# Patient Record
Sex: MALE | Race: BLACK OR AFRICAN AMERICAN | Employment: OTHER | URBAN - METROPOLITAN AREA
[De-identification: names, ages, dates, MRNs, and addresses within clinical notes are randomized per-mention and may not be internally consistent; named-entity substitution may affect disease eponyms.]

---

## 2019-01-06 ENCOUNTER — APPOINTMENT (OUTPATIENT)
Dept: CT IMAGING | Age: 69
DRG: 435 | End: 2019-01-06
Payer: MEDICARE

## 2019-01-06 ENCOUNTER — HOSPITAL ENCOUNTER (INPATIENT)
Age: 69
LOS: 3 days | Discharge: HOME | DRG: 435 | End: 2019-01-10
Attending: EMERGENCY MEDICINE | Admitting: INTERNAL MEDICINE
Payer: MEDICARE

## 2019-01-06 ENCOUNTER — APPOINTMENT (OUTPATIENT)
Dept: ULTRASOUND IMAGING | Age: 69
DRG: 435 | End: 2019-01-06
Payer: MEDICARE

## 2019-01-06 ENCOUNTER — APPOINTMENT (OUTPATIENT)
Dept: GENERAL RADIOLOGY | Age: 69
DRG: 435 | End: 2019-01-06
Payer: MEDICARE

## 2019-01-06 DIAGNOSIS — E80.6 HYPERBILIRUBINEMIA: ICD-10-CM

## 2019-01-06 DIAGNOSIS — R17 JAUNDICE: Primary | ICD-10-CM

## 2019-01-06 LAB
ACETAMINOPHEN LEVEL: <5 MCG/ML (ref 10–30)
ALBUMIN SERPL-MCNC: 3.3 G/DL (ref 3.5–5.2)
ALP BLD-CCNC: 752 U/L (ref 40–129)
ALT SERPL-CCNC: 290 U/L (ref 0–40)
AMMONIA: 39 UMOL/L (ref 16–60)
AMPHETAMINE SCREEN, URINE: NOT DETECTED
ANION GAP SERPL CALCULATED.3IONS-SCNC: 14 MMOL/L (ref 7–16)
ANION GAP SERPL CALCULATED.3IONS-SCNC: 15 MMOL/L (ref 7–16)
ANISOCYTOSIS: ABNORMAL
APTT: 28.8 SEC (ref 24.5–35.1)
AST SERPL-CCNC: 389 U/L (ref 0–39)
BARBITURATE SCREEN URINE: NOT DETECTED
BASOPHILS ABSOLUTE: 0.11 E9/L (ref 0–0.2)
BASOPHILS RELATIVE PERCENT: 1.7 % (ref 0–2)
BENZODIAZEPINE SCREEN, URINE: NOT DETECTED
BILIRUB SERPL-MCNC: 22 MG/DL (ref 0–1.2)
BILIRUBIN DIRECT: 13.7 MG/DL (ref 0–0.3)
BILIRUBIN, INDIRECT: 8.3 MG/DL (ref 0–1)
BUN BLDV-MCNC: 10 MG/DL (ref 8–23)
BUN BLDV-MCNC: 12 MG/DL (ref 8–23)
CALCIUM SERPL-MCNC: 9 MG/DL (ref 8.6–10.2)
CALCIUM SERPL-MCNC: 9.1 MG/DL (ref 8.6–10.2)
CANNABINOID SCREEN URINE: POSITIVE
CEA: 3.6 NG/ML (ref 0–5.2)
CHLORIDE BLD-SCNC: 92 MMOL/L (ref 98–107)
CHLORIDE BLD-SCNC: 95 MMOL/L (ref 98–107)
CHLORIDE URINE RANDOM: 60 MMOL/L
CO2: 19 MMOL/L (ref 22–29)
CO2: 21 MMOL/L (ref 22–29)
COCAINE METABOLITE SCREEN URINE: NOT DETECTED
CREAT SERPL-MCNC: 0.7 MG/DL (ref 0.7–1.2)
CREAT SERPL-MCNC: 1.1 MG/DL (ref 0.7–1.2)
CREATININE URINE: 70 MG/DL (ref 40–278)
EKG ATRIAL RATE: 79 BPM
EKG P AXIS: 49 DEGREES
EKG P-R INTERVAL: 120 MS
EKG Q-T INTERVAL: 440 MS
EKG QRS DURATION: 140 MS
EKG QTC CALCULATION (BAZETT): 504 MS
EKG R AXIS: -58 DEGREES
EKG T AXIS: 7 DEGREES
EKG VENTRICULAR RATE: 79 BPM
EOSINOPHILS ABSOLUTE: 0 E9/L (ref 0.05–0.5)
EOSINOPHILS RELATIVE PERCENT: 1.1 % (ref 0–6)
ETHANOL: 25 MG/DL (ref 0–0.08)
GFR AFRICAN AMERICAN: >60
GFR AFRICAN AMERICAN: >60
GFR NON-AFRICAN AMERICAN: >60 ML/MIN/1.73
GFR NON-AFRICAN AMERICAN: >60 ML/MIN/1.73
GLUCOSE BLD-MCNC: 157 MG/DL (ref 74–99)
GLUCOSE BLD-MCNC: 276 MG/DL (ref 74–99)
HCT VFR BLD CALC: 28.7 % (ref 37–54)
HEMOGLOBIN: 9.8 G/DL (ref 12.5–16.5)
HYPOCHROMIA: ABNORMAL
INR BLD: 1
LACTIC ACID: 1.4 MMOL/L (ref 0.5–2.2)
LIPASE: 79 U/L (ref 13–60)
LYMPHOCYTES ABSOLUTE: 0.78 E9/L (ref 1.5–4)
LYMPHOCYTES RELATIVE PERCENT: 12.2 % (ref 20–42)
MCH RBC QN AUTO: 28.9 PG (ref 26–35)
MCHC RBC AUTO-ENTMCNC: 34.1 % (ref 32–34.5)
MCV RBC AUTO: 84.7 FL (ref 80–99.9)
METHADONE SCREEN, URINE: NOT DETECTED
MONOCYTES ABSOLUTE: 0.26 E9/L (ref 0.1–0.95)
MONOCYTES RELATIVE PERCENT: 3.5 % (ref 2–12)
MYELOCYTE PERCENT: 0.9 % (ref 0–0)
NEUTROPHILS ABSOLUTE: 5.4 E9/L (ref 1.8–7.3)
NEUTROPHILS RELATIVE PERCENT: 81.7 % (ref 43–80)
OPIATE SCREEN URINE: NOT DETECTED
OSMOLALITY URINE: 401 MOSM/KG (ref 300–900)
OSMOLALITY: 300 MOSM/KG (ref 285–310)
PDW BLD-RTO: 22.2 FL (ref 11.5–15)
PHENCYCLIDINE SCREEN URINE: NOT DETECTED
PLATELET # BLD: 348 E9/L (ref 130–450)
PMV BLD AUTO: 12.5 FL (ref 7–12)
POIKILOCYTES: ABNORMAL
POTASSIUM SERPL-SCNC: 4.3 MMOL/L (ref 3.5–5)
POTASSIUM SERPL-SCNC: 4.3 MMOL/L (ref 3.5–5)
POTASSIUM, UR: 30.4 MMOL/L
PROPOXYPHENE SCREEN: NOT DETECTED
PROTHROMBIN TIME: 11.8 SEC (ref 9.3–12.4)
RBC # BLD: 3.39 E12/L (ref 3.8–5.8)
SALICYLATE, SERUM: <0.3 MG/DL (ref 0–30)
SODIUM BLD-SCNC: 128 MMOL/L (ref 132–146)
SODIUM BLD-SCNC: 128 MMOL/L (ref 132–146)
SODIUM URINE: 49 MMOL/L
TARGET CELLS: ABNORMAL
TOTAL PROTEIN: 7 G/DL (ref 6.4–8.3)
TRICYCLIC ANTIDEPRESSANTS SCREEN SERUM: NEGATIVE NG/ML
TSH SERPL DL<=0.05 MIU/L-ACNC: 1.01 UIU/ML (ref 0.27–4.2)
UREA NITROGEN, UR: 363 MG/DL (ref 800–1666)
VITAMIN B-12: 1477 PG/ML (ref 211–946)
WBC # BLD: 6.5 E9/L (ref 4.5–11.5)

## 2019-01-06 PROCEDURE — 6360000002 HC RX W HCPCS: Performed by: INTERNAL MEDICINE

## 2019-01-06 PROCEDURE — G0480 DRUG TEST DEF 1-7 CLASSES: HCPCS

## 2019-01-06 PROCEDURE — 86316 IMMUNOASSAY TUMOR OTHER: CPT

## 2019-01-06 PROCEDURE — 76705 ECHO EXAM OF ABDOMEN: CPT

## 2019-01-06 PROCEDURE — 86703 HIV-1/HIV-2 1 RESULT ANTBDY: CPT

## 2019-01-06 PROCEDURE — 2580000003 HC RX 258: Performed by: EMERGENCY MEDICINE

## 2019-01-06 PROCEDURE — 83935 ASSAY OF URINE OSMOLALITY: CPT

## 2019-01-06 PROCEDURE — 82436 ASSAY OF URINE CHLORIDE: CPT

## 2019-01-06 PROCEDURE — G0378 HOSPITAL OBSERVATION PER HR: HCPCS

## 2019-01-06 PROCEDURE — 84443 ASSAY THYROID STIM HORMONE: CPT

## 2019-01-06 PROCEDURE — 82140 ASSAY OF AMMONIA: CPT

## 2019-01-06 PROCEDURE — 94761 N-INVAS EAR/PLS OXIMETRY MLT: CPT

## 2019-01-06 PROCEDURE — 82607 VITAMIN B-12: CPT

## 2019-01-06 PROCEDURE — 84540 ASSAY OF URINE/UREA-N: CPT

## 2019-01-06 PROCEDURE — 80048 BASIC METABOLIC PNL TOTAL CA: CPT

## 2019-01-06 PROCEDURE — 84133 ASSAY OF URINE POTASSIUM: CPT

## 2019-01-06 PROCEDURE — 85610 PROTHROMBIN TIME: CPT

## 2019-01-06 PROCEDURE — 99223 1ST HOSP IP/OBS HIGH 75: CPT | Performed by: SURGERY

## 2019-01-06 PROCEDURE — 83930 ASSAY OF BLOOD OSMOLALITY: CPT

## 2019-01-06 PROCEDURE — 99285 EMERGENCY DEPT VISIT HI MDM: CPT

## 2019-01-06 PROCEDURE — 80076 HEPATIC FUNCTION PANEL: CPT

## 2019-01-06 PROCEDURE — 83605 ASSAY OF LACTIC ACID: CPT

## 2019-01-06 PROCEDURE — 74175 CTA ABDOMEN W/CONTRAST: CPT

## 2019-01-06 PROCEDURE — 6360000004 HC RX CONTRAST MEDICATION: Performed by: RADIOLOGY

## 2019-01-06 PROCEDURE — 80074 ACUTE HEPATITIS PANEL: CPT

## 2019-01-06 PROCEDURE — 85730 THROMBOPLASTIN TIME PARTIAL: CPT

## 2019-01-06 PROCEDURE — 36415 COLL VENOUS BLD VENIPUNCTURE: CPT

## 2019-01-06 PROCEDURE — 85025 COMPLETE CBC W/AUTO DIFF WBC: CPT

## 2019-01-06 PROCEDURE — 84425 ASSAY OF VITAMIN B-1: CPT

## 2019-01-06 PROCEDURE — 93005 ELECTROCARDIOGRAM TRACING: CPT | Performed by: INTERNAL MEDICINE

## 2019-01-06 PROCEDURE — 86301 IMMUNOASSAY TUMOR CA 19-9: CPT

## 2019-01-06 PROCEDURE — 84300 ASSAY OF URINE SODIUM: CPT

## 2019-01-06 PROCEDURE — 2580000003 HC RX 258: Performed by: INTERNAL MEDICINE

## 2019-01-06 PROCEDURE — 2500000003 HC RX 250 WO HCPCS: Performed by: INTERNAL MEDICINE

## 2019-01-06 PROCEDURE — 71045 X-RAY EXAM CHEST 1 VIEW: CPT

## 2019-01-06 PROCEDURE — 82570 ASSAY OF URINE CREATININE: CPT

## 2019-01-06 PROCEDURE — 83690 ASSAY OF LIPASE: CPT

## 2019-01-06 PROCEDURE — 80307 DRUG TEST PRSMV CHEM ANLYZR: CPT

## 2019-01-06 PROCEDURE — 82378 CARCINOEMBRYONIC ANTIGEN: CPT

## 2019-01-06 PROCEDURE — 96372 THER/PROPH/DIAG INJ SC/IM: CPT

## 2019-01-06 RX ORDER — LORAZEPAM 2 MG/ML
2 INJECTION INTRAMUSCULAR
Status: DISCONTINUED | OUTPATIENT
Start: 2019-01-06 | End: 2019-01-11 | Stop reason: HOSPADM

## 2019-01-06 RX ORDER — SODIUM CHLORIDE 0.9 % (FLUSH) 0.9 %
10 SYRINGE (ML) INJECTION EVERY 12 HOURS SCHEDULED
Status: DISCONTINUED | OUTPATIENT
Start: 2019-01-06 | End: 2019-01-11 | Stop reason: HOSPADM

## 2019-01-06 RX ORDER — SODIUM CHLORIDE 0.9 % (FLUSH) 0.9 %
10 SYRINGE (ML) INJECTION PRN
Status: DISCONTINUED | OUTPATIENT
Start: 2019-01-06 | End: 2019-01-06 | Stop reason: SDUPTHER

## 2019-01-06 RX ORDER — LORAZEPAM 2 MG/ML
3 INJECTION INTRAMUSCULAR
Status: DISCONTINUED | OUTPATIENT
Start: 2019-01-06 | End: 2019-01-11 | Stop reason: HOSPADM

## 2019-01-06 RX ORDER — ONDANSETRON 2 MG/ML
4 INJECTION INTRAMUSCULAR; INTRAVENOUS EVERY 6 HOURS PRN
Status: DISCONTINUED | OUTPATIENT
Start: 2019-01-06 | End: 2019-01-11 | Stop reason: HOSPADM

## 2019-01-06 RX ORDER — LORAZEPAM 1 MG/1
4 TABLET ORAL
Status: DISCONTINUED | OUTPATIENT
Start: 2019-01-06 | End: 2019-01-11 | Stop reason: HOSPADM

## 2019-01-06 RX ORDER — LORAZEPAM 1 MG/1
3 TABLET ORAL
Status: DISCONTINUED | OUTPATIENT
Start: 2019-01-06 | End: 2019-01-11 | Stop reason: HOSPADM

## 2019-01-06 RX ORDER — LORAZEPAM 1 MG/1
2 TABLET ORAL
Status: DISCONTINUED | OUTPATIENT
Start: 2019-01-06 | End: 2019-01-11 | Stop reason: HOSPADM

## 2019-01-06 RX ORDER — LORAZEPAM 2 MG/ML
1 INJECTION INTRAMUSCULAR
Status: DISCONTINUED | OUTPATIENT
Start: 2019-01-06 | End: 2019-01-11 | Stop reason: HOSPADM

## 2019-01-06 RX ORDER — LORAZEPAM 2 MG/ML
4 INJECTION INTRAMUSCULAR
Status: DISCONTINUED | OUTPATIENT
Start: 2019-01-06 | End: 2019-01-11 | Stop reason: HOSPADM

## 2019-01-06 RX ORDER — SODIUM CHLORIDE 0.9 % (FLUSH) 0.9 %
10 SYRINGE (ML) INJECTION EVERY 12 HOURS SCHEDULED
Status: DISCONTINUED | OUTPATIENT
Start: 2019-01-06 | End: 2019-01-06 | Stop reason: SDUPTHER

## 2019-01-06 RX ORDER — LORAZEPAM 1 MG/1
1 TABLET ORAL
Status: DISCONTINUED | OUTPATIENT
Start: 2019-01-06 | End: 2019-01-11 | Stop reason: HOSPADM

## 2019-01-06 RX ORDER — HEPARIN SODIUM 10000 [USP'U]/ML
5000 INJECTION, SOLUTION INTRAVENOUS; SUBCUTANEOUS 3 TIMES DAILY
Status: DISCONTINUED | OUTPATIENT
Start: 2019-01-06 | End: 2019-01-11 | Stop reason: HOSPADM

## 2019-01-06 RX ORDER — SODIUM CHLORIDE 0.9 % (FLUSH) 0.9 %
10 SYRINGE (ML) INJECTION PRN
Status: DISCONTINUED | OUTPATIENT
Start: 2019-01-06 | End: 2019-01-11 | Stop reason: HOSPADM

## 2019-01-06 RX ORDER — SODIUM CHLORIDE 9 MG/ML
INJECTION, SOLUTION INTRAVENOUS CONTINUOUS
Status: DISCONTINUED | OUTPATIENT
Start: 2019-01-06 | End: 2019-01-11 | Stop reason: HOSPADM

## 2019-01-06 RX ADMIN — HEPARIN SODIUM 5000 UNITS: 10000 INJECTION INTRAVENOUS; SUBCUTANEOUS at 20:40

## 2019-01-06 RX ADMIN — FOLIC ACID: 5 INJECTION, SOLUTION INTRAMUSCULAR; INTRAVENOUS; SUBCUTANEOUS at 16:16

## 2019-01-06 RX ADMIN — IOPAMIDOL 90 ML: 755 INJECTION, SOLUTION INTRAVENOUS at 10:08

## 2019-01-06 RX ADMIN — Medication 10 ML: at 20:41

## 2019-01-06 RX ADMIN — SODIUM CHLORIDE: 9 INJECTION, SOLUTION INTRAVENOUS at 13:38

## 2019-01-06 RX ADMIN — SODIUM CHLORIDE: 9 INJECTION, SOLUTION INTRAVENOUS at 06:40

## 2019-01-06 ASSESSMENT — ENCOUNTER SYMPTOMS
EYE PAIN: 0
PHOTOPHOBIA: 0
VOMITING: 0
ABDOMINAL PAIN: 0
ANAL BLEEDING: 0
NAUSEA: 0
DIARRHEA: 0
EYE ITCHING: 1
EYE DISCHARGE: 0
SINUS PRESSURE: 0
SHORTNESS OF BREATH: 0
CHOKING: 0
SORE THROAT: 0
BLOOD IN STOOL: 0
WHEEZING: 0
COUGH: 0
RECTAL PAIN: 0
RHINORRHEA: 0
ABDOMINAL DISTENTION: 0
CONSTIPATION: 0
COLOR CHANGE: 1
SINUS PAIN: 0
FACIAL SWELLING: 0
EYE REDNESS: 0
APNEA: 0
BACK PAIN: 0
CHEST TIGHTNESS: 0

## 2019-01-06 ASSESSMENT — PAIN SCALES - GENERAL
PAINLEVEL_OUTOF10: 0
PAINLEVEL_OUTOF10: 0

## 2019-01-07 ENCOUNTER — APPOINTMENT (OUTPATIENT)
Dept: CT IMAGING | Age: 69
DRG: 435 | End: 2019-01-07
Payer: MEDICARE

## 2019-01-07 PROBLEM — K83.1 OBSTRUCTIVE JAUNDICE: Status: ACTIVE | Noted: 2019-01-07

## 2019-01-07 LAB
ALBUMIN SERPL-MCNC: 2.8 G/DL (ref 3.5–5.2)
ALP BLD-CCNC: 666 U/L (ref 40–129)
ALT SERPL-CCNC: 261 U/L (ref 0–40)
ANION GAP SERPL CALCULATED.3IONS-SCNC: 11 MMOL/L (ref 7–16)
ANION GAP SERPL CALCULATED.3IONS-SCNC: 16 MMOL/L (ref 7–16)
AST SERPL-CCNC: 298 U/L (ref 0–39)
BILIRUB SERPL-MCNC: 19.9 MG/DL (ref 0–1.2)
BUN BLDV-MCNC: 13 MG/DL (ref 8–23)
BUN BLDV-MCNC: 14 MG/DL (ref 8–23)
CALCIUM SERPL-MCNC: 8.8 MG/DL (ref 8.6–10.2)
CALCIUM SERPL-MCNC: 8.8 MG/DL (ref 8.6–10.2)
CHLORIDE BLD-SCNC: 96 MMOL/L (ref 98–107)
CHLORIDE BLD-SCNC: 97 MMOL/L (ref 98–107)
CO2: 18 MMOL/L (ref 22–29)
CO2: 21 MMOL/L (ref 22–29)
CREAT SERPL-MCNC: 0.8 MG/DL (ref 0.7–1.2)
CREAT SERPL-MCNC: 0.8 MG/DL (ref 0.7–1.2)
GFR AFRICAN AMERICAN: >60
GFR AFRICAN AMERICAN: >60
GFR NON-AFRICAN AMERICAN: >60 ML/MIN/1.73
GFR NON-AFRICAN AMERICAN: >60 ML/MIN/1.73
GLUCOSE BLD-MCNC: 147 MG/DL (ref 74–99)
GLUCOSE BLD-MCNC: 153 MG/DL (ref 74–99)
HAV IGM SER IA-ACNC: NORMAL
HCT VFR BLD CALC: 28.5 % (ref 37–54)
HEMOGLOBIN: 10 G/DL (ref 12.5–16.5)
HEPATITIS B CORE IGM ANTIBODY: NORMAL
HEPATITIS B SURFACE ANTIGEN INTERPRETATION: NORMAL
HEPATITIS C ANTIBODY INTERPRETATION: NORMAL
HIV-1 AND HIV-2 ANTIBODIES: NORMAL
MCH RBC QN AUTO: 29.9 PG (ref 26–35)
MCHC RBC AUTO-ENTMCNC: 35.1 % (ref 32–34.5)
MCV RBC AUTO: 85.3 FL (ref 80–99.9)
PDW BLD-RTO: 22.9 FL (ref 11.5–15)
PLATELET # BLD: 313 E9/L (ref 130–450)
PMV BLD AUTO: 11.3 FL (ref 7–12)
POTASSIUM SERPL-SCNC: 3.6 MMOL/L (ref 3.5–5)
POTASSIUM SERPL-SCNC: 4 MMOL/L (ref 3.5–5)
RBC # BLD: 3.34 E12/L (ref 3.8–5.8)
SODIUM BLD-SCNC: 128 MMOL/L (ref 132–146)
SODIUM BLD-SCNC: 131 MMOL/L (ref 132–146)
TOTAL PROTEIN: 6.4 G/DL (ref 6.4–8.3)
WBC # BLD: 6.4 E9/L (ref 4.5–11.5)

## 2019-01-07 PROCEDURE — 2580000003 HC RX 258: Performed by: TRANSPLANT SURGERY

## 2019-01-07 PROCEDURE — 6360000002 HC RX W HCPCS: Performed by: INTERNAL MEDICINE

## 2019-01-07 PROCEDURE — 6370000000 HC RX 637 (ALT 250 FOR IP): Performed by: INTERNAL MEDICINE

## 2019-01-07 PROCEDURE — 71260 CT THORAX DX C+: CPT

## 2019-01-07 PROCEDURE — 80053 COMPREHEN METABOLIC PANEL: CPT

## 2019-01-07 PROCEDURE — 2580000003 HC RX 258: Performed by: INTERNAL MEDICINE

## 2019-01-07 PROCEDURE — 2500000003 HC RX 250 WO HCPCS: Performed by: TRANSPLANT SURGERY

## 2019-01-07 PROCEDURE — 36415 COLL VENOUS BLD VENIPUNCTURE: CPT

## 2019-01-07 PROCEDURE — 99221 1ST HOSP IP/OBS SF/LOW 40: CPT | Performed by: EMERGENCY MEDICINE

## 2019-01-07 PROCEDURE — 80048 BASIC METABOLIC PNL TOTAL CA: CPT

## 2019-01-07 PROCEDURE — 85027 COMPLETE CBC AUTOMATED: CPT

## 2019-01-07 PROCEDURE — 93010 ELECTROCARDIOGRAM REPORT: CPT | Performed by: INTERNAL MEDICINE

## 2019-01-07 PROCEDURE — 6360000004 HC RX CONTRAST MEDICATION: Performed by: RADIOLOGY

## 2019-01-07 PROCEDURE — 99223 1ST HOSP IP/OBS HIGH 75: CPT | Performed by: INTERNAL MEDICINE

## 2019-01-07 PROCEDURE — 2500000003 HC RX 250 WO HCPCS: Performed by: INTERNAL MEDICINE

## 2019-01-07 PROCEDURE — 6360000002 HC RX W HCPCS: Performed by: TRANSPLANT SURGERY

## 2019-01-07 PROCEDURE — 1200000000 HC SEMI PRIVATE

## 2019-01-07 RX ORDER — LISINOPRIL 5 MG/1
5 TABLET ORAL DAILY
Status: DISCONTINUED | OUTPATIENT
Start: 2019-01-07 | End: 2019-01-07

## 2019-01-07 RX ORDER — PRAVASTATIN SODIUM 20 MG
80 TABLET ORAL NIGHTLY
Status: DISCONTINUED | OUTPATIENT
Start: 2019-01-07 | End: 2019-01-11 | Stop reason: HOSPADM

## 2019-01-07 RX ORDER — GABAPENTIN 100 MG/1
100 CAPSULE ORAL 2 TIMES DAILY PRN
Status: DISCONTINUED | OUTPATIENT
Start: 2019-01-07 | End: 2019-01-11 | Stop reason: HOSPADM

## 2019-01-07 RX ORDER — LISINOPRIL 5 MG/1
5 TABLET ORAL ONCE
Status: COMPLETED | OUTPATIENT
Start: 2019-01-07 | End: 2019-01-07

## 2019-01-07 RX ORDER — METOPROLOL SUCCINATE 50 MG/1
50 TABLET, EXTENDED RELEASE ORAL DAILY
Status: DISCONTINUED | OUTPATIENT
Start: 2019-01-07 | End: 2019-01-11 | Stop reason: HOSPADM

## 2019-01-07 RX ORDER — LISINOPRIL 20 MG/1
20 TABLET ORAL DAILY
Status: DISCONTINUED | OUTPATIENT
Start: 2019-01-08 | End: 2019-01-11 | Stop reason: HOSPADM

## 2019-01-07 RX ADMIN — IOPAMIDOL 90 ML: 755 INJECTION, SOLUTION INTRAVENOUS at 15:36

## 2019-01-07 RX ADMIN — HEPARIN SODIUM 5000 UNITS: 10000 INJECTION INTRAVENOUS; SUBCUTANEOUS at 21:12

## 2019-01-07 RX ADMIN — METRONIDAZOLE 500 MG: 500 INJECTION, SOLUTION INTRAVENOUS at 13:45

## 2019-01-07 RX ADMIN — LISINOPRIL 5 MG: 5 TABLET ORAL at 21:12

## 2019-01-07 RX ADMIN — METOPROLOL SUCCINATE 50 MG: 50 TABLET, FILM COATED, EXTENDED RELEASE ORAL at 10:32

## 2019-01-07 RX ADMIN — WATER 2 G: 1 INJECTION INTRAMUSCULAR; INTRAVENOUS; SUBCUTANEOUS at 17:03

## 2019-01-07 RX ADMIN — LISINOPRIL 5 MG: 5 TABLET ORAL at 10:32

## 2019-01-07 RX ADMIN — FOLIC ACID: 5 INJECTION, SOLUTION INTRAMUSCULAR; INTRAVENOUS; SUBCUTANEOUS at 08:33

## 2019-01-07 RX ADMIN — PRAVASTATIN SODIUM 80 MG: 20 TABLET ORAL at 21:13

## 2019-01-07 RX ADMIN — HEPARIN SODIUM 5000 UNITS: 10000 INJECTION INTRAVENOUS; SUBCUTANEOUS at 08:33

## 2019-01-07 RX ADMIN — HEPARIN SODIUM 5000 UNITS: 10000 INJECTION INTRAVENOUS; SUBCUTANEOUS at 13:41

## 2019-01-07 ASSESSMENT — PAIN SCALES - GENERAL
PAINLEVEL_OUTOF10: 0
PAINLEVEL_OUTOF10: 0

## 2019-01-08 PROBLEM — K83.1 OBSTRUCTIVE JAUNDICE DUE TO MALIGNANT NEOPLASM (HCC): Status: ACTIVE | Noted: 2019-01-08

## 2019-01-08 PROBLEM — E44.0 MODERATE PROTEIN-CALORIE MALNUTRITION (HCC): Chronic | Status: ACTIVE | Noted: 2019-01-08

## 2019-01-08 PROBLEM — C80.1 OBSTRUCTIVE JAUNDICE DUE TO MALIGNANT NEOPLASM (HCC): Status: ACTIVE | Noted: 2019-01-08

## 2019-01-08 LAB
ALBUMIN SERPL-MCNC: 2.5 G/DL (ref 3.5–5.2)
ALP BLD-CCNC: 584 U/L (ref 40–129)
ALT SERPL-CCNC: 211 U/L (ref 0–40)
ANION GAP SERPL CALCULATED.3IONS-SCNC: 12 MMOL/L (ref 7–16)
AST SERPL-CCNC: 226 U/L (ref 0–39)
BILIRUB SERPL-MCNC: 17.8 MG/DL (ref 0–1.2)
BUN BLDV-MCNC: 13 MG/DL (ref 8–23)
CA 19-9: 239 U/ML (ref 0–37)
CALCIUM SERPL-MCNC: 8.4 MG/DL (ref 8.6–10.2)
CHLORIDE BLD-SCNC: 97 MMOL/L (ref 98–107)
CHLORIDE URINE RANDOM: 94 MMOL/L
CO2: 20 MMOL/L (ref 22–29)
CREAT SERPL-MCNC: 0.8 MG/DL (ref 0.7–1.2)
GFR AFRICAN AMERICAN: >60
GFR NON-AFRICAN AMERICAN: >60 ML/MIN/1.73
GLUCOSE BLD-MCNC: 135 MG/DL (ref 74–99)
INR BLD: 1
OSMOLALITY URINE: 663 MOSM/KG (ref 300–900)
OSMOLALITY: 287 MOSM/KG (ref 285–310)
POTASSIUM SERPL-SCNC: 3.7 MMOL/L (ref 3.5–5)
POTASSIUM, UR: 34.3 MMOL/L
PROTHROMBIN TIME: 11.4 SEC (ref 9.3–12.4)
SODIUM BLD-SCNC: 129 MMOL/L (ref 132–146)
SODIUM URINE: 86 MMOL/L
TOTAL PROTEIN: 5.7 G/DL (ref 6.4–8.3)

## 2019-01-08 PROCEDURE — 1200000000 HC SEMI PRIVATE

## 2019-01-08 PROCEDURE — 84300 ASSAY OF URINE SODIUM: CPT

## 2019-01-08 PROCEDURE — 6370000000 HC RX 637 (ALT 250 FOR IP): Performed by: INTERNAL MEDICINE

## 2019-01-08 PROCEDURE — 36415 COLL VENOUS BLD VENIPUNCTURE: CPT

## 2019-01-08 PROCEDURE — 2580000003 HC RX 258: Performed by: TRANSPLANT SURGERY

## 2019-01-08 PROCEDURE — 80053 COMPREHEN METABOLIC PANEL: CPT

## 2019-01-08 PROCEDURE — 2500000003 HC RX 250 WO HCPCS: Performed by: TRANSPLANT SURGERY

## 2019-01-08 PROCEDURE — 99232 SBSQ HOSP IP/OBS MODERATE 35: CPT | Performed by: TRANSPLANT SURGERY

## 2019-01-08 PROCEDURE — 83935 ASSAY OF URINE OSMOLALITY: CPT

## 2019-01-08 PROCEDURE — 6370000000 HC RX 637 (ALT 250 FOR IP): Performed by: EMERGENCY MEDICINE

## 2019-01-08 PROCEDURE — 85610 PROTHROMBIN TIME: CPT

## 2019-01-08 PROCEDURE — 2500000003 HC RX 250 WO HCPCS: Performed by: INTERNAL MEDICINE

## 2019-01-08 PROCEDURE — 99231 SBSQ HOSP IP/OBS SF/LOW 25: CPT | Performed by: PHYSICIAN ASSISTANT

## 2019-01-08 PROCEDURE — 6360000002 HC RX W HCPCS: Performed by: INTERNAL MEDICINE

## 2019-01-08 PROCEDURE — 2580000003 HC RX 258: Performed by: EMERGENCY MEDICINE

## 2019-01-08 PROCEDURE — 2580000003 HC RX 258: Performed by: INTERNAL MEDICINE

## 2019-01-08 PROCEDURE — 83930 ASSAY OF BLOOD OSMOLALITY: CPT

## 2019-01-08 PROCEDURE — 6360000002 HC RX W HCPCS: Performed by: TRANSPLANT SURGERY

## 2019-01-08 PROCEDURE — 82436 ASSAY OF URINE CHLORIDE: CPT

## 2019-01-08 PROCEDURE — 84133 ASSAY OF URINE POTASSIUM: CPT

## 2019-01-08 PROCEDURE — 99232 SBSQ HOSP IP/OBS MODERATE 35: CPT | Performed by: INTERNAL MEDICINE

## 2019-01-08 RX ORDER — PANTOPRAZOLE SODIUM 40 MG/1
40 TABLET, DELAYED RELEASE ORAL
Status: DISCONTINUED | OUTPATIENT
Start: 2019-01-08 | End: 2019-01-11 | Stop reason: HOSPADM

## 2019-01-08 RX ADMIN — METRONIDAZOLE 500 MG: 500 INJECTION, SOLUTION INTRAVENOUS at 13:36

## 2019-01-08 RX ADMIN — WATER 2 G: 1 INJECTION INTRAMUSCULAR; INTRAVENOUS; SUBCUTANEOUS at 13:36

## 2019-01-08 RX ADMIN — HEPARIN SODIUM 5000 UNITS: 10000 INJECTION INTRAVENOUS; SUBCUTANEOUS at 13:39

## 2019-01-08 RX ADMIN — METRONIDAZOLE 500 MG: 500 INJECTION, SOLUTION INTRAVENOUS at 05:21

## 2019-01-08 RX ADMIN — LISINOPRIL 20 MG: 20 TABLET ORAL at 08:28

## 2019-01-08 RX ADMIN — SODIUM CHLORIDE: 9 INJECTION, SOLUTION INTRAVENOUS at 23:16

## 2019-01-08 RX ADMIN — PANTOPRAZOLE SODIUM 40 MG: 40 TABLET, DELAYED RELEASE ORAL at 09:44

## 2019-01-08 RX ADMIN — METRONIDAZOLE 500 MG: 500 INJECTION, SOLUTION INTRAVENOUS at 23:09

## 2019-01-08 RX ADMIN — METOPROLOL SUCCINATE 50 MG: 50 TABLET, FILM COATED, EXTENDED RELEASE ORAL at 08:28

## 2019-01-08 RX ADMIN — FOLIC ACID: 5 INJECTION, SOLUTION INTRAMUSCULAR; INTRAVENOUS; SUBCUTANEOUS at 08:30

## 2019-01-08 RX ADMIN — PRAVASTATIN SODIUM 80 MG: 20 TABLET ORAL at 23:08

## 2019-01-08 RX ADMIN — Medication 10 ML: at 23:08

## 2019-01-08 RX ADMIN — HEPARIN SODIUM 5000 UNITS: 10000 INJECTION INTRAVENOUS; SUBCUTANEOUS at 23:25

## 2019-01-08 RX ADMIN — GABAPENTIN 100 MG: 100 CAPSULE ORAL at 16:32

## 2019-01-08 RX ADMIN — HEPARIN SODIUM 5000 UNITS: 10000 INJECTION INTRAVENOUS; SUBCUTANEOUS at 08:32

## 2019-01-08 ASSESSMENT — PAIN SCALES - GENERAL
PAINLEVEL_OUTOF10: 0

## 2019-01-09 LAB
ALBUMIN SERPL-MCNC: 2.5 G/DL (ref 3.5–5.2)
ALP BLD-CCNC: 518 U/L (ref 40–129)
ALT SERPL-CCNC: 185 U/L (ref 0–40)
ANION GAP SERPL CALCULATED.3IONS-SCNC: 12 MMOL/L (ref 7–16)
ANISOCYTOSIS: ABNORMAL
AST SERPL-CCNC: 172 U/L (ref 0–39)
BASOPHILS ABSOLUTE: 0 E9/L (ref 0–0.2)
BASOPHILS RELATIVE PERCENT: 0.4 % (ref 0–2)
BILIRUB SERPL-MCNC: 16.6 MG/DL (ref 0–1.2)
BUN BLDV-MCNC: 11 MG/DL (ref 8–23)
CALCIUM SERPL-MCNC: 8.2 MG/DL (ref 8.6–10.2)
CHLORIDE BLD-SCNC: 97 MMOL/L (ref 98–107)
CHROMOGRANIN A: 88 NG/ML (ref 0–95)
CO2: 19 MMOL/L (ref 22–29)
CORTISOL TOTAL: 18.23 MCG/DL (ref 2.68–18.4)
CREAT SERPL-MCNC: 0.8 MG/DL (ref 0.7–1.2)
EOSINOPHILS ABSOLUTE: 0.13 E9/L (ref 0.05–0.5)
EOSINOPHILS RELATIVE PERCENT: 2.6 % (ref 0–6)
GFR AFRICAN AMERICAN: >60
GFR NON-AFRICAN AMERICAN: >60 ML/MIN/1.73
GLUCOSE BLD-MCNC: 154 MG/DL (ref 74–99)
HCT VFR BLD CALC: 24.6 % (ref 37–54)
HEMOGLOBIN: 8.6 G/DL (ref 12.5–16.5)
HYPOCHROMIA: ABNORMAL
LYMPHOCYTES ABSOLUTE: 0.54 E9/L (ref 1.5–4)
LYMPHOCYTES RELATIVE PERCENT: 10.5 % (ref 20–42)
MCH RBC QN AUTO: 29.6 PG (ref 26–35)
MCHC RBC AUTO-ENTMCNC: 35 % (ref 32–34.5)
MCV RBC AUTO: 84.5 FL (ref 80–99.9)
MONOCYTES ABSOLUTE: 0.29 E9/L (ref 0.1–0.95)
MONOCYTES RELATIVE PERCENT: 6.1 % (ref 2–12)
NEUTROPHILS ABSOLUTE: 3.97 E9/L (ref 1.8–7.3)
NEUTROPHILS RELATIVE PERCENT: 80.7 % (ref 43–80)
PDW BLD-RTO: 22.9 FL (ref 11.5–15)
PLATELET # BLD: 341 E9/L (ref 130–450)
PMV BLD AUTO: 12.1 FL (ref 7–12)
POIKILOCYTES: ABNORMAL
POTASSIUM SERPL-SCNC: 3.8 MMOL/L (ref 3.5–5)
RBC # BLD: 2.91 E12/L (ref 3.8–5.8)
SODIUM BLD-SCNC: 128 MMOL/L (ref 132–146)
TARGET CELLS: ABNORMAL
TOTAL PROTEIN: 5.6 G/DL (ref 6.4–8.3)
URIC ACID, SERUM: 2.8 MG/DL (ref 3.4–7)
WBC # BLD: 4.9 E9/L (ref 4.5–11.5)

## 2019-01-09 PROCEDURE — 82533 TOTAL CORTISOL: CPT

## 2019-01-09 PROCEDURE — 85025 COMPLETE CBC W/AUTO DIFF WBC: CPT

## 2019-01-09 PROCEDURE — 6370000000 HC RX 637 (ALT 250 FOR IP): Performed by: INTERNAL MEDICINE

## 2019-01-09 PROCEDURE — 80053 COMPREHEN METABOLIC PANEL: CPT

## 2019-01-09 PROCEDURE — 1200000000 HC SEMI PRIVATE

## 2019-01-09 PROCEDURE — 84550 ASSAY OF BLOOD/URIC ACID: CPT

## 2019-01-09 PROCEDURE — 99232 SBSQ HOSP IP/OBS MODERATE 35: CPT | Performed by: INTERNAL MEDICINE

## 2019-01-09 PROCEDURE — 2580000003 HC RX 258: Performed by: EMERGENCY MEDICINE

## 2019-01-09 PROCEDURE — 6360000002 HC RX W HCPCS: Performed by: INTERNAL MEDICINE

## 2019-01-09 PROCEDURE — 2500000003 HC RX 250 WO HCPCS: Performed by: TRANSPLANT SURGERY

## 2019-01-09 PROCEDURE — 36415 COLL VENOUS BLD VENIPUNCTURE: CPT

## 2019-01-09 RX ORDER — GABAPENTIN 100 MG/1
100 CAPSULE ORAL 2 TIMES DAILY PRN
Status: CANCELLED | OUTPATIENT
Start: 2019-01-09

## 2019-01-09 RX ORDER — PRAVASTATIN SODIUM 20 MG
80 TABLET ORAL NIGHTLY
Status: CANCELLED | OUTPATIENT
Start: 2019-01-09

## 2019-01-09 RX ORDER — LISINOPRIL 20 MG/1
20 TABLET ORAL DAILY
Status: CANCELLED | OUTPATIENT
Start: 2019-01-09

## 2019-01-09 RX ORDER — PANTOPRAZOLE SODIUM 40 MG/1
40 TABLET, DELAYED RELEASE ORAL
Status: CANCELLED | OUTPATIENT
Start: 2019-01-10

## 2019-01-09 RX ORDER — METOPROLOL SUCCINATE 50 MG/1
50 TABLET, EXTENDED RELEASE ORAL DAILY
Status: CANCELLED | OUTPATIENT
Start: 2019-01-09

## 2019-01-09 RX ADMIN — PRAVASTATIN SODIUM 80 MG: 20 TABLET ORAL at 22:06

## 2019-01-09 RX ADMIN — METRONIDAZOLE 500 MG: 500 INJECTION, SOLUTION INTRAVENOUS at 22:09

## 2019-01-09 RX ADMIN — METRONIDAZOLE 500 MG: 500 INJECTION, SOLUTION INTRAVENOUS at 06:59

## 2019-01-09 RX ADMIN — PANTOPRAZOLE SODIUM 40 MG: 40 TABLET, DELAYED RELEASE ORAL at 06:59

## 2019-01-09 RX ADMIN — HEPARIN SODIUM 5000 UNITS: 10000 INJECTION INTRAVENOUS; SUBCUTANEOUS at 22:11

## 2019-01-09 RX ADMIN — SODIUM CHLORIDE: 9 INJECTION, SOLUTION INTRAVENOUS at 22:07

## 2019-01-10 ENCOUNTER — TELEPHONE (OUTPATIENT)
Dept: HEMATOLOGY | Age: 69
End: 2019-01-10

## 2019-01-10 VITALS
DIASTOLIC BLOOD PRESSURE: 77 MMHG | HEIGHT: 70 IN | HEART RATE: 78 BPM | WEIGHT: 161.6 LBS | SYSTOLIC BLOOD PRESSURE: 157 MMHG | TEMPERATURE: 100.4 F | RESPIRATION RATE: 20 BRPM | OXYGEN SATURATION: 97 % | BODY MASS INDEX: 23.13 KG/M2

## 2019-01-10 LAB
ALBUMIN SERPL-MCNC: 2.6 G/DL (ref 3.5–5.2)
ALP BLD-CCNC: 495 U/L (ref 40–129)
ALT SERPL-CCNC: 170 U/L (ref 0–40)
ANION GAP SERPL CALCULATED.3IONS-SCNC: 11 MMOL/L (ref 7–16)
ANISOCYTOSIS: ABNORMAL
AST SERPL-CCNC: 147 U/L (ref 0–39)
BASOPHILS ABSOLUTE: 0.03 E9/L (ref 0–0.2)
BASOPHILS RELATIVE PERCENT: 0.5 % (ref 0–2)
BILIRUB SERPL-MCNC: 10.7 MG/DL (ref 0–1.2)
BUN BLDV-MCNC: 9 MG/DL (ref 8–23)
CALCIUM SERPL-MCNC: 8.4 MG/DL (ref 8.6–10.2)
CANNABINOIDS CONF, URINE: >500 NG/ML
CHLORIDE BLD-SCNC: 98 MMOL/L (ref 98–107)
CO2: 22 MMOL/L (ref 22–29)
CREAT SERPL-MCNC: 0.8 MG/DL (ref 0.7–1.2)
EOSINOPHILS ABSOLUTE: 0.07 E9/L (ref 0.05–0.5)
EOSINOPHILS RELATIVE PERCENT: 1.2 % (ref 0–6)
GFR AFRICAN AMERICAN: >60
GFR NON-AFRICAN AMERICAN: >60 ML/MIN/1.73
GLUCOSE BLD-MCNC: 148 MG/DL (ref 74–99)
HCT VFR BLD CALC: 28 % (ref 37–54)
HEMOGLOBIN: 9.2 G/DL (ref 12.5–16.5)
HYPOCHROMIA: ABNORMAL
IMMATURE GRANULOCYTES #: 0.07 E9/L
IMMATURE GRANULOCYTES %: 1.2 % (ref 0–5)
LV EF: 53 %
LVEF MODALITY: NORMAL
LYMPHOCYTES ABSOLUTE: 0.6 E9/L (ref 1.5–4)
LYMPHOCYTES RELATIVE PERCENT: 10.2 % (ref 20–42)
MCH RBC QN AUTO: 29.1 PG (ref 26–35)
MCHC RBC AUTO-ENTMCNC: 32.9 % (ref 32–34.5)
MCV RBC AUTO: 88.6 FL (ref 80–99.9)
MONOCYTES ABSOLUTE: 0.55 E9/L (ref 0.1–0.95)
MONOCYTES RELATIVE PERCENT: 9.4 % (ref 2–12)
NEUTROPHILS ABSOLUTE: 4.56 E9/L (ref 1.8–7.3)
NEUTROPHILS RELATIVE PERCENT: 77.5 % (ref 43–80)
PDW BLD-RTO: 24.4 FL (ref 11.5–15)
PLATELET # BLD: 328 E9/L (ref 130–450)
PMV BLD AUTO: 12.1 FL (ref 7–12)
POIKILOCYTES: ABNORMAL
POTASSIUM SERPL-SCNC: 3.7 MMOL/L (ref 3.5–5)
RBC # BLD: 3.16 E12/L (ref 3.8–5.8)
SODIUM BLD-SCNC: 131 MMOL/L (ref 132–146)
TARGET CELLS: ABNORMAL
TOTAL PROTEIN: 5.9 G/DL (ref 6.4–8.3)
VITAMIN B1 WHOLE BLOOD: 73 NMOL/L (ref 70–180)
WBC # BLD: 5.9 E9/L (ref 4.5–11.5)

## 2019-01-10 PROCEDURE — 6370000000 HC RX 637 (ALT 250 FOR IP): Performed by: INTERNAL MEDICINE

## 2019-01-10 PROCEDURE — 85025 COMPLETE CBC W/AUTO DIFF WBC: CPT

## 2019-01-10 PROCEDURE — 2500000003 HC RX 250 WO HCPCS: Performed by: TRANSPLANT SURGERY

## 2019-01-10 PROCEDURE — 6360000002 HC RX W HCPCS: Performed by: TRANSPLANT SURGERY

## 2019-01-10 PROCEDURE — 36415 COLL VENOUS BLD VENIPUNCTURE: CPT

## 2019-01-10 PROCEDURE — 93306 TTE W/DOPPLER COMPLETE: CPT

## 2019-01-10 PROCEDURE — APPNB15 APP NON BILLABLE TIME 0-15 MINS: Performed by: NURSE PRACTITIONER

## 2019-01-10 PROCEDURE — 6370000000 HC RX 637 (ALT 250 FOR IP): Performed by: STUDENT IN AN ORGANIZED HEALTH CARE EDUCATION/TRAINING PROGRAM

## 2019-01-10 PROCEDURE — 99223 1ST HOSP IP/OBS HIGH 75: CPT | Performed by: INTERNAL MEDICINE

## 2019-01-10 PROCEDURE — 6360000002 HC RX W HCPCS: Performed by: INTERNAL MEDICINE

## 2019-01-10 PROCEDURE — 2580000003 HC RX 258: Performed by: TRANSPLANT SURGERY

## 2019-01-10 PROCEDURE — 99232 SBSQ HOSP IP/OBS MODERATE 35: CPT | Performed by: TRANSPLANT SURGERY

## 2019-01-10 PROCEDURE — 99231 SBSQ HOSP IP/OBS SF/LOW 25: CPT | Performed by: INTERNAL MEDICINE

## 2019-01-10 PROCEDURE — APPSS60 APP SPLIT SHARED TIME 46-60 MINUTES: Performed by: NURSE PRACTITIONER

## 2019-01-10 PROCEDURE — 80053 COMPREHEN METABOLIC PANEL: CPT

## 2019-01-10 RX ORDER — LISINOPRIL 20 MG/1
20 TABLET ORAL DAILY
Qty: 30 TABLET | Refills: 3 | Status: ON HOLD | OUTPATIENT
Start: 2019-01-11 | End: 2019-05-10 | Stop reason: HOSPADM

## 2019-01-10 RX ORDER — METRONIDAZOLE 500 MG/1
500 TABLET ORAL 2 TIMES DAILY
Qty: 14 TABLET | Refills: 0 | Status: ON HOLD | OUTPATIENT
Start: 2019-01-10 | End: 2019-01-21 | Stop reason: HOSPADM

## 2019-01-10 RX ORDER — PRAVASTATIN SODIUM 80 MG/1
80 TABLET ORAL NIGHTLY
Qty: 30 TABLET | Refills: 3 | Status: SHIPPED | OUTPATIENT
Start: 2019-01-10 | End: 2019-06-17

## 2019-01-10 RX ORDER — METOPROLOL SUCCINATE 50 MG/1
50 TABLET, EXTENDED RELEASE ORAL DAILY
Qty: 30 TABLET | Refills: 3 | Status: ON HOLD | OUTPATIENT
Start: 2019-01-11 | End: 2019-05-10 | Stop reason: HOSPADM

## 2019-01-10 RX ORDER — CIPROFLOXACIN 500 MG/1
500 TABLET, FILM COATED ORAL ONCE
Status: COMPLETED | OUTPATIENT
Start: 2019-01-10 | End: 2019-01-10

## 2019-01-10 RX ORDER — TAMSULOSIN HYDROCHLORIDE 0.4 MG/1
0.4 CAPSULE ORAL DAILY
Status: DISCONTINUED | OUTPATIENT
Start: 2019-01-10 | End: 2019-01-11 | Stop reason: HOSPADM

## 2019-01-10 RX ORDER — CIPROFLOXACIN 500 MG/1
500 TABLET, FILM COATED ORAL 2 TIMES DAILY
Qty: 14 TABLET | Refills: 0 | Status: ON HOLD | OUTPATIENT
Start: 2019-01-10 | End: 2019-01-21 | Stop reason: HOSPADM

## 2019-01-10 RX ORDER — METRONIDAZOLE 500 MG/1
500 TABLET ORAL ONCE
Status: COMPLETED | OUTPATIENT
Start: 2019-01-10 | End: 2019-01-10

## 2019-01-10 RX ORDER — TAMSULOSIN HYDROCHLORIDE 0.4 MG/1
0.4 CAPSULE ORAL DAILY
Qty: 30 CAPSULE | Refills: 3 | Status: SHIPPED | OUTPATIENT
Start: 2019-01-11

## 2019-01-10 RX ADMIN — METRONIDAZOLE 500 MG: 500 TABLET, FILM COATED ORAL at 22:31

## 2019-01-10 RX ADMIN — TAMSULOSIN HYDROCHLORIDE 0.4 MG: 0.4 CAPSULE ORAL at 14:08

## 2019-01-10 RX ADMIN — PRAVASTATIN SODIUM 80 MG: 20 TABLET ORAL at 21:25

## 2019-01-10 RX ADMIN — LISINOPRIL 20 MG: 20 TABLET ORAL at 09:19

## 2019-01-10 RX ADMIN — HEPARIN SODIUM 5000 UNITS: 10000 INJECTION INTRAVENOUS; SUBCUTANEOUS at 13:25

## 2019-01-10 RX ADMIN — HEPARIN SODIUM 5000 UNITS: 10000 INJECTION INTRAVENOUS; SUBCUTANEOUS at 08:11

## 2019-01-10 RX ADMIN — METRONIDAZOLE 500 MG: 500 INJECTION, SOLUTION INTRAVENOUS at 13:21

## 2019-01-10 RX ADMIN — METRONIDAZOLE 500 MG: 500 INJECTION, SOLUTION INTRAVENOUS at 05:04

## 2019-01-10 RX ADMIN — PANTOPRAZOLE SODIUM 40 MG: 40 TABLET, DELAYED RELEASE ORAL at 06:57

## 2019-01-10 RX ADMIN — CIPROFLOXACIN 500 MG: 500 TABLET, FILM COATED ORAL at 22:31

## 2019-01-10 RX ADMIN — WATER 2 G: 1 INJECTION INTRAMUSCULAR; INTRAVENOUS; SUBCUTANEOUS at 14:08

## 2019-01-10 RX ADMIN — METOPROLOL SUCCINATE 50 MG: 50 TABLET, FILM COATED, EXTENDED RELEASE ORAL at 09:19

## 2019-01-10 RX ADMIN — HEPARIN SODIUM 5000 UNITS: 10000 INJECTION INTRAVENOUS; SUBCUTANEOUS at 21:26

## 2019-01-10 ASSESSMENT — PAIN SCALES - GENERAL
PAINLEVEL_OUTOF10: 0

## 2019-01-15 ENCOUNTER — ANESTHESIA EVENT (OUTPATIENT)
Dept: OPERATING ROOM | Age: 69
DRG: 414 | End: 2019-01-15
Payer: MEDICARE

## 2019-01-15 ENCOUNTER — HOSPITAL ENCOUNTER (OUTPATIENT)
Dept: PREADMISSION TESTING | Age: 69
Setting detail: SURGERY ADMIT
Discharge: HOME | DRG: 414 | End: 2019-01-15
Payer: MEDICARE

## 2019-01-15 VITALS
RESPIRATION RATE: 20 BRPM | HEART RATE: 82 BPM | WEIGHT: 140 LBS | HEIGHT: 70 IN | SYSTOLIC BLOOD PRESSURE: 138 MMHG | TEMPERATURE: 98.9 F | DIASTOLIC BLOOD PRESSURE: 64 MMHG | OXYGEN SATURATION: 97 % | BODY MASS INDEX: 20.04 KG/M2

## 2019-01-15 DIAGNOSIS — C24.1 AMPULLARY CARCINOMA (HCC): ICD-10-CM

## 2019-01-15 DIAGNOSIS — Z01.812 PRE-OPERATIVE LABORATORY EXAMINATION: Primary | ICD-10-CM

## 2019-01-15 LAB
ABO/RH: NORMAL
ALBUMIN SERPL-MCNC: 3.4 G/DL (ref 3.5–5.2)
ALP BLD-CCNC: 344 U/L (ref 40–129)
ALT SERPL-CCNC: 167 U/L (ref 0–40)
ANION GAP SERPL CALCULATED.3IONS-SCNC: 12 MMOL/L (ref 7–16)
ANISOCYTOSIS: ABNORMAL
ANTIBODY SCREEN: NORMAL
AST SERPL-CCNC: 222 U/L (ref 0–39)
BASOPHILIC STIPPLING: ABNORMAL
BASOPHILS ABSOLUTE: 0 E9/L (ref 0–0.2)
BASOPHILS RELATIVE PERCENT: 0.2 % (ref 0–2)
BILIRUB SERPL-MCNC: 6.6 MG/DL (ref 0–1.2)
BUN BLDV-MCNC: 9 MG/DL (ref 8–23)
CALCIUM SERPL-MCNC: 8.5 MG/DL (ref 8.6–10.2)
CHLORIDE BLD-SCNC: 96 MMOL/L (ref 98–107)
CO2: 21 MMOL/L (ref 22–29)
CREAT SERPL-MCNC: 0.8 MG/DL (ref 0.7–1.2)
EOSINOPHILS ABSOLUTE: 0.07 E9/L (ref 0.05–0.5)
EOSINOPHILS RELATIVE PERCENT: 1.8 % (ref 0–6)
GFR AFRICAN AMERICAN: >60
GFR NON-AFRICAN AMERICAN: >60 ML/MIN/1.73
GLUCOSE BLD-MCNC: 173 MG/DL (ref 74–99)
HCT VFR BLD CALC: 25.1 % (ref 37–54)
HEMOGLOBIN: 7.9 G/DL (ref 12.5–16.5)
HYPOCHROMIA: ABNORMAL
INR BLD: 0.9
LYMPHOCYTES ABSOLUTE: 1.07 E9/L (ref 1.5–4)
LYMPHOCYTES RELATIVE PERCENT: 25.7 % (ref 20–42)
MCH RBC QN AUTO: 30.6 PG (ref 26–35)
MCHC RBC AUTO-ENTMCNC: 31.5 % (ref 32–34.5)
MCV RBC AUTO: 97.3 FL (ref 80–99.9)
MONOCYTES ABSOLUTE: 0.25 E9/L (ref 0.1–0.95)
MONOCYTES RELATIVE PERCENT: 6.2 % (ref 2–12)
NEUTROPHILS ABSOLUTE: 2.71 E9/L (ref 1.8–7.3)
NEUTROPHILS RELATIVE PERCENT: 66.4 % (ref 43–80)
NUCLEATED RED BLOOD CELLS: 0.9 /100 WBC
OVALOCYTES: ABNORMAL
PDW BLD-RTO: 19.4 FL (ref 11.5–15)
PLATELET # BLD: 423 E9/L (ref 130–450)
PMV BLD AUTO: 10.3 FL (ref 7–12)
POIKILOCYTES: ABNORMAL
POLYCHROMASIA: ABNORMAL
POTASSIUM REFLEX MAGNESIUM: 4.1 MMOL/L (ref 3.5–5)
PROTHROMBIN TIME: 10.2 SEC (ref 9.3–12.4)
RBC # BLD: 2.58 E12/L (ref 3.8–5.8)
SCHISTOCYTES: ABNORMAL
SODIUM BLD-SCNC: 129 MMOL/L (ref 132–146)
TOTAL PROTEIN: 6.6 G/DL (ref 6.4–8.3)
WBC # BLD: 4.1 E9/L (ref 4.5–11.5)

## 2019-01-15 PROCEDURE — 86850 RBC ANTIBODY SCREEN: CPT

## 2019-01-15 PROCEDURE — 86923 COMPATIBILITY TEST ELECTRIC: CPT

## 2019-01-15 PROCEDURE — 80053 COMPREHEN METABOLIC PANEL: CPT

## 2019-01-15 PROCEDURE — 86901 BLOOD TYPING SEROLOGIC RH(D): CPT

## 2019-01-15 PROCEDURE — 85025 COMPLETE CBC W/AUTO DIFF WBC: CPT

## 2019-01-15 PROCEDURE — 36415 COLL VENOUS BLD VENIPUNCTURE: CPT

## 2019-01-15 PROCEDURE — 87081 CULTURE SCREEN ONLY: CPT

## 2019-01-15 PROCEDURE — 85610 PROTHROMBIN TIME: CPT

## 2019-01-15 PROCEDURE — 86900 BLOOD TYPING SEROLOGIC ABO: CPT

## 2019-01-15 ASSESSMENT — ENCOUNTER SYMPTOMS
ABDOMINAL PAIN: 0
NAUSEA: 0
VOMITING: 0
DIARRHEA: 0
EYE PAIN: 0
PHOTOPHOBIA: 0
EYE DISCHARGE: 0
SHORTNESS OF BREATH: 0
CONSTIPATION: 0
BLOOD IN STOOL: 0
BACK PAIN: 0

## 2019-01-15 ASSESSMENT — LIFESTYLE VARIABLES: SMOKING_STATUS: 0

## 2019-01-16 ENCOUNTER — APPOINTMENT (OUTPATIENT)
Dept: GENERAL RADIOLOGY | Age: 69
DRG: 414 | End: 2019-01-16
Attending: TRANSPLANT SURGERY
Payer: MEDICARE

## 2019-01-16 ENCOUNTER — HOSPITAL ENCOUNTER (INPATIENT)
Age: 69
LOS: 6 days | Discharge: NURSING, REHAB OR LONG-TERM CARE | DRG: 414 | End: 2019-01-22
Attending: TRANSPLANT SURGERY | Admitting: TRANSPLANT SURGERY
Payer: MEDICARE

## 2019-01-16 ENCOUNTER — ANESTHESIA (OUTPATIENT)
Dept: OPERATING ROOM | Age: 69
DRG: 414 | End: 2019-01-16
Payer: MEDICARE

## 2019-01-16 VITALS — DIASTOLIC BLOOD PRESSURE: 94 MMHG | SYSTOLIC BLOOD PRESSURE: 138 MMHG | OXYGEN SATURATION: 99 % | TEMPERATURE: 95 F

## 2019-01-16 DIAGNOSIS — C24.1 AMPULLARY CARCINOMA (HCC): Primary | ICD-10-CM

## 2019-01-16 PROBLEM — K86.89 PANCREATIC MASS: Status: ACTIVE | Noted: 2019-01-16

## 2019-01-16 PROBLEM — C25.9 PANCREATIC ADENOCARCINOMA (HCC): Status: ACTIVE | Noted: 2019-01-16

## 2019-01-16 LAB
B.E.: -4.5 MMOL/L (ref -3–0)
B.E.: -4.8 MMOL/L (ref -3–0)
CARDIOPULMONARY BYPASS: NO
CARDIOPULMONARY BYPASS: NO
DEVICE: ABNORMAL
DEVICE: ABNORMAL
HCO3 ARTERIAL: 19.7 MMOL/L (ref 22–26)
HCO3 ARTERIAL: 20.8 MMOL/L (ref 22–26)
HCT,ARTERIAL: 22 % (ref 37–54)
HCT,ARTERIAL: 25 % (ref 37–54)
HGB, ARTERIAL: 7.6 G/DL (ref 12.5–15.5)
HGB, ARTERIAL: 8.6 G/DL (ref 12.5–15.5)
METER GLUCOSE: 180 MG/DL (ref 74–99)
METER GLUCOSE: 180 MG/DL (ref 74–99)
METER GLUCOSE: 221 MG/DL (ref 74–99)
MRSA CULTURE ONLY: NORMAL
O2 SATURATION: 97.6 % (ref 92–98.5)
O2 SATURATION: 99 % (ref 92–98.5)
OPERATOR ID: ABNORMAL
OPERATOR ID: ABNORMAL
PCO2 ARTERIAL: 33 MMHG (ref 35–45)
PCO2 ARTERIAL: 38.4 MMHG (ref 35–45)
PH BLOOD GAS: 7.34 (ref 7.35–7.45)
PH BLOOD GAS: 7.38 (ref 7.35–7.45)
PO2 ARTERIAL: 102.7 MMHG (ref 60–80)
PO2 ARTERIAL: 132.1 MMHG (ref 60–80)
POTASSIUM SERPL-SCNC: 3.4 MMOL/L (ref 3.5–5.5)
POTASSIUM SERPL-SCNC: 3.6 MMOL/L (ref 3.5–5.5)
SOURCE, BLOOD GAS: ABNORMAL
SOURCE, BLOOD GAS: ABNORMAL

## 2019-01-16 PROCEDURE — P9016 RBC LEUKOCYTES REDUCED: HCPCS

## 2019-01-16 PROCEDURE — 2060000000 HC ICU INTERMEDIATE R&B

## 2019-01-16 PROCEDURE — 3600000004 HC SURGERY LEVEL 4 BASE: Performed by: TRANSPLANT SURGERY

## 2019-01-16 PROCEDURE — 2580000003 HC RX 258: Performed by: STUDENT IN AN ORGANIZED HEALTH CARE EDUCATION/TRAINING PROGRAM

## 2019-01-16 PROCEDURE — 7100000001 HC PACU RECOVERY - ADDTL 15 MIN: Performed by: TRANSPLANT SURGERY

## 2019-01-16 PROCEDURE — 3700000001 HC ADD 15 MINUTES (ANESTHESIA): Performed by: TRANSPLANT SURGERY

## 2019-01-16 PROCEDURE — 3700000000 HC ANESTHESIA ATTENDED CARE: Performed by: TRANSPLANT SURGERY

## 2019-01-16 PROCEDURE — 0FT40ZZ RESECTION OF GALLBLADDER, OPEN APPROACH: ICD-10-PCS | Performed by: TRANSPLANT SURGERY

## 2019-01-16 PROCEDURE — 6360000002 HC RX W HCPCS: Performed by: STUDENT IN AN ORGANIZED HEALTH CARE EDUCATION/TRAINING PROGRAM

## 2019-01-16 PROCEDURE — 2500000003 HC RX 250 WO HCPCS: Performed by: STUDENT IN AN ORGANIZED HEALTH CARE EDUCATION/TRAINING PROGRAM

## 2019-01-16 PROCEDURE — 82803 BLOOD GASES ANY COMBINATION: CPT

## 2019-01-16 PROCEDURE — 2500000003 HC RX 250 WO HCPCS

## 2019-01-16 PROCEDURE — 07BD0ZX EXCISION OF AORTIC LYMPHATIC, OPEN APPROACH, DIAGNOSTIC: ICD-10-PCS | Performed by: TRANSPLANT SURGERY

## 2019-01-16 PROCEDURE — 76998 US GUIDE INTRAOP: CPT | Performed by: TRANSPLANT SURGERY

## 2019-01-16 PROCEDURE — 2580000003 HC RX 258: Performed by: NURSE ANESTHETIST, CERTIFIED REGISTERED

## 2019-01-16 PROCEDURE — C9113 INJ PANTOPRAZOLE SODIUM, VIA: HCPCS | Performed by: STUDENT IN AN ORGANIZED HEALTH CARE EDUCATION/TRAINING PROGRAM

## 2019-01-16 PROCEDURE — 88331 PATH CONSLTJ SURG 1 BLK 1SPC: CPT

## 2019-01-16 PROCEDURE — 2580000003 HC RX 258

## 2019-01-16 PROCEDURE — 2500000003 HC RX 250 WO HCPCS: Performed by: NURSE ANESTHETIST, CERTIFIED REGISTERED

## 2019-01-16 PROCEDURE — 2709999900 HC NON-CHARGEABLE SUPPLY: Performed by: TRANSPLANT SURGERY

## 2019-01-16 PROCEDURE — 3600000014 HC SURGERY LEVEL 4 ADDTL 15MIN: Performed by: TRANSPLANT SURGERY

## 2019-01-16 PROCEDURE — 6360000002 HC RX W HCPCS: Performed by: TRANSPLANT SURGERY

## 2019-01-16 PROCEDURE — 88304 TISSUE EXAM BY PATHOLOGIST: CPT

## 2019-01-16 PROCEDURE — 88311 DECALCIFY TISSUE: CPT

## 2019-01-16 PROCEDURE — 88305 TISSUE EXAM BY PATHOLOGIST: CPT

## 2019-01-16 PROCEDURE — 6360000002 HC RX W HCPCS

## 2019-01-16 PROCEDURE — 6370000000 HC RX 637 (ALT 250 FOR IP): Performed by: STUDENT IN AN ORGANIZED HEALTH CARE EDUCATION/TRAINING PROGRAM

## 2019-01-16 PROCEDURE — P9041 ALBUMIN (HUMAN),5%, 50ML: HCPCS | Performed by: NURSE ANESTHETIST, CERTIFIED REGISTERED

## 2019-01-16 PROCEDURE — 82962 GLUCOSE BLOOD TEST: CPT

## 2019-01-16 PROCEDURE — 0D160ZA BYPASS STOMACH TO JEJUNUM, OPEN APPROACH: ICD-10-PCS | Performed by: TRANSPLANT SURGERY

## 2019-01-16 PROCEDURE — 6360000002 HC RX W HCPCS: Performed by: NURSE ANESTHETIST, CERTIFIED REGISTERED

## 2019-01-16 PROCEDURE — 43820 GASTROJEJUNOSTOMY WO VAGOTMY: CPT | Performed by: TRANSPLANT SURGERY

## 2019-01-16 PROCEDURE — 38747 REMOVE ABDOMINAL LYMPH NODES: CPT | Performed by: TRANSPLANT SURGERY

## 2019-01-16 PROCEDURE — 71045 X-RAY EXAM CHEST 1 VIEW: CPT

## 2019-01-16 PROCEDURE — 47600 CHOLECYSTECTOMY: CPT | Performed by: TRANSPLANT SURGERY

## 2019-01-16 PROCEDURE — 2580000003 HC RX 258: Performed by: TRANSPLANT SURGERY

## 2019-01-16 PROCEDURE — 7100000000 HC PACU RECOVERY - FIRST 15 MIN: Performed by: TRANSPLANT SURGERY

## 2019-01-16 RX ORDER — SODIUM CHLORIDE 0.9 % (FLUSH) 0.9 %
10 SYRINGE (ML) INJECTION EVERY 12 HOURS SCHEDULED
Status: DISCONTINUED | OUTPATIENT
Start: 2019-01-16 | End: 2019-01-16 | Stop reason: HOSPADM

## 2019-01-16 RX ORDER — LIDOCAINE HYDROCHLORIDE ANHYDROUS AND DEXTROSE MONOHYDRATE .4; 5 G/100ML; G/100ML
INJECTION, SOLUTION INTRAVENOUS CONTINUOUS PRN
Status: DISCONTINUED | OUTPATIENT
Start: 2019-01-16 | End: 2019-01-16 | Stop reason: SDUPTHER

## 2019-01-16 RX ORDER — HEPARIN SODIUM 10000 [USP'U]/ML
5000 INJECTION, SOLUTION INTRAVENOUS; SUBCUTANEOUS EVERY 8 HOURS SCHEDULED
Status: DISCONTINUED | OUTPATIENT
Start: 2019-01-16 | End: 2019-01-22 | Stop reason: HOSPADM

## 2019-01-16 RX ORDER — SODIUM CHLORIDE 9 MG/ML
INJECTION, SOLUTION INTRAVENOUS CONTINUOUS
Status: DISCONTINUED | OUTPATIENT
Start: 2019-01-16 | End: 2019-01-16

## 2019-01-16 RX ORDER — KETAMINE HYDROCHLORIDE 10 MG/ML
INJECTION, SOLUTION INTRAMUSCULAR; INTRAVENOUS PRN
Status: DISCONTINUED | OUTPATIENT
Start: 2019-01-16 | End: 2019-01-16 | Stop reason: SDUPTHER

## 2019-01-16 RX ORDER — MIDAZOLAM HYDROCHLORIDE 1 MG/ML
INJECTION INTRAMUSCULAR; INTRAVENOUS PRN
Status: DISCONTINUED | OUTPATIENT
Start: 2019-01-16 | End: 2019-01-16 | Stop reason: SDUPTHER

## 2019-01-16 RX ORDER — NICOTINE POLACRILEX 4 MG
15 LOZENGE BUCCAL PRN
Status: DISCONTINUED | OUTPATIENT
Start: 2019-01-16 | End: 2019-01-22 | Stop reason: HOSPADM

## 2019-01-16 RX ORDER — DEXTROSE MONOHYDRATE 50 MG/ML
100 INJECTION, SOLUTION INTRAVENOUS PRN
Status: DISCONTINUED | OUTPATIENT
Start: 2019-01-16 | End: 2019-01-22 | Stop reason: HOSPADM

## 2019-01-16 RX ORDER — ONDANSETRON 2 MG/ML
INJECTION INTRAMUSCULAR; INTRAVENOUS PRN
Status: DISCONTINUED | OUTPATIENT
Start: 2019-01-16 | End: 2019-01-16 | Stop reason: SDUPTHER

## 2019-01-16 RX ORDER — NEOSTIGMINE METHYLSULFATE 1 MG/ML
INJECTION, SOLUTION INTRAVENOUS PRN
Status: DISCONTINUED | OUTPATIENT
Start: 2019-01-16 | End: 2019-01-16 | Stop reason: SDUPTHER

## 2019-01-16 RX ORDER — SODIUM CHLORIDE 0.9 % (FLUSH) 0.9 %
10 SYRINGE (ML) INJECTION EVERY 12 HOURS SCHEDULED
Status: DISCONTINUED | OUTPATIENT
Start: 2019-01-16 | End: 2019-01-17 | Stop reason: SDUPTHER

## 2019-01-16 RX ORDER — SODIUM CHLORIDE, SODIUM LACTATE, POTASSIUM CHLORIDE, CALCIUM CHLORIDE 600; 310; 30; 20 MG/100ML; MG/100ML; MG/100ML; MG/100ML
INJECTION, SOLUTION INTRAVENOUS CONTINUOUS
Status: DISCONTINUED | OUTPATIENT
Start: 2019-01-16 | End: 2019-01-18

## 2019-01-16 RX ORDER — PANTOPRAZOLE SODIUM 40 MG/10ML
40 INJECTION, POWDER, LYOPHILIZED, FOR SOLUTION INTRAVENOUS DAILY
Status: DISCONTINUED | OUTPATIENT
Start: 2019-01-16 | End: 2019-01-22 | Stop reason: HOSPADM

## 2019-01-16 RX ORDER — PROPOFOL 10 MG/ML
INJECTION, EMULSION INTRAVENOUS PRN
Status: DISCONTINUED | OUTPATIENT
Start: 2019-01-16 | End: 2019-01-16 | Stop reason: SDUPTHER

## 2019-01-16 RX ORDER — DEXTROSE MONOHYDRATE 25 G/50ML
12.5 INJECTION, SOLUTION INTRAVENOUS PRN
Status: DISCONTINUED | OUTPATIENT
Start: 2019-01-16 | End: 2019-01-22 | Stop reason: HOSPADM

## 2019-01-16 RX ORDER — ONDANSETRON 2 MG/ML
4 INJECTION INTRAMUSCULAR; INTRAVENOUS EVERY 6 HOURS PRN
Status: DISCONTINUED | OUTPATIENT
Start: 2019-01-16 | End: 2019-01-22 | Stop reason: HOSPADM

## 2019-01-16 RX ORDER — SODIUM CHLORIDE 0.9 % (FLUSH) 0.9 %
10 SYRINGE (ML) INJECTION PRN
Status: DISCONTINUED | OUTPATIENT
Start: 2019-01-16 | End: 2019-01-17 | Stop reason: SDUPTHER

## 2019-01-16 RX ORDER — LIDOCAINE HYDROCHLORIDE 20 MG/ML
INJECTION, SOLUTION EPIDURAL; INFILTRATION; INTRACAUDAL; PERINEURAL PRN
Status: DISCONTINUED | OUTPATIENT
Start: 2019-01-16 | End: 2019-01-16 | Stop reason: SDUPTHER

## 2019-01-16 RX ORDER — SUCCINYLCHOLINE CHLORIDE 20 MG/ML
INJECTION INTRAMUSCULAR; INTRAVENOUS PRN
Status: DISCONTINUED | OUTPATIENT
Start: 2019-01-16 | End: 2019-01-16 | Stop reason: SDUPTHER

## 2019-01-16 RX ORDER — VECURONIUM BROMIDE 1 MG/ML
INJECTION, POWDER, LYOPHILIZED, FOR SOLUTION INTRAVENOUS PRN
Status: DISCONTINUED | OUTPATIENT
Start: 2019-01-16 | End: 2019-01-16 | Stop reason: SDUPTHER

## 2019-01-16 RX ORDER — METOPROLOL TARTRATE 5 MG/5ML
5 INJECTION INTRAVENOUS EVERY 6 HOURS
Status: DISCONTINUED | OUTPATIENT
Start: 2019-01-16 | End: 2019-01-22

## 2019-01-16 RX ORDER — 0.9 % SODIUM CHLORIDE 0.9 %
10 VIAL (ML) INJECTION DAILY
Status: DISCONTINUED | OUTPATIENT
Start: 2019-01-16 | End: 2019-01-22 | Stop reason: HOSPADM

## 2019-01-16 RX ORDER — FENTANYL CITRATE 50 UG/ML
INJECTION, SOLUTION INTRAMUSCULAR; INTRAVENOUS PRN
Status: DISCONTINUED | OUTPATIENT
Start: 2019-01-16 | End: 2019-01-16 | Stop reason: SDUPTHER

## 2019-01-16 RX ORDER — SODIUM CHLORIDE 9 MG/ML
INJECTION, SOLUTION INTRAVENOUS CONTINUOUS PRN
Status: DISCONTINUED | OUTPATIENT
Start: 2019-01-16 | End: 2019-01-16 | Stop reason: SDUPTHER

## 2019-01-16 RX ORDER — ALBUMIN, HUMAN INJ 5% 5 %
SOLUTION INTRAVENOUS PRN
Status: DISCONTINUED | OUTPATIENT
Start: 2019-01-16 | End: 2019-01-16 | Stop reason: SDUPTHER

## 2019-01-16 RX ORDER — EPHEDRINE SULFATE 50 MG/ML
INJECTION INTRAVENOUS PRN
Status: DISCONTINUED | OUTPATIENT
Start: 2019-01-16 | End: 2019-01-16 | Stop reason: SDUPTHER

## 2019-01-16 RX ORDER — DEXAMETHASONE SODIUM PHOSPHATE 10 MG/ML
INJECTION, SOLUTION INTRAMUSCULAR; INTRAVENOUS PRN
Status: DISCONTINUED | OUTPATIENT
Start: 2019-01-16 | End: 2019-01-16 | Stop reason: SDUPTHER

## 2019-01-16 RX ORDER — GLYCOPYRROLATE 1 MG/5 ML
SYRINGE (ML) INTRAVENOUS PRN
Status: DISCONTINUED | OUTPATIENT
Start: 2019-01-16 | End: 2019-01-16 | Stop reason: SDUPTHER

## 2019-01-16 RX ORDER — SODIUM CHLORIDE 0.9 % (FLUSH) 0.9 %
10 SYRINGE (ML) INJECTION PRN
Status: DISCONTINUED | OUTPATIENT
Start: 2019-01-16 | End: 2019-01-16 | Stop reason: HOSPADM

## 2019-01-16 RX ADMIN — VECURONIUM BROMIDE FOR INJECTION 3 MG: 1 INJECTION, POWDER, LYOPHILIZED, FOR SOLUTION INTRAVENOUS at 09:40

## 2019-01-16 RX ADMIN — VECURONIUM BROMIDE FOR INJECTION 7 MG: 1 INJECTION, POWDER, LYOPHILIZED, FOR SOLUTION INTRAVENOUS at 07:34

## 2019-01-16 RX ADMIN — INSULIN LISPRO 1 UNITS: 100 INJECTION, SOLUTION INTRAVENOUS; SUBCUTANEOUS at 21:50

## 2019-01-16 RX ADMIN — KETAMINE HYDROCHLORIDE 50 MG: 10 INJECTION INTRAMUSCULAR; INTRAVENOUS at 07:31

## 2019-01-16 RX ADMIN — Medication 3 MG: at 12:52

## 2019-01-16 RX ADMIN — PHENYLEPHRINE HYDROCHLORIDE 100 MCG: 10 INJECTION INTRAVENOUS at 07:36

## 2019-01-16 RX ADMIN — SODIUM CHLORIDE, POTASSIUM CHLORIDE, SODIUM LACTATE AND CALCIUM CHLORIDE: 600; 310; 30; 20 INJECTION, SOLUTION INTRAVENOUS at 16:37

## 2019-01-16 RX ADMIN — FENTANYL CITRATE 100 MCG: 50 INJECTION, SOLUTION INTRAMUSCULAR; INTRAVENOUS at 07:55

## 2019-01-16 RX ADMIN — MEROPENEM 1 G: 1 INJECTION, POWDER, FOR SOLUTION INTRAVENOUS at 07:34

## 2019-01-16 RX ADMIN — MEROPENEM 1 G: 1 INJECTION, POWDER, FOR SOLUTION INTRAVENOUS at 17:17

## 2019-01-16 RX ADMIN — EPHEDRINE SULFATE 5 MG: 50 INJECTION, SOLUTION INTRAVENOUS at 11:32

## 2019-01-16 RX ADMIN — ALBUMIN (HUMAN) 25 G: 12.5 INJECTION, SOLUTION INTRAVENOUS at 09:24

## 2019-01-16 RX ADMIN — SODIUM CHLORIDE: 9 INJECTION, SOLUTION INTRAVENOUS at 07:44

## 2019-01-16 RX ADMIN — Medication 0.2 MG: at 11:28

## 2019-01-16 RX ADMIN — VECURONIUM BROMIDE FOR INJECTION 1 MG: 1 INJECTION, POWDER, LYOPHILIZED, FOR SOLUTION INTRAVENOUS at 10:12

## 2019-01-16 RX ADMIN — Medication 0.2 MG: at 10:42

## 2019-01-16 RX ADMIN — EPHEDRINE SULFATE 10 MG: 50 INJECTION, SOLUTION INTRAVENOUS at 08:49

## 2019-01-16 RX ADMIN — METOPROLOL TARTRATE 5 MG: 5 INJECTION, SOLUTION INTRAVENOUS at 16:37

## 2019-01-16 RX ADMIN — FENTANYL CITRATE 50 MCG: 50 INJECTION, SOLUTION INTRAMUSCULAR; INTRAVENOUS at 08:57

## 2019-01-16 RX ADMIN — ONDANSETRON HYDROCHLORIDE 4 MG: 2 INJECTION, SOLUTION INTRAMUSCULAR; INTRAVENOUS at 12:56

## 2019-01-16 RX ADMIN — ALBUMIN (HUMAN) 25 G: 12.5 INJECTION, SOLUTION INTRAVENOUS at 09:53

## 2019-01-16 RX ADMIN — MIDAZOLAM HYDROCHLORIDE 2 MG: 1 INJECTION, SOLUTION INTRAMUSCULAR; INTRAVENOUS at 07:14

## 2019-01-16 RX ADMIN — LIDOCAINE HYDROCHLORIDE 100 MG: 20 INJECTION, SOLUTION EPIDURAL; INFILTRATION; INTRACAUDAL; PERINEURAL at 07:27

## 2019-01-16 RX ADMIN — SODIUM CHLORIDE: 9 INJECTION, SOLUTION INTRAVENOUS at 06:06

## 2019-01-16 RX ADMIN — PANTOPRAZOLE SODIUM 40 MG: 40 INJECTION, POWDER, FOR SOLUTION INTRAVENOUS at 16:37

## 2019-01-16 RX ADMIN — KETAMINE HYDROCHLORIDE 3 MCG/KG/MIN: 100 INJECTION, SOLUTION, CONCENTRATE INTRAMUSCULAR; INTRAVENOUS at 07:50

## 2019-01-16 RX ADMIN — VECURONIUM BROMIDE FOR INJECTION 1 MG: 1 INJECTION, POWDER, LYOPHILIZED, FOR SOLUTION INTRAVENOUS at 11:35

## 2019-01-16 RX ADMIN — EPHEDRINE SULFATE 5 MG: 50 INJECTION, SOLUTION INTRAVENOUS at 09:18

## 2019-01-16 RX ADMIN — Medication 0.2 MG: at 12:52

## 2019-01-16 RX ADMIN — LIDOCAINE HYDROCHLORIDE ANHYDROUS AND DEXTROSE MONOHYDRATE 2.1 MG/MIN: .4; 5 INJECTION, SOLUTION INTRAVENOUS at 07:32

## 2019-01-16 RX ADMIN — PROPOFOL 2500 MG: 10 INJECTION, EMULSION INTRAVENOUS at 07:27

## 2019-01-16 RX ADMIN — INSULIN LISPRO 4 UNITS: 100 INJECTION, SOLUTION INTRAVENOUS; SUBCUTANEOUS at 16:47

## 2019-01-16 RX ADMIN — FENTANYL CITRATE 100 MCG: 50 INJECTION, SOLUTION INTRAMUSCULAR; INTRAVENOUS at 07:27

## 2019-01-16 RX ADMIN — HEPARIN SODIUM 5000 UNITS: 10000 INJECTION INTRAVENOUS; SUBCUTANEOUS at 16:37

## 2019-01-16 RX ADMIN — VECURONIUM BROMIDE FOR INJECTION 3 MG: 1 INJECTION, POWDER, LYOPHILIZED, FOR SOLUTION INTRAVENOUS at 07:57

## 2019-01-16 RX ADMIN — Medication 10 ML: at 16:37

## 2019-01-16 RX ADMIN — MEROPENEM 1 G: 1 INJECTION, POWDER, FOR SOLUTION INTRAVENOUS at 23:52

## 2019-01-16 RX ADMIN — SODIUM CHLORIDE: 9 INJECTION, SOLUTION INTRAVENOUS at 07:14

## 2019-01-16 RX ADMIN — SODIUM CHLORIDE: 9 INJECTION, SOLUTION INTRAVENOUS at 07:47

## 2019-01-16 RX ADMIN — SODIUM CHLORIDE, POTASSIUM CHLORIDE, SODIUM LACTATE AND CALCIUM CHLORIDE: 600; 310; 30; 20 INJECTION, SOLUTION INTRAVENOUS at 23:52

## 2019-01-16 RX ADMIN — HEPARIN SODIUM 5000 UNITS: 10000 INJECTION INTRAVENOUS; SUBCUTANEOUS at 21:44

## 2019-01-16 RX ADMIN — VECURONIUM BROMIDE FOR INJECTION 1 MG: 1 INJECTION, POWDER, LYOPHILIZED, FOR SOLUTION INTRAVENOUS at 11:10

## 2019-01-16 RX ADMIN — DEXAMETHASONE SODIUM PHOSPHATE 10 MG: 10 INJECTION INTRAMUSCULAR; INTRAVENOUS at 08:32

## 2019-01-16 RX ADMIN — METOPROLOL TARTRATE 5 MG: 5 INJECTION, SOLUTION INTRAVENOUS at 21:41

## 2019-01-16 RX ADMIN — SUCCINYLCHOLINE CHLORIDE 120 MG: 20 INJECTION, SOLUTION INTRAMUSCULAR; INTRAVENOUS at 07:27

## 2019-01-16 ASSESSMENT — PULMONARY FUNCTION TESTS
PIF_VALUE: 24
PIF_VALUE: 16
PIF_VALUE: 11
PIF_VALUE: 22
PIF_VALUE: 18
PIF_VALUE: 18
PIF_VALUE: 17
PIF_VALUE: 22
PIF_VALUE: 22
PIF_VALUE: 2
PIF_VALUE: 18
PIF_VALUE: 20
PIF_VALUE: 16
PIF_VALUE: 21
PIF_VALUE: 22
PIF_VALUE: 20
PIF_VALUE: 19
PIF_VALUE: 22
PIF_VALUE: 3
PIF_VALUE: 21
PIF_VALUE: 20
PIF_VALUE: 16
PIF_VALUE: 26
PIF_VALUE: 19
PIF_VALUE: 22
PIF_VALUE: 22
PIF_VALUE: 19
PIF_VALUE: 16
PIF_VALUE: 22
PIF_VALUE: 16
PIF_VALUE: 21
PIF_VALUE: 21
PIF_VALUE: 20
PIF_VALUE: 22
PIF_VALUE: 22
PIF_VALUE: 23
PIF_VALUE: 19
PIF_VALUE: 23
PIF_VALUE: 21
PIF_VALUE: 19
PIF_VALUE: 20
PIF_VALUE: 19
PIF_VALUE: 19
PIF_VALUE: 20
PIF_VALUE: 16
PIF_VALUE: 2
PIF_VALUE: 24
PIF_VALUE: 20
PIF_VALUE: 16
PIF_VALUE: 18
PIF_VALUE: 16
PIF_VALUE: 21
PIF_VALUE: 21
PIF_VALUE: 16
PIF_VALUE: 19
PIF_VALUE: 20
PIF_VALUE: 17
PIF_VALUE: 20
PIF_VALUE: 22
PIF_VALUE: 20
PIF_VALUE: 20
PIF_VALUE: 0
PIF_VALUE: 19
PIF_VALUE: 16
PIF_VALUE: 21
PIF_VALUE: 20
PIF_VALUE: 25
PIF_VALUE: 21
PIF_VALUE: 22
PIF_VALUE: 20
PIF_VALUE: 18
PIF_VALUE: 18
PIF_VALUE: 23
PIF_VALUE: 22
PIF_VALUE: 23
PIF_VALUE: 17
PIF_VALUE: 21
PIF_VALUE: 20
PIF_VALUE: 19
PIF_VALUE: 20
PIF_VALUE: 16
PIF_VALUE: 24
PIF_VALUE: 21
PIF_VALUE: 22
PIF_VALUE: 0
PIF_VALUE: 19
PIF_VALUE: 22
PIF_VALUE: 19
PIF_VALUE: 40
PIF_VALUE: 20
PIF_VALUE: 21
PIF_VALUE: 23
PIF_VALUE: 22
PIF_VALUE: 22
PIF_VALUE: 17
PIF_VALUE: 0
PIF_VALUE: 23
PIF_VALUE: 20
PIF_VALUE: 19
PIF_VALUE: 32
PIF_VALUE: 21
PIF_VALUE: 17
PIF_VALUE: 19
PIF_VALUE: 16
PIF_VALUE: 20
PIF_VALUE: 20
PIF_VALUE: 21
PIF_VALUE: 20
PIF_VALUE: 19
PIF_VALUE: 19
PIF_VALUE: 21
PIF_VALUE: 21
PIF_VALUE: 22
PIF_VALUE: 18
PIF_VALUE: 17
PIF_VALUE: 24
PIF_VALUE: 20
PIF_VALUE: 18
PIF_VALUE: 19
PIF_VALUE: 19
PIF_VALUE: 40
PIF_VALUE: 20
PIF_VALUE: 19
PIF_VALUE: 20
PIF_VALUE: 20
PIF_VALUE: 19
PIF_VALUE: 20
PIF_VALUE: 22
PIF_VALUE: 16
PIF_VALUE: 16
PIF_VALUE: 19
PIF_VALUE: 21
PIF_VALUE: 22
PIF_VALUE: 15
PIF_VALUE: 19
PIF_VALUE: 20
PIF_VALUE: 21
PIF_VALUE: 19
PIF_VALUE: 16
PIF_VALUE: 20
PIF_VALUE: 18
PIF_VALUE: 18
PIF_VALUE: 19
PIF_VALUE: 22
PIF_VALUE: 19
PIF_VALUE: 19
PIF_VALUE: 18
PIF_VALUE: 17
PIF_VALUE: 17
PIF_VALUE: 22
PIF_VALUE: 22
PIF_VALUE: 18
PIF_VALUE: 17
PIF_VALUE: 21
PIF_VALUE: 22
PIF_VALUE: 18
PIF_VALUE: 22
PIF_VALUE: 19
PIF_VALUE: 19
PIF_VALUE: 17
PIF_VALUE: 16
PIF_VALUE: 22
PIF_VALUE: 20
PIF_VALUE: 21
PIF_VALUE: 19
PIF_VALUE: 21
PIF_VALUE: 2
PIF_VALUE: 17
PIF_VALUE: 23
PIF_VALUE: 18
PIF_VALUE: 23
PIF_VALUE: 20
PIF_VALUE: 20
PIF_VALUE: 19
PIF_VALUE: 22
PIF_VALUE: 22
PIF_VALUE: 0
PIF_VALUE: 16
PIF_VALUE: 22
PIF_VALUE: 20
PIF_VALUE: 19
PIF_VALUE: 16
PIF_VALUE: 19
PIF_VALUE: 16
PIF_VALUE: 23
PIF_VALUE: 19
PIF_VALUE: 19
PIF_VALUE: 20
PIF_VALUE: 19
PIF_VALUE: 20
PIF_VALUE: 22
PIF_VALUE: 19
PIF_VALUE: 22
PIF_VALUE: 1
PIF_VALUE: 20
PIF_VALUE: 21
PIF_VALUE: 18
PIF_VALUE: 19
PIF_VALUE: 22
PIF_VALUE: 19
PIF_VALUE: 22
PIF_VALUE: 19
PIF_VALUE: 20
PIF_VALUE: 19
PIF_VALUE: 18
PIF_VALUE: 23
PIF_VALUE: 20
PIF_VALUE: 19
PIF_VALUE: 24
PIF_VALUE: 20
PIF_VALUE: 21
PIF_VALUE: 22
PIF_VALUE: 21
PIF_VALUE: 22
PIF_VALUE: 20
PIF_VALUE: 18
PIF_VALUE: 16
PIF_VALUE: 20
PIF_VALUE: 18
PIF_VALUE: 20
PIF_VALUE: 21
PIF_VALUE: 3
PIF_VALUE: 19
PIF_VALUE: 22
PIF_VALUE: 16
PIF_VALUE: 23
PIF_VALUE: 22
PIF_VALUE: 21
PIF_VALUE: 22
PIF_VALUE: 22
PIF_VALUE: 17
PIF_VALUE: 19
PIF_VALUE: 4
PIF_VALUE: 19
PIF_VALUE: 20
PIF_VALUE: 16
PIF_VALUE: 19
PIF_VALUE: 20
PIF_VALUE: 20
PIF_VALUE: 21
PIF_VALUE: 21
PIF_VALUE: 1
PIF_VALUE: 18
PIF_VALUE: 18
PIF_VALUE: 21
PIF_VALUE: 20
PIF_VALUE: 21
PIF_VALUE: 22
PIF_VALUE: 2
PIF_VALUE: 21
PIF_VALUE: 20
PIF_VALUE: 22
PIF_VALUE: 22
PIF_VALUE: 21
PIF_VALUE: 19
PIF_VALUE: 17
PIF_VALUE: 20
PIF_VALUE: 19
PIF_VALUE: 19
PIF_VALUE: 20
PIF_VALUE: 20
PIF_VALUE: 22
PIF_VALUE: 23
PIF_VALUE: 20
PIF_VALUE: 19
PIF_VALUE: 17
PIF_VALUE: 20
PIF_VALUE: 21
PIF_VALUE: 19
PIF_VALUE: 19
PIF_VALUE: 20
PIF_VALUE: 19
PIF_VALUE: 18
PIF_VALUE: 24
PIF_VALUE: 19
PIF_VALUE: 20
PIF_VALUE: 20
PIF_VALUE: 18
PIF_VALUE: 16
PIF_VALUE: 22
PIF_VALUE: 22
PIF_VALUE: 23
PIF_VALUE: 21
PIF_VALUE: 20
PIF_VALUE: 18
PIF_VALUE: 16
PIF_VALUE: 23
PIF_VALUE: 19
PIF_VALUE: 19
PIF_VALUE: 23
PIF_VALUE: 20
PIF_VALUE: 21
PIF_VALUE: 21
PIF_VALUE: 20
PIF_VALUE: 22
PIF_VALUE: 22
PIF_VALUE: 18
PIF_VALUE: 21
PIF_VALUE: 22
PIF_VALUE: 22
PIF_VALUE: 19
PIF_VALUE: 18
PIF_VALUE: 20
PIF_VALUE: 23
PIF_VALUE: 19
PIF_VALUE: 24
PIF_VALUE: 22
PIF_VALUE: 17
PIF_VALUE: 19
PIF_VALUE: 22
PIF_VALUE: 19
PIF_VALUE: 17
PIF_VALUE: 19
PIF_VALUE: 16
PIF_VALUE: 19
PIF_VALUE: 20
PIF_VALUE: 23
PIF_VALUE: 19
PIF_VALUE: 19
PIF_VALUE: 17
PIF_VALUE: 19
PIF_VALUE: 20
PIF_VALUE: 19
PIF_VALUE: 21
PIF_VALUE: 22
PIF_VALUE: 19
PIF_VALUE: 20
PIF_VALUE: 19
PIF_VALUE: 22
PIF_VALUE: 21
PIF_VALUE: 16
PIF_VALUE: 25
PIF_VALUE: 20
PIF_VALUE: 1
PIF_VALUE: 20
PIF_VALUE: 21
PIF_VALUE: 19
PIF_VALUE: 24
PIF_VALUE: 17
PIF_VALUE: 20
PIF_VALUE: 0
PIF_VALUE: 3
PIF_VALUE: 19
PIF_VALUE: 20
PIF_VALUE: 22
PIF_VALUE: 20
PIF_VALUE: 19
PIF_VALUE: 20
PIF_VALUE: 1
PIF_VALUE: 21
PIF_VALUE: 19
PIF_VALUE: 19
PIF_VALUE: 20
PIF_VALUE: 19
PIF_VALUE: 0
PIF_VALUE: 22
PIF_VALUE: 18
PIF_VALUE: 22
PIF_VALUE: 22
PIF_VALUE: 19
PIF_VALUE: 20
PIF_VALUE: 17
PIF_VALUE: 19

## 2019-01-16 ASSESSMENT — PAIN SCALES - GENERAL
PAINLEVEL_OUTOF10: 0

## 2019-01-16 ASSESSMENT — PAIN - FUNCTIONAL ASSESSMENT: PAIN_FUNCTIONAL_ASSESSMENT: 0-10

## 2019-01-16 ASSESSMENT — PAIN DESCRIPTION - LOCATION: LOCATION: ABDOMEN

## 2019-01-16 ASSESSMENT — PAIN DESCRIPTION - PAIN TYPE: TYPE: SURGICAL PAIN

## 2019-01-17 LAB
ALBUMIN SERPL-MCNC: 3.2 G/DL (ref 3.5–5.2)
ALP BLD-CCNC: 219 U/L (ref 40–129)
ALT SERPL-CCNC: 167 U/L (ref 0–40)
ANION GAP SERPL CALCULATED.3IONS-SCNC: 12 MMOL/L (ref 7–16)
AST SERPL-CCNC: 239 U/L (ref 0–39)
BASOPHILS ABSOLUTE: 0 E9/L (ref 0–0.2)
BASOPHILS RELATIVE PERCENT: 0 % (ref 0–2)
BILIRUB SERPL-MCNC: 4.5 MG/DL (ref 0–1.2)
BUN BLDV-MCNC: 8 MG/DL (ref 8–23)
CALCIUM SERPL-MCNC: 8 MG/DL (ref 8.6–10.2)
CHLORIDE BLD-SCNC: 100 MMOL/L (ref 98–107)
CO2: 22 MMOL/L (ref 22–29)
CREAT SERPL-MCNC: 0.8 MG/DL (ref 0.7–1.2)
EOSINOPHILS ABSOLUTE: 0.01 E9/L (ref 0.05–0.5)
EOSINOPHILS RELATIVE PERCENT: 0.1 % (ref 0–6)
GFR AFRICAN AMERICAN: >60
GFR NON-AFRICAN AMERICAN: >60 ML/MIN/1.73
GLUCOSE BLD-MCNC: 131 MG/DL (ref 74–99)
HBA1C MFR BLD: 5.3 % (ref 4–5.6)
HCT VFR BLD CALC: 24.2 % (ref 37–54)
HEMOGLOBIN: 7.6 G/DL (ref 12.5–16.5)
IMMATURE GRANULOCYTES #: 0.04 E9/L
IMMATURE GRANULOCYTES %: 0.5 % (ref 0–5)
INR BLD: 1
LYMPHOCYTES ABSOLUTE: 1.32 E9/L (ref 1.5–4)
LYMPHOCYTES RELATIVE PERCENT: 16.3 % (ref 20–42)
MAGNESIUM: 1.9 MG/DL (ref 1.6–2.6)
MCH RBC QN AUTO: 30.4 PG (ref 26–35)
MCHC RBC AUTO-ENTMCNC: 31.4 % (ref 32–34.5)
MCV RBC AUTO: 96.8 FL (ref 80–99.9)
METER GLUCOSE: 144 MG/DL (ref 74–99)
METER GLUCOSE: 145 MG/DL (ref 74–99)
METER GLUCOSE: 154 MG/DL (ref 74–99)
MONOCYTES ABSOLUTE: 0.34 E9/L (ref 0.1–0.95)
MONOCYTES RELATIVE PERCENT: 4.2 % (ref 2–12)
NEUTROPHILS ABSOLUTE: 6.41 E9/L (ref 1.8–7.3)
NEUTROPHILS RELATIVE PERCENT: 78.9 % (ref 43–80)
PDW BLD-RTO: 19.9 FL (ref 11.5–15)
PHOSPHORUS: 2.9 MG/DL (ref 2.5–4.5)
PLATELET # BLD: 337 E9/L (ref 130–450)
PMV BLD AUTO: 10.2 FL (ref 7–12)
POTASSIUM REFLEX MAGNESIUM: 4.2 MMOL/L (ref 3.5–5)
PROTHROMBIN TIME: 11.4 SEC (ref 9.3–12.4)
RBC # BLD: 2.5 E12/L (ref 3.8–5.8)
SODIUM BLD-SCNC: 134 MMOL/L (ref 132–146)
TOTAL PROTEIN: 5.6 G/DL (ref 6.4–8.3)
WBC # BLD: 8.1 E9/L (ref 4.5–11.5)

## 2019-01-17 PROCEDURE — 6360000002 HC RX W HCPCS: Performed by: STUDENT IN AN ORGANIZED HEALTH CARE EDUCATION/TRAINING PROGRAM

## 2019-01-17 PROCEDURE — 99222 1ST HOSP IP/OBS MODERATE 55: CPT | Performed by: INTERNAL MEDICINE

## 2019-01-17 PROCEDURE — 2500000003 HC RX 250 WO HCPCS: Performed by: STUDENT IN AN ORGANIZED HEALTH CARE EDUCATION/TRAINING PROGRAM

## 2019-01-17 PROCEDURE — 2580000003 HC RX 258: Performed by: STUDENT IN AN ORGANIZED HEALTH CARE EDUCATION/TRAINING PROGRAM

## 2019-01-17 PROCEDURE — 82962 GLUCOSE BLOOD TEST: CPT

## 2019-01-17 PROCEDURE — 85025 COMPLETE CBC W/AUTO DIFF WBC: CPT

## 2019-01-17 PROCEDURE — APPSS180 APP SPLIT SHARED TIME > 60 MINUTES: Performed by: NURSE PRACTITIONER

## 2019-01-17 PROCEDURE — 2700000000 HC OXYGEN THERAPY PER DAY

## 2019-01-17 PROCEDURE — 36415 COLL VENOUS BLD VENIPUNCTURE: CPT

## 2019-01-17 PROCEDURE — 80053 COMPREHEN METABOLIC PANEL: CPT

## 2019-01-17 PROCEDURE — C9113 INJ PANTOPRAZOLE SODIUM, VIA: HCPCS | Performed by: STUDENT IN AN ORGANIZED HEALTH CARE EDUCATION/TRAINING PROGRAM

## 2019-01-17 PROCEDURE — 6370000000 HC RX 637 (ALT 250 FOR IP): Performed by: TRANSPLANT SURGERY

## 2019-01-17 PROCEDURE — 84100 ASSAY OF PHOSPHORUS: CPT

## 2019-01-17 PROCEDURE — 2060000000 HC ICU INTERMEDIATE R&B

## 2019-01-17 PROCEDURE — 83735 ASSAY OF MAGNESIUM: CPT

## 2019-01-17 PROCEDURE — 36592 COLLECT BLOOD FROM PICC: CPT

## 2019-01-17 PROCEDURE — 83036 HEMOGLOBIN GLYCOSYLATED A1C: CPT

## 2019-01-17 PROCEDURE — 85610 PROTHROMBIN TIME: CPT

## 2019-01-17 RX ORDER — THIAMINE HYDROCHLORIDE 100 MG/ML
100 INJECTION, SOLUTION INTRAMUSCULAR; INTRAVENOUS DAILY
Status: DISCONTINUED | OUTPATIENT
Start: 2019-01-17 | End: 2019-01-22 | Stop reason: HOSPADM

## 2019-01-17 RX ORDER — SODIUM CHLORIDE 0.9 % (FLUSH) 0.9 %
10 SYRINGE (ML) INJECTION EVERY 12 HOURS SCHEDULED
Status: DISCONTINUED | OUTPATIENT
Start: 2019-01-17 | End: 2019-01-22 | Stop reason: HOSPADM

## 2019-01-17 RX ORDER — FOLIC ACID 5 MG/ML
1 INJECTION, SOLUTION INTRAMUSCULAR; INTRAVENOUS; SUBCUTANEOUS DAILY
Status: DISCONTINUED | OUTPATIENT
Start: 2019-01-17 | End: 2019-01-22 | Stop reason: HOSPADM

## 2019-01-17 RX ORDER — SODIUM CHLORIDE 0.9 % (FLUSH) 0.9 %
10 SYRINGE (ML) INJECTION PRN
Status: DISCONTINUED | OUTPATIENT
Start: 2019-01-17 | End: 2019-01-22 | Stop reason: HOSPADM

## 2019-01-17 RX ORDER — IPRATROPIUM BROMIDE AND ALBUTEROL SULFATE 2.5; .5 MG/3ML; MG/3ML
1 SOLUTION RESPIRATORY (INHALATION)
Status: DISCONTINUED | OUTPATIENT
Start: 2019-01-17 | End: 2019-01-22 | Stop reason: HOSPADM

## 2019-01-17 RX ADMIN — METOPROLOL TARTRATE 5 MG: 5 INJECTION, SOLUTION INTRAVENOUS at 22:01

## 2019-01-17 RX ADMIN — MEROPENEM 1 G: 1 INJECTION, POWDER, FOR SOLUTION INTRAVENOUS at 08:19

## 2019-01-17 RX ADMIN — Medication 10 ML: at 20:35

## 2019-01-17 RX ADMIN — ONDANSETRON HYDROCHLORIDE 4 MG: 2 SOLUTION INTRAMUSCULAR; INTRAVENOUS at 20:34

## 2019-01-17 RX ADMIN — TRIMETHOBENZAMIDE HYDROCHLORIDE 200 MG: 100 INJECTION INTRAMUSCULAR at 22:56

## 2019-01-17 RX ADMIN — SODIUM CHLORIDE, POTASSIUM CHLORIDE, SODIUM LACTATE AND CALCIUM CHLORIDE: 600; 310; 30; 20 INJECTION, SOLUTION INTRAVENOUS at 16:18

## 2019-01-17 RX ADMIN — Medication 10 ML: at 04:18

## 2019-01-17 RX ADMIN — SODIUM CHLORIDE, POTASSIUM CHLORIDE, SODIUM LACTATE AND CALCIUM CHLORIDE: 600; 310; 30; 20 INJECTION, SOLUTION INTRAVENOUS at 06:03

## 2019-01-17 RX ADMIN — THIAMINE HYDROCHLORIDE 100 MG: 100 INJECTION, SOLUTION INTRAMUSCULAR; INTRAVENOUS at 08:20

## 2019-01-17 RX ADMIN — HEPARIN SODIUM 5000 UNITS: 10000 INJECTION INTRAVENOUS; SUBCUTANEOUS at 14:02

## 2019-01-17 RX ADMIN — Medication 10 ML: at 08:21

## 2019-01-17 RX ADMIN — FOLIC ACID 1 MG: 5 INJECTION, SOLUTION INTRAMUSCULAR; INTRAVENOUS; SUBCUTANEOUS at 08:20

## 2019-01-17 RX ADMIN — Medication 10 ML: at 16:21

## 2019-01-17 RX ADMIN — PANTOPRAZOLE SODIUM 40 MG: 40 INJECTION, POWDER, FOR SOLUTION INTRAVENOUS at 08:20

## 2019-01-17 RX ADMIN — ONDANSETRON HYDROCHLORIDE 4 MG: 2 SOLUTION INTRAMUSCULAR; INTRAVENOUS at 04:17

## 2019-01-17 RX ADMIN — ONDANSETRON HYDROCHLORIDE 4 MG: 2 SOLUTION INTRAMUSCULAR; INTRAVENOUS at 12:41

## 2019-01-17 RX ADMIN — METOPROLOL TARTRATE 5 MG: 5 INJECTION, SOLUTION INTRAVENOUS at 10:28

## 2019-01-17 RX ADMIN — HEPARIN SODIUM 5000 UNITS: 10000 INJECTION INTRAVENOUS; SUBCUTANEOUS at 06:03

## 2019-01-17 RX ADMIN — METOPROLOL TARTRATE 5 MG: 5 INJECTION, SOLUTION INTRAVENOUS at 04:18

## 2019-01-17 RX ADMIN — HEPARIN SODIUM 5000 UNITS: 10000 INJECTION INTRAVENOUS; SUBCUTANEOUS at 22:03

## 2019-01-17 RX ADMIN — MEROPENEM 1 G: 1 INJECTION, POWDER, FOR SOLUTION INTRAVENOUS at 16:22

## 2019-01-17 RX ADMIN — Medication 10 ML: at 10:28

## 2019-01-17 RX ADMIN — Medication 10 ML: at 08:20

## 2019-01-17 RX ADMIN — METOPROLOL TARTRATE 5 MG: 5 INJECTION, SOLUTION INTRAVENOUS at 16:21

## 2019-01-17 ASSESSMENT — PAIN DESCRIPTION - LOCATION: LOCATION: ABDOMEN

## 2019-01-17 ASSESSMENT — PAIN - FUNCTIONAL ASSESSMENT: PAIN_FUNCTIONAL_ASSESSMENT: PREVENTS OR INTERFERES SOME ACTIVE ACTIVITIES AND ADLS

## 2019-01-17 ASSESSMENT — PAIN DESCRIPTION - DESCRIPTORS: DESCRIPTORS: ACHING;SORE

## 2019-01-17 ASSESSMENT — PAIN DESCRIPTION - PROGRESSION
CLINICAL_PROGRESSION: NOT CHANGED
CLINICAL_PROGRESSION: NOT CHANGED

## 2019-01-17 ASSESSMENT — PAIN DESCRIPTION - FREQUENCY: FREQUENCY: INTERMITTENT

## 2019-01-17 ASSESSMENT — PAIN DESCRIPTION - PAIN TYPE: TYPE: SURGICAL PAIN

## 2019-01-17 ASSESSMENT — PAIN SCALES - GENERAL
PAINLEVEL_OUTOF10: 0
PAINLEVEL_OUTOF10: 8

## 2019-01-17 ASSESSMENT — PAIN DESCRIPTION - ONSET: ONSET: ON-GOING

## 2019-01-18 ENCOUNTER — APPOINTMENT (OUTPATIENT)
Dept: GENERAL RADIOLOGY | Age: 69
DRG: 414 | End: 2019-01-18
Attending: TRANSPLANT SURGERY
Payer: MEDICARE

## 2019-01-18 LAB
ALBUMIN SERPL-MCNC: 2.9 G/DL (ref 3.5–5.2)
ALP BLD-CCNC: 209 U/L (ref 40–129)
ALT SERPL-CCNC: 147 U/L (ref 0–40)
ANION GAP SERPL CALCULATED.3IONS-SCNC: 12 MMOL/L (ref 7–16)
AST SERPL-CCNC: 142 U/L (ref 0–39)
BASOPHILS ABSOLUTE: 0 E9/L (ref 0–0.2)
BASOPHILS RELATIVE PERCENT: 0 % (ref 0–2)
BILIRUB SERPL-MCNC: 4.1 MG/DL (ref 0–1.2)
BUN BLDV-MCNC: 9 MG/DL (ref 8–23)
CALCIUM SERPL-MCNC: 8.4 MG/DL (ref 8.6–10.2)
CHLORIDE BLD-SCNC: 97 MMOL/L (ref 98–107)
CO2: 25 MMOL/L (ref 22–29)
CREAT SERPL-MCNC: 0.6 MG/DL (ref 0.7–1.2)
EOSINOPHILS ABSOLUTE: 0 E9/L (ref 0.05–0.5)
EOSINOPHILS RELATIVE PERCENT: 0 % (ref 0–6)
GFR AFRICAN AMERICAN: >60
GFR NON-AFRICAN AMERICAN: >60 ML/MIN/1.73
GLUCOSE BLD-MCNC: 149 MG/DL (ref 74–99)
HCT VFR BLD CALC: 27.6 % (ref 37–54)
HEMOGLOBIN: 8.7 G/DL (ref 12.5–16.5)
IMMATURE GRANULOCYTES #: 0.04 E9/L
IMMATURE GRANULOCYTES %: 0.5 % (ref 0–5)
INR BLD: 1
LYMPHOCYTES ABSOLUTE: 0.67 E9/L (ref 1.5–4)
LYMPHOCYTES RELATIVE PERCENT: 8.3 % (ref 20–42)
MAGNESIUM: 1.9 MG/DL (ref 1.6–2.6)
MCH RBC QN AUTO: 30.9 PG (ref 26–35)
MCHC RBC AUTO-ENTMCNC: 31.5 % (ref 32–34.5)
MCV RBC AUTO: 97.9 FL (ref 80–99.9)
METER GLUCOSE: 102 MG/DL (ref 74–99)
METER GLUCOSE: 115 MG/DL (ref 74–99)
METER GLUCOSE: 132 MG/DL (ref 74–99)
METER GLUCOSE: 156 MG/DL (ref 74–99)
MONOCYTES ABSOLUTE: 0.36 E9/L (ref 0.1–0.95)
MONOCYTES RELATIVE PERCENT: 4.4 % (ref 2–12)
NEUTROPHILS ABSOLUTE: 7.03 E9/L (ref 1.8–7.3)
NEUTROPHILS RELATIVE PERCENT: 86.8 % (ref 43–80)
PDW BLD-RTO: 19.8 FL (ref 11.5–15)
PHOSPHORUS: 3 MG/DL (ref 2.5–4.5)
PLATELET # BLD: 401 E9/L (ref 130–450)
PMV BLD AUTO: 10.7 FL (ref 7–12)
POTASSIUM REFLEX MAGNESIUM: 4.1 MMOL/L (ref 3.5–5)
PROTHROMBIN TIME: 11.6 SEC (ref 9.3–12.4)
RBC # BLD: 2.82 E12/L (ref 3.8–5.8)
SODIUM BLD-SCNC: 134 MMOL/L (ref 132–146)
TOTAL PROTEIN: 5.9 G/DL (ref 6.4–8.3)
WBC # BLD: 8.1 E9/L (ref 4.5–11.5)

## 2019-01-18 PROCEDURE — C9113 INJ PANTOPRAZOLE SODIUM, VIA: HCPCS | Performed by: STUDENT IN AN ORGANIZED HEALTH CARE EDUCATION/TRAINING PROGRAM

## 2019-01-18 PROCEDURE — 85610 PROTHROMBIN TIME: CPT

## 2019-01-18 PROCEDURE — 2500000003 HC RX 250 WO HCPCS: Performed by: STUDENT IN AN ORGANIZED HEALTH CARE EDUCATION/TRAINING PROGRAM

## 2019-01-18 PROCEDURE — 6360000002 HC RX W HCPCS: Performed by: STUDENT IN AN ORGANIZED HEALTH CARE EDUCATION/TRAINING PROGRAM

## 2019-01-18 PROCEDURE — 82962 GLUCOSE BLOOD TEST: CPT

## 2019-01-18 PROCEDURE — 80053 COMPREHEN METABOLIC PANEL: CPT

## 2019-01-18 PROCEDURE — 94640 AIRWAY INHALATION TREATMENT: CPT

## 2019-01-18 PROCEDURE — 2580000003 HC RX 258: Performed by: STUDENT IN AN ORGANIZED HEALTH CARE EDUCATION/TRAINING PROGRAM

## 2019-01-18 PROCEDURE — 85025 COMPLETE CBC W/AUTO DIFF WBC: CPT

## 2019-01-18 PROCEDURE — 6370000000 HC RX 637 (ALT 250 FOR IP): Performed by: TRANSPLANT SURGERY

## 2019-01-18 PROCEDURE — 2700000000 HC OXYGEN THERAPY PER DAY

## 2019-01-18 PROCEDURE — 94664 DEMO&/EVAL PT USE INHALER: CPT

## 2019-01-18 PROCEDURE — 36592 COLLECT BLOOD FROM PICC: CPT

## 2019-01-18 PROCEDURE — 99024 POSTOP FOLLOW-UP VISIT: CPT | Performed by: TRANSPLANT SURGERY

## 2019-01-18 PROCEDURE — 71045 X-RAY EXAM CHEST 1 VIEW: CPT

## 2019-01-18 PROCEDURE — 2060000000 HC ICU INTERMEDIATE R&B

## 2019-01-18 PROCEDURE — 36415 COLL VENOUS BLD VENIPUNCTURE: CPT

## 2019-01-18 PROCEDURE — 6370000000 HC RX 637 (ALT 250 FOR IP): Performed by: STUDENT IN AN ORGANIZED HEALTH CARE EDUCATION/TRAINING PROGRAM

## 2019-01-18 PROCEDURE — 83735 ASSAY OF MAGNESIUM: CPT

## 2019-01-18 PROCEDURE — 84100 ASSAY OF PHOSPHORUS: CPT

## 2019-01-18 RX ORDER — DEXTROSE, SODIUM CHLORIDE, AND POTASSIUM CHLORIDE 5; .45; .15 G/100ML; G/100ML; G/100ML
INJECTION INTRAVENOUS CONTINUOUS
Status: DISCONTINUED | OUTPATIENT
Start: 2019-01-18 | End: 2019-01-21

## 2019-01-18 RX ORDER — SODIUM CHLORIDE, SODIUM LACTATE, POTASSIUM CHLORIDE, CALCIUM CHLORIDE 600; 310; 30; 20 MG/100ML; MG/100ML; MG/100ML; MG/100ML
500 INJECTION, SOLUTION INTRAVENOUS ONCE
Status: COMPLETED | OUTPATIENT
Start: 2019-01-18 | End: 2019-01-18

## 2019-01-18 RX ADMIN — Medication 10 ML: at 09:07

## 2019-01-18 RX ADMIN — PANTOPRAZOLE SODIUM 40 MG: 40 INJECTION, POWDER, FOR SOLUTION INTRAVENOUS at 08:15

## 2019-01-18 RX ADMIN — IPRATROPIUM BROMIDE AND ALBUTEROL SULFATE 1 AMPULE: .5; 3 SOLUTION RESPIRATORY (INHALATION) at 08:52

## 2019-01-18 RX ADMIN — IPRATROPIUM BROMIDE AND ALBUTEROL SULFATE 1 AMPULE: .5; 3 SOLUTION RESPIRATORY (INHALATION) at 12:38

## 2019-01-18 RX ADMIN — METOPROLOL TARTRATE 5 MG: 5 INJECTION, SOLUTION INTRAVENOUS at 08:15

## 2019-01-18 RX ADMIN — HEPARIN SODIUM 5000 UNITS: 10000 INJECTION INTRAVENOUS; SUBCUTANEOUS at 21:32

## 2019-01-18 RX ADMIN — FOLIC ACID 1 MG: 5 INJECTION, SOLUTION INTRAMUSCULAR; INTRAVENOUS; SUBCUTANEOUS at 09:07

## 2019-01-18 RX ADMIN — IPRATROPIUM BROMIDE AND ALBUTEROL SULFATE 1 AMPULE: .5; 3 SOLUTION RESPIRATORY (INHALATION) at 02:14

## 2019-01-18 RX ADMIN — METOPROLOL TARTRATE 5 MG: 5 INJECTION, SOLUTION INTRAVENOUS at 16:13

## 2019-01-18 RX ADMIN — Medication 10 ML: at 21:28

## 2019-01-18 RX ADMIN — THIAMINE HYDROCHLORIDE 100 MG: 100 INJECTION, SOLUTION INTRAMUSCULAR; INTRAVENOUS at 08:16

## 2019-01-18 RX ADMIN — MEROPENEM 1 G: 1 INJECTION, POWDER, FOR SOLUTION INTRAVENOUS at 16:08

## 2019-01-18 RX ADMIN — Medication 10 ML: at 08:17

## 2019-01-18 RX ADMIN — SODIUM CHLORIDE, POTASSIUM CHLORIDE, SODIUM LACTATE AND CALCIUM CHLORIDE 500 ML: 600; 310; 30; 20 INJECTION, SOLUTION INTRAVENOUS at 01:48

## 2019-01-18 RX ADMIN — METOPROLOL TARTRATE 5 MG: 5 INJECTION, SOLUTION INTRAVENOUS at 21:28

## 2019-01-18 RX ADMIN — IPRATROPIUM BROMIDE AND ALBUTEROL SULFATE 1 AMPULE: .5; 3 SOLUTION RESPIRATORY (INHALATION) at 16:28

## 2019-01-18 RX ADMIN — DEXTROSE, SODIUM CHLORIDE, AND POTASSIUM CHLORIDE: 5; .45; .15 INJECTION INTRAVENOUS at 22:57

## 2019-01-18 RX ADMIN — SODIUM CHLORIDE, POTASSIUM CHLORIDE, SODIUM LACTATE AND CALCIUM CHLORIDE: 600; 310; 30; 20 INJECTION, SOLUTION INTRAVENOUS at 03:49

## 2019-01-18 RX ADMIN — HEPARIN SODIUM 5000 UNITS: 10000 INJECTION INTRAVENOUS; SUBCUTANEOUS at 14:55

## 2019-01-18 RX ADMIN — INSULIN LISPRO 2 UNITS: 100 INJECTION, SOLUTION INTRAVENOUS; SUBCUTANEOUS at 16:16

## 2019-01-18 RX ADMIN — METOPROLOL TARTRATE 5 MG: 5 INJECTION, SOLUTION INTRAVENOUS at 03:49

## 2019-01-18 RX ADMIN — MEROPENEM 1 G: 1 INJECTION, POWDER, FOR SOLUTION INTRAVENOUS at 00:19

## 2019-01-18 RX ADMIN — DEXTROSE, SODIUM CHLORIDE, AND POTASSIUM CHLORIDE: 5; .45; .15 INJECTION INTRAVENOUS at 08:16

## 2019-01-18 RX ADMIN — Medication 10 ML: at 03:50

## 2019-01-18 RX ADMIN — HEPARIN SODIUM 5000 UNITS: 10000 INJECTION INTRAVENOUS; SUBCUTANEOUS at 05:39

## 2019-01-18 RX ADMIN — MEROPENEM 1 G: 1 INJECTION, POWDER, FOR SOLUTION INTRAVENOUS at 08:16

## 2019-01-18 ASSESSMENT — PAIN SCALES - GENERAL
PAINLEVEL_OUTOF10: 0
PAINLEVEL_OUTOF10: 0

## 2019-01-19 LAB
ALBUMIN SERPL-MCNC: 2.6 G/DL (ref 3.5–5.2)
ALP BLD-CCNC: 164 U/L (ref 40–129)
ALT SERPL-CCNC: 102 U/L (ref 0–40)
ANION GAP SERPL CALCULATED.3IONS-SCNC: 8 MMOL/L (ref 7–16)
AST SERPL-CCNC: 78 U/L (ref 0–39)
BASOPHILS ABSOLUTE: 0.01 E9/L (ref 0–0.2)
BASOPHILS RELATIVE PERCENT: 0.1 % (ref 0–2)
BILIRUB SERPL-MCNC: 3.3 MG/DL (ref 0–1.2)
BUN BLDV-MCNC: 10 MG/DL (ref 8–23)
CALCIUM SERPL-MCNC: 7.9 MG/DL (ref 8.6–10.2)
CHLORIDE BLD-SCNC: 99 MMOL/L (ref 98–107)
CO2: 27 MMOL/L (ref 22–29)
CREAT SERPL-MCNC: 0.7 MG/DL (ref 0.7–1.2)
EOSINOPHILS ABSOLUTE: 0.05 E9/L (ref 0.05–0.5)
EOSINOPHILS RELATIVE PERCENT: 0.6 % (ref 0–6)
GFR AFRICAN AMERICAN: >60
GFR NON-AFRICAN AMERICAN: >60 ML/MIN/1.73
GLUCOSE BLD-MCNC: 133 MG/DL (ref 74–99)
HCT VFR BLD CALC: 24.8 % (ref 37–54)
HEMOGLOBIN: 7.9 G/DL (ref 12.5–16.5)
IMMATURE GRANULOCYTES #: 0.04 E9/L
IMMATURE GRANULOCYTES %: 0.5 % (ref 0–5)
INR BLD: 1
LYMPHOCYTES ABSOLUTE: 1.12 E9/L (ref 1.5–4)
LYMPHOCYTES RELATIVE PERCENT: 14.5 % (ref 20–42)
MAGNESIUM: 2.3 MG/DL (ref 1.6–2.6)
MCH RBC QN AUTO: 31.3 PG (ref 26–35)
MCHC RBC AUTO-ENTMCNC: 31.9 % (ref 32–34.5)
MCV RBC AUTO: 98.4 FL (ref 80–99.9)
METER GLUCOSE: 114 MG/DL (ref 74–99)
METER GLUCOSE: 116 MG/DL (ref 74–99)
METER GLUCOSE: 120 MG/DL (ref 74–99)
METER GLUCOSE: 142 MG/DL (ref 74–99)
MONOCYTES ABSOLUTE: 0.43 E9/L (ref 0.1–0.95)
MONOCYTES RELATIVE PERCENT: 5.6 % (ref 2–12)
NEUTROPHILS ABSOLUTE: 6.06 E9/L (ref 1.8–7.3)
NEUTROPHILS RELATIVE PERCENT: 78.7 % (ref 43–80)
PDW BLD-RTO: 19.4 FL (ref 11.5–15)
PHOSPHORUS: 2.3 MG/DL (ref 2.5–4.5)
PLATELET # BLD: 365 E9/L (ref 130–450)
PMV BLD AUTO: 10.1 FL (ref 7–12)
POTASSIUM REFLEX MAGNESIUM: 4.3 MMOL/L (ref 3.5–5)
PROTHROMBIN TIME: 11.8 SEC (ref 9.3–12.4)
RBC # BLD: 2.52 E12/L (ref 3.8–5.8)
SODIUM BLD-SCNC: 134 MMOL/L (ref 132–146)
TOTAL PROTEIN: 5.6 G/DL (ref 6.4–8.3)
WBC # BLD: 7.7 E9/L (ref 4.5–11.5)

## 2019-01-19 PROCEDURE — 2500000003 HC RX 250 WO HCPCS: Performed by: STUDENT IN AN ORGANIZED HEALTH CARE EDUCATION/TRAINING PROGRAM

## 2019-01-19 PROCEDURE — 2580000003 HC RX 258: Performed by: STUDENT IN AN ORGANIZED HEALTH CARE EDUCATION/TRAINING PROGRAM

## 2019-01-19 PROCEDURE — 97530 THERAPEUTIC ACTIVITIES: CPT

## 2019-01-19 PROCEDURE — 36415 COLL VENOUS BLD VENIPUNCTURE: CPT

## 2019-01-19 PROCEDURE — 82962 GLUCOSE BLOOD TEST: CPT

## 2019-01-19 PROCEDURE — 94640 AIRWAY INHALATION TREATMENT: CPT

## 2019-01-19 PROCEDURE — C9113 INJ PANTOPRAZOLE SODIUM, VIA: HCPCS | Performed by: STUDENT IN AN ORGANIZED HEALTH CARE EDUCATION/TRAINING PROGRAM

## 2019-01-19 PROCEDURE — 6370000000 HC RX 637 (ALT 250 FOR IP): Performed by: TRANSPLANT SURGERY

## 2019-01-19 PROCEDURE — 85025 COMPLETE CBC W/AUTO DIFF WBC: CPT

## 2019-01-19 PROCEDURE — 99024 POSTOP FOLLOW-UP VISIT: CPT | Performed by: SURGERY

## 2019-01-19 PROCEDURE — 85610 PROTHROMBIN TIME: CPT

## 2019-01-19 PROCEDURE — 6360000002 HC RX W HCPCS: Performed by: STUDENT IN AN ORGANIZED HEALTH CARE EDUCATION/TRAINING PROGRAM

## 2019-01-19 PROCEDURE — 6370000000 HC RX 637 (ALT 250 FOR IP): Performed by: STUDENT IN AN ORGANIZED HEALTH CARE EDUCATION/TRAINING PROGRAM

## 2019-01-19 PROCEDURE — 2700000000 HC OXYGEN THERAPY PER DAY

## 2019-01-19 PROCEDURE — 97162 PT EVAL MOD COMPLEX 30 MIN: CPT

## 2019-01-19 PROCEDURE — 83735 ASSAY OF MAGNESIUM: CPT

## 2019-01-19 PROCEDURE — 80053 COMPREHEN METABOLIC PANEL: CPT

## 2019-01-19 PROCEDURE — 2580000003 HC RX 258: Performed by: ANESTHESIOLOGY

## 2019-01-19 PROCEDURE — 84100 ASSAY OF PHOSPHORUS: CPT

## 2019-01-19 PROCEDURE — 97166 OT EVAL MOD COMPLEX 45 MIN: CPT

## 2019-01-19 PROCEDURE — 2500000003 HC RX 250 WO HCPCS: Performed by: ANESTHESIOLOGY

## 2019-01-19 PROCEDURE — 6360000002 HC RX W HCPCS: Performed by: ANESTHESIOLOGY

## 2019-01-19 PROCEDURE — 2060000000 HC ICU INTERMEDIATE R&B

## 2019-01-19 RX ORDER — FENTANYL CITRATE 50 UG/ML
25 INJECTION, SOLUTION INTRAMUSCULAR; INTRAVENOUS
Status: DISCONTINUED | OUTPATIENT
Start: 2019-01-19 | End: 2019-01-22 | Stop reason: HOSPADM

## 2019-01-19 RX ORDER — FENTANYL CITRATE 50 UG/ML
50 INJECTION, SOLUTION INTRAMUSCULAR; INTRAVENOUS
Status: DISCONTINUED | OUTPATIENT
Start: 2019-01-19 | End: 2019-01-22 | Stop reason: HOSPADM

## 2019-01-19 RX ADMIN — Medication 10 ML: at 08:14

## 2019-01-19 RX ADMIN — METOPROLOL TARTRATE 5 MG: 5 INJECTION, SOLUTION INTRAVENOUS at 21:26

## 2019-01-19 RX ADMIN — HEPARIN SODIUM 5000 UNITS: 10000 INJECTION INTRAVENOUS; SUBCUTANEOUS at 05:34

## 2019-01-19 RX ADMIN — THIAMINE HYDROCHLORIDE 100 MG: 100 INJECTION, SOLUTION INTRAMUSCULAR; INTRAVENOUS at 08:15

## 2019-01-19 RX ADMIN — IPRATROPIUM BROMIDE AND ALBUTEROL SULFATE 1 AMPULE: .5; 3 SOLUTION RESPIRATORY (INHALATION) at 20:28

## 2019-01-19 RX ADMIN — DEXTROSE, SODIUM CHLORIDE, AND POTASSIUM CHLORIDE: 5; .45; .15 INJECTION INTRAVENOUS at 11:36

## 2019-01-19 RX ADMIN — MEROPENEM 1 G: 1 INJECTION, POWDER, FOR SOLUTION INTRAVENOUS at 00:46

## 2019-01-19 RX ADMIN — IPRATROPIUM BROMIDE AND ALBUTEROL SULFATE 1 AMPULE: .5; 3 SOLUTION RESPIRATORY (INHALATION) at 09:55

## 2019-01-19 RX ADMIN — IPRATROPIUM BROMIDE AND ALBUTEROL SULFATE 1 AMPULE: .5; 3 SOLUTION RESPIRATORY (INHALATION) at 17:12

## 2019-01-19 RX ADMIN — BUPIVACAINE HYDROCHLORIDE: 7.5 INJECTION, SOLUTION EPIDURAL; RETROBULBAR at 09:40

## 2019-01-19 RX ADMIN — PANTOPRAZOLE SODIUM 40 MG: 40 INJECTION, POWDER, FOR SOLUTION INTRAVENOUS at 08:14

## 2019-01-19 RX ADMIN — INSULIN LISPRO 2 UNITS: 100 INJECTION, SOLUTION INTRAVENOUS; SUBCUTANEOUS at 11:36

## 2019-01-19 RX ADMIN — MEROPENEM 1 G: 1 INJECTION, POWDER, FOR SOLUTION INTRAVENOUS at 09:33

## 2019-01-19 RX ADMIN — METOPROLOL TARTRATE 5 MG: 5 INJECTION, SOLUTION INTRAVENOUS at 05:34

## 2019-01-19 RX ADMIN — METOPROLOL TARTRATE 5 MG: 5 INJECTION, SOLUTION INTRAVENOUS at 16:20

## 2019-01-19 RX ADMIN — IPRATROPIUM BROMIDE AND ALBUTEROL SULFATE 1 AMPULE: .5; 3 SOLUTION RESPIRATORY (INHALATION) at 13:23

## 2019-01-19 RX ADMIN — METOPROLOL TARTRATE 5 MG: 5 INJECTION, SOLUTION INTRAVENOUS at 09:33

## 2019-01-19 RX ADMIN — MEROPENEM 1 G: 1 INJECTION, POWDER, FOR SOLUTION INTRAVENOUS at 16:20

## 2019-01-19 RX ADMIN — HEPARIN SODIUM 5000 UNITS: 10000 INJECTION INTRAVENOUS; SUBCUTANEOUS at 21:26

## 2019-01-19 RX ADMIN — Medication 10 ML: at 08:15

## 2019-01-19 RX ADMIN — Medication 10 ML: at 09:33

## 2019-01-19 RX ADMIN — FENTANYL CITRATE 25 MCG: 50 INJECTION, SOLUTION INTRAMUSCULAR; INTRAVENOUS at 17:53

## 2019-01-19 RX ADMIN — Medication 10 ML: at 17:53

## 2019-01-19 RX ADMIN — FOLIC ACID 1 MG: 5 INJECTION, SOLUTION INTRAMUSCULAR; INTRAVENOUS; SUBCUTANEOUS at 08:14

## 2019-01-19 ASSESSMENT — PAIN SCALES - GENERAL
PAINLEVEL_OUTOF10: 0
PAINLEVEL_OUTOF10: 0
PAINLEVEL_OUTOF10: 9

## 2019-01-20 LAB
ALBUMIN SERPL-MCNC: 2.6 G/DL (ref 3.5–5.2)
ALP BLD-CCNC: 169 U/L (ref 40–129)
ALT SERPL-CCNC: 93 U/L (ref 0–40)
ANION GAP SERPL CALCULATED.3IONS-SCNC: 8 MMOL/L (ref 7–16)
AST SERPL-CCNC: 64 U/L (ref 0–39)
BASOPHILS ABSOLUTE: 0.01 E9/L (ref 0–0.2)
BASOPHILS RELATIVE PERCENT: 0.1 % (ref 0–2)
BILIRUB SERPL-MCNC: 3.6 MG/DL (ref 0–1.2)
BLOOD BANK DISPENSE STATUS: NORMAL
BLOOD BANK DISPENSE STATUS: NORMAL
BLOOD BANK PRODUCT CODE: NORMAL
BLOOD BANK PRODUCT CODE: NORMAL
BPU ID: NORMAL
BPU ID: NORMAL
BUN BLDV-MCNC: 7 MG/DL (ref 8–23)
CALCIUM SERPL-MCNC: 8.5 MG/DL (ref 8.6–10.2)
CHLORIDE BLD-SCNC: 97 MMOL/L (ref 98–107)
CO2: 28 MMOL/L (ref 22–29)
CREAT SERPL-MCNC: 0.6 MG/DL (ref 0.7–1.2)
DESCRIPTION BLOOD BANK: NORMAL
DESCRIPTION BLOOD BANK: NORMAL
EOSINOPHILS ABSOLUTE: 0.02 E9/L (ref 0.05–0.5)
EOSINOPHILS RELATIVE PERCENT: 0.3 % (ref 0–6)
GFR AFRICAN AMERICAN: >60
GFR NON-AFRICAN AMERICAN: >60 ML/MIN/1.73
GLUCOSE BLD-MCNC: 149 MG/DL (ref 74–99)
HCT VFR BLD CALC: 29.5 % (ref 37–54)
HEMOGLOBIN: 9.2 G/DL (ref 12.5–16.5)
IMMATURE GRANULOCYTES #: 0.05 E9/L
IMMATURE GRANULOCYTES %: 0.7 % (ref 0–5)
INR BLD: 1.1
LYMPHOCYTES ABSOLUTE: 0.91 E9/L (ref 1.5–4)
LYMPHOCYTES RELATIVE PERCENT: 12.8 % (ref 20–42)
MAGNESIUM: 2.1 MG/DL (ref 1.6–2.6)
MCH RBC QN AUTO: 30.5 PG (ref 26–35)
MCHC RBC AUTO-ENTMCNC: 31.2 % (ref 32–34.5)
MCV RBC AUTO: 97.7 FL (ref 80–99.9)
METER GLUCOSE: 133 MG/DL (ref 74–99)
METER GLUCOSE: 136 MG/DL (ref 74–99)
METER GLUCOSE: 142 MG/DL (ref 74–99)
METER GLUCOSE: 191 MG/DL (ref 74–99)
MONOCYTES ABSOLUTE: 0.43 E9/L (ref 0.1–0.95)
MONOCYTES RELATIVE PERCENT: 6 % (ref 2–12)
NEUTROPHILS ABSOLUTE: 5.71 E9/L (ref 1.8–7.3)
NEUTROPHILS RELATIVE PERCENT: 80.1 % (ref 43–80)
PDW BLD-RTO: 18.6 FL (ref 11.5–15)
PHOSPHORUS: 2 MG/DL (ref 2.5–4.5)
PLATELET # BLD: 436 E9/L (ref 130–450)
PMV BLD AUTO: 10.4 FL (ref 7–12)
POTASSIUM REFLEX MAGNESIUM: 4.1 MMOL/L (ref 3.5–5)
PROTHROMBIN TIME: 12.2 SEC (ref 9.3–12.4)
RBC # BLD: 3.02 E12/L (ref 3.8–5.8)
SODIUM BLD-SCNC: 133 MMOL/L (ref 132–146)
TOTAL PROTEIN: 6.3 G/DL (ref 6.4–8.3)
WBC # BLD: 7.1 E9/L (ref 4.5–11.5)

## 2019-01-20 PROCEDURE — 6370000000 HC RX 637 (ALT 250 FOR IP): Performed by: TRANSPLANT SURGERY

## 2019-01-20 PROCEDURE — 6360000002 HC RX W HCPCS: Performed by: STUDENT IN AN ORGANIZED HEALTH CARE EDUCATION/TRAINING PROGRAM

## 2019-01-20 PROCEDURE — 85610 PROTHROMBIN TIME: CPT

## 2019-01-20 PROCEDURE — 99024 POSTOP FOLLOW-UP VISIT: CPT | Performed by: SURGERY

## 2019-01-20 PROCEDURE — 82962 GLUCOSE BLOOD TEST: CPT

## 2019-01-20 PROCEDURE — 2700000000 HC OXYGEN THERAPY PER DAY

## 2019-01-20 PROCEDURE — 6370000000 HC RX 637 (ALT 250 FOR IP): Performed by: STUDENT IN AN ORGANIZED HEALTH CARE EDUCATION/TRAINING PROGRAM

## 2019-01-20 PROCEDURE — 85025 COMPLETE CBC W/AUTO DIFF WBC: CPT

## 2019-01-20 PROCEDURE — 94640 AIRWAY INHALATION TREATMENT: CPT

## 2019-01-20 PROCEDURE — 2500000003 HC RX 250 WO HCPCS: Performed by: STUDENT IN AN ORGANIZED HEALTH CARE EDUCATION/TRAINING PROGRAM

## 2019-01-20 PROCEDURE — 2060000000 HC ICU INTERMEDIATE R&B

## 2019-01-20 PROCEDURE — 2580000003 HC RX 258: Performed by: STUDENT IN AN ORGANIZED HEALTH CARE EDUCATION/TRAINING PROGRAM

## 2019-01-20 PROCEDURE — 83735 ASSAY OF MAGNESIUM: CPT

## 2019-01-20 PROCEDURE — 80053 COMPREHEN METABOLIC PANEL: CPT

## 2019-01-20 PROCEDURE — 36415 COLL VENOUS BLD VENIPUNCTURE: CPT

## 2019-01-20 PROCEDURE — C9113 INJ PANTOPRAZOLE SODIUM, VIA: HCPCS | Performed by: STUDENT IN AN ORGANIZED HEALTH CARE EDUCATION/TRAINING PROGRAM

## 2019-01-20 PROCEDURE — 84100 ASSAY OF PHOSPHORUS: CPT

## 2019-01-20 RX ADMIN — DEXTROSE, SODIUM CHLORIDE, AND POTASSIUM CHLORIDE: 5; .45; .15 INJECTION INTRAVENOUS at 06:48

## 2019-01-20 RX ADMIN — IPRATROPIUM BROMIDE AND ALBUTEROL SULFATE 1 AMPULE: .5; 3 SOLUTION RESPIRATORY (INHALATION) at 08:40

## 2019-01-20 RX ADMIN — INSULIN LISPRO 2 UNITS: 100 INJECTION, SOLUTION INTRAVENOUS; SUBCUTANEOUS at 12:14

## 2019-01-20 RX ADMIN — FENTANYL CITRATE 25 MCG: 50 INJECTION, SOLUTION INTRAMUSCULAR; INTRAVENOUS at 09:30

## 2019-01-20 RX ADMIN — PANTOPRAZOLE SODIUM 40 MG: 40 INJECTION, POWDER, FOR SOLUTION INTRAVENOUS at 09:30

## 2019-01-20 RX ADMIN — METOPROLOL TARTRATE 5 MG: 5 INJECTION, SOLUTION INTRAVENOUS at 04:44

## 2019-01-20 RX ADMIN — Medication 10 ML: at 21:18

## 2019-01-20 RX ADMIN — METOPROLOL TARTRATE 5 MG: 5 INJECTION, SOLUTION INTRAVENOUS at 16:18

## 2019-01-20 RX ADMIN — IPRATROPIUM BROMIDE AND ALBUTEROL SULFATE 1 AMPULE: .5; 3 SOLUTION RESPIRATORY (INHALATION) at 20:23

## 2019-01-20 RX ADMIN — HEPARIN SODIUM 5000 UNITS: 10000 INJECTION INTRAVENOUS; SUBCUTANEOUS at 15:03

## 2019-01-20 RX ADMIN — Medication 10 ML: at 09:42

## 2019-01-20 RX ADMIN — FOLIC ACID 1 MG: 5 INJECTION, SOLUTION INTRAMUSCULAR; INTRAVENOUS; SUBCUTANEOUS at 09:29

## 2019-01-20 RX ADMIN — Medication 10 ML: at 09:43

## 2019-01-20 RX ADMIN — THIAMINE HYDROCHLORIDE 100 MG: 100 INJECTION, SOLUTION INTRAMUSCULAR; INTRAVENOUS at 09:31

## 2019-01-20 RX ADMIN — HEPARIN SODIUM 5000 UNITS: 10000 INJECTION INTRAVENOUS; SUBCUTANEOUS at 21:18

## 2019-01-20 RX ADMIN — IPRATROPIUM BROMIDE AND ALBUTEROL SULFATE 1 AMPULE: .5; 3 SOLUTION RESPIRATORY (INHALATION) at 16:04

## 2019-01-20 RX ADMIN — INSULIN LISPRO 1 UNITS: 100 INJECTION, SOLUTION INTRAVENOUS; SUBCUTANEOUS at 21:18

## 2019-01-20 RX ADMIN — Medication 10 ML: at 00:00

## 2019-01-20 RX ADMIN — METOPROLOL TARTRATE 5 MG: 5 INJECTION, SOLUTION INTRAVENOUS at 21:18

## 2019-01-20 RX ADMIN — METOPROLOL TARTRATE 5 MG: 5 INJECTION, SOLUTION INTRAVENOUS at 09:30

## 2019-01-20 RX ADMIN — MEROPENEM 1 G: 1 INJECTION, POWDER, FOR SOLUTION INTRAVENOUS at 00:00

## 2019-01-20 RX ADMIN — HEPARIN SODIUM 5000 UNITS: 10000 INJECTION INTRAVENOUS; SUBCUTANEOUS at 06:00

## 2019-01-20 ASSESSMENT — PAIN SCALES - GENERAL
PAINLEVEL_OUTOF10: 0
PAINLEVEL_OUTOF10: 8

## 2019-01-21 LAB
ALBUMIN SERPL-MCNC: 2.7 G/DL (ref 3.5–5.2)
ALP BLD-CCNC: 155 U/L (ref 40–129)
ALT SERPL-CCNC: 86 U/L (ref 0–40)
ANION GAP SERPL CALCULATED.3IONS-SCNC: 7 MMOL/L (ref 7–16)
AST SERPL-CCNC: 64 U/L (ref 0–39)
BASOPHILS ABSOLUTE: 0.01 E9/L (ref 0–0.2)
BASOPHILS RELATIVE PERCENT: 0.2 % (ref 0–2)
BILIRUB SERPL-MCNC: 3.4 MG/DL (ref 0–1.2)
BUN BLDV-MCNC: 7 MG/DL (ref 8–23)
CALCIUM SERPL-MCNC: 8.3 MG/DL (ref 8.6–10.2)
CHLORIDE BLD-SCNC: 96 MMOL/L (ref 98–107)
CO2: 28 MMOL/L (ref 22–29)
CREAT SERPL-MCNC: 0.6 MG/DL (ref 0.7–1.2)
EOSINOPHILS ABSOLUTE: 0.03 E9/L (ref 0.05–0.5)
EOSINOPHILS RELATIVE PERCENT: 0.5 % (ref 0–6)
GFR AFRICAN AMERICAN: >60
GFR NON-AFRICAN AMERICAN: >60 ML/MIN/1.73
GLUCOSE BLD-MCNC: 149 MG/DL (ref 74–99)
HCT VFR BLD CALC: 25.9 % (ref 37–54)
HEMOGLOBIN: 8.4 G/DL (ref 12.5–16.5)
IMMATURE GRANULOCYTES #: 0.04 E9/L
IMMATURE GRANULOCYTES %: 0.7 % (ref 0–5)
INR BLD: 1.1
LYMPHOCYTES ABSOLUTE: 1.07 E9/L (ref 1.5–4)
LYMPHOCYTES RELATIVE PERCENT: 18.4 % (ref 20–42)
MAGNESIUM: 2 MG/DL (ref 1.6–2.6)
MCH RBC QN AUTO: 31 PG (ref 26–35)
MCHC RBC AUTO-ENTMCNC: 32.4 % (ref 32–34.5)
MCV RBC AUTO: 95.6 FL (ref 80–99.9)
METER GLUCOSE: 101 MG/DL (ref 74–99)
METER GLUCOSE: 140 MG/DL (ref 74–99)
METER GLUCOSE: 146 MG/DL (ref 74–99)
MONOCYTES ABSOLUTE: 0.44 E9/L (ref 0.1–0.95)
MONOCYTES RELATIVE PERCENT: 7.5 % (ref 2–12)
NEUTROPHILS ABSOLUTE: 4.24 E9/L (ref 1.8–7.3)
NEUTROPHILS RELATIVE PERCENT: 72.7 % (ref 43–80)
PDW BLD-RTO: 17.8 FL (ref 11.5–15)
PHOSPHORUS: 2.2 MG/DL (ref 2.5–4.5)
PLATELET # BLD: 373 E9/L (ref 130–450)
PMV BLD AUTO: 9.9 FL (ref 7–12)
POTASSIUM REFLEX MAGNESIUM: 4 MMOL/L (ref 3.5–5)
PROTHROMBIN TIME: 12.6 SEC (ref 9.3–12.4)
RBC # BLD: 2.71 E12/L (ref 3.8–5.8)
SODIUM BLD-SCNC: 131 MMOL/L (ref 132–146)
TOTAL PROTEIN: 6.2 G/DL (ref 6.4–8.3)
WBC # BLD: 5.8 E9/L (ref 4.5–11.5)

## 2019-01-21 PROCEDURE — 97530 THERAPEUTIC ACTIVITIES: CPT

## 2019-01-21 PROCEDURE — 85025 COMPLETE CBC W/AUTO DIFF WBC: CPT

## 2019-01-21 PROCEDURE — 83735 ASSAY OF MAGNESIUM: CPT

## 2019-01-21 PROCEDURE — 2700000000 HC OXYGEN THERAPY PER DAY

## 2019-01-21 PROCEDURE — 94640 AIRWAY INHALATION TREATMENT: CPT

## 2019-01-21 PROCEDURE — 85610 PROTHROMBIN TIME: CPT

## 2019-01-21 PROCEDURE — 2500000003 HC RX 250 WO HCPCS: Performed by: STUDENT IN AN ORGANIZED HEALTH CARE EDUCATION/TRAINING PROGRAM

## 2019-01-21 PROCEDURE — 99233 SBSQ HOSP IP/OBS HIGH 50: CPT | Performed by: INTERNAL MEDICINE

## 2019-01-21 PROCEDURE — 6360000002 HC RX W HCPCS: Performed by: STUDENT IN AN ORGANIZED HEALTH CARE EDUCATION/TRAINING PROGRAM

## 2019-01-21 PROCEDURE — 6370000000 HC RX 637 (ALT 250 FOR IP): Performed by: TRANSPLANT SURGERY

## 2019-01-21 PROCEDURE — 6370000000 HC RX 637 (ALT 250 FOR IP): Performed by: STUDENT IN AN ORGANIZED HEALTH CARE EDUCATION/TRAINING PROGRAM

## 2019-01-21 PROCEDURE — 2580000003 HC RX 258: Performed by: STUDENT IN AN ORGANIZED HEALTH CARE EDUCATION/TRAINING PROGRAM

## 2019-01-21 PROCEDURE — 80053 COMPREHEN METABOLIC PANEL: CPT

## 2019-01-21 PROCEDURE — C9113 INJ PANTOPRAZOLE SODIUM, VIA: HCPCS | Performed by: STUDENT IN AN ORGANIZED HEALTH CARE EDUCATION/TRAINING PROGRAM

## 2019-01-21 PROCEDURE — 82962 GLUCOSE BLOOD TEST: CPT

## 2019-01-21 PROCEDURE — 2060000000 HC ICU INTERMEDIATE R&B

## 2019-01-21 PROCEDURE — 36415 COLL VENOUS BLD VENIPUNCTURE: CPT

## 2019-01-21 PROCEDURE — 84100 ASSAY OF PHOSPHORUS: CPT

## 2019-01-21 RX ADMIN — METOPROLOL TARTRATE 5 MG: 5 INJECTION, SOLUTION INTRAVENOUS at 04:00

## 2019-01-21 RX ADMIN — METOPROLOL TARTRATE 5 MG: 5 INJECTION, SOLUTION INTRAVENOUS at 21:11

## 2019-01-21 RX ADMIN — POTASSIUM PHOSPHATE, MONOBASIC AND POTASSIUM PHOSPHATE, DIBASIC 20 MMOL: 224; 236 INJECTION, SOLUTION, CONCENTRATE INTRAVENOUS at 08:21

## 2019-01-21 RX ADMIN — Medication 10 ML: at 15:27

## 2019-01-21 RX ADMIN — INSULIN LISPRO 1 UNITS: 100 INJECTION, SOLUTION INTRAVENOUS; SUBCUTANEOUS at 21:12

## 2019-01-21 RX ADMIN — IPRATROPIUM BROMIDE AND ALBUTEROL SULFATE 1 AMPULE: .5; 3 SOLUTION RESPIRATORY (INHALATION) at 16:15

## 2019-01-21 RX ADMIN — DEXTROSE, SODIUM CHLORIDE, AND POTASSIUM CHLORIDE: 5; .45; .15 INJECTION INTRAVENOUS at 05:08

## 2019-01-21 RX ADMIN — Medication 10 ML: at 09:09

## 2019-01-21 RX ADMIN — METOPROLOL TARTRATE 5 MG: 5 INJECTION, SOLUTION INTRAVENOUS at 15:26

## 2019-01-21 RX ADMIN — PANTOPRAZOLE SODIUM 40 MG: 40 INJECTION, POWDER, FOR SOLUTION INTRAVENOUS at 09:08

## 2019-01-21 RX ADMIN — HEPARIN SODIUM 5000 UNITS: 10000 INJECTION INTRAVENOUS; SUBCUTANEOUS at 06:29

## 2019-01-21 RX ADMIN — METOPROLOL TARTRATE 5 MG: 5 INJECTION, SOLUTION INTRAVENOUS at 09:58

## 2019-01-21 RX ADMIN — HEPARIN SODIUM 5000 UNITS: 10000 INJECTION INTRAVENOUS; SUBCUTANEOUS at 15:26

## 2019-01-21 RX ADMIN — FOLIC ACID 1 MG: 5 INJECTION, SOLUTION INTRAMUSCULAR; INTRAVENOUS; SUBCUTANEOUS at 09:08

## 2019-01-21 RX ADMIN — THIAMINE HYDROCHLORIDE 100 MG: 100 INJECTION, SOLUTION INTRAMUSCULAR; INTRAVENOUS at 09:08

## 2019-01-21 RX ADMIN — IPRATROPIUM BROMIDE AND ALBUTEROL SULFATE 1 AMPULE: .5; 3 SOLUTION RESPIRATORY (INHALATION) at 07:40

## 2019-01-21 RX ADMIN — IPRATROPIUM BROMIDE AND ALBUTEROL SULFATE 1 AMPULE: .5; 3 SOLUTION RESPIRATORY (INHALATION) at 12:00

## 2019-01-21 RX ADMIN — HEPARIN SODIUM 5000 UNITS: 10000 INJECTION INTRAVENOUS; SUBCUTANEOUS at 21:59

## 2019-01-21 RX ADMIN — INSULIN LISPRO 2 UNITS: 100 INJECTION, SOLUTION INTRAVENOUS; SUBCUTANEOUS at 09:09

## 2019-01-21 RX ADMIN — Medication 10 ML: at 21:12

## 2019-01-21 ASSESSMENT — PAIN SCALES - GENERAL
PAINLEVEL_OUTOF10: 0

## 2019-01-22 LAB
ALBUMIN SERPL-MCNC: 3.1 G/DL (ref 3.5–5.2)
ALP BLD-CCNC: 143 U/L (ref 40–129)
ALT SERPL-CCNC: 73 U/L (ref 0–40)
ANION GAP SERPL CALCULATED.3IONS-SCNC: 11 MMOL/L (ref 7–16)
AST SERPL-CCNC: 47 U/L (ref 0–39)
BASOPHILS ABSOLUTE: 0.01 E9/L (ref 0–0.2)
BASOPHILS RELATIVE PERCENT: 0.2 % (ref 0–2)
BILIRUB SERPL-MCNC: 3.3 MG/DL (ref 0–1.2)
BUN BLDV-MCNC: 8 MG/DL (ref 8–23)
CALCIUM SERPL-MCNC: 8.4 MG/DL (ref 8.6–10.2)
CHLORIDE BLD-SCNC: 95 MMOL/L (ref 98–107)
CO2: 26 MMOL/L (ref 22–29)
CREAT SERPL-MCNC: 0.7 MG/DL (ref 0.7–1.2)
EOSINOPHILS ABSOLUTE: 0.04 E9/L (ref 0.05–0.5)
EOSINOPHILS RELATIVE PERCENT: 0.7 % (ref 0–6)
GFR AFRICAN AMERICAN: >60
GFR NON-AFRICAN AMERICAN: >60 ML/MIN/1.73
GLUCOSE BLD-MCNC: 135 MG/DL (ref 74–99)
HCT VFR BLD CALC: 26.9 % (ref 37–54)
HEMOGLOBIN: 8.7 G/DL (ref 12.5–16.5)
IMMATURE GRANULOCYTES #: 0.04 E9/L
IMMATURE GRANULOCYTES %: 0.7 % (ref 0–5)
INR BLD: 1.1
LYMPHOCYTES ABSOLUTE: 1.15 E9/L (ref 1.5–4)
LYMPHOCYTES RELATIVE PERCENT: 19 % (ref 20–42)
MAGNESIUM: 2.2 MG/DL (ref 1.6–2.6)
MCH RBC QN AUTO: 31.1 PG (ref 26–35)
MCHC RBC AUTO-ENTMCNC: 32.3 % (ref 32–34.5)
MCV RBC AUTO: 96.1 FL (ref 80–99.9)
METER GLUCOSE: 135 MG/DL (ref 74–99)
METER GLUCOSE: 244 MG/DL (ref 74–99)
MONOCYTES ABSOLUTE: 0.52 E9/L (ref 0.1–0.95)
MONOCYTES RELATIVE PERCENT: 8.6 % (ref 2–12)
NEUTROPHILS ABSOLUTE: 4.29 E9/L (ref 1.8–7.3)
NEUTROPHILS RELATIVE PERCENT: 70.8 % (ref 43–80)
PDW BLD-RTO: 17.5 FL (ref 11.5–15)
PHOSPHORUS: 3.1 MG/DL (ref 2.5–4.5)
PLATELET # BLD: 395 E9/L (ref 130–450)
PMV BLD AUTO: 10.8 FL (ref 7–12)
POTASSIUM REFLEX MAGNESIUM: 3.9 MMOL/L (ref 3.5–5)
PROTHROMBIN TIME: 12.3 SEC (ref 9.3–12.4)
RBC # BLD: 2.8 E12/L (ref 3.8–5.8)
SODIUM BLD-SCNC: 132 MMOL/L (ref 132–146)
TOTAL PROTEIN: 6 G/DL (ref 6.4–8.3)
WBC # BLD: 6.1 E9/L (ref 4.5–11.5)

## 2019-01-22 PROCEDURE — 36415 COLL VENOUS BLD VENIPUNCTURE: CPT

## 2019-01-22 PROCEDURE — 6370000000 HC RX 637 (ALT 250 FOR IP): Performed by: STUDENT IN AN ORGANIZED HEALTH CARE EDUCATION/TRAINING PROGRAM

## 2019-01-22 PROCEDURE — 6360000002 HC RX W HCPCS: Performed by: STUDENT IN AN ORGANIZED HEALTH CARE EDUCATION/TRAINING PROGRAM

## 2019-01-22 PROCEDURE — 82962 GLUCOSE BLOOD TEST: CPT

## 2019-01-22 PROCEDURE — 83735 ASSAY OF MAGNESIUM: CPT

## 2019-01-22 PROCEDURE — 80053 COMPREHEN METABOLIC PANEL: CPT

## 2019-01-22 PROCEDURE — 85025 COMPLETE CBC W/AUTO DIFF WBC: CPT

## 2019-01-22 PROCEDURE — 2580000003 HC RX 258: Performed by: STUDENT IN AN ORGANIZED HEALTH CARE EDUCATION/TRAINING PROGRAM

## 2019-01-22 PROCEDURE — 84100 ASSAY OF PHOSPHORUS: CPT

## 2019-01-22 PROCEDURE — C9113 INJ PANTOPRAZOLE SODIUM, VIA: HCPCS | Performed by: STUDENT IN AN ORGANIZED HEALTH CARE EDUCATION/TRAINING PROGRAM

## 2019-01-22 PROCEDURE — 97530 THERAPEUTIC ACTIVITIES: CPT

## 2019-01-22 PROCEDURE — 85610 PROTHROMBIN TIME: CPT

## 2019-01-22 PROCEDURE — 2700000000 HC OXYGEN THERAPY PER DAY

## 2019-01-22 PROCEDURE — 94640 AIRWAY INHALATION TREATMENT: CPT

## 2019-01-22 PROCEDURE — 97535 SELF CARE MNGMENT TRAINING: CPT

## 2019-01-22 PROCEDURE — 6370000000 HC RX 637 (ALT 250 FOR IP): Performed by: TRANSPLANT SURGERY

## 2019-01-22 PROCEDURE — 2500000003 HC RX 250 WO HCPCS: Performed by: STUDENT IN AN ORGANIZED HEALTH CARE EDUCATION/TRAINING PROGRAM

## 2019-01-22 RX ORDER — METOPROLOL SUCCINATE 50 MG/1
50 TABLET, EXTENDED RELEASE ORAL DAILY
Status: DISCONTINUED | OUTPATIENT
Start: 2019-01-22 | End: 2019-01-22 | Stop reason: HOSPADM

## 2019-01-22 RX ADMIN — IPRATROPIUM BROMIDE AND ALBUTEROL SULFATE 1 AMPULE: .5; 3 SOLUTION RESPIRATORY (INHALATION) at 09:46

## 2019-01-22 RX ADMIN — HEPARIN SODIUM 5000 UNITS: 10000 INJECTION INTRAVENOUS; SUBCUTANEOUS at 05:30

## 2019-01-22 RX ADMIN — HEPARIN SODIUM 5000 UNITS: 10000 INJECTION INTRAVENOUS; SUBCUTANEOUS at 13:42

## 2019-01-22 RX ADMIN — INSULIN LISPRO 4 UNITS: 100 INJECTION, SOLUTION INTRAVENOUS; SUBCUTANEOUS at 11:27

## 2019-01-22 RX ADMIN — THIAMINE HYDROCHLORIDE 100 MG: 100 INJECTION, SOLUTION INTRAMUSCULAR; INTRAVENOUS at 08:10

## 2019-01-22 RX ADMIN — PANTOPRAZOLE SODIUM 40 MG: 40 INJECTION, POWDER, FOR SOLUTION INTRAVENOUS at 08:11

## 2019-01-22 RX ADMIN — Medication 10 ML: at 08:11

## 2019-01-22 RX ADMIN — METOPROLOL SUCCINATE 50 MG: 50 TABLET, FILM COATED, EXTENDED RELEASE ORAL at 18:01

## 2019-01-22 RX ADMIN — FOLIC ACID 1 MG: 5 INJECTION, SOLUTION INTRAMUSCULAR; INTRAVENOUS; SUBCUTANEOUS at 08:11

## 2019-01-22 RX ADMIN — METOPROLOL TARTRATE 5 MG: 5 INJECTION, SOLUTION INTRAVENOUS at 04:25

## 2019-01-22 RX ADMIN — METOPROLOL TARTRATE 5 MG: 5 INJECTION, SOLUTION INTRAVENOUS at 10:18

## 2019-01-22 RX ADMIN — Medication 10 ML: at 10:18

## 2019-01-22 ASSESSMENT — PAIN SCALES - GENERAL
PAINLEVEL_OUTOF10: 0

## 2019-01-23 VITALS
WEIGHT: 147 LBS | HEIGHT: 70 IN | BODY MASS INDEX: 21.05 KG/M2 | SYSTOLIC BLOOD PRESSURE: 132 MMHG | RESPIRATION RATE: 11 BRPM | TEMPERATURE: 99.1 F | OXYGEN SATURATION: 97 % | DIASTOLIC BLOOD PRESSURE: 60 MMHG | HEART RATE: 85 BPM

## 2019-01-28 ENCOUNTER — TELEPHONE (OUTPATIENT)
Dept: HEMATOLOGY | Age: 69
End: 2019-01-28

## 2019-02-07 ENCOUNTER — HOSPITAL ENCOUNTER (OUTPATIENT)
Dept: INFUSION THERAPY | Age: 69
Discharge: HOME | End: 2019-02-07
Payer: MEDICARE

## 2019-02-07 ENCOUNTER — OFFICE VISIT (OUTPATIENT)
Dept: ONCOLOGY | Age: 69
End: 2019-02-07
Payer: MEDICARE

## 2019-02-07 ENCOUNTER — OFFICE VISIT (OUTPATIENT)
Dept: HEMATOLOGY | Age: 69
End: 2019-02-07
Payer: MEDICARE

## 2019-02-07 ENCOUNTER — TELEPHONE (OUTPATIENT)
Dept: CASE MANAGEMENT | Age: 69
End: 2019-02-07

## 2019-02-07 VITALS
DIASTOLIC BLOOD PRESSURE: 80 MMHG | SYSTOLIC BLOOD PRESSURE: 136 MMHG | WEIGHT: 130 LBS | OXYGEN SATURATION: 100 % | BODY MASS INDEX: 18.61 KG/M2 | RESPIRATION RATE: 18 BRPM | HEART RATE: 90 BPM | HEIGHT: 70 IN

## 2019-02-07 VITALS
SYSTOLIC BLOOD PRESSURE: 119 MMHG | WEIGHT: 131.4 LBS | HEART RATE: 90 BPM | DIASTOLIC BLOOD PRESSURE: 70 MMHG | BODY MASS INDEX: 18.81 KG/M2 | HEIGHT: 70 IN | TEMPERATURE: 98.8 F | RESPIRATION RATE: 20 BRPM

## 2019-02-07 DIAGNOSIS — C25.9 PANCREATIC ADENOCARCINOMA (HCC): ICD-10-CM

## 2019-02-07 DIAGNOSIS — K86.89 PANCREATIC MASS: ICD-10-CM

## 2019-02-07 DIAGNOSIS — C25.9 PANCREATIC ADENOCARCINOMA (HCC): Primary | ICD-10-CM

## 2019-02-07 DIAGNOSIS — C25.0 MALIGNANT NEOPLASM OF HEAD OF PANCREAS (HCC): Primary | ICD-10-CM

## 2019-02-07 LAB
ALBUMIN SERPL-MCNC: 3.6 G/DL (ref 3.5–5.2)
ALP BLD-CCNC: 206 U/L (ref 40–129)
ALT SERPL-CCNC: 42 U/L (ref 0–40)
ANION GAP SERPL CALCULATED.3IONS-SCNC: 7 MMOL/L (ref 7–16)
AST SERPL-CCNC: 30 U/L (ref 0–39)
BASOPHILS ABSOLUTE: 0.02 E9/L (ref 0–0.2)
BASOPHILS RELATIVE PERCENT: 0.3 % (ref 0–2)
BILIRUB SERPL-MCNC: 2.5 MG/DL (ref 0–1.2)
BUN BLDV-MCNC: 11 MG/DL (ref 8–23)
CALCIUM SERPL-MCNC: 9.2 MG/DL (ref 8.6–10.2)
CHLORIDE BLD-SCNC: 100 MMOL/L (ref 98–107)
CO2: 25 MMOL/L (ref 22–29)
CREAT SERPL-MCNC: 1 MG/DL (ref 0.7–1.2)
EOSINOPHILS ABSOLUTE: 0.02 E9/L (ref 0.05–0.5)
EOSINOPHILS RELATIVE PERCENT: 0.3 % (ref 0–6)
GFR AFRICAN AMERICAN: >60
GFR NON-AFRICAN AMERICAN: >60 ML/MIN/1.73
GLUCOSE BLD-MCNC: 110 MG/DL (ref 74–99)
HCT VFR BLD CALC: 28.7 % (ref 37–54)
HEMOGLOBIN: 9.2 G/DL (ref 12.5–16.5)
IMMATURE GRANULOCYTES #: 0.01 E9/L
IMMATURE GRANULOCYTES %: 0.1 % (ref 0–5)
LYMPHOCYTES ABSOLUTE: 0.88 E9/L (ref 1.5–4)
LYMPHOCYTES RELATIVE PERCENT: 12.9 % (ref 20–42)
MCH RBC QN AUTO: 31.1 PG (ref 26–35)
MCHC RBC AUTO-ENTMCNC: 32.1 % (ref 32–34.5)
MCV RBC AUTO: 97 FL (ref 80–99.9)
MONOCYTES ABSOLUTE: 0.51 E9/L (ref 0.1–0.95)
MONOCYTES RELATIVE PERCENT: 7.5 % (ref 2–12)
NEUTROPHILS ABSOLUTE: 5.38 E9/L (ref 1.8–7.3)
NEUTROPHILS RELATIVE PERCENT: 78.9 % (ref 43–80)
PDW BLD-RTO: 15 FL (ref 11.5–15)
PLATELET # BLD: 404 E9/L (ref 130–450)
PMV BLD AUTO: 9.5 FL (ref 7–12)
POTASSIUM SERPL-SCNC: 4.2 MMOL/L (ref 3.5–5)
RBC # BLD: 2.96 E12/L (ref 3.8–5.8)
SODIUM BLD-SCNC: 132 MMOL/L (ref 132–146)
TOTAL PROTEIN: 7.6 G/DL (ref 6.4–8.3)
WBC # BLD: 6.8 E9/L (ref 4.5–11.5)

## 2019-02-07 PROCEDURE — 85025 COMPLETE CBC W/AUTO DIFF WBC: CPT

## 2019-02-07 PROCEDURE — 36415 COLL VENOUS BLD VENIPUNCTURE: CPT

## 2019-02-07 PROCEDURE — 86301 IMMUNOASSAY TUMOR CA 19-9: CPT

## 2019-02-07 PROCEDURE — 80053 COMPREHEN METABOLIC PANEL: CPT

## 2019-02-07 PROCEDURE — 99024 POSTOP FOLLOW-UP VISIT: CPT | Performed by: TRANSPLANT SURGERY

## 2019-02-07 PROCEDURE — 99024 POSTOP FOLLOW-UP VISIT: CPT | Performed by: INTERNAL MEDICINE

## 2019-02-07 PROCEDURE — 99214 OFFICE O/P EST MOD 30 MIN: CPT | Performed by: INTERNAL MEDICINE

## 2019-02-07 NOTE — PROGRESS NOTES
[facility]  [facility]   Hematology/Oncology  Consult      Patient Name: Denver Lamp  YOB: 1950  PCP: Fer Orellana DO   Referring Provider: [de-identified] [id number] [address]     Reason for Consultation: No chief complaint on file. History of Present Illness:      Diagnostic Data:     Past Medical History:   Diagnosis Date    Cancer ([address]     Cerebral artery occlusion with cerebral infarction ([address]     Hypertension     Syncope        Patient Active Problem List    Diagnosis Date Noted    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[address]     Pancreatic mass 01/16/2019    Pancreatic adenocarcinoma ([address] 01/16/2019    Obstructive jaundice due to malignant neoplasm ([address] 01/08/2019    Moderate protein-calorie malnutrition ([address] 01/08/2019    Obstructive jaundice 01/07/2019    Hyperbilirubinemia     Itching     Palliative care encounter     Goals of care, counseling/discussion     Jaundice 01/06/2019    Gastric ulcer 09/14/2015    Acute blood loss anemia 09/12/2015    Elevation of cardiac enzymes 09/12/2015    HTN (hypertension), benign 09/12/2015    Other specified anemias 09/12/2015    Non-ST elevated myocardial infarction ([address]     Syncope         Past Surgical History:   Procedure Laterality Date    EYE SURGERY      WHIPPLE PROCEDURE N/A 1/16/2019    EXPLORATORY LAPAROTOMY; ABORTED WHIPPLE PROCEDURE; GASTROJEJUNOSTOMY; OPEN CHOLECYSTECTOMY performed by Balwinder Iverson MD at [facility]       Family History  Family History   Problem Relation Age of Onset    Cancer Mother 50        breast       Social History    TOBACCO:   reports that he has never smoked. He has never used smokeless tobacco.  ETOH:   reports that he drinks about 4.8 oz of alcohol per week . Home Medications  Prior to Admission medications    Medication Sig Start Date End Date Taking?  Authorizing Provider   pravastatin (PRAVACHOL) 80 MG tablet Take 1 tablet by mouth nightly 1/10/19   Jeannette Iraheta MD   lisinopril (PRINIVIL;ZESTRIL) 20 MG tablet Take 1 tablet by mouth daily 1/11/19   Jeannette Iraheta MD   metoprolol succinate (TOPROL XL) 50 MG extended release tablet Take 1 tablet by mouth daily 1/11/19   Jeannette Iraheta MD   tamsulosin (FLOMAX) 0.4 MG capsule Take 1 capsule by mouth daily 1/11/19   Jeannette Iraheta MD       Allergies  No Known Allergies    Review of Systems:    Constitutional:  No fever chills or rigors. Eyes: No changes in vision, discharge, or pain  ENT: No Headaches, hearing loss or vertigo. No mouth sores or sore throat. No change in taste or smell. Cardiovascular: No chest discomfort, dyspnea on exertion, palpitations or loss of consciousness. or phlebitis. Respiratory: Has no cough or wheezing, Has no sputum production. Has no hemoptysis, Has no pleuritic pain, . Gastrointestinal: No abdominal pain, appetite loss, blood in stools. No change in bowel habits. No hematemesis   Genitourinary: Patient acknowledges no dysuria, trouble voiding, or hematuria. No nocturia or increased frequency. Musculoskeletal: No gait disturbance, weakness or joint complaints. Integumentary: No rash or pruritis. Neurological: No headache, diplopia, change in muscle strength, numbness or tingling. No change in gait, balance, coordination, mood, affect, memory, mentation, behavior. Psychiatric: No anxiety, or depression. Endocrine: No temperature intolerance. No excessive thirst, fluid intake, or urination. No tremor. Hematologic/Lymphatic: No abnormal bruising or bleeding, blood clots or swollen lymph nodes. Allergic/Immunologic: No nasal congestion or hives. Objective  There were no vitals taken for this visit. Physical Performance Status    Physical Exam:  General: AAO to person, place, time, and purpose, appears stated age, cooperative, no acute distress, pleasant   Head and neck : PERRLA, EOMI . Sclera non icteric.   Oropharynx : Clear  Neck: no JVD,  no adenopathy, no carotid bruit  BREASTS : No lumps or palpable masses  LYMPHATICS :  Lungs: Clear to auscultation   Heart: Regular rate and regular rhythm, no murmur, normal S1, S2  Abdomen: Soft, non-tender;no masses, no organomegaly  Extremities: No edema,no cyanosis, no clubbing. Skin:  No rash. Neurologic:Cranial nerves grossly intact. No focal motor or sensory deficits . Recent Laboratory Data-   Lab Results   Component Value Date    WBC 6.1 2019    HGB 8.7 (L) 2019    HCT 26.9 (L) 2019    MCV 96.1 2019     2019    LYMPHOPCT 19.0 (L) 2019    RBC 2.80 (L) 2019    MCH 31.1 2019    MCHC 32.3 2019    RDW 17.5 (H) 2019    NEUTOPHILPCT 70.8 2019    MONOPCT 8.6 2019    BASOPCT 0.2 2019    NEUTROABS 4.29 2019    LYMPHSABS 1.15 (L) 2019    MONOSABS 0.52 2019    EOSABS 0.04 (L) 2019    BASOSABS 0.01 2019       Lab Results   Component Value Date     2019    K 3.9 2019    CL 95 (L) 2019    CO2 26 2019    BUN 8 2019    CREATININE 0.7 2019    GLUCOSE 135 (H) 2019    CALCIUM 8.4 (L) 2019    PROT 6.0 (L) 2019    LABALBU 3.1 (L) 2019    BILITOT 3.3 (H) 2019    ALKPHOS 143 (H) 2019    AST 47 (H) 2019    ALT 73 (H) 2019    LABGLOM >60 2019    GFRAA >60 2019       Lab Results   Component Value Date    IRON 16 (L) 2015    TIBC 509 (H) 2015    FERRITIN 7 2015           Radiology-    Xr Chest Portable    Result Date: 2019  Patient MRN: [id number] : 1950 Age:  76 years Gender: Male Order Date: 2019 1:30 AM Exam: XR CHEST PORTABLE Number of Images: 1 view Indication:   increased O2 req increased O2 req Comparison: 2019 FINDINGS: Right central venous catheter terminates in low SVC. Nasogastric tube in the gastric cardia. There is atelectasis at the right base. Heart enlarged.  Pulmonary

## 2019-02-10 LAB — CA 19-9: 171 U/ML (ref 0–37)

## 2019-02-13 ENCOUNTER — TELEPHONE (OUTPATIENT)
Dept: RADIATION ONCOLOGY | Age: 69
End: 2019-02-13

## 2019-02-27 ENCOUNTER — TELEPHONE (OUTPATIENT)
Dept: HEMATOLOGY | Age: 69
End: 2019-02-27

## 2019-03-07 ENCOUNTER — TELEPHONE (OUTPATIENT)
Dept: HEMATOLOGY | Age: 69
End: 2019-03-07

## 2019-03-08 ENCOUNTER — ANESTHESIA (OUTPATIENT)
Dept: ENDOSCOPY | Age: 69
End: 2019-03-08
Payer: MEDICARE

## 2019-03-08 ENCOUNTER — ANESTHESIA EVENT (OUTPATIENT)
Dept: ENDOSCOPY | Age: 69
End: 2019-03-08
Payer: MEDICARE

## 2019-03-08 ENCOUNTER — HOSPITAL ENCOUNTER (OUTPATIENT)
Age: 69
Setting detail: OUTPATIENT SURGERY
Discharge: HOME | End: 2019-03-08
Attending: INTERNAL MEDICINE | Admitting: INTERNAL MEDICINE
Payer: MEDICARE

## 2019-03-08 VITALS
TEMPERATURE: 98 F | BODY MASS INDEX: 18.61 KG/M2 | HEIGHT: 70 IN | RESPIRATION RATE: 17 BRPM | HEART RATE: 77 BPM | SYSTOLIC BLOOD PRESSURE: 142 MMHG | OXYGEN SATURATION: 99 % | WEIGHT: 130 LBS | DIASTOLIC BLOOD PRESSURE: 80 MMHG

## 2019-03-08 VITALS — DIASTOLIC BLOOD PRESSURE: 80 MMHG | SYSTOLIC BLOOD PRESSURE: 144 MMHG | OXYGEN SATURATION: 93 %

## 2019-03-08 PROCEDURE — 2580000003 HC RX 258: Performed by: NURSE ANESTHETIST, CERTIFIED REGISTERED

## 2019-03-08 PROCEDURE — 82787 IGG 1 2 3 OR 4 EACH: CPT

## 2019-03-08 PROCEDURE — 3700000001 HC ADD 15 MINUTES (ANESTHESIA): Performed by: INTERNAL MEDICINE

## 2019-03-08 PROCEDURE — 7100000010 HC PHASE II RECOVERY - FIRST 15 MIN: Performed by: INTERNAL MEDICINE

## 2019-03-08 PROCEDURE — 3609020800 HC EGD W/EUS FNA: Performed by: INTERNAL MEDICINE

## 2019-03-08 PROCEDURE — 6360000002 HC RX W HCPCS: Performed by: NURSE ANESTHETIST, CERTIFIED REGISTERED

## 2019-03-08 PROCEDURE — 88305 TISSUE EXAM BY PATHOLOGIST: CPT

## 2019-03-08 PROCEDURE — 43238 EGD US FINE NEEDLE BX/ASPIR: CPT | Performed by: INTERNAL MEDICINE

## 2019-03-08 PROCEDURE — 88173 CYTOPATH EVAL FNA REPORT: CPT

## 2019-03-08 PROCEDURE — 3609018500 HC EGD US SCOPE W/ADJACENT STRUCTURES: Performed by: INTERNAL MEDICINE

## 2019-03-08 PROCEDURE — 3700000000 HC ANESTHESIA ATTENDED CARE: Performed by: INTERNAL MEDICINE

## 2019-03-08 PROCEDURE — 7100000011 HC PHASE II RECOVERY - ADDTL 15 MIN: Performed by: INTERNAL MEDICINE

## 2019-03-08 PROCEDURE — 3609012800 HC EGD DIAGNOSTIC ONLY: Performed by: INTERNAL MEDICINE

## 2019-03-08 PROCEDURE — 2709999900 HC NON-CHARGEABLE SUPPLY: Performed by: INTERNAL MEDICINE

## 2019-03-08 RX ORDER — PROPOFOL 10 MG/ML
INJECTION, EMULSION INTRAVENOUS CONTINUOUS PRN
Status: DISCONTINUED | OUTPATIENT
Start: 2019-03-08 | End: 2019-03-08 | Stop reason: SDUPTHER

## 2019-03-08 RX ORDER — 0.9 % SODIUM CHLORIDE 0.9 %
10 VIAL (ML) INJECTION EVERY 12 HOURS SCHEDULED
Status: CANCELLED | OUTPATIENT
Start: 2019-03-08

## 2019-03-08 RX ORDER — SODIUM CHLORIDE 9 MG/ML
INJECTION, SOLUTION INTRAVENOUS CONTINUOUS PRN
Status: DISCONTINUED | OUTPATIENT
Start: 2019-03-08 | End: 2019-03-08 | Stop reason: SDUPTHER

## 2019-03-08 RX ORDER — 0.9 % SODIUM CHLORIDE 0.9 %
10 VIAL (ML) INJECTION PRN
Status: CANCELLED | OUTPATIENT
Start: 2019-03-08

## 2019-03-08 RX ORDER — PROPOFOL 10 MG/ML
INJECTION, EMULSION INTRAVENOUS PRN
Status: DISCONTINUED | OUTPATIENT
Start: 2019-03-08 | End: 2019-03-08 | Stop reason: SDUPTHER

## 2019-03-08 RX ADMIN — PROPOFOL 40 MG: 10 INJECTION, EMULSION INTRAVENOUS at 11:34

## 2019-03-08 RX ADMIN — SODIUM CHLORIDE: 9 INJECTION, SOLUTION INTRAVENOUS at 11:32

## 2019-03-08 RX ADMIN — PROPOFOL 20 MG: 10 INJECTION, EMULSION INTRAVENOUS at 12:30

## 2019-03-08 RX ADMIN — PROPOFOL 20 MG: 10 INJECTION, EMULSION INTRAVENOUS at 12:27

## 2019-03-08 RX ADMIN — PROPOFOL 140 MCG/KG/MIN: 10 INJECTION, EMULSION INTRAVENOUS at 11:35

## 2019-03-08 ASSESSMENT — PULMONARY FUNCTION TESTS
PIF_VALUE: 0
PIF_VALUE: 1
PIF_VALUE: 0

## 2019-03-08 ASSESSMENT — PAIN - FUNCTIONAL ASSESSMENT: PAIN_FUNCTIONAL_ASSESSMENT: 0-10

## 2019-03-08 ASSESSMENT — PAIN SCALES - GENERAL
PAINLEVEL_OUTOF10: 0

## 2019-03-11 LAB
IGG 1: 986 MG/DL (ref 240–1118)
IGG 2: 685 MG/DL (ref 124–549)
IGG 3: 73 MG/DL (ref 21–134)
IGG 4: 369 MG/DL (ref 1–123)

## 2019-03-14 DIAGNOSIS — K86.89 PANCREATIC MASS: Primary | ICD-10-CM

## 2019-03-18 ENCOUNTER — TELEPHONE (OUTPATIENT)
Dept: INFUSION THERAPY | Age: 69
End: 2019-03-18

## 2019-03-18 ENCOUNTER — HOSPITAL ENCOUNTER (OUTPATIENT)
Dept: INFUSION THERAPY | Age: 69
Discharge: HOME | End: 2019-03-18
Payer: MEDICARE

## 2019-03-18 ENCOUNTER — HOSPITAL ENCOUNTER (OUTPATIENT)
Age: 69
Discharge: HOME | End: 2019-03-18
Payer: MEDICARE

## 2019-03-18 ENCOUNTER — HOSPITAL ENCOUNTER (OUTPATIENT)
Dept: INFUSION THERAPY | Age: 69
End: 2019-03-18

## 2019-03-18 ENCOUNTER — TELEPHONE (OUTPATIENT)
Dept: ONCOLOGY | Age: 69
End: 2019-03-18

## 2019-03-18 ENCOUNTER — OFFICE VISIT (OUTPATIENT)
Dept: ONCOLOGY | Age: 69
End: 2019-03-18
Payer: MEDICARE

## 2019-03-18 VITALS
SYSTOLIC BLOOD PRESSURE: 127 MMHG | WEIGHT: 125.9 LBS | HEIGHT: 70 IN | BODY MASS INDEX: 18.02 KG/M2 | DIASTOLIC BLOOD PRESSURE: 75 MMHG | TEMPERATURE: 99.3 F | HEART RATE: 107 BPM

## 2019-03-18 DIAGNOSIS — K86.89 PANCREATIC MASS: Primary | ICD-10-CM

## 2019-03-18 DIAGNOSIS — R97.8 OTHER ABNORMAL TUMOR MARKERS: ICD-10-CM

## 2019-03-18 DIAGNOSIS — K86.89 PANCREATIC MASS: ICD-10-CM

## 2019-03-18 DIAGNOSIS — R17 JAUNDICE: Primary | ICD-10-CM

## 2019-03-18 LAB
ALBUMIN SERPL-MCNC: 3.2 G/DL (ref 3.5–5.2)
ALP BLD-CCNC: 487 U/L (ref 40–129)
ALT SERPL-CCNC: 194 U/L (ref 0–40)
ANION GAP SERPL CALCULATED.3IONS-SCNC: 13 MMOL/L (ref 7–16)
ANISOCYTOSIS: ABNORMAL
AST SERPL-CCNC: 205 U/L (ref 0–39)
BASOPHILS ABSOLUTE: 0 E9/L (ref 0–0.2)
BASOPHILS RELATIVE PERCENT: 0.1 % (ref 0–2)
BILIRUB SERPL-MCNC: 2 MG/DL (ref 0–1.2)
BUN BLDV-MCNC: 15 MG/DL (ref 8–23)
CALCIUM SERPL-MCNC: 8.6 MG/DL (ref 8.6–10.2)
CHLORIDE BLD-SCNC: 99 MMOL/L (ref 98–107)
CO2: 23 MMOL/L (ref 22–29)
CREAT SERPL-MCNC: 1 MG/DL (ref 0.7–1.2)
EOSINOPHILS ABSOLUTE: 0 E9/L (ref 0.05–0.5)
EOSINOPHILS RELATIVE PERCENT: 0.1 % (ref 0–6)
GFR AFRICAN AMERICAN: >60
GFR NON-AFRICAN AMERICAN: >60 ML/MIN/1.73
GLUCOSE BLD-MCNC: 149 MG/DL (ref 74–99)
HCT VFR BLD CALC: 36.5 % (ref 37–54)
HEMOGLOBIN: 11.6 G/DL (ref 12.5–16.5)
LYMPHOCYTES ABSOLUTE: 0.2 E9/L (ref 1.5–4)
LYMPHOCYTES RELATIVE PERCENT: 1.7 % (ref 20–42)
MCH RBC QN AUTO: 29.3 PG (ref 26–35)
MCHC RBC AUTO-ENTMCNC: 31.8 % (ref 32–34.5)
MCV RBC AUTO: 92.2 FL (ref 80–99.9)
MONOCYTES ABSOLUTE: 0.3 E9/L (ref 0.1–0.95)
MONOCYTES RELATIVE PERCENT: 2.6 % (ref 2–12)
NEUTROPHILS ABSOLUTE: 9.5 E9/L (ref 1.8–7.3)
NEUTROPHILS RELATIVE PERCENT: 95.7 % (ref 43–80)
PDW BLD-RTO: 14.1 FL (ref 11.5–15)
PLATELET # BLD: 389 E9/L (ref 130–450)
PMV BLD AUTO: 9.6 FL (ref 7–12)
POTASSIUM SERPL-SCNC: 4 MMOL/L (ref 3.5–5)
RBC # BLD: 3.96 E12/L (ref 3.8–5.8)
SODIUM BLD-SCNC: 135 MMOL/L (ref 132–146)
TOTAL PROTEIN: 7.1 G/DL (ref 6.4–8.3)
WBC # BLD: 9.9 E9/L (ref 4.5–11.5)

## 2019-03-18 PROCEDURE — 36415 COLL VENOUS BLD VENIPUNCTURE: CPT

## 2019-03-18 PROCEDURE — 80053 COMPREHEN METABOLIC PANEL: CPT

## 2019-03-18 PROCEDURE — 85025 COMPLETE CBC W/AUTO DIFF WBC: CPT

## 2019-03-18 PROCEDURE — 99214 OFFICE O/P EST MOD 30 MIN: CPT | Performed by: INTERNAL MEDICINE

## 2019-03-18 PROCEDURE — 99212 OFFICE O/P EST SF 10 MIN: CPT | Performed by: INTERNAL MEDICINE

## 2019-03-18 PROCEDURE — 86301 IMMUNOASSAY TUMOR CA 19-9: CPT

## 2019-03-18 RX ORDER — PREDNISONE 1 MG/1
20 TABLET ORAL DAILY
Qty: 120 TABLET | Refills: 1 | Status: ON HOLD | OUTPATIENT
Start: 2019-03-18 | End: 2019-03-29 | Stop reason: SDUPTHER

## 2019-03-19 ENCOUNTER — TELEPHONE (OUTPATIENT)
Dept: SURGERY | Age: 69
End: 2019-03-19

## 2019-03-21 ENCOUNTER — APPOINTMENT (OUTPATIENT)
Dept: GENERAL RADIOLOGY | Age: 69
DRG: 871 | End: 2019-03-21
Payer: MEDICARE

## 2019-03-21 ENCOUNTER — HOSPITAL ENCOUNTER (EMERGENCY)
Age: 69
Discharge: HOME | DRG: 871 | End: 2019-03-21
Attending: EMERGENCY MEDICINE
Payer: MEDICARE

## 2019-03-21 VITALS
BODY MASS INDEX: 18.04 KG/M2 | RESPIRATION RATE: 16 BRPM | DIASTOLIC BLOOD PRESSURE: 61 MMHG | WEIGHT: 126 LBS | SYSTOLIC BLOOD PRESSURE: 103 MMHG | OXYGEN SATURATION: 98 % | HEIGHT: 70 IN | TEMPERATURE: 98.1 F | HEART RATE: 98 BPM

## 2019-03-21 DIAGNOSIS — R53.83 OTHER FATIGUE: Primary | ICD-10-CM

## 2019-03-21 DIAGNOSIS — R74.8 ELEVATED LIVER ENZYMES: ICD-10-CM

## 2019-03-21 LAB
ALBUMIN SERPL-MCNC: 2.8 G/DL (ref 3.5–5.2)
ALP BLD-CCNC: 714 U/L (ref 40–129)
ALT SERPL-CCNC: 151 U/L (ref 0–40)
ANION GAP SERPL CALCULATED.3IONS-SCNC: 15 MMOL/L (ref 7–16)
ANISOCYTOSIS: ABNORMAL
AST SERPL-CCNC: 203 U/L (ref 0–39)
BASOPHILS ABSOLUTE: 0 E9/L (ref 0–0.2)
BASOPHILS RELATIVE PERCENT: 0.2 % (ref 0–2)
BILIRUB SERPL-MCNC: 5.5 MG/DL (ref 0–1.2)
BUN BLDV-MCNC: 24 MG/DL (ref 8–23)
CA 19-9: 239 U/ML (ref 0–37)
CALCIUM SERPL-MCNC: 9 MG/DL (ref 8.6–10.2)
CHLORIDE BLD-SCNC: 93 MMOL/L (ref 98–107)
CO2: 21 MMOL/L (ref 22–29)
CREAT SERPL-MCNC: 1.3 MG/DL (ref 0.7–1.2)
EOSINOPHILS ABSOLUTE: 0 E9/L (ref 0.05–0.5)
EOSINOPHILS RELATIVE PERCENT: 0 % (ref 0–6)
GFR AFRICAN AMERICAN: >60
GFR NON-AFRICAN AMERICAN: >60 ML/MIN/1.73
GLUCOSE BLD-MCNC: 96 MG/DL (ref 74–99)
HCT VFR BLD CALC: 33.9 % (ref 37–54)
HEMOGLOBIN: 11.2 G/DL (ref 12.5–16.5)
LACTIC ACID: 2.5 MMOL/L (ref 0.5–2.2)
LYMPHOCYTES ABSOLUTE: 0.32 E9/L (ref 1.5–4)
LYMPHOCYTES RELATIVE PERCENT: 2.6 % (ref 20–42)
MCH RBC QN AUTO: 29.3 PG (ref 26–35)
MCHC RBC AUTO-ENTMCNC: 33 % (ref 32–34.5)
MCV RBC AUTO: 88.7 FL (ref 80–99.9)
METER GLUCOSE: 93 MG/DL (ref 74–99)
MONOCYTES ABSOLUTE: 0.64 E9/L (ref 0.1–0.95)
MONOCYTES RELATIVE PERCENT: 6.1 % (ref 2–12)
NEUTROPHILS ABSOLUTE: 9.74 E9/L (ref 1.8–7.3)
NEUTROPHILS RELATIVE PERCENT: 91.3 % (ref 43–80)
OVALOCYTES: ABNORMAL
PDW BLD-RTO: 14.5 FL (ref 11.5–15)
PLATELET # BLD: 264 E9/L (ref 130–450)
PMV BLD AUTO: 10.7 FL (ref 7–12)
POIKILOCYTES: ABNORMAL
POTASSIUM SERPL-SCNC: 4 MMOL/L (ref 3.5–5)
PRO-BNP: 7813 PG/ML (ref 0–125)
RBC # BLD: 3.82 E12/L (ref 3.8–5.8)
SODIUM BLD-SCNC: 129 MMOL/L (ref 132–146)
TOTAL PROTEIN: 7.2 G/DL (ref 6.4–8.3)
TROPONIN: 0.03 NG/ML (ref 0–0.03)
WBC # BLD: 10.7 E9/L (ref 4.5–11.5)

## 2019-03-21 PROCEDURE — 99283 EMERGENCY DEPT VISIT LOW MDM: CPT

## 2019-03-21 PROCEDURE — 36415 COLL VENOUS BLD VENIPUNCTURE: CPT

## 2019-03-21 PROCEDURE — 83880 ASSAY OF NATRIURETIC PEPTIDE: CPT

## 2019-03-21 PROCEDURE — 84484 ASSAY OF TROPONIN QUANT: CPT

## 2019-03-21 PROCEDURE — 85025 COMPLETE CBC W/AUTO DIFF WBC: CPT

## 2019-03-21 PROCEDURE — 87040 BLOOD CULTURE FOR BACTERIA: CPT

## 2019-03-21 PROCEDURE — 93005 ELECTROCARDIOGRAM TRACING: CPT

## 2019-03-21 PROCEDURE — 83605 ASSAY OF LACTIC ACID: CPT

## 2019-03-21 PROCEDURE — 71045 X-RAY EXAM CHEST 1 VIEW: CPT

## 2019-03-21 PROCEDURE — 87077 CULTURE AEROBIC IDENTIFY: CPT

## 2019-03-21 PROCEDURE — 82962 GLUCOSE BLOOD TEST: CPT

## 2019-03-21 PROCEDURE — 87186 SC STD MICRODIL/AGAR DIL: CPT

## 2019-03-21 PROCEDURE — 2580000003 HC RX 258: Performed by: EMERGENCY MEDICINE

## 2019-03-21 PROCEDURE — 80053 COMPREHEN METABOLIC PANEL: CPT

## 2019-03-21 RX ORDER — 0.9 % SODIUM CHLORIDE 0.9 %
1000 INTRAVENOUS SOLUTION INTRAVENOUS ONCE
Status: COMPLETED | OUTPATIENT
Start: 2019-03-21 | End: 2019-03-21

## 2019-03-21 RX ADMIN — SODIUM CHLORIDE 1000 ML: 9 INJECTION, SOLUTION INTRAVENOUS at 19:17

## 2019-03-21 NOTE — ED PROVIDER NOTES
Department of Emergency Medicine   ED  Provider Note  Admit Date/RoomTime: 3/21/2019  6:00 PM  ED Room: 01/01    HPI:   Evie Wright is a 76 y.o. male presenting to the ED for fatigue, beginning 30 minutes ago. The complaint has been constant, moderate in severity, and worsened by nothing. Pt has a Hx of pancreatic CA and follows with [facility]. He states today he was out in the cold and he became increasingly fatigued, stating he could not make it the rest of the way home. He is not complaining of pain at this time and he states his appetite has been normal and he has been drinking fluids normally. The pt denies HA, SOB, unilateral weakness, dysarthria, lightheadedness, chest pain, fever, chills, abd pain, N/V/D, neck pain, back pain, dysuria, and hematuria. ROS:   Pertinent positives and negatives are stated within HPI, all other systems reviewed and are negative.    --------------------------------------------- PAST HISTORY ---------------------------------------------  Past Medical History:  has a past medical history of Cancer [facility]), Cerebral artery occlusion with cerebral infarction ([facility] Hypertension, and Syncope. Past Surgical History:  has a past surgical history that includes whipple procedure (N/A, 1/16/2019); eye surgery; Upper gastrointestinal endoscopy (N/A, 3/8/2019); Upper gastrointestinal endoscopy (N/A, 3/8/2019); ERCP (N/A, 3/25/2019); and picc line insertion nurse (3/27/2019). Social History:  reports that he has never smoked. He has never used smokeless tobacco. He reports that he drinks about 4.8 oz of alcohol per week. He reports that he does not use drugs. Family History: family history includes Cancer (age of onset: 50) in his mother. The patients home medications have been reviewed. Allergies: Patient has no known allergies.     -------------------------------------------------- RESULTS -------------------------------------------------  All laboratory and radiology results have been personally reviewed by myself   LABS:  Results for orders placed or performed during the hospital encounter of 03/21/19   Culture Blood #1   Result Value Ref Range    Blood Culture, Routine      Organism Escherichia coli (A)     Organism Escherichia coli (A)     Blood Culture, Routine Second morphology     Organism Klebsiella pneumoniae ssp pneumoniae (A)     Organism Clostridium perfringens (A)     Blood Culture, Routine       Beta Lactamase negative  A negative Beta Lactamase test does not necessarily assure  susceptibility to this drug.          Susceptibility    Escherichia coli - BACTERIAL SUSCEPTIBILITY PANEL BY SANTIAGO     ampicillin <=^2 Sensitive mcg/mL     ceFAZolin <=^4 Sensitive mcg/mL     cefepime <=^0.12 Sensitive mcg/mL     cefTRIAXone <=^0.25 Sensitive mcg/mL     Confirmatory Extended Spectrum Beta-Lactamase Neg  mcg/mL     gentamicin <=^1 Sensitive mcg/mL     imipenem <=^0.25 Sensitive mcg/mL     levofloxacin <=^0.12 Sensitive mcg/mL     piperacillin-tazobactam <=^4 Sensitive mcg/mL     trimethoprim-sulfamethoxazole <=^20 Sensitive mcg/mL    Escherichia coli - BACTERIAL SUSCEPTIBILITY PANEL BY SANTIAGO     ampicillin >=^32 Resistant mcg/mL     ceFAZolin <=^4 Sensitive mcg/mL     cefepime <=^0.12 Sensitive mcg/mL     cefTRIAXone <=^0.25 Sensitive mcg/mL     Confirmatory Extended Spectrum Beta-Lactamase Neg  mcg/mL     gentamicin <=^1 Sensitive mcg/mL     imipenem <=^0.25 Sensitive mcg/mL     levofloxacin =^0.5 Sensitive mcg/mL     piperacillin-tazobactam <=^4 Sensitive mcg/mL     trimethoprim-sulfamethoxazole >=^320 Resistant mcg/mL    Klebsiella pneumoniae ssp pneumoniae - BACTERIAL SUSCEPTIBILITY PANEL BY SANTIAGO     ampicillin >=^32 Resistant mcg/mL     ceFAZolin <=^4 Sensitive mcg/mL     cefepime <=^0.12 Sensitive mcg/mL     cefTRIAXone <=^0.25 Sensitive mcg/mL     Confirmatory Extended Spectrum Beta-Lactamase Neg  mcg/mL     gentamicin <=^1 Sensitive mcg/mL     imipenem <=^0.25 Sensitive mcg/mL     levofloxacin <=^0.12 Sensitive mcg/mL     piperacillin-tazobactam <=^4 Sensitive mcg/mL     trimethoprim-sulfamethoxazole <=^20 Sensitive mcg/mL   Troponin   Result Value Ref Range    Troponin 0.03 0.00 - 0.03 ng/mL   CBC Auto Differential   Result Value Ref Range    WBC 10.7 4.5 - 11.5 E9/L    RBC 3.82 3.80 - 5.80 E12/L    Hemoglobin 11.2 (L) 12.5 - 16.5 g/dL    Hematocrit 33.9 (L) 37.0 - 54.0 %    MCV 88.7 80.0 - 99.9 fL    MCH 29.3 26.0 - 35.0 pg    MCHC 33.0 32.0 - 34.5 %    RDW 14.5 11.5 - 15.0 fL    Platelets 032 550 - 728 E9/L    MPV 10.7 7.0 - 12.0 fL    Neutrophils % 91.3 (H) 43.0 - 80.0 %    Lymphocytes % 2.6 (L) 20.0 - 42.0 %    Monocytes % 6.1 2.0 - 12.0 %    Eosinophils % 0.0 0.0 - 6.0 %    Basophils % 0.2 0.0 - 2.0 %    Neutrophils # 9.74 (H) 1.80 - 7.30 E9/L    Lymphocytes # 0.32 (L) 1.50 - 4.00 E9/L    Monocytes # 0.64 0.10 - 0.95 E9/L    Eosinophils # 0.00 (L) 0.05 - 0.50 E9/L    Basophils # 0.00 0.00 - 0.20 E9/L    Anisocytosis 2+     Poikilocytes 1+     Ovalocytes 1+    Comprehensive Metabolic Panel   Result Value Ref Range    Sodium 129 (L) 132 - 146 mmol/L    Potassium 4.0 3.5 - 5.0 mmol/L    Chloride 93 (L) 98 - 107 mmol/L    CO2 21 (L) 22 - 29 mmol/L    Anion Gap 15 7 - 16 mmol/L    Glucose 96 74 - 99 mg/dL    BUN 24 (H) 8 - 23 mg/dL    CREATININE 1.3 (H) 0.7 - 1.2 mg/dL    GFR Non-African American >60 >=60 mL/min/1.73    GFR African American >60     Calcium 9.0 8.6 - 10.2 mg/dL    Total Protein 7.2 6.4 - 8.3 g/dL    Alb 2.8 (L) 3.5 - 5.2 g/dL    Total Bilirubin 5.5 (H) 0.0 - 1.2 mg/dL    Alkaline Phosphatase 714 (H) 40 - 129 U/L     (H) 0 - 40 U/L     (H) 0 - 39 U/L   Brain Natriuretic Peptide   Result Value Ref Range    Pro-BNP 7,813 (H) 0 - 125 pg/mL   Lactic Acid, Plasma   Result Value Ref Range    Lactic Acid 2.5 (H) 0.5 - 2.2 mmol/L   POCT Glucose   Result Value Ref Range    Meter Glucose 93 74 - 99 mg/dL   EKG 12 Lead   Result Value Ref Range Neurologic: No focal deficits, systemic strength 5/5 to all extremities symmetrically. Psych: Normal affect.     ------------------------------ ED COURSE/MEDICAL DECISION MAKING----------------------  Medications   0.9 % sodium chloride bolus (0 mLs Intravenous Stopped 3/21/19 2121)       Medical Decision Making: This is a 76year old male presenting to the ED for fatigue beginning 30 minutes PTA while pt was outside in the cold. Present Hx of pancreatic CA. Hemodynamically stable. He appears in NAD at this time and is complaining of no pain, normal appetite and drinking normally. He will be given IVF. Labs and CXR ordered. Re-evaluations:  2005  Pt feeling better following fluids. Workup discussed. He does not want to wait for a UA and lactate. States he would just like to go home. Pt will be discharged to home to followup as outpatient. Discussed importance of outpatient followup and signs and symptoms for which to return. Counseling: The emergency provider has spoken with the patient and discussed todays results, in addition to providing specific details for the plan of care and counseling regarding the diagnosis and prognosis. Questions are answered at this time and they are agreeable with the plan.      --------------------------------- IMPRESSION AND DISPOSITION ---------------------------------    IMPRESSION  1. Other fatigue    2. Elevated liver enzymes        DISPOSITION  Disposition: Discharge to home  Patient condition is fair    3/21/19, 6:08 PM.    This note is prepared by John Jeong, acting as Scribe for Radha Bermudez MD.    Radha Bermudez MD:  The scribe's documentation has been prepared under my direction and personally reviewed by me in its entirety. I confirm that the note above accurately reflects all work, treatment, procedures, and medical decision making performed by me.              Radha Bermudez MD  04/10/19 7039

## 2019-03-21 NOTE — ED NOTES
Bed: 01  Expected date:   Expected time:   Means of arrival:   Comments:  ems     Paul Islas, RN  03/21/19 1800

## 2019-03-22 ENCOUNTER — HOSPITAL ENCOUNTER (INPATIENT)
Age: 69
LOS: 3 days | Discharge: ANOTHER HOSPITAL | DRG: 871 | End: 2019-03-25
Attending: EMERGENCY MEDICINE | Admitting: INTERNAL MEDICINE
Payer: MEDICARE

## 2019-03-22 DIAGNOSIS — R78.81 BACTEREMIA: Primary | ICD-10-CM

## 2019-03-22 PROBLEM — K83.1 OBSTRUCTIVE JAUNDICE DUE TO MALIGNANT NEOPLASM (HCC): Chronic | Status: ACTIVE | Noted: 2019-01-08

## 2019-03-22 PROBLEM — C80.1 OBSTRUCTIVE JAUNDICE DUE TO MALIGNANT NEOPLASM (HCC): Chronic | Status: ACTIVE | Noted: 2019-01-08

## 2019-03-22 PROBLEM — R17 JAUNDICE: Status: RESOLVED | Noted: 2019-01-06 | Resolved: 2019-03-22

## 2019-03-22 PROBLEM — C25.9 PANCREATIC ADENOCARCINOMA (HCC): Chronic | Status: ACTIVE | Noted: 2019-01-16

## 2019-03-22 LAB
ALBUMIN SERPL-MCNC: 2.5 G/DL (ref 3.5–5.2)
ALP BLD-CCNC: 490 U/L (ref 40–129)
ALT SERPL-CCNC: 202 U/L (ref 0–40)
ANION GAP SERPL CALCULATED.3IONS-SCNC: 15 MMOL/L (ref 7–16)
AST SERPL-CCNC: 420 U/L (ref 0–39)
BACTERIA: ABNORMAL /HPF
BILIRUB SERPL-MCNC: 5.6 MG/DL (ref 0–1.2)
BILIRUBIN URINE: ABNORMAL
BLOOD, URINE: ABNORMAL
BUN BLDV-MCNC: 31 MG/DL (ref 8–23)
CALCIUM SERPL-MCNC: 8.6 MG/DL (ref 8.6–10.2)
CHLORIDE BLD-SCNC: 98 MMOL/L (ref 98–107)
CLARITY: ABNORMAL
CO2: 19 MMOL/L (ref 22–29)
COLOR: YELLOW
CREAT SERPL-MCNC: 1.4 MG/DL (ref 0.7–1.2)
EPITHELIAL CELLS, UA: ABNORMAL /HPF
GFR AFRICAN AMERICAN: >60
GFR NON-AFRICAN AMERICAN: >60 ML/MIN/1.73
GLUCOSE BLD-MCNC: 104 MG/DL (ref 74–99)
GLUCOSE URINE: NEGATIVE MG/DL
HCT VFR BLD CALC: 27.9 % (ref 37–54)
HEMOGLOBIN: 9.3 G/DL (ref 12.5–16.5)
KETONES, URINE: NEGATIVE MG/DL
LACTIC ACID, SEPSIS: 1.9 MMOL/L (ref 0.5–1.9)
LEUKOCYTE ESTERASE, URINE: ABNORMAL
LIPASE: 5 U/L (ref 13–60)
MCH RBC QN AUTO: 29.2 PG (ref 26–35)
MCHC RBC AUTO-ENTMCNC: 33.3 % (ref 32–34.5)
MCV RBC AUTO: 87.5 FL (ref 80–99.9)
NITRITE, URINE: POSITIVE
PDW BLD-RTO: 14.6 FL (ref 11.5–15)
PH UA: 5.5 (ref 5–9)
PLATELET # BLD: 121 E9/L (ref 130–450)
PMV BLD AUTO: 10.4 FL (ref 7–12)
POTASSIUM SERPL-SCNC: 3.9 MMOL/L (ref 3.5–5)
PROTEIN UA: 30 MG/DL
RBC # BLD: 3.19 E12/L (ref 3.8–5.8)
RBC UA: ABNORMAL /HPF (ref 0–2)
SODIUM BLD-SCNC: 132 MMOL/L (ref 132–146)
SPECIFIC GRAVITY UA: 1.01 (ref 1–1.03)
TOTAL PROTEIN: 6.4 G/DL (ref 6.4–8.3)
TROPONIN: 0.02 NG/ML (ref 0–0.03)
UROBILINOGEN, URINE: 0.2 E.U./DL
WBC # BLD: 29.2 E9/L (ref 4.5–11.5)
WBC UA: >20 /HPF (ref 0–5)

## 2019-03-22 PROCEDURE — 51702 INSERT TEMP BLADDER CATH: CPT

## 2019-03-22 PROCEDURE — 2580000003 HC RX 258: Performed by: INTERNAL MEDICINE

## 2019-03-22 PROCEDURE — 81001 URINALYSIS AUTO W/SCOPE: CPT

## 2019-03-22 PROCEDURE — 2580000003 HC RX 258: Performed by: EMERGENCY MEDICINE

## 2019-03-22 PROCEDURE — 83605 ASSAY OF LACTIC ACID: CPT

## 2019-03-22 PROCEDURE — 99284 EMERGENCY DEPT VISIT MOD MDM: CPT

## 2019-03-22 PROCEDURE — 83690 ASSAY OF LIPASE: CPT

## 2019-03-22 PROCEDURE — 6370000000 HC RX 637 (ALT 250 FOR IP): Performed by: INTERNAL MEDICINE

## 2019-03-22 PROCEDURE — 87040 BLOOD CULTURE FOR BACTERIA: CPT

## 2019-03-22 PROCEDURE — 84484 ASSAY OF TROPONIN QUANT: CPT

## 2019-03-22 PROCEDURE — 2500000003 HC RX 250 WO HCPCS: Performed by: EMERGENCY MEDICINE

## 2019-03-22 PROCEDURE — 80053 COMPREHEN METABOLIC PANEL: CPT

## 2019-03-22 PROCEDURE — 6360000002 HC RX W HCPCS: Performed by: INTERNAL MEDICINE

## 2019-03-22 PROCEDURE — 87076 CULTURE ANAEROBE IDENT EACH: CPT

## 2019-03-22 PROCEDURE — 87077 CULTURE AEROBIC IDENTIFY: CPT

## 2019-03-22 PROCEDURE — 2060000000 HC ICU INTERMEDIATE R&B

## 2019-03-22 PROCEDURE — 36415 COLL VENOUS BLD VENIPUNCTURE: CPT

## 2019-03-22 PROCEDURE — 96365 THER/PROPH/DIAG IV INF INIT: CPT

## 2019-03-22 PROCEDURE — 87186 SC STD MICRODIL/AGAR DIL: CPT

## 2019-03-22 PROCEDURE — 6360000002 HC RX W HCPCS: Performed by: EMERGENCY MEDICINE

## 2019-03-22 PROCEDURE — 99222 1ST HOSP IP/OBS MODERATE 55: CPT | Performed by: SURGERY

## 2019-03-22 PROCEDURE — 87088 URINE BACTERIA CULTURE: CPT

## 2019-03-22 PROCEDURE — 85027 COMPLETE CBC AUTOMATED: CPT

## 2019-03-22 RX ORDER — ONDANSETRON 2 MG/ML
4 INJECTION INTRAMUSCULAR; INTRAVENOUS EVERY 6 HOURS PRN
Status: DISCONTINUED | OUTPATIENT
Start: 2019-03-22 | End: 2019-03-25 | Stop reason: HOSPADM

## 2019-03-22 RX ORDER — PANTOPRAZOLE SODIUM 40 MG/1
40 TABLET, DELAYED RELEASE ORAL
Status: DISCONTINUED | OUTPATIENT
Start: 2019-03-23 | End: 2019-03-25 | Stop reason: HOSPADM

## 2019-03-22 RX ORDER — SODIUM CHLORIDE 0.9 % (FLUSH) 0.9 %
10 SYRINGE (ML) INJECTION EVERY 12 HOURS SCHEDULED
Status: DISCONTINUED | OUTPATIENT
Start: 2019-03-22 | End: 2019-03-25 | Stop reason: HOSPADM

## 2019-03-22 RX ORDER — ACETAMINOPHEN 325 MG/1
650 TABLET ORAL EVERY 4 HOURS PRN
Status: DISCONTINUED | OUTPATIENT
Start: 2019-03-22 | End: 2019-03-25 | Stop reason: HOSPADM

## 2019-03-22 RX ORDER — SODIUM CHLORIDE 9 MG/ML
INJECTION, SOLUTION INTRAVENOUS CONTINUOUS
Status: DISCONTINUED | OUTPATIENT
Start: 2019-03-22 | End: 2019-03-23

## 2019-03-22 RX ORDER — PRAVASTATIN SODIUM 20 MG
80 TABLET ORAL NIGHTLY
Status: DISCONTINUED | OUTPATIENT
Start: 2019-03-22 | End: 2019-03-22 | Stop reason: SDUPTHER

## 2019-03-22 RX ORDER — METOPROLOL SUCCINATE 50 MG/1
50 TABLET, EXTENDED RELEASE ORAL DAILY
Status: DISCONTINUED | OUTPATIENT
Start: 2019-03-22 | End: 2019-03-25 | Stop reason: HOSPADM

## 2019-03-22 RX ORDER — METOPROLOL SUCCINATE 50 MG/1
50 TABLET, EXTENDED RELEASE ORAL DAILY
Status: DISCONTINUED | OUTPATIENT
Start: 2019-03-22 | End: 2019-03-22 | Stop reason: SDUPTHER

## 2019-03-22 RX ORDER — LISINOPRIL 20 MG/1
20 TABLET ORAL DAILY
Status: DISCONTINUED | OUTPATIENT
Start: 2019-03-22 | End: 2019-03-22 | Stop reason: SDUPTHER

## 2019-03-22 RX ORDER — SODIUM CHLORIDE 0.9 % (FLUSH) 0.9 %
10 SYRINGE (ML) INJECTION PRN
Status: DISCONTINUED | OUTPATIENT
Start: 2019-03-22 | End: 2019-03-25 | Stop reason: HOSPADM

## 2019-03-22 RX ORDER — LISINOPRIL 20 MG/1
20 TABLET ORAL DAILY
Status: DISCONTINUED | OUTPATIENT
Start: 2019-03-22 | End: 2019-03-25 | Stop reason: HOSPADM

## 2019-03-22 RX ORDER — SODIUM CHLORIDE 9 MG/ML
INJECTION, SOLUTION INTRAVENOUS CONTINUOUS
Status: DISCONTINUED | OUTPATIENT
Start: 2019-03-22 | End: 2019-03-22

## 2019-03-22 RX ORDER — GABAPENTIN 100 MG/1
100 CAPSULE ORAL 2 TIMES DAILY PRN
Status: DISCONTINUED | OUTPATIENT
Start: 2019-03-22 | End: 2019-03-25 | Stop reason: HOSPADM

## 2019-03-22 RX ORDER — PRAVASTATIN SODIUM 20 MG
80 TABLET ORAL NIGHTLY
Status: DISCONTINUED | OUTPATIENT
Start: 2019-03-22 | End: 2019-03-25 | Stop reason: HOSPADM

## 2019-03-22 RX ORDER — 0.9 % SODIUM CHLORIDE 0.9 %
1000 INTRAVENOUS SOLUTION INTRAVENOUS ONCE
Status: COMPLETED | OUTPATIENT
Start: 2019-03-22 | End: 2019-03-22

## 2019-03-22 RX ORDER — TAMSULOSIN HYDROCHLORIDE 0.4 MG/1
0.4 CAPSULE ORAL DAILY
Status: DISCONTINUED | OUTPATIENT
Start: 2019-03-22 | End: 2019-03-25 | Stop reason: HOSPADM

## 2019-03-22 RX ADMIN — SODIUM CHLORIDE 1000 ML: 9 INJECTION, SOLUTION INTRAVENOUS at 14:00

## 2019-03-22 RX ADMIN — PIPERACILLIN AND TAZOBACTAM 3.38 G: 3; .375 INJECTION, POWDER, FOR SOLUTION INTRAVENOUS at 23:35

## 2019-03-22 RX ADMIN — SODIUM CHLORIDE: 9 INJECTION, SOLUTION INTRAVENOUS at 14:05

## 2019-03-22 RX ADMIN — PRAVASTATIN SODIUM 80 MG: 20 TABLET ORAL at 20:30

## 2019-03-22 RX ADMIN — LISINOPRIL 20 MG: 20 TABLET ORAL at 20:30

## 2019-03-22 RX ADMIN — Medication 10 ML: at 20:31

## 2019-03-22 RX ADMIN — SODIUM CHLORIDE: 9 INJECTION, SOLUTION INTRAVENOUS at 20:29

## 2019-03-22 RX ADMIN — METOPROLOL SUCCINATE 50 MG: 50 TABLET, EXTENDED RELEASE ORAL at 20:30

## 2019-03-22 RX ADMIN — TAMSULOSIN HYDROCHLORIDE 0.4 MG: 0.4 CAPSULE ORAL at 20:30

## 2019-03-22 RX ADMIN — CEFEPIME HYDROCHLORIDE 2 G: 2 INJECTION, POWDER, FOR SOLUTION INTRAVENOUS at 13:20

## 2019-03-22 RX ADMIN — METRONIDAZOLE 500 MG: 500 INJECTION, SOLUTION INTRAVENOUS at 17:50

## 2019-03-22 ASSESSMENT — PAIN DESCRIPTION - PROGRESSION: CLINICAL_PROGRESSION: NOT CHANGED

## 2019-03-22 ASSESSMENT — PAIN DESCRIPTION - DESCRIPTORS: DESCRIPTORS: CONSTANT;DULL

## 2019-03-22 ASSESSMENT — PAIN DESCRIPTION - PAIN TYPE
TYPE: CHRONIC PAIN
TYPE: ACUTE PAIN

## 2019-03-22 ASSESSMENT — PAIN SCALES - GENERAL
PAINLEVEL_OUTOF10: 8
PAINLEVEL_OUTOF10: 4

## 2019-03-22 ASSESSMENT — PAIN DESCRIPTION - LOCATION
LOCATION: ABDOMEN
LOCATION: ABDOMEN

## 2019-03-22 ASSESSMENT — PAIN DESCRIPTION - ONSET: ONSET: ON-GOING

## 2019-03-22 ASSESSMENT — PAIN DESCRIPTION - FREQUENCY: FREQUENCY: CONTINUOUS

## 2019-03-22 ASSESSMENT — PAIN DESCRIPTION - ORIENTATION
ORIENTATION: RIGHT
ORIENTATION: LEFT

## 2019-03-22 NOTE — ED NOTES
Patient stated that he has no ride home. Call daughter, Royal Phoenix, who lives in [place].  Daughter is unable to find ride  Charge nurse notified        Kenya Billingsley RN  03/21/19 2048

## 2019-03-22 NOTE — ED NOTES
Pt on telepack. Monitor tech K[facility]p[employer]er aware. Pt ready to admit. No s/s of distress. IV infusing without difficulty. Pt chimed.      Jose Holden RN  03/22/19 4333

## 2019-03-22 NOTE — CARE COORDINATION
Social Work /Discharge Planning:    Pt was seen in ED yesterday and was called to return to ED today for abnormal labs and placement in nursing facility for rehab. Pt also has open case with [facility]. GEGE spoke to Clarke Mckinley with [facility] who reports pt's ex wife made referral to APS because pt is unable to care for himself at home, has no electric, and lives in deplorable conditions. Per report pt's landlord was going to evict pt because he was unable to properly care for himself and his home. GEGE met with pt who was alert and oriented x3. Pt reports he was living alone and able to care for himself, no recent falls, but now agreeable to go to [facility]/SNF. Pt states his daughters are currently reviewing [facility] options and SW can call to see if they have found a place for pt to go. GEGE left voice messages for pt's daughters, Nirav Bardales ([phone]) and [facility] ([phone]).

## 2019-03-22 NOTE — CONSULTS
assessment and plan with the following additions, corrections, and changes. This is a 76 y.o. male admitted 3/22/2019 with bacteremia, likely obstructive jaundice, possible from occluded stent. Plan:  1. Medical service requested transfer to [place] in case of need for ERCP  2. Fractionate Bili  3. Once at [address] [facility], will need MRI w/wo contrast MRCP and started on abx. 4. We will have GI eval as well. Jd Kwan   3/22/2019   3:21 PM    NOTE: This report, in part or full, may have been transcribed using voice recognition software. Every effort was made to ensure accuracy; however, inadvertent computerized transcription errors may be present. Please excuse any transcriptional grammatical or spelling errors that may have escaped my editorial review.

## 2019-03-22 NOTE — ED PROVIDER NOTES
37.0 - 54.0 %    MCV 87.5 80.0 - 99.9 fL    MCH 29.2 26.0 - 35.0 pg    MCHC 33.3 32.0 - 34.5 %    RDW 14.6 11.5 - 15.0 fL    Platelets 145 (L) 459 - 450 E9/L    MPV 10.4 7.0 - 12.0 fL   Comprehensive Metabolic Panel   Result Value Ref Range    Sodium 132 132 - 146 mmol/L    Potassium 3.9 3.5 - 5.0 mmol/L    Chloride 98 98 - 107 mmol/L    CO2 19 (L) 22 - 29 mmol/L    Anion Gap 15 7 - 16 mmol/L    Glucose 104 (H) 74 - 99 mg/dL    BUN 31 (H) 8 - 23 mg/dL    CREATININE 1.4 (H) 0.7 - 1.2 mg/dL    GFR Non-African American >60 >=60 mL/min/1.73    GFR African American >60     Calcium 8.6 8.6 - 10.2 mg/dL    Total Protein 6.4 6.4 - 8.3 g/dL    Alb 2.5 (L) 3.5 - 5.2 g/dL    Total Bilirubin 5.6 (H) 0.0 - 1.2 mg/dL    Alkaline Phosphatase 490 (H) 40 - 129 U/L     (H) 0 - 40 U/L     (H) 0 - 39 U/L   Lactate, Sepsis   Result Value Ref Range    Lactic Acid, Sepsis 1.9 0.5 - 1.9 mmol/L   Lipase   Result Value Ref Range    Lipase 5 (L) 13 - 60 U/L   Troponin   Result Value Ref Range    Troponin 0.02 0.00 - 0.03 ng/mL   EKG 12 Lead   Result Value Ref Range    Ventricular Rate 102 BPM    Atrial Rate 102 BPM    P-R Interval 136 ms    QRS Duration 150 ms    Q-T Interval 368 ms    QTc Calculation (Bazett) 479 ms    P Axis 67 degrees    R Axis -39 degrees    T Axis 43 degrees       RADIOLOGY:  Interpreted by Radiologist.  No orders to display       ------------------------- NURSING NOTES AND VITALS REVIEWED ---------------------------   The nursing notes within the ED encounter and vital signs as below have been reviewed.    /73   Pulse 90   Temp 97.7 °F (36.5 °C) (Oral)   Resp 14   Ht 5' 9\" (1.753 m)   Wt 125 lb (56.7 kg)   SpO2 98%   BMI 18.46 kg/m²   Oxygen Saturation Interpretation: Normal      ---------------------------------------------------PHYSICAL EXAM--------------------------------------      Constitutional/General: Alert and oriented x3, well appearing, non toxic in NAD  Head: Normocephalic and atraumatic  Eyes: PERRL, EOMI  Mouth: Oropharynx clear, handling secretions, no trismus  Neck: Supple, full ROM,   Pulmonary: Lungs clear to auscultation bilaterally, no wheezes, rales, or rhonchi. Not in respiratory distress  Cardiovascular:  Regular rate and rhythm, no murmurs, gallops, or rubs. 2+ distal pulses  Abdomen: Soft, non tender, non distended,   Extremities: Moves all extremities x 4. Warm and well perfused  Skin: warm and dry without rash  Neurologic: GCS 15,  Psych: Normal Affect      ------------------------------ ED COURSE/MEDICAL DECISION MAKING----------------------  Medications   cefepime (MAXIPIME) 2 g IVPB extended (mini-bag) (2 g Intravenous New Bag 3/22/19 1320)         ED COURSE:       Medical Decision Making:    Labs and imaging reviewed. Patient started antibiotics, patient was admitted. Counseling: The emergency provider has spoken with the patient and discussed todays results, in addition to providing specific details for the plan of care and counseling regarding the diagnosis and prognosis. Questions are answered at this time and they are agreeable with the plan.      --------------------------------- IMPRESSION AND DISPOSITION ---------------------------------    IMPRESSION  1. Bacteremia        DISPOSITION  Disposition: Admit to med/surg floor  Patient condition is stable      NOTE: This report was transcribed using voice recognition software.  Every effort was made to ensure accuracy; however, inadvertent computerized transcription errors may be present        Saira Viera MD  03/25/19 9408

## 2019-03-23 ENCOUNTER — APPOINTMENT (OUTPATIENT)
Dept: MRI IMAGING | Age: 69
DRG: 871 | End: 2019-03-23
Payer: MEDICARE

## 2019-03-23 LAB
ALBUMIN SERPL-MCNC: 2 G/DL (ref 3.5–5.2)
ALP BLD-CCNC: 417 U/L (ref 40–129)
ALT SERPL-CCNC: 169 U/L (ref 0–40)
ANION GAP SERPL CALCULATED.3IONS-SCNC: 13 MMOL/L (ref 7–16)
AST SERPL-CCNC: 346 U/L (ref 0–39)
BILIRUB SERPL-MCNC: 5.3 MG/DL (ref 0–1.2)
BUN BLDV-MCNC: 28 MG/DL (ref 8–23)
CALCIUM SERPL-MCNC: 7.7 MG/DL (ref 8.6–10.2)
CHLORIDE BLD-SCNC: 101 MMOL/L (ref 98–107)
CO2: 18 MMOL/L (ref 22–29)
CREAT SERPL-MCNC: 1.1 MG/DL (ref 0.7–1.2)
GFR AFRICAN AMERICAN: >60
GFR NON-AFRICAN AMERICAN: >60 ML/MIN/1.73
GLUCOSE BLD-MCNC: 60 MG/DL (ref 74–99)
HCT VFR BLD CALC: 26.3 % (ref 37–54)
HEMOGLOBIN: 9 G/DL (ref 12.5–16.5)
LACTIC ACID, SEPSIS: 1.1 MMOL/L (ref 0.5–1.9)
MAGNESIUM: 2 MG/DL (ref 1.6–2.6)
MCH RBC QN AUTO: 29.4 PG (ref 26–35)
MCHC RBC AUTO-ENTMCNC: 34.2 % (ref 32–34.5)
MCV RBC AUTO: 85.9 FL (ref 80–99.9)
PDW BLD-RTO: 14.6 FL (ref 11.5–15)
PLATELET # BLD: 52 E9/L (ref 130–450)
PLATELET CONFIRMATION: NORMAL
PMV BLD AUTO: 12.2 FL (ref 7–12)
POTASSIUM SERPL-SCNC: 2.8 MMOL/L (ref 3.5–5)
RBC # BLD: 3.06 E12/L (ref 3.8–5.8)
SODIUM BLD-SCNC: 132 MMOL/L (ref 132–146)
TOTAL PROTEIN: 5.4 G/DL (ref 6.4–8.3)
WBC # BLD: 18.5 E9/L (ref 4.5–11.5)

## 2019-03-23 PROCEDURE — 83735 ASSAY OF MAGNESIUM: CPT

## 2019-03-23 PROCEDURE — 6370000000 HC RX 637 (ALT 250 FOR IP): Performed by: INTERNAL MEDICINE

## 2019-03-23 PROCEDURE — 83605 ASSAY OF LACTIC ACID: CPT

## 2019-03-23 PROCEDURE — 6360000002 HC RX W HCPCS: Performed by: INTERNAL MEDICINE

## 2019-03-23 PROCEDURE — 80053 COMPREHEN METABOLIC PANEL: CPT

## 2019-03-23 PROCEDURE — 74183 MRI ABD W/O CNTR FLWD CNTR: CPT

## 2019-03-23 PROCEDURE — 2060000000 HC ICU INTERMEDIATE R&B

## 2019-03-23 PROCEDURE — 6360000004 HC RX CONTRAST MEDICATION: Performed by: RADIOLOGY

## 2019-03-23 PROCEDURE — 2580000003 HC RX 258: Performed by: INTERNAL MEDICINE

## 2019-03-23 PROCEDURE — 36415 COLL VENOUS BLD VENIPUNCTURE: CPT

## 2019-03-23 PROCEDURE — 87077 CULTURE AEROBIC IDENTIFY: CPT

## 2019-03-23 PROCEDURE — 85027 COMPLETE CBC AUTOMATED: CPT

## 2019-03-23 PROCEDURE — A9579 GAD-BASE MR CONTRAST NOS,1ML: HCPCS | Performed by: RADIOLOGY

## 2019-03-23 PROCEDURE — 87040 BLOOD CULTURE FOR BACTERIA: CPT

## 2019-03-23 PROCEDURE — 87186 SC STD MICRODIL/AGAR DIL: CPT

## 2019-03-23 PROCEDURE — 99231 SBSQ HOSP IP/OBS SF/LOW 25: CPT | Performed by: SURGERY

## 2019-03-23 RX ORDER — SODIUM CHLORIDE AND POTASSIUM CHLORIDE .9; .15 G/100ML; G/100ML
SOLUTION INTRAVENOUS CONTINUOUS
Status: DISCONTINUED | OUTPATIENT
Start: 2019-03-23 | End: 2019-03-25 | Stop reason: HOSPADM

## 2019-03-23 RX ORDER — POTASSIUM CHLORIDE 20 MEQ/1
40 TABLET, EXTENDED RELEASE ORAL 2 TIMES DAILY
Status: COMPLETED | OUTPATIENT
Start: 2019-03-23 | End: 2019-03-23

## 2019-03-23 RX ADMIN — ONDANSETRON HYDROCHLORIDE 4 MG: 2 SOLUTION INTRAMUSCULAR; INTRAVENOUS at 15:55

## 2019-03-23 RX ADMIN — PIPERACILLIN AND TAZOBACTAM 3.38 G: 3; .375 INJECTION, POWDER, FOR SOLUTION INTRAVENOUS at 08:12

## 2019-03-23 RX ADMIN — SODIUM CHLORIDE: 9 INJECTION, SOLUTION INTRAVENOUS at 03:53

## 2019-03-23 RX ADMIN — SODIUM CHLORIDE AND POTASSIUM CHLORIDE: .9; .15 SOLUTION INTRAVENOUS at 11:00

## 2019-03-23 RX ADMIN — PRAVASTATIN SODIUM 80 MG: 20 TABLET ORAL at 19:34

## 2019-03-23 RX ADMIN — TAMSULOSIN HYDROCHLORIDE 0.4 MG: 0.4 CAPSULE ORAL at 08:13

## 2019-03-23 RX ADMIN — PIPERACILLIN AND TAZOBACTAM 3.38 G: 3; .375 INJECTION, POWDER, FOR SOLUTION INTRAVENOUS at 15:52

## 2019-03-23 RX ADMIN — METOPROLOL SUCCINATE 50 MG: 50 TABLET, EXTENDED RELEASE ORAL at 08:13

## 2019-03-23 RX ADMIN — Medication 10 ML: at 19:34

## 2019-03-23 RX ADMIN — LISINOPRIL 20 MG: 20 TABLET ORAL at 08:12

## 2019-03-23 RX ADMIN — POTASSIUM CHLORIDE 40 MEQ: 20 TABLET, EXTENDED RELEASE ORAL at 19:33

## 2019-03-23 RX ADMIN — GADOTERIDOL 11 ML: 279.3 INJECTION, SOLUTION INTRAVENOUS at 10:17

## 2019-03-23 RX ADMIN — PANTOPRAZOLE SODIUM 40 MG: 40 TABLET, DELAYED RELEASE ORAL at 05:46

## 2019-03-23 RX ADMIN — Medication 10 ML: at 08:13

## 2019-03-23 RX ADMIN — SODIUM CHLORIDE AND POTASSIUM CHLORIDE: .9; .15 SOLUTION INTRAVENOUS at 19:38

## 2019-03-23 RX ADMIN — ENOXAPARIN SODIUM 40 MG: 40 INJECTION, SOLUTION INTRAVENOUS; SUBCUTANEOUS at 08:13

## 2019-03-23 RX ADMIN — POTASSIUM CHLORIDE 40 MEQ: 20 TABLET, EXTENDED RELEASE ORAL at 11:05

## 2019-03-23 ASSESSMENT — PAIN SCALES - GENERAL
PAINLEVEL_OUTOF10: 0

## 2019-03-23 NOTE — CONSULTS
Full ID Consult dictated    Pt seen and examined    Sever sepsis  Complicated UTI  GN Bactremia nisreen from above  Obstructive jaundice- bactremia can be from above but right now typical features of UTI    PLAN  Zosyn for now  Repeat one set bld cultures tomorrow  May need decompression of biliary system  Follow the final bld culture results

## 2019-03-23 NOTE — H&P
[address] Consult[facility]  History and Physical      CHIEF COMPLAINT:  + blood cx      HISTORY OF PRESENT ILLNESS:      The patient is a 76 y.o. male patient of Dr Margarette Perla who presents with +BCx. he had been seen in emergency department fatigue and had blood cultures drawn. Blood cultures came back positive. . Patient admitted to hospital for further evaluation treatment. Patient has history of pancreatic cancer obstructive jaundice and biliary stent. He had sub fever 2 days ago. No vom or diarrhea. No exac or relief. Past Medical History:    Past Medical History:   Diagnosis Date    Cancer [facility])     pancreatic    Cerebral artery occlusion with cerebral infarction ([facility]     Hypertension     Syncope        Past Surgical History:    Past Surgical History:   Procedure Laterality Date    EYE SURGERY      UPPER GASTROINTESTINAL ENDOSCOPY N/A 3/8/2019    REDO ENDOSCOPIC EGD  ULTRASOUND performed by Gisele Dewey DO at [address] [facility] 3/8/2019    EGD DIAGNOSTIC ONLY performed by Gisele Dewey DO at [address] [place] [address] N/A 1/16/2019    EXPLORATORY LAPAROTOMY; ABORTED WHIPPLE PROCEDURE; GASTROJEJUNOSTOMY; OPEN CHOLECYSTECTOMY performed by Norman Daniels MD at [address]       Medications Prior to Admission:    Medications Prior to Admission: predniSONE (DELTASONE) 5 MG tablet, Take 4 tablets by mouth daily  pravastatin (PRAVACHOL) 80 MG tablet, Take 1 tablet by mouth nightly  lisinopril (PRINIVIL;ZESTRIL) 20 MG tablet, Take 1 tablet by mouth daily  metoprolol succinate (TOPROL XL) 50 MG extended release tablet, Take 1 tablet by mouth daily  tamsulosin (FLOMAX) 0.4 MG capsule, Take 1 capsule by mouth daily    Allergies:    Patient has no known allergies. Social History:    reports that he has never smoked. He has never used smokeless tobacco. He reports that he drinks about 4.8 oz of alcohol per week.  He reports that he does not use 417 03/23/2019     03/23/2019     03/23/2019     Magnesium:    Lab Results   Component Value Date    MG 2.2 01/22/2019     Phosphorus:    Lab Results   Component Value Date    PHOS 3.1 01/22/2019     Troponin:    Lab Results   Component Value Date    TROPONINI 0.02 03/22/2019     U/A:    Lab Results   Component Value Date    COLORU Yellow 03/22/2019    PROTEINU 30 03/22/2019    PHUR 5.5 03/22/2019    LABCAST FEW  01/11/2013    WBCUA >20 03/22/2019    RBCUA 1-3 03/22/2019    RBCUA 0-1 01/25/2013    BACTERIA MANY 03/22/2019    CLARITYU TURBID 03/22/2019    SPECGRAV 1.010 03/22/2019    LEUKOCYTESUR LARGE 03/22/2019    UROBILINOGEN 0.2 03/22/2019    BILIRUBINUR LARGE 03/22/2019    BLOODU MODERATE 03/22/2019    GLUCOSEU Negative 03/22/2019     FLP:  No results found for: TRIG, HDL, LDLCALC, LDLDIRECT, LABVLDL  TSH:    Lab Results   Component Value Date    TSH 1.010 01/06/2019       MRI ABDOMEN W WO CONTRAST MRCP    (Results Pending)       ASSESSMENT:      Principal Problem:    Bacteremia  Active Problems:    HTN (hypertension), benign    Hyperbilirubinemia    Obstructive jaundice due to malignant neoplasm (HCC)    Moderate protein-calorie malnutrition (HCC)    Pancreatic adenocarcinoma ([address]  Resolved Problems:    * No resolved hospital problems.  *      PLAN:    IV abx-- Zosyn  BCx GNB  MRCP  Gsurg Consult appreciated  ID cs  PT/OT  DVT PPx  DC planning         Electronically signed by Radha Reeves MD on 3/23/2019 at 8:12 AM

## 2019-03-23 NOTE — PROCEDURES
Spoke to [facility] [place] and he will complete the MRI screening form and keep pt npo after midnight. 3/22/19 at 11 pm

## 2019-03-23 NOTE — PROGRESS NOTES
GENERAL SURGERY  DAILY PROGRESS NOTE  3/23/2019    Chief Complaint   Patient presents with    Other     was here lastnight.  Per family patient lives alone was sent by  to be admitted       Subjective:  Denies abdominal pain, nausea and vomiting    Objective:  /61   Pulse 86   Temp 98.1 °F (36.7 °C) (Temporal)   Resp 20   Ht 5' 9\" (1.753 m)   Wt 124 lb 3.2 oz (56.3 kg)   SpO2 95%   BMI 18.34 kg/m²     GENERAL:  Laying in bed, awake, alert, cooperative, no apparent distress  HEAD: Normocephalic, atraumatic  EYES: No sclera icterus, pupils equal  LUNGS:  No increased work of breathing  CARDIOVASCULAR:  RR  ABDOMEN:  Soft, non-tender, non-distended  EXTREMITIES: No edema or swelling  SKIN: Warm and dry    Assessment/Plan:  76 y.o. male male admitted 3/22/2019 with bacteremia, likely obstructive jaundice, possible from occluded stent    MRCP pending  Continue abx per primary  Okay for clears once MRCP is complete  Depending on MRCP results possible transfer to [facility] signed by Consuelo Burdick MD on 3/23/2019 at 7:46 AM

## 2019-03-23 NOTE — PROGRESS NOTES
General Surgery Progress Note:    Chief Complaint   Patient presents with    Other     was here lastnight. Per family patient lives alone was sent by  to be admitted       S: ok no pain, no n/v       Objective:    Vitals:    03/23/19 0730   BP: 109/61   Pulse: 86   Resp: 20   Temp: 98.1 °F (36.7 °C)   SpO2: 95%       Physical - /61   Pulse 86   Temp 98.1 °F (36.7 °C) (Temporal)   Resp 20   Ht 5' 9\" (1.753 m)   Wt 124 lb 3.2 oz (56.3 kg)   SpO2 95%   BMI 18.34 kg/m²       Gen: NAD  Resp: Breathing Comfortably, no resp distress  CV: Reg Rate  Abd: soft non tender   EXT NVI no edema       Assessment/Plan: 77 yo with hx aborted whipple due to desmoplastic rxn, admitted with bacteremia, recent EUS showed no Ca but path from previous was questionable,  Obstructive jaundice possible occluded stent. ..     - MRCP today ok for liquids after may need GI and transfer for ERCP. ..   - Bactermia/obstrucvei jaundice ? Stent blockage,...  On ABX Zosyn,    VTE Prophylaxis: lovenox     Isaiasteo Lamb MD  [unfilled]  9:45 AM

## 2019-03-23 NOTE — PROGRESS NOTES
Freed catheter inserted per facility protocol for severe urinary retention. Final return of 2200cc of tea colored urine. Pt mynor procedure well.

## 2019-03-23 NOTE — PLAN OF CARE
Problem: Pain:  Goal: Control of acute pain  Description  Control of acute pain  Outcome: Met This Shift     Problem: Pain:  Goal: Control of chronic pain  Description  Control of chronic pain  Outcome: Met This Shift     Problem: Risk for Impaired Skin Integrity  Goal: Tissue integrity - skin and mucous membranes  Description  Structural intactness and normal physiological function of skin and  mucous membranes.   Outcome: Met This Shift     Problem: Falls - Risk of:  Goal: Absence of physical injury  Description  Absence of physical injury  Outcome: Met This Shift

## 2019-03-23 NOTE — PLAN OF CARE
Problem: Pain:  Goal: Control of acute pain  Description  Control of acute pain  3/23/2019 0618 by Dutch Mejía RN  Outcome: Met This Shift  3/22/2019 2206 by Dutch Mejía RN  Outcome: Met This Shift     Problem: Falls - Risk of:  Goal: Absence of physical injury  Description  Absence of physical injury  3/23/2019 0618 by Dutch Mejía RN  Outcome: Met This Shift  3/22/2019 2206 by Dutch Mejía RN  Outcome: Met This Shift

## 2019-03-23 NOTE — PLAN OF CARE
Problem: Pain:  Description  Pain management should include both nonpharmacologic and pharmacologic interventions. Goal: Pain level will decrease  Description  Pain level will decrease  Outcome: Met This Shift     Problem: Risk for Impaired Skin Integrity  Goal: Tissue integrity - skin and mucous membranes  Description  Structural intactness and normal physiological function of skin and  mucous membranes.   Outcome: Met This Shift     Problem: Falls - Risk of:  Goal: Will remain free from falls  Description  Will remain free from falls  Outcome: Met This Shift

## 2019-03-24 PROBLEM — N39.0 UTI (URINARY TRACT INFECTION): Status: ACTIVE | Noted: 2019-03-24

## 2019-03-24 LAB
ANION GAP SERPL CALCULATED.3IONS-SCNC: 6 MMOL/L (ref 7–16)
BUN BLDV-MCNC: 22 MG/DL (ref 8–23)
CALCIUM SERPL-MCNC: 7.6 MG/DL (ref 8.6–10.2)
CHLORIDE BLD-SCNC: 104 MMOL/L (ref 98–107)
CO2: 20 MMOL/L (ref 22–29)
CREAT SERPL-MCNC: 1 MG/DL (ref 0.7–1.2)
GFR AFRICAN AMERICAN: >60
GFR NON-AFRICAN AMERICAN: >60 ML/MIN/1.73
GLUCOSE BLD-MCNC: 154 MG/DL (ref 74–99)
HCT VFR BLD CALC: 28.3 % (ref 37–54)
HEMOGLOBIN: 9.5 G/DL (ref 12.5–16.5)
MCH RBC QN AUTO: 29.1 PG (ref 26–35)
MCHC RBC AUTO-ENTMCNC: 33.6 % (ref 32–34.5)
MCV RBC AUTO: 86.8 FL (ref 80–99.9)
ORGANISM: ABNORMAL
PDW BLD-RTO: 14.8 FL (ref 11.5–15)
PLATELET # BLD: 34 E9/L (ref 130–450)
PLATELET CONFIRMATION: NORMAL
PMV BLD AUTO: 12 FL (ref 7–12)
POTASSIUM SERPL-SCNC: 4.5 MMOL/L (ref 3.5–5)
RBC # BLD: 3.26 E12/L (ref 3.8–5.8)
SODIUM BLD-SCNC: 130 MMOL/L (ref 132–146)
URINE CULTURE, ROUTINE: ABNORMAL
URINE CULTURE, ROUTINE: ABNORMAL
WBC # BLD: 15 E9/L (ref 4.5–11.5)

## 2019-03-24 PROCEDURE — 99231 SBSQ HOSP IP/OBS SF/LOW 25: CPT | Performed by: SURGERY

## 2019-03-24 PROCEDURE — 87040 BLOOD CULTURE FOR BACTERIA: CPT

## 2019-03-24 PROCEDURE — 2580000003 HC RX 258: Performed by: INTERNAL MEDICINE

## 2019-03-24 PROCEDURE — 80048 BASIC METABOLIC PNL TOTAL CA: CPT

## 2019-03-24 PROCEDURE — 6370000000 HC RX 637 (ALT 250 FOR IP): Performed by: INTERNAL MEDICINE

## 2019-03-24 PROCEDURE — 85027 COMPLETE CBC AUTOMATED: CPT

## 2019-03-24 PROCEDURE — 6360000002 HC RX W HCPCS: Performed by: INTERNAL MEDICINE

## 2019-03-24 PROCEDURE — 36415 COLL VENOUS BLD VENIPUNCTURE: CPT

## 2019-03-24 PROCEDURE — 2060000000 HC ICU INTERMEDIATE R&B

## 2019-03-24 RX ADMIN — ENOXAPARIN SODIUM 40 MG: 40 INJECTION, SOLUTION INTRAVENOUS; SUBCUTANEOUS at 08:30

## 2019-03-24 RX ADMIN — LISINOPRIL 20 MG: 20 TABLET ORAL at 08:30

## 2019-03-24 RX ADMIN — PRAVASTATIN SODIUM 80 MG: 20 TABLET ORAL at 21:20

## 2019-03-24 RX ADMIN — Medication 10 ML: at 23:42

## 2019-03-24 RX ADMIN — PIPERACILLIN AND TAZOBACTAM 3.38 G: 3; .375 INJECTION, POWDER, FOR SOLUTION INTRAVENOUS at 00:30

## 2019-03-24 RX ADMIN — PIPERACILLIN AND TAZOBACTAM 3.38 G: 3; .375 INJECTION, POWDER, FOR SOLUTION INTRAVENOUS at 08:29

## 2019-03-24 RX ADMIN — PIPERACILLIN AND TAZOBACTAM 3.38 G: 3; .375 INJECTION, POWDER, FOR SOLUTION INTRAVENOUS at 23:42

## 2019-03-24 RX ADMIN — PANTOPRAZOLE SODIUM 40 MG: 40 TABLET, DELAYED RELEASE ORAL at 06:17

## 2019-03-24 RX ADMIN — SODIUM CHLORIDE AND POTASSIUM CHLORIDE: .9; .15 SOLUTION INTRAVENOUS at 17:40

## 2019-03-24 RX ADMIN — Medication 10 ML: at 15:28

## 2019-03-24 RX ADMIN — ACETAMINOPHEN 650 MG: 325 TABLET, FILM COATED ORAL at 21:20

## 2019-03-24 RX ADMIN — Medication 10 ML: at 08:30

## 2019-03-24 RX ADMIN — SODIUM CHLORIDE AND POTASSIUM CHLORIDE: .9; .15 SOLUTION INTRAVENOUS at 06:17

## 2019-03-24 RX ADMIN — METOPROLOL SUCCINATE 50 MG: 50 TABLET, EXTENDED RELEASE ORAL at 08:30

## 2019-03-24 RX ADMIN — PIPERACILLIN AND TAZOBACTAM 3.38 G: 3; .375 INJECTION, POWDER, FOR SOLUTION INTRAVENOUS at 15:27

## 2019-03-24 RX ADMIN — TAMSULOSIN HYDROCHLORIDE 0.4 MG: 0.4 CAPSULE ORAL at 08:30

## 2019-03-24 ASSESSMENT — PAIN SCALES - GENERAL
PAINLEVEL_OUTOF10: 0

## 2019-03-24 NOTE — CONSULTS
[facility]                  [address] Jaqui Sosa                                  CONSULTATION    PATIENT NAME: Mariann Carrington                   :        1950  MED REC NO:   [id number]                            ROOM:       7402  ACCOUNT NO:   [de-identified]                           ADMIT DATE: 2019  PROVIDER:     Milo Malone MD    CONSULT DATE:  2019    REASON FOR CONSULTATION:  Bile duct obstruction, bacteremia. HISTORY:  He is a 69-year-old male who presented originally with  jaundice back in January. He was found to have obstructive jaundice. Pancreatic duct was markedly dilated. His bilirubin at that time was in  excess of 20. His evaluation included an endoscopic ultrasound and ERCP  with stent placement in [place]. Now, apparently the original  pathology suggested high IPMN with high-grade dysplasia. Malignancy was  not confirmed. He underwent an open procedure for an attempted Whipple  by Dr. Cira Hassan here in [facility]. Because of desmoplastic reaction  and poorly defined planes, the lesion was judged to be unresectable. A  retrocolic gastrojejunostomy was performed. Pathology from that procedure also failed to confirm the presence of  malignancy. His gallbladder was removed. His liver function studies  improved, but never normalized reaching a william with a bilirubin of 2.0  on 2019, but his alkaline phosphatase had risen from 206 in  February to 47. On the , his alkaline phosphatase of 714 and his  bilirubin 5.5. In the interim, he underwent an endoscopic ultrasound  here in [facility] by Dr. Greer December. The aspiration obtained an inadequate  specimen and no further diagnostic information was obtained. 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 Just a  few days ago, prednisone at a dose of 20 mg per day was initiated. His  IgG4 was elevated at 369.   This is nonoperative. Now, he presents with  Gram-negative septicemia. His first set of blood cultures with  Klebsiella matching his urine. His second set of blood cultures report  Gram-negative rods, Gram-positive diplococci and Gram-positive rods. There is a lesion in the liver which is clearly new, but appears cystic  and does not have features of hepatic abscess, but I guess that would  have to be considered. He will require exchange of his stent. However, he appears not to have  complete biliary obstruction as one would expect, as his bilirubin has  remained the same essentially since admission. His alkaline phosphatase  is fallen presumably his treatment for his infection and cyst.  The  question is whether Dr. Ramy Elias would prefer to have it done locally  or have him referred back to the [facility] where the original  stent was placed. He is stable and there is no urgency today. He is stable.         Foreign Schaeffer MD    D: 03/24/2019 11:44:47       T: 03/24/2019 11:48:35     [email or web address]  Job#: [id number]     Doc#: [id number]    CC:

## 2019-03-24 NOTE — PROGRESS NOTES
GENERAL SURGERY  DAILY PROGRESS NOTE  3/24/2019    Chief Complaint   Patient presents with    Other     was here lastnight. Per family patient lives alone was sent by  to be admitted     Subjective:  No events overnight. Only has leg pain. Tolerated food yesterday. Objective:  BP (!) 103/58   Pulse 71   Temp 98.5 °F (36.9 °C) (Temporal)   Resp 16   Ht 5' 9\" (1.753 m)   Wt 124 lb 3.2 oz (56.3 kg)   SpO2 97%   BMI 18.34 kg/m²     General appearance: alert, cooperative and in no acute distress. Eyes: grossly normal  Lungs: nonlabored breathing  Heart: regular rate  Abdomen: soft, non-tender, non distended  : talavera watson    Assessment/Plan:  76 y.o. male with history of aborted Whipple, admitted with obstructive jaundice bacteremia.  MRCP showed occluded duct stent    No abdominal pain  Continue zosyn  GI consultation    Electronically signed by Colt Rojas MD on 3/24/2019 at 7:35 AM

## 2019-03-24 NOTE — PROGRESS NOTES
General Surgery Progress Note:    Chief Complaint   Patient presents with    Other     was here lastnight. Per family patient lives alone was sent by  to be admitted       S: ok no pain, no n/v       Objective:    Vitals:    03/24/19 1505   BP: 131/69   Pulse: 79   Resp: 18   Temp: 98.4 °F (36.9 °C)   SpO2: 96%       Physical - /69   Pulse 79   Temp 98.4 °F (36.9 °C) (Temporal)   Resp 18   Ht 5' 9\" (1.753 m)   Wt 124 lb 3.2 oz (56.3 kg)   SpO2 96%   BMI 18.34 kg/m²       Gen: NAD  Resp: Breathing Comfortably, no resp distress  CV: Reg Rate  Abd: soft non tender   EXT NVI no edema       Assessment/Plan: 75 yo with hx aborted whipple due to desmoplastic rxn, admitted with bacteremia, recent EUS showed no Ca but path from previous was questionable,  Obstructive jaundice possible occluded stent. ..     - GI recs appreciated, likely will need ERCP   - Bactermia/obstrucvei jaundice ? Stent blockage,...  On ABX Zosyn,    VTE Prophylaxis: lovenox     Tapan Almazan MD  [unfilled]  3:37 PM

## 2019-03-24 NOTE — PROGRESS NOTES
Call placed to Dr Ezekiel Brooks inquiring about if patient is to be transferred or treated here. Dr Ezekiel Brooks stated, \"we will decide tomorrow. \"

## 2019-03-24 NOTE — PLAN OF CARE
Problem: Pain:  Goal: Pain level will decrease  Description  Pain level will decrease  3/24/2019 0558 by Mayo Fernandez RN  Outcome: Met This Shift  3/23/2019 1834 by Rupail Eubanks RN  Outcome: Met This Shift  Goal: Control of acute pain  Description  Control of acute pain  3/24/2019 0558 by Mayo Fernandez RN  Outcome: Met This Shift  3/23/2019 2107 by Ivan Wray RN  Outcome: Met This Shift  3/23/2019 1920 by Ivan Wray RN  Outcome: Met This Shift  Goal: Control of chronic pain  Description  Control of chronic pain  Outcome: Met This Shift     Problem: Risk for Impaired Skin Integrity  Goal: Tissue integrity - skin and mucous membranes  Description  Structural intactness and normal physiological function of skin and  mucous membranes.   3/24/2019 0558 by Mayo Fernandez RN  Outcome: Met This Shift  3/23/2019 2107 by Ivan Wray RN  Outcome: Met This Shift  3/23/2019 1834 by Rupali Eubanks RN  Outcome: Met This Shift     Problem: Falls - Risk of:  Goal: Will remain free from falls  Description  Will remain free from falls  3/24/2019 0558 by Mayo Fernandez RN  Outcome: Met This Shift  3/23/2019 1834 by Rupali Eubanks RN  Outcome: Met This Shift  Goal: Absence of physical injury  Description  Absence of physical injury  Outcome: Met This Shift

## 2019-03-24 NOTE — PROGRESS NOTES
Subjective:  *Feeling better No CP, SOB, F, V, D, P     Objective:    BP (!) 116/57   Pulse 60   Temp 98.3 °F (36.8 °C) (Temporal)   Resp 18   Ht 5' 9\" (1.753 m)   Wt 124 lb 3.2 oz (56.3 kg)   SpO2 96%   BMI 18.34 kg/m²     24HR INTAKE/OUTPUT:      Intake/Output Summary (Last 24 hours) at 3/24/2019 0838  Last data filed at 3/24/2019 0649  Gross per 24 hour   Intake 2461.67 ml   Output 1000 ml   Net 1461.67 ml     nad  Heart:  RRR, no murmurs, gallops, or rubs. Lungs:  CTA + few rhonchi  Abd: bowel sounds present, nontender, nondistended, no masses  Extrem:  No clubbing, cyanosis, or edema    Most Recent Labs  Lab Results   Component Value Date    WBC 15.0 (H) 03/24/2019    HGB 9.5 (L) 03/24/2019    HCT 28.3 (L) 03/24/2019    PLT 34 (L) 03/24/2019     (L) 03/24/2019    K 4.5 03/24/2019     03/24/2019    CREATININE 1.0 03/24/2019    BUN 22 03/24/2019    CO2 20 (L) 03/24/2019    GLUCOSE 154 (H) 03/24/2019     (H) 03/23/2019     (H) 03/23/2019    INR 1.1 01/22/2019    TSH 1.010 01/06/2019    LABA1C 5.3 01/17/2019       MRI ABDOMEN W WO CONTRAST MRCP   Final Result   Biliary dilatation with abnormal signal at the level the   distal duct which could be compatible with a calculus a stent or an   occluded stent    Pancreatic duct dilatation   New cystic lesion in the right lobe of the liver measuring 4 cm in   size possibly a cyst.                    Assessment    Principal Problem:    Bacteremia  Active Problems:    HTN (hypertension), benign    Hyperbilirubinemia    Obstructive jaundice due to malignant neoplasm (HCC)    Moderate protein-calorie malnutrition (HCC)    Pancreatic adenocarcinoma (HCC)    UTI (urinary tract infection)  Resolved Problems:    * No resolved hospital problems.  *      Plan:  IV abx-- Zosyn-->rocephin  BCx K pneumonia  UCx  MRCP  Gsurg Consult appreciated  ID cs  PT/OT  DVT PPx  DC planning    Electronically signed by Sajan Hawley MD on 3/24/2019 at 8:38 AM

## 2019-03-24 NOTE — PLAN OF CARE
Problem: Pain:  Goal: Pain level will decrease  Description  Pain level will decrease  3/24/2019 1829 by Kamar Cordova RN  Outcome: Met This Shift  3/24/2019 0558 by Margaret Gregg RN  Outcome: Met This Shift  Goal: Control of acute pain  Description  Control of acute pain  3/24/2019 1829 by Kamar Cordova RN  Outcome: Met This Shift  3/24/2019 0558 by Margaret Gregg RN  Outcome: Met This Shift  Goal: Control of chronic pain  Description  Control of chronic pain  3/24/2019 1829 by Kamar Cordova RN  Outcome: Met This Shift  3/24/2019 0558 by Margaret Gregg RN  Outcome: Met This Shift     Problem: Risk for Impaired Skin Integrity  Goal: Tissue integrity - skin and mucous membranes  Description  Structural intactness and normal physiological function of skin and  mucous membranes.   3/24/2019 1829 by Kamar Cordova RN  Outcome: Met This Shift  3/24/2019 0558 by Margaret Gregg RN  Outcome: Met This Shift     Problem: Falls - Risk of:  Goal: Will remain free from falls  Description  Will remain free from falls  3/24/2019 1829 by Kamar Cordova RN  Outcome: Met This Shift  3/24/2019 0558 by Margaret Gregg RN  Outcome: Met This Shift  Goal: Absence of physical injury  Description  Absence of physical injury  3/24/2019 1829 by Kamar Cordova RN  Outcome: Met This Shift  3/24/2019 0558 by Margaret Gregg RN  Outcome: Met This Shift

## 2019-03-24 NOTE — PLAN OF CARE
Problem: Pain:  Goal: Control of acute pain  Description  Control of acute pain  3/23/2019 2107 by Vanessa Collado RN  Outcome: Met This Shift  3/23/2019 1920 by Vanessa Collado RN  Outcome: Met This Shift     Problem: Risk for Impaired Skin Integrity  Goal: Tissue integrity - skin and mucous membranes  Description  Structural intactness and normal physiological function of skin and  mucous membranes.   3/23/2019 2107 by Vanessa Collado RN  Outcome: Met This Shift  3/23/2019 1834 by Mart Johnson RN  Outcome: Met This Shift

## 2019-03-24 NOTE — PROGRESS NOTES
ID Progress Note                [address] [facility] [address], [place], [address]            Phone [phone]     Fax [phone]      Chief complaint   Weaknes/fever unchanged    Subjective:  Pt looks very  tired, shivering, abdo pain  The patient is awake and alert. Tolerating medications. Reports no side effects. 10 ROS otherwise negative unless otherwise specified above. Objective:    Vitals:    03/24/19 1505   BP: 131/69   Pulse: 79   Resp: 18   Temp: 98.4 °F (36.9 °C)   SpO2: 96%     GENERAL:  The patient is alert and oriented x3, not in acute distress. HEENT:  Atraumatic, normocephalic. PERRLA. EOMI. RESPIRATORY:  Air entry bilaterally equal.  No wheezes or crackles. CARDIOVASCULAR:  S1 and S2 normal.  ABDOMEN:  Soft,tender today, nondistended. Mild tenderness over the left  lower quadrant. No guarding or rigidity. EXTREMITIES:  Mild pedal edema, grade 1.         Labs:  Recent Labs     03/22/19  1220 03/23/19  0625 03/24/19  0614   WBC 29.2* 18.5* 15.0*   RBC 3.19* 3.06* 3.26*   HGB 9.3* 9.0* 9.5*   HCT 27.9* 26.3* 28.3*   MCV 87.5 85.9 86.8   MCH 29.2 29.4 29.1   MCHC 33.3 34.2 33.6   RDW 14.6 14.6 14.8   * 52* 34*   MPV 10.4 12.2* 12.0     CMP:    Lab Results   Component Value Date     03/24/2019    K 4.5 03/24/2019    K 3.9 01/22/2019     03/24/2019    CO2 20 03/24/2019    BUN 22 03/24/2019    CREATININE 1.0 03/24/2019    GFRAA >60 03/24/2019    LABGLOM >60 03/24/2019    GLUCOSE 154 03/24/2019    PROT 5.4 03/23/2019    LABALBU 2.0 03/23/2019    CALCIUM 7.6 03/24/2019    BILITOT 5.3 03/23/2019    ALKPHOS 417 03/23/2019     03/23/2019     03/23/2019          Microbiology :  Recent Labs     03/23/19  0625   BC Gram stain performed from blood culture bottle media  Gram positive diplococci  Growth in both bottles  Previously positive blood culture called  *     Recent Labs     03/22/19  1225   BLOODCULT2 Gram stain performed from blood culture

## 2019-03-25 ENCOUNTER — ANESTHESIA (OUTPATIENT)
Dept: ENDOSCOPY | Age: 69
DRG: 444 | End: 2019-03-25
Payer: MEDICARE

## 2019-03-25 ENCOUNTER — HOSPITAL ENCOUNTER (INPATIENT)
Age: 69
LOS: 4 days | Discharge: NURSING, REHAB OR LONG-TERM CARE | DRG: 444 | End: 2019-03-29
Attending: INTERNAL MEDICINE | Admitting: TRANSPLANT SURGERY
Payer: MEDICARE

## 2019-03-25 ENCOUNTER — APPOINTMENT (OUTPATIENT)
Dept: GENERAL RADIOLOGY | Age: 69
DRG: 444 | End: 2019-03-25
Attending: INTERNAL MEDICINE
Payer: MEDICARE

## 2019-03-25 ENCOUNTER — ANESTHESIA EVENT (OUTPATIENT)
Dept: ENDOSCOPY | Age: 69
DRG: 444 | End: 2019-03-25
Payer: MEDICARE

## 2019-03-25 ENCOUNTER — HOSPITAL ENCOUNTER (OUTPATIENT)
Age: 69
Discharge: HOME | End: 2019-03-25
Payer: MEDICARE

## 2019-03-25 VITALS
HEIGHT: 69 IN | WEIGHT: 124.2 LBS | DIASTOLIC BLOOD PRESSURE: 61 MMHG | RESPIRATION RATE: 18 BRPM | SYSTOLIC BLOOD PRESSURE: 119 MMHG | TEMPERATURE: 98.9 F | HEART RATE: 72 BPM | BODY MASS INDEX: 18.4 KG/M2 | OXYGEN SATURATION: 95 %

## 2019-03-25 VITALS — DIASTOLIC BLOOD PRESSURE: 55 MMHG | SYSTOLIC BLOOD PRESSURE: 92 MMHG | OXYGEN SATURATION: 97 %

## 2019-03-25 DIAGNOSIS — K83.1 BILIARY OBSTRUCTION: Primary | ICD-10-CM

## 2019-03-25 LAB
ABO/RH: NORMAL
ALBUMIN SERPL-MCNC: 1.7 G/DL (ref 3.5–5.2)
ALP BLD-CCNC: 388 U/L (ref 40–129)
ALT SERPL-CCNC: 107 U/L (ref 0–40)
AMYLASE: 29 U/L (ref 20–100)
ANION GAP SERPL CALCULATED.3IONS-SCNC: 5 MMOL/L (ref 7–16)
ANTIBODY SCREEN: NORMAL
APTT: 30.6 SEC (ref 24.5–35.1)
AST SERPL-CCNC: 127 U/L (ref 0–39)
BILIRUB SERPL-MCNC: 7.2 MG/DL (ref 0–1.2)
BLOOD BANK DISPENSE STATUS: NORMAL
BLOOD BANK PRODUCT CODE: NORMAL
BPU ID: NORMAL
BUN BLDV-MCNC: 16 MG/DL (ref 8–23)
CALCIUM SERPL-MCNC: 7.4 MG/DL (ref 8.6–10.2)
CHLORIDE BLD-SCNC: 103 MMOL/L (ref 98–107)
CO2: 19 MMOL/L (ref 22–29)
CREAT SERPL-MCNC: 0.9 MG/DL (ref 0.7–1.2)
DESCRIPTION BLOOD BANK: NORMAL
GFR AFRICAN AMERICAN: >60
GFR NON-AFRICAN AMERICAN: >60 ML/MIN/1.73
GLUCOSE BLD-MCNC: 129 MG/DL (ref 74–99)
HCT VFR BLD CALC: 29.6 % (ref 37–54)
HEMOGLOBIN: 10.3 G/DL (ref 12.5–16.5)
INR BLD: 1.3
LIPASE: 9 U/L (ref 13–60)
MCH RBC QN AUTO: 29.3 PG (ref 26–35)
MCHC RBC AUTO-ENTMCNC: 34.8 % (ref 32–34.5)
MCV RBC AUTO: 84.1 FL (ref 80–99.9)
PDW BLD-RTO: 15 FL (ref 11.5–15)
PLATELET # BLD: 34 E9/L (ref 130–450)
PLATELET CONFIRMATION: NORMAL
PMV BLD AUTO: 13.5 FL (ref 7–12)
POTASSIUM SERPL-SCNC: 4.3 MMOL/L (ref 3.5–5)
PROTHROMBIN TIME: 14.1 SEC (ref 9.3–12.4)
RBC # BLD: 3.52 E12/L (ref 3.8–5.8)
SODIUM BLD-SCNC: 127 MMOL/L (ref 132–146)
TOTAL PROTEIN: 5.4 G/DL (ref 6.4–8.3)
WBC # BLD: 11 E9/L (ref 4.5–11.5)

## 2019-03-25 PROCEDURE — 0F798DZ DILATION OF COMMON BILE DUCT WITH INTRALUMINAL DEVICE, VIA NATURAL OR ARTIFICIAL OPENING ENDOSCOPIC: ICD-10-PCS | Performed by: INTERNAL MEDICINE

## 2019-03-25 PROCEDURE — 86022 PLATELET ANTIBODIES: CPT

## 2019-03-25 PROCEDURE — 6370000000 HC RX 637 (ALT 250 FOR IP): Performed by: NURSE PRACTITIONER

## 2019-03-25 PROCEDURE — 74330 X-RAY BILE/PANC ENDOSCOPY: CPT

## 2019-03-25 PROCEDURE — 85027 COMPLETE CBC AUTOMATED: CPT

## 2019-03-25 PROCEDURE — 6360000002 HC RX W HCPCS: Performed by: TRANSPLANT SURGERY

## 2019-03-25 PROCEDURE — 6370000000 HC RX 637 (ALT 250 FOR IP): Performed by: INTERNAL MEDICINE

## 2019-03-25 PROCEDURE — 3700000001 HC ADD 15 MINUTES (ANESTHESIA): Performed by: INTERNAL MEDICINE

## 2019-03-25 PROCEDURE — 86901 BLOOD TYPING SEROLOGIC RH(D): CPT

## 2019-03-25 PROCEDURE — C1769 GUIDE WIRE: HCPCS | Performed by: INTERNAL MEDICINE

## 2019-03-25 PROCEDURE — A0428 BLS: HCPCS

## 2019-03-25 PROCEDURE — 82150 ASSAY OF AMYLASE: CPT

## 2019-03-25 PROCEDURE — 6370000000 HC RX 637 (ALT 250 FOR IP): Performed by: TRANSPLANT SURGERY

## 2019-03-25 PROCEDURE — 99222 1ST HOSP IP/OBS MODERATE 55: CPT | Performed by: TRANSPLANT SURGERY

## 2019-03-25 PROCEDURE — 2580000003 HC RX 258: Performed by: INTERNAL MEDICINE

## 2019-03-25 PROCEDURE — 2580000003 HC RX 258: Performed by: NURSE PRACTITIONER

## 2019-03-25 PROCEDURE — BF101ZZ FLUOROSCOPY OF BILE DUCTS USING LOW OSMOLAR CONTRAST: ICD-10-PCS | Performed by: INTERNAL MEDICINE

## 2019-03-25 PROCEDURE — 36415 COLL VENOUS BLD VENIPUNCTURE: CPT

## 2019-03-25 PROCEDURE — 80053 COMPREHEN METABOLIC PANEL: CPT

## 2019-03-25 PROCEDURE — 2580000003 HC RX 258: Performed by: SPECIALIST

## 2019-03-25 PROCEDURE — 6360000002 HC RX W HCPCS: Performed by: SPECIALIST

## 2019-03-25 PROCEDURE — 2500000003 HC RX 250 WO HCPCS: Performed by: NURSE ANESTHETIST, CERTIFIED REGISTERED

## 2019-03-25 PROCEDURE — A0425 GROUND MILEAGE: HCPCS

## 2019-03-25 PROCEDURE — 6360000002 HC RX W HCPCS: Performed by: INTERNAL MEDICINE

## 2019-03-25 PROCEDURE — 7100000011 HC PHASE II RECOVERY - ADDTL 15 MIN: Performed by: INTERNAL MEDICINE

## 2019-03-25 PROCEDURE — 86900 BLOOD TYPING SEROLOGIC ABO: CPT

## 2019-03-25 PROCEDURE — 2709999900 HC NON-CHARGEABLE SUPPLY: Performed by: INTERNAL MEDICINE

## 2019-03-25 PROCEDURE — 36430 TRANSFUSION BLD/BLD COMPNT: CPT

## 2019-03-25 PROCEDURE — 97165 OT EVAL LOW COMPLEX 30 MIN: CPT

## 2019-03-25 PROCEDURE — 3700000000 HC ANESTHESIA ATTENDED CARE: Performed by: INTERNAL MEDICINE

## 2019-03-25 PROCEDURE — 1200000000 HC SEMI PRIVATE

## 2019-03-25 PROCEDURE — 7100000010 HC PHASE II RECOVERY - FIRST 15 MIN: Performed by: INTERNAL MEDICINE

## 2019-03-25 PROCEDURE — 87186 SC STD MICRODIL/AGAR DIL: CPT

## 2019-03-25 PROCEDURE — 87077 CULTURE AEROBIC IDENTIFY: CPT

## 2019-03-25 PROCEDURE — 87040 BLOOD CULTURE FOR BACTERIA: CPT

## 2019-03-25 PROCEDURE — 2720000010 HC SURG SUPPLY STERILE: Performed by: INTERNAL MEDICINE

## 2019-03-25 PROCEDURE — 97530 THERAPEUTIC ACTIVITIES: CPT

## 2019-03-25 PROCEDURE — 85610 PROTHROMBIN TIME: CPT

## 2019-03-25 PROCEDURE — P9035 PLATELET PHERES LEUKOREDUCED: HCPCS

## 2019-03-25 PROCEDURE — 2580000003 HC RX 258: Performed by: NURSE ANESTHETIST, CERTIFIED REGISTERED

## 2019-03-25 PROCEDURE — 97161 PT EVAL LOW COMPLEX 20 MIN: CPT

## 2019-03-25 PROCEDURE — 85730 THROMBOPLASTIN TIME PARTIAL: CPT

## 2019-03-25 PROCEDURE — 99223 1ST HOSP IP/OBS HIGH 75: CPT | Performed by: INTERNAL MEDICINE

## 2019-03-25 PROCEDURE — 83690 ASSAY OF LIPASE: CPT

## 2019-03-25 PROCEDURE — C1874 STENT, COATED/COV W/DEL SYS: HCPCS | Performed by: INTERNAL MEDICINE

## 2019-03-25 PROCEDURE — 3609015100 HC ERCP STENT PLACEMENT BILIARY/PANCREATIC DUCT: Performed by: INTERNAL MEDICINE

## 2019-03-25 PROCEDURE — 6360000002 HC RX W HCPCS: Performed by: NURSE ANESTHETIST, CERTIFIED REGISTERED

## 2019-03-25 PROCEDURE — 86850 RBC ANTIBODY SCREEN: CPT

## 2019-03-25 PROCEDURE — 6360000002 HC RX W HCPCS

## 2019-03-25 DEVICE — STENT SYSTEM RMV
Type: IMPLANTABLE DEVICE | Site: BILE DUCT | Status: FUNCTIONAL
Brand: WALLFLEX BILIARY

## 2019-03-25 RX ORDER — PANTOPRAZOLE SODIUM 40 MG/1
40 TABLET, DELAYED RELEASE ORAL
Status: CANCELLED | OUTPATIENT
Start: 2019-03-26

## 2019-03-25 RX ORDER — PRAVASTATIN SODIUM 20 MG
80 TABLET ORAL NIGHTLY
Status: DISCONTINUED | OUTPATIENT
Start: 2019-03-25 | End: 2019-03-29 | Stop reason: HOSPADM

## 2019-03-25 RX ORDER — SODIUM CHLORIDE, SODIUM LACTATE, POTASSIUM CHLORIDE, CALCIUM CHLORIDE 600; 310; 30; 20 MG/100ML; MG/100ML; MG/100ML; MG/100ML
INJECTION, SOLUTION INTRAVENOUS CONTINUOUS PRN
Status: DISCONTINUED | OUTPATIENT
Start: 2019-03-25 | End: 2019-03-25 | Stop reason: SDUPTHER

## 2019-03-25 RX ORDER — METOPROLOL SUCCINATE 50 MG/1
50 TABLET, EXTENDED RELEASE ORAL DAILY
Status: CANCELLED | OUTPATIENT
Start: 2019-03-26

## 2019-03-25 RX ORDER — ACETAMINOPHEN 325 MG/1
650 TABLET ORAL EVERY 4 HOURS PRN
Status: DISCONTINUED | OUTPATIENT
Start: 2019-03-25 | End: 2019-03-29 | Stop reason: HOSPADM

## 2019-03-25 RX ORDER — PANTOPRAZOLE SODIUM 40 MG/1
40 TABLET, DELAYED RELEASE ORAL
Status: DISCONTINUED | OUTPATIENT
Start: 2019-03-26 | End: 2019-03-29 | Stop reason: HOSPADM

## 2019-03-25 RX ORDER — LISINOPRIL 20 MG/1
20 TABLET ORAL DAILY
Status: CANCELLED | OUTPATIENT
Start: 2019-03-26

## 2019-03-25 RX ORDER — PROPOFOL 10 MG/ML
INJECTION, EMULSION INTRAVENOUS PRN
Status: DISCONTINUED | OUTPATIENT
Start: 2019-03-25 | End: 2019-03-25 | Stop reason: SDUPTHER

## 2019-03-25 RX ORDER — ACETAMINOPHEN 325 MG/1
650 TABLET ORAL EVERY 4 HOURS PRN
Status: CANCELLED | OUTPATIENT
Start: 2019-03-25

## 2019-03-25 RX ORDER — CIPROFLOXACIN 2 MG/ML
400 INJECTION, SOLUTION INTRAVENOUS
Status: DISCONTINUED | OUTPATIENT
Start: 2019-03-25 | End: 2019-03-25

## 2019-03-25 RX ORDER — METOPROLOL SUCCINATE 50 MG/1
50 TABLET, EXTENDED RELEASE ORAL DAILY
Status: DISCONTINUED | OUTPATIENT
Start: 2019-03-26 | End: 2019-03-29 | Stop reason: HOSPADM

## 2019-03-25 RX ORDER — SODIUM CHLORIDE 0.9 % (FLUSH) 0.9 %
10 SYRINGE (ML) INJECTION EVERY 12 HOURS SCHEDULED
Status: CANCELLED | OUTPATIENT
Start: 2019-03-25

## 2019-03-25 RX ORDER — PROPOFOL 10 MG/ML
INJECTION, EMULSION INTRAVENOUS CONTINUOUS PRN
Status: DISCONTINUED | OUTPATIENT
Start: 2019-03-25 | End: 2019-03-25 | Stop reason: SDUPTHER

## 2019-03-25 RX ORDER — SODIUM CHLORIDE 0.9 % (FLUSH) 0.9 %
10 SYRINGE (ML) INJECTION PRN
Status: DISCONTINUED | OUTPATIENT
Start: 2019-03-25 | End: 2019-03-29 | Stop reason: HOSPADM

## 2019-03-25 RX ORDER — PREDNISONE 20 MG/1
40 TABLET ORAL DAILY
Status: DISCONTINUED | OUTPATIENT
Start: 2019-03-25 | End: 2019-03-29 | Stop reason: HOSPADM

## 2019-03-25 RX ORDER — PRAVASTATIN SODIUM 20 MG
80 TABLET ORAL NIGHTLY
Status: CANCELLED | OUTPATIENT
Start: 2019-03-25

## 2019-03-25 RX ORDER — SODIUM CHLORIDE, SODIUM LACTATE, POTASSIUM CHLORIDE, AND CALCIUM CHLORIDE .6; .31; .03; .02 G/100ML; G/100ML; G/100ML; G/100ML
1000 INJECTION, SOLUTION INTRAVENOUS ONCE
Status: COMPLETED | OUTPATIENT
Start: 2019-03-25 | End: 2019-03-25

## 2019-03-25 RX ORDER — SODIUM CHLORIDE 0.9 % (FLUSH) 0.9 %
10 SYRINGE (ML) INJECTION EVERY 12 HOURS SCHEDULED
Status: DISCONTINUED | OUTPATIENT
Start: 2019-03-25 | End: 2019-03-29 | Stop reason: HOSPADM

## 2019-03-25 RX ORDER — LISINOPRIL 20 MG/1
20 TABLET ORAL DAILY
Status: DISCONTINUED | OUTPATIENT
Start: 2019-03-26 | End: 2019-03-29 | Stop reason: HOSPADM

## 2019-03-25 RX ORDER — TAMSULOSIN HYDROCHLORIDE 0.4 MG/1
0.4 CAPSULE ORAL DAILY
Status: DISCONTINUED | OUTPATIENT
Start: 2019-03-26 | End: 2019-03-29 | Stop reason: HOSPADM

## 2019-03-25 RX ORDER — 0.9 % SODIUM CHLORIDE 0.9 %
250 INTRAVENOUS SOLUTION INTRAVENOUS ONCE
Status: DISCONTINUED | OUTPATIENT
Start: 2019-03-25 | End: 2019-03-29 | Stop reason: HOSPADM

## 2019-03-25 RX ORDER — SODIUM CHLORIDE 0.9 % (FLUSH) 0.9 %
10 SYRINGE (ML) INJECTION PRN
Status: CANCELLED | OUTPATIENT
Start: 2019-03-25

## 2019-03-25 RX ORDER — SODIUM CHLORIDE AND POTASSIUM CHLORIDE .9; .15 G/100ML; G/100ML
SOLUTION INTRAVENOUS CONTINUOUS
Status: CANCELLED | OUTPATIENT
Start: 2019-03-25

## 2019-03-25 RX ORDER — TAMSULOSIN HYDROCHLORIDE 0.4 MG/1
0.4 CAPSULE ORAL DAILY
Status: CANCELLED | OUTPATIENT
Start: 2019-03-26

## 2019-03-25 RX ORDER — SODIUM CHLORIDE AND POTASSIUM CHLORIDE .9; .15 G/100ML; G/100ML
SOLUTION INTRAVENOUS CONTINUOUS
Status: DISCONTINUED | OUTPATIENT
Start: 2019-03-25 | End: 2019-03-27

## 2019-03-25 RX ORDER — ONDANSETRON 2 MG/ML
4 INJECTION INTRAMUSCULAR; INTRAVENOUS EVERY 6 HOURS PRN
Status: DISCONTINUED | OUTPATIENT
Start: 2019-03-25 | End: 2019-03-29 | Stop reason: HOSPADM

## 2019-03-25 RX ORDER — SODIUM CHLORIDE, SODIUM LACTATE, POTASSIUM CHLORIDE, AND CALCIUM CHLORIDE .6; .31; .03; .02 G/100ML; G/100ML; G/100ML; G/100ML
1000 INJECTION, SOLUTION INTRAVENOUS ONCE
Status: DISCONTINUED | OUTPATIENT
Start: 2019-03-26 | End: 2019-03-25

## 2019-03-25 RX ORDER — ONDANSETRON 2 MG/ML
4 INJECTION INTRAMUSCULAR; INTRAVENOUS EVERY 6 HOURS PRN
Status: CANCELLED | OUTPATIENT
Start: 2019-03-25

## 2019-03-25 RX ADMIN — PHENYLEPHRINE HYDROCHLORIDE 100 MCG: 10 INJECTION INTRAVENOUS at 16:04

## 2019-03-25 RX ADMIN — GLUCAGON HYDROCHLORIDE 0.25 MG: KIT at 15:50

## 2019-03-25 RX ADMIN — METOPROLOL SUCCINATE 50 MG: 50 TABLET, EXTENDED RELEASE ORAL at 09:12

## 2019-03-25 RX ADMIN — SODIUM CHLORIDE AND POTASSIUM CHLORIDE: .9; .15 SOLUTION INTRAVENOUS at 06:14

## 2019-03-25 RX ADMIN — Medication 10 ML: at 09:12

## 2019-03-25 RX ADMIN — PROPOFOL 125 MCG/KG/MIN: 10 INJECTION, EMULSION INTRAVENOUS at 15:42

## 2019-03-25 RX ADMIN — PANTOPRAZOLE SODIUM 40 MG: 40 TABLET, DELAYED RELEASE ORAL at 06:14

## 2019-03-25 RX ADMIN — PRAVASTATIN SODIUM 80 MG: 20 TABLET ORAL at 21:46

## 2019-03-25 RX ADMIN — PIPERACILLIN SODIUM AND TAZOBACTAM SODIUM 3.38 G: 3; .375 INJECTION, POWDER, LYOPHILIZED, FOR SOLUTION INTRAVENOUS at 18:42

## 2019-03-25 RX ADMIN — INDOMETHACIN 100 MG: 50 SUPPOSITORY RECTAL at 14:54

## 2019-03-25 RX ADMIN — PREDNISONE 40 MG: 20 TABLET ORAL at 18:13

## 2019-03-25 RX ADMIN — PROPOFOL 100 MG: 10 INJECTION, EMULSION INTRAVENOUS at 15:42

## 2019-03-25 RX ADMIN — PHENYLEPHRINE HYDROCHLORIDE 100 MCG: 10 INJECTION INTRAVENOUS at 16:00

## 2019-03-25 RX ADMIN — CIPROFLOXACIN 400 MG: 2 INJECTION, SOLUTION INTRAVENOUS at 14:54

## 2019-03-25 RX ADMIN — POTASSIUM CHLORIDE AND SODIUM CHLORIDE: 900; 150 INJECTION, SOLUTION INTRAVENOUS at 18:13

## 2019-03-25 RX ADMIN — TAMSULOSIN HYDROCHLORIDE 0.4 MG: 0.4 CAPSULE ORAL at 09:12

## 2019-03-25 RX ADMIN — SODIUM CHLORIDE, POTASSIUM CHLORIDE, SODIUM LACTATE AND CALCIUM CHLORIDE 1000 ML: 600; 310; 30; 20 INJECTION, SOLUTION INTRAVENOUS at 12:30

## 2019-03-25 RX ADMIN — PIPERACILLIN AND TAZOBACTAM 3.38 G: 3; .375 INJECTION, POWDER, FOR SOLUTION INTRAVENOUS at 09:11

## 2019-03-25 RX ADMIN — LISINOPRIL 20 MG: 20 TABLET ORAL at 09:12

## 2019-03-25 RX ADMIN — SODIUM CHLORIDE, POTASSIUM CHLORIDE, SODIUM LACTATE AND CALCIUM CHLORIDE: 600; 310; 30; 20 INJECTION, SOLUTION INTRAVENOUS at 15:35

## 2019-03-25 ASSESSMENT — ENCOUNTER SYMPTOMS
EYE PAIN: 0
EYE DISCHARGE: 0
PHOTOPHOBIA: 0
DIARRHEA: 0
CONSTIPATION: 0
SHORTNESS OF BREATH: 0
VOMITING: 0
NAUSEA: 0
ABDOMINAL PAIN: 0
BACK PAIN: 0
BLOOD IN STOOL: 0

## 2019-03-25 ASSESSMENT — PAIN SCALES - GENERAL
PAINLEVEL_OUTOF10: 6
PAINLEVEL_OUTOF10: 0
PAINLEVEL_OUTOF10: 0
PAINLEVEL_OUTOF10: 8

## 2019-03-25 NOTE — OP NOTE
Brief Postoperative Note    Sandeep Orr  YOB: 1950  [id number]    Procedure:  ERCP    Anesthesia: [facility]      Surgeon:  Magalis Bergeron MD      Findings: CBD: SURPRISINGLY PT. HAS AN UNCOVERED WALL STENT WITH OBSTRUCTING TISSUE INGROWTH !!  MASSIVE DILATATION OF CBD PROXIMALLY WITH DISTAL OBSTRUCTION. FULLY COVERED WALL STENT 8X60 WAS PLACED INSIDE THE UNCOVERED ONE AND A REMARKABLE AMOUNT OF PURULENT BILIARY DRAINAGE WAS OBTAINED.  PT. TOLERATED WELL                      Complications: None      Salo Edouard MD

## 2019-03-25 NOTE — PROGRESS NOTES
Gave welcome folder. Explained contents. Discussed Rights & Responsibilities and Condition H.  Made patient aware we care and will be responsive to their needs.    3/25/2019 Renée Marks  7:23 PM

## 2019-03-25 NOTE — PROGRESS NOTES
OCCUPATIONAL THERAPY INITIAL EVALUATION      Date:3/25/2019  Patient Name: Pretty Maldonado  MRN: [id number]  : 1950  Room: [address] [place] [address]    Evaluating OT: Liana Mills. JANETTE PinaR/L   License #  [id number]    AM-PAC Daily Activity Raw Score: 15/24  Recommended Adaptive Equipment:  TBD     Diagnosis: Bacteremia   Surgery:   Past Surgical History:   Procedure Laterality Date    EYE SURGERY      UPPER GASTROINTESTINAL ENDOSCOPY N/A 3/8/2019    REDO ENDOSCOPIC EGD  ULTRASOUND performed by Rosenda Haddad DO at [facility] N/A 3/8/2019    EGD DIAGNOSTIC ONLY performed by Rosenda Haddad DO at [address] [place] [address] N/A 2019    EXPLORATORY LAPAROTOMY; ABORTED WHIPPLE PROCEDURE; GASTROJEJUNOSTOMY; OPEN CHOLECYSTECTOMY performed by Carlo Pimentel MD at [address] [facility]      Pertinent Medical History:       Diagnosis Date    Cancer [facility])     pancreatic    Cerebral artery occlusion with cerebral infarction ([facility] [address]     Hypertension     Syncope       Precautions:  Falls, skin integrity, bed alarm     Home Living: Pt lives alone, but states he is moving from current residence. Pt. states possible KEAGAN at d/c Bathroom setup: accessible   Equipment owned: st. cane  Prior Level of Function: pt. States was Ind.?? with ADLs , Ind. ?? with IADLs; using st. cane for ambulation. Driving: not active  Occupation: fabricating  Medication Management: self    Pain Level: minimal pain R lateral knee  Cognition: A&O: 2-3/4; Follows 1-2 step directions with increased time for processing   Memory:  fair -   Sequencing:  poor   Problem solving:  fair -   Judgement/safety:  poor+     Functional Assessment:   Initial Eval Status  Date: 3-25-19 Treatment Status  Date: Short Term Goals  Treatment frequency: PRN 3-4 x/week   Feeding S/u with breakfast tray this a.m. Mod I   Grooming Min A   Sup   UB Dressing Min A   Sup   LB Dressing Max A   Sup    Bathing Mod A with sim. ax.   Sup Toileting NT  Sup   Bed Mobility  Log roll: Min A  Supine to sit: Mod A   Sit to supine: NT   Log roll Mod I  Supine to sit: Mod I   Sit to supine: Mod I   Functional Transfers Sit to stand: Mod A   Stand to sit:Mod A  Stand pivot: Mod A with cues and increased time. Commode: NT  Sup   Functional Mobility Mod A with few steps from EOB to bedside chair  Sup   Balance Sitting:     Static:  SBA    Dynamic:CGA  Standing: Mod A     Activity Tolerance Poor+ with lt. ax. Visual/  Perceptual Glasses: no                Hand dominance: R  UE ROM: RUE: grossly WFL  LUE: shld. to 100, otherwise grossly WFL  Strength: RUE: grossly 4/5 LUE: grossly shld. 4-/5, distally 4/5   Strength: B Fair/WFL  Fine Motor Coordination: B fair-    Hearing: WFL  Sensation: B UE No c/o numbness or tingling  Tone: B UE WFL  Edema: None noted B UE                            Comments/Treatment: Upon arrival, patient supine in bed. Pt. assisted with bed mobility, functional transfer/mobility training, ADL ax. with focus on safety, technique, precautions. At end of session, patient seated in bedside chair with breakfast tray available/ s/u provided, all needs met, RN notified, with call light and phone within reach, all lines and tubes intact. Pt. Instructed RE: safe transfers/mobility, ADLs, role of OT, treatment plan, recs. , prec. Pt would benefit from continued in pt. OT to increase functional independence and quality of life. Eval Complexity: Low  · History: Expanded chart review of medical records and additional review of physical, cognitive, or psychosocial history related to current functional performance  · Exam: 3+ performance deficits  · Assistance/Modification: Min/mod assistance or modifications required to perform tasks. May have comorbidities that affect occupational performance.     Assessment of current deficits   Functional mobility [x]  ADLs [x] Strength [x]  Cognition []  Functional transfers  [x] IADLs [x] Safety Awareness [x]  Endurance [x]  Fine Motor Coordination [] Balance [x] Vision/perception [] Sensation []   Gross Motor Coordination [] ROM [] Delirium []                  Motor Control []    Plan of Care:   ADL retraining [x]   Equipment needs [x]   Neuromuscular re-education [x] Energy Conservation Techniques [x]  Functional Transfer training [x] Patient and/or Family Education [x]  Functional Mobility training [x]  Environmental Modifications [x]  Cognitive re-training []   Compensatory techniques for ADLs [x]  Splinting Needs []   Positioning to improve overall function [x]   Therapeutic Activity [x]  Therapeutic Exercise  [x]  Visual/Perceptual: []    Delirium prevention/treatment  []   Other:  []    Rehab Potential: Good for established goals, pt. assisted in establishment of goals. Patient / Family Goal: to return home     Patient and/or family were instructed diagnosis, prognosis/goals and plan of care. Demonstrated fair+ understanding. [] Malnutrition indicators have been identified and nursing has been notified to ensure a dietitian consult is ordered. Evaluation time includes thorough review of current medical information, gathering information on past medical & social history & PLOF, completion of standardized testing, informal observation of tasks, consultation with other medical professions/disciplines, assessment of data & development of POC/goals. Tx. Time in: 7:43  Tx. Time out: 8:10  low Evaluation + 27 timed treatment minutes    Lyndsey Pina OTR/L   License #  [id number]

## 2019-03-25 NOTE — CONSULTS
Source: BLOOD       Site: Blood&Blood          Microbiology results called to and read back   by Christoph Longoria RN, 03/22/2019  11:10, by [facility]   Susceptibility     Escherichia coli (1)     Antibiotic Interpretation SANTIAGO Status    ampicillin Sensitive <=^2 mcg/mL     ceFAZolin Sensitive <=^4 mcg/mL     cefepime Sensitive <=^0.12 mcg/mL     cefTRIAXone Sensitive <=^0.25 mcg/mL     Confirmatory Extended Spectrum Beta-Lactamase  Neg mcg/mL     gentamicin Sensitive <=^1 mcg/mL     imipenem Sensitive <=^0.25 mcg/mL     levofloxacin Sensitive <=^0.12 mcg/mL     piperacillin-tazobactam Sensitive <=^4 mcg/mL     trimethoprim-sulfamethoxazole Sensitive <=^20 mcg/mL     Escherichia coli (2)     Antibiotic Interpretation SANTIAGO Status    ampicillin Resistant >=^32 mcg/mL     ceFAZolin Sensitive <=^4 mcg/mL     cefepime Sensitive <=^0.12 mcg/mL     cefTRIAXone Sensitive <=^0.25 mcg/mL     Confirmatory Extended Spectrum Beta-Lactamase  Neg mcg/mL     gentamicin Sensitive <=^1 mcg/mL     imipenem Sensitive <=^0.25 mcg/mL     levofloxacin Sensitive =^0.5 mcg/mL     piperacillin-tazobactam Sensitive <=^4 mcg/mL     trimethoprim-sulfamethoxazole Resistant >=^320 mcg/mL     Klebsiella pneumoniae ssp pneumoniae (3)     Antibiotic Interpretation SANTIAGO Status    ampicillin Resistant >=^32 mcg/mL     ceFAZolin Sensitive <=^4 mcg/mL     cefepime Sensitive <=^0.12 mcg/mL     cefTRIAXone Sensitive <=^0.25 mcg/mL     Confirmatory Extended Spectrum Beta-Lactamase  Neg mcg/mL     gentamicin Sensitive <=^1 mcg/mL     imipenem Sensitive <=^0.25 mcg/mL     levofloxacin Sensitive <=^0.12 mcg/mL     piperacillin-tazobactam Sensitive <=^4 mcg/mL     trimethoprim-sulfamethoxazole Sensitive         ANAEROBIC G+ SUBHA ALSO GREW.3-21-19  STREP SPECIES ALSO GREW FROM 3-22-19 BLOOD PLUS THE OTHERS (E COLI AND KLEB AND ANAEROBES)    3-24-19 BLOODS ARE + FOR THE STREP 2/2                   THIS IS PRIOR TO ERCP                Past Medical History:        Diagnosis Date    Cancer [facility])     pancreatic    Cerebral artery occlusion with cerebral infarction ([facility] [address]     Hypertension     Syncope      Past Surgical History:        Procedure Laterality Date    EYE SURGERY      UPPER GASTROINTESTINAL ENDOSCOPY N/A 3/8/2019    REDO ENDOSCOPIC EGD  ULTRASOUND performed by Lyndsey Conteh DO at [place] [address] N/A 3/8/2019    EGD DIAGNOSTIC ONLY performed by Lyndsey Conteh DO at [address] [place] [address] 1/16/2019    EXPLORATORY LAPAROTOMY; ABORTED WHIPPLE PROCEDURE; GASTROJEJUNOSTOMY; OPEN CHOLECYSTECTOMY performed by Marine Leon MD at [facility] OR     Current Medications:   Scheduled Meds:   indomethacin  100 mg Rectal 60 Min Pre-Op    ciprofloxacin  400 mg Intravenous 60 Min Pre-Op    sodium chloride  250 mL Intravenous Once    sodium chloride flush  10 mL Intravenous 2 times per day    [START ON 3/26/2019] lisinopril  20 mg Oral Daily    [START ON 3/26/2019] metoprolol succinate  50 mg Oral Daily    [START ON 3/26/2019] pantoprazole  40 mg Oral QAM AC    pravastatin  80 mg Oral Nightly    [START ON 3/26/2019] tamsulosin  0.4 mg Oral Daily    predniSONE  40 mg Oral Daily     Continuous Infusions:   0.9% NaCl with KCl 20 mEq       PRN Meds:acetaminophen, magnesium hydroxide, ondansetron, sodium chloride flush, HYDROmorphone    Allergies:  Patient has no known allergies.     Social History:   Social History     Socioeconomic History    Marital status:      Spouse name: Not on file    Number of children: Not on file    Years of education: Not on file    Highest education level: Not on file   Occupational History    Not on file   Social Needs    Financial resource strain: Not on file    Food insecurity:     Worry: Not on file     Inability: Not on file    Transportation needs:     Medical: Not on file     Non-medical: Not on file   Tobacco Use    Smoking status: Never Smoker    Smokeless tobacco: Never Used   Substance and Sexual Activity    Alcohol use: Yes     Alcohol/week: 4.8 oz     Types: 8 Cans of beer per week     Comment: states drinks one 24 ounce beer daily    Drug use: No     Types: Marijuana    Sexual activity: Not Currently   Lifestyle    Physical activity:     Days per week: Not on file     Minutes per session: Not on file    Stress: Not on file   Relationships    Social connections:     Talks on phone: Not on file     Gets together: Not on file     Attends [employer] service: Not on file     Active member of club or organization: Not on file     Attends meetings of clubs or organizations: Not on file     Relationship status: Not on file    Intimate partner violence:     Fear of current or ex partner: Not on file     Emotionally abused: Not on file     Physically abused: Not on file     Forced sexual activity: Not on file   Other Topics Concern    Not on file   Social History Narrative    Not on file         Family History:       Problem Relation Age of Onset    Cancer Mother 50        breast   . Otherwise non-pertinent to the chief complaint. REVIEW OF SYSTEMS:    CONSTITUTIONAL: + chills, fevers +night sweats. + loss of weight. EYES:  No double vision or drainage from eyes, ears or throat. HEENT:  No neck stiffness. No dysphagia. No drainage from eyes, ears or throat  RESPIRATORY:  No cough, productive sputum or hemoptysis. CARDIOVASCULAR:  No chest pain, palpitations, orthopnea or dyspnea on exertion. GASTROINTESTINAL:  +nausea, vomiting, diarrhea or constipation or hematochezia   GENITOURINARY:  No frequency+ burning dysuria   INTEGUMENT/BREAST:  No rash or breast masses. HEMATOLOGIC/LYMPHATIC:  No lymphadenopathy or blood dyscrasics. ALLERGIC/IMMUNOLOGIC:  No anaphylaxis. ENDOCRINE:  No polyuria or polydipsia or temperature intolerance. MUSCULOSKELETAL:  No myalgia or arthralgia. Full ROM. NEUROLOGICAL:  No focal motor sensory deficit.   BEHAVIOR/PSYCH:  No psychosis. PHYSICAL EXAM:    Vitals:    /68   Pulse 66   Temp 98.8 °F (37.1 °C) (Oral)   Resp 16   SpO2 97%   Constitutional: The patient is awake, alert, and oriented. Skin: Warm and dry. No rashes were noted. HEENT: Eyes show round, and reactive pupils. No jaundice. Moist mucous membranes, no ulcerations, no thrush. Neck: Supple to movements. No lymphadenopathy. Chest: No use of accessory muscles to breathe. Symmetrical expansion. Auscultation reveals no wheezing, crackles, or rhonchi. Cardiovascular: S1 and S2 are rhythmic and regular. No murmurs appreciated. Abdomen: Positive bowel sounds to auscultation. Benign to palpation. No masses felt. No hepatosplenomegaly. Extremities: No clubbing, no cyanosis, no edema.   Musculoskeletal: FROM  Neurological: NO FOCAL DEFICITS   Lines: peripheral      CBC+dif:  Recent Labs     03/23/19  0625 03/24/19  0614 03/25/19  0759   WBC 18.5* 15.0* 11.0   HGB 9.0* 9.5* 10.3*   HCT 26.3* 28.3* 29.6*   MCV 85.9 86.8 84.1   PLT 52* 34* 34*     No results found for: CRP  No results found for: CRPHS  No results found for: SEDRATE  Lab Results   Component Value Date     (H) 03/25/2019     (H) 03/25/2019    ALKPHOS 388 (H) 03/25/2019    BILITOT 7.2 (H) 03/25/2019     Lab Results   Component Value Date     03/25/2019    K 4.3 03/25/2019    K 3.9 01/22/2019     03/25/2019    CO2 19 03/25/2019    BUN 16 03/25/2019    CREATININE 0.9 03/25/2019    GFRAA >60 03/25/2019    LABGLOM >60 03/25/2019    GLUCOSE 129 03/25/2019    PROT 5.4 03/25/2019    LABALBU 1.7 03/25/2019    CALCIUM 7.4 03/25/2019    BILITOT 7.2 03/25/2019    ALKPHOS 388 03/25/2019     03/25/2019     03/25/2019       Lab Results   Component Value Date    PROTIME 14.1 03/25/2019    INR 1.3 03/25/2019       Lab Results   Component Value Date    TSH 1.010 01/06/2019       Lab Results   Component Value Date    COLORU Yellow 03/22/2019    PHUR 5.5 03/22/2019    LABCAST FEW  01/11/2013    WBCUA >20 03/22/2019    RBCUA 1-3 03/22/2019    RBCUA 0-1 01/25/2013    BACTERIA MANY 03/22/2019    CLARITYU TURBID 03/22/2019    SPECGRAV 1.010 03/22/2019    LEUKOCYTESUR LARGE 03/22/2019    UROBILINOGEN 0.2 03/22/2019    BILIRUBINUR LARGE 03/22/2019    BLOODU MODERATE 03/22/2019    GLUCOSEU Negative 03/22/2019       Lab Results   Component Value Date    OUW8URC 19.7 01/16/2019    ZPK8CKU 33.0 01/16/2019    PO2ART 132.1 01/16/2019     Radiology:  FL ERCP BILIARY AND PANCREATIC S&I   Final Result   Fluoroscopic guidance during ERCP, distal stent does not   demonstrate opacifications, clinical correlation with findings during   dynamic exam recommended. Microbiology:  Pending  Recent Labs     03/23/19  0625 03/24/19  0614   BC Gram stain performed from blood culture bottle media  Gram positive diplococci  Growth in both bottles  Previously positive blood culture called  *  Identification and sensitivity to follow Gram stain performed from blood culture bottle media  Gram positive cocci in pairs  Previously positive blood culture called  *     Recent Labs     03/23/19  0625   ORG Streptococcus species*     No results for input(s): Sherita Pont in the last 72 hours. No results for input(s): STREPNEUMAGU in the last 72 hours. No results for input(s): LP1UAG in the last 72 hours. No results for input(s): ASO in the last 72 hours. No results for input(s): CULTRESP in the last 72 hours. Assessment:  · POLYMICROBIAL SEPSIS WITH  CHOLANGITIS  · HIGH GRADE STREP SEPSIS C BROCK MV DISEASE                    R/O SBE    Plan:    · Cont ZOSYN   · F/U BLOODS  · ECHO  · Check cultures  · Baseline ESR, CRP  · Monitor labs  · Will follow with you    Thank you for having us see this patient in consultation. I will be discussing this case with the treating physicians.       Electronically signed by Raphael Matamoros MD on 3/25/2019 at 5:22 PM

## 2019-03-25 NOTE — CONSULTS
Consults   Gastroenterology Consult Note   Viji SIDHU NP-C with Vidal Monaco M.D. Consult Note        Date of Service: 3/25/2019  Reason for Consult: ERCP  Requesting Physician: Timi Amador    CHIEF COMPLAINT:  Fatigue, and abdominal pain    History Obtained From:  patient, electronic medical record    HISTORY OF PRESENT ILLNESS:       Torrie Barriga is a 76 y.o. male with significant past medical history of HTN, stroke, and pancreatic cancer  transferred to [facility] from [facility] for a ERCP. Patient was admitted d/t  Fatigue, abdominal pain, as well as positive BC. Pt reports to ongoing pain in his left leg while at home. Also with complaints of lower abdominal pain \"pinching and rated it a 9/10\". Denies any nausea or vomiting. Reports to soft brown BMs daily. Denies any melena or hematochezia. He reports a poor appetite, with known weight loss but unable to quantify a amount. He was having fever and chills while at home, uncertain of what his temperature was at home. Reports to having a EGD as well as colonoscopy in the past but again uncertain of any details. No family present. Admission labs , chloride 93, CO2 21, BUN 24, creat 1.3, BNP 7813, albumin 2.8, , , , bili 5.5, H&H 11.2 & 33.9, neutro 91.3%, lymph 2.6%, abso neutro 9.74, abso lymph 0.32, abso eosi 0.00. MRCP:  Biliary dilatation with abnormal signal at the level the distal duct which could be compatible with a calculus a stent or an occluded stent Pancreatic duct dilatation New cystic lesion in the right lobe of the liver measuring 4 cm in size possibly a cyst.  Consultation for ERCP. Currently, pt reports to lower abdominal pain. Patient seen with extreme fatigue, drifts back to sleep quickly after awakened. Labs today , CO2 19, anion gap 5, , calcium 7.4, protein 5.4, albumin 1.7, , , , bili 7.2, RBC 3.52, H&H 10.3 & 29.6, MCHC 34.8, MPV 13.5, plat 34.      Past Medical History: denies  Illicit Drugs: Pt denies    Family History:   HEMAL- patient extremely fatigued, with noted confusion. No family present. REVIEW OF SYSTEMS:    Aside from what was mentioned in the PMH and HPI, essentially unremarkable, all others negative. PHYSICAL EXAM:      Vitals: There were no vitals taken for this visit. CONSTITUTIONAL:  Asleep arouses to name- drifts quickly back to sleep, cooperative, no apparent distress, and appears stated age  EYES:  pupils equal, round and reactive to light, sclera icteric and conjunctiva normal  ENT:  normocephalic, oral pharynx with dry mucous membranes  NECK:  supple   LUNGS: clear/ diminshed to auscultation bilaterally.   CARDIOVASCULAR:   regular rate and rhythm, no murmur noted; 2+ pulses; no edema  ABDOMEN:   normal bowel sounds, softly distended, non-tender, no masses palpated, no hepatosplenomegally  MUSCULOSKELETAL:  full range of motion noted  motor strength is 5 out of 5 all extremities bilaterally  NEUROLOGIC:  Mental Status Exam:  Level of Alertness:   awake  Orientation:   person  Motor Exam:  Motor exam is symmetrical 5 out of 5 all extremities bilaterally  SKIN:  AA  skin color, dry texture, turgor fair    DATA:    CBC with Differential:    Lab Results   Component Value Date    WBC 11.0 03/25/2019    RBC 3.52 03/25/2019    HGB 10.3 03/25/2019    HCT 29.6 03/25/2019    PLT 34 03/25/2019    MCV 84.1 03/25/2019    MCH 29.3 03/25/2019    MCHC 34.8 03/25/2019    RDW 15.0 03/25/2019    NRBC 0.9 01/15/2019    SEGSPCT 68 01/11/2013    LYMPHOPCT 2.6 03/21/2019    MONOPCT 6.1 03/21/2019    MYELOPCT 0.9 01/06/2019    BASOPCT 0.2 03/21/2019    MONOSABS 0.64 03/21/2019    LYMPHSABS 0.32 03/21/2019    EOSABS 0.00 03/21/2019    BASOSABS 0.00 03/21/2019     CMP:    Lab Results   Component Value Date     03/25/2019    K 4.3 03/25/2019    K 3.9 01/22/2019     03/25/2019    CO2 19 03/25/2019    BUN 16 03/25/2019    CREATININE 0.9 03/25/2019    GFRAA >60 2019    LABGLOM >60 2019    GLUCOSE 129 2019    PROT 5.4 2019    LABALBU 1.7 2019    CALCIUM 7.4 2019    BILITOT 7.2 2019    ALKPHOS 388 2019     2019     2019     Hepatic Function Panel:    Lab Results   Component Value Date    ALKPHOS 388 2019     2019     2019    PROT 5.4 2019    BILITOT 7.2 2019    BILIDIR 13.7 2019    IBILI 8.3 2019    LABALBU 1.7 2019     PT/INR:    Lab Results   Component Value Date    PROTIME 12.3 2019    INR 1.1 2019     PTT:    Lab Results   Component Value Date    APTT 28.8 2019   [APTT}  Last 3 Troponin:    Lab Results   Component Value Date    TROPONINI 0.02 2019    TROPONINI 0.03 2019    TROPONINI 0.36 2015     TSH:    Lab Results   Component Value Date    TSH 1.010 2019     VITAMIN B12:   Lab Results   Component Value Date    EXYEMURU26 9637 2019     FOLATE:  No results found for: FOLATE  IRON:    Lab Results   Component Value Date    IRON 16 2015     Iron Saturation:    Lab Results   Component Value Date    LABIRON 3 2015     TIBC:    Lab Results   Component Value Date    TIBC 509 2015     FERRITIN:    Lab Results   Component Value Date    FERRITIN 7 2015         Mri Abdomen W Wo Contrast Mrcp    Result Date: 3/23/2019  Patient MRN:  [id number] : 1950 Age: 76 years Gender: Male Order Date:  3/22/2019 5:15 PM EXAM: MRI ABDOMEN W WO CONTRAST MRCP NUMBER OF IMAGES \ views:  040 INDICATION:  evaluate for blockage of CBD stent COMPARISON: CT scan dated 2019 20  Sequences are submitted Multiple sequence of the abdomen with sagittal and coronal MPR reconstructions were obtained from the top of the diaphragm to the bottom of the kidneys.  The visualized portions of the abdomen reveal: The liver is abnormal. There is a region of increased signal in the right lobe of the liver new from previous. There is massive biliary dilatation. There is a filling defect in the distal common duct this is suspicious for a large calculus or mass this could also represent artifact from a stent with obstruction. Please correlate clinically. .  . The spleen is unremarkable. . The kidneys are unremarkable  . The adrenals is  unremarkable. The pancreas is abnormal. There is dilatation of the pancreatic duct there is a suggestion of mass effect at the level the head of the pancreas. This is poorly evaluated due to significant motion artifact There is no significant ascites MRCP demonstrates massive biliary dilatation and dilatation of the pancreatic duct. Biliary dilatation with abnormal signal at the level the distal duct which could be compatible with a calculus a stent or an occluded stent Pancreatic duct dilatation New cystic lesion in the right lobe of the liver measuring 4 cm in size possibly a cyst.       IMPRESSION:    · Obstructive jaundice, occluded stent  · Prior ERCP with stent placement at [facility]  · HX aborted whipple d/t desmoplastic reaction with Dr. Maddie Alex  · Pancreatic CA  · +BC- gram +cocci; ID following  · Anemia, normocytic, normochromic  · Thrombocytopenia   · Weight loss  · Elevated LFTs  · Electrolyte abnormalities defer to PCP  · Recent EUS with Dr. Kalia Adamson- negative for CA  · CA 19-9 239 (3/18/19)  · +UTI- Klebsiella    RECOMMENDATIONS:      · Amylase, lipase now  · PTT, PT/INR now  · Type & screen now  · Prepare & transfuse 6 units of platelets now; prior to ERCP  · NPO  · ERCP today with Dr. Princess Hess. Procedure details for ERCP drawn in detail. Complications including but not limited to pancreatitis, perforation, bleeding or infection are discussed in great detail. Risks, benefits, and alternatives have been explained. Pt has understood the information and has agreed to proceed.   · LR bolus as ordered  · Continue to medicate for pain, nausea as ordered  · Monitor CBC, CMP, amylase,

## 2019-03-25 NOTE — H&P
Encounter Date:06/12/2018    Location: [place]    Debbie ZUNIGA Zavala, LIZETTE    Patient ID: Chrissy William is a 44 y.o. female resident of [place], [place].    1973  Subjective:      Chief Complaint/Reason for visit:  Pre-operative cardiac clearance    Problem List:  1.Hypertension  2. Hyperlipidemia  A. Lipid Profile 4/26/18- TC- 229, TG- 276, HDL-58, .  B. Statin therapy discontinued secondary to fatty liver.  3. DM 2  A. HgbA1C- 6.8 4/26/18  4. Obesity  5. History of Tobacco abuse with cessation 1997  6. CREST Syndrome, followed by Rheumatology  7.Chronic Back Pain  8. IBS  9. GERD  10. Migraine Headaches  11. Chronic Fatigue  12. Depression/Anxiety  13. Fatty Liver    HPI: Mrs. William is a 44 yr old white female who presents in consultation referred by Val Llanos PA-C for pre-operative cardiac clearance prior to sleeve gastrectomy. She has a past history of hypertension and hyperlipidemia.  Currently her blood pressure has been well controlled on her Losartan therapy. She previously was on Lovastatin, this was discontinued when she was diagnosed with Fatty Liver.  She exercises 3 times per week by walking 30-40 minutes.  She denies any symptoms of chest pain or dyspnea on exertion while exercising. She has had no prior cardiac testing.  She denies symptoms of palpitations, edema, orthopnea, PND or syncope. She has had several previous surgeries without complications.    Social History     Social History   • Marital status:      Spouse name: Ricky William   • Number of children: 2   • Years of education: Trade School      Occupational History   •        Social History Main Topics   • Smoking status: Former Smoker     Packs/day: 1.00     Years: 5.00     Types: Cigarettes     Quit date: 1997   • Smokeless tobacco: Never Used   • Alcohol use Yes      Comment: occas   • Drug use: No   • Sexual activity: Not on file     Other Topics Concern   • Not on file     Social History  Hepatobiliary and Pancreatic Surgery Attending History and Physical    Patient's Name/Date of Birth: Lucy Hernández /1950 (64 y.o.)    Date: March 25, 2019     CC: occluded biliary stent    HPI:  Patient is a very pleasant 76year old male well known to me whom I met in early January with obstructive jaundice. He underwent and ERCP and EUS at [facility]. He was found to have a pancreatic head mass and the biopsies showed an IPMN with high grade dysplasia and features suspicious for invasive adenocarcinoma with chronic pancreatitis. I took him to the operating room and there was an intense desmoplastic reaction and I could not develop a plane for resection. I sent off multiple lymph nodes which were negative and my intraoperative core biopsies showed only pancreatitis. I did perform a billroth 2. He underwent a repeat EUS with biopsy which again showed only pancreatitis. His IGG4 was noted to be elevated. Being that he is now 3 months since his first diagnosis he has maintained his weight and does not appear to have cancer. 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 He presented this past weekend with bacteremia and obstructive jaundice.       Past Medical History:   Diagnosis Date    Cancer [facility])     pancreatic    Cerebral artery occlusion with cerebral infarction ([facility] [address]     Hypertension     Syncope        Past Surgical History:   Procedure Laterality Date    EYE SURGERY      UPPER GASTROINTESTINAL ENDOSCOPY N/A 3/8/2019    REDO ENDOSCOPIC EGD  ULTRASOUND performed by Lazara Fisher DO at [address] [facility] 3/8/2019    EGD DIAGNOSTIC ONLY performed by Lazara Fisher DO at [address] [facility] N/A 1/16/2019    EXPLORATORY LAPAROTOMY; ABORTED WHIPPLE PROCEDURE; GASTROJEJUNOSTOMY; OPEN CHOLECYSTECTOMY performed by Lesa De Leon MD at [address] Narrative    Lives in [place], [place] w/ spouse, children and her parents.    @ dental office.       family history includes Colon cancer (age of onset: 78) in her paternal uncle; Diabetes in her father, maternal grandmother, mother, paternal grandfather, and paternal grandmother; Heart disease in her father; Hypertension in her father, maternal grandmother, mother, and paternal grandmother; Lymphoma in her sister; No Known Problems in her brother and sister; Prostate cancer in her maternal grandfather; Sleep apnea in her maternal grandmother; Stroke in her paternal grandfather and paternal grandmother.     has a past medical history of Anxiety (2015); Chicken pox; Chronic back pain; CREST syndrome; Diabetes type 2, controlled (11/2016); Dyspepsia; Dyspnea on exertion; Fatigue; Fatty liver (2017); Former smoker; GERD (gastroesophageal reflux disease); H. pylori infection; History of IBS; Hyperlipidemia; Hypertension; Iron deficiency anemia; Migraine; Obesity; TMJ syndrome; Tubular adenoma (2008); and Tubular adenoma of ascending colon (12/2016).    Allergies   Allergen Reactions   • Azithromycin Rash   • Erythromycin Rash   • Mobic [Meloxicam] Rash   • Sulfa Antibiotics Rash         Current Outpatient Prescriptions:   •  B-D UF III MINI PEN NEEDLES 31G X 5 MM misc, USE AS DIRECTED WITH BRANDON, Disp: , Rfl: 0  •  Biotin 93009 MCG tablet, Take 10,000 mcg by mouth Daily., Disp: , Rfl:   •  cyclobenzaprine (FLEXERIL) 10 MG tablet, Take 10 mg by mouth As Needed., Disp: , Rfl: 2  •  escitalopram (LEXAPRO) 10 MG tablet, Take 10 mg by mouth Daily., Disp: , Rfl:   •  ferrous sulfate 325 (65 FE) MG tablet, Take 325 mg by mouth Daily With Breakfast., Disp: , Rfl:   •  glipiZIDE (GLUCOTROL) 5 MG ER tablet, TAKE 1 TABLET BY MOUTH EVERY EVENING WITH MEAL, Disp: , Rfl: 3  •  losartan (COZAAR) 25 MG tablet, TAKE 1 TABLET EVERY DAY ( D/C LISINOPRIL), Disp: , Rfl: 0  •  metFORMIN ER (GLUCOPHAGE-XR) 500 MG 24 hr tablet, TAKE 2  "TABLETS BY MOUTH TWICE A DAY, Disp: , Rfl: 1  •  multivitamin (DAILY SPARKLE) tablet tablet, Take 1 tablet by mouth Daily., Disp: , Rfl:   •  omeprazole (priLOSEC) 40 MG capsule, TAKE 1 CAPSULE (40 MG) BY MOUTH DAILY AT LEAST 30 MINUTES BEFORE MORNING MEAL., Disp: , Rfl: 11  •  ONE TOUCH ULTRA TEST test strip, TEST TWICE A DAY AS NEEDED, Disp: , Rfl: 5    Review of Systems   Constitution: Negative.   HENT: Negative.    Eyes: Negative.    Cardiovascular: Negative for chest pain, dyspnea on exertion, irregular heartbeat, leg swelling, near-syncope, orthopnea, palpitations, paroxysmal nocturnal dyspnea and syncope.   Respiratory: Positive for snoring.    Endocrine: Negative.    Hematologic/Lymphatic: Negative.    Skin: Negative.    Musculoskeletal: Negative.    Gastrointestinal:        History of H-pylori   Genitourinary: Negative.    Neurological: Negative.    Psychiatric/Behavioral: Positive for depression. The patient is nervous/anxious.    Allergic/Immunologic: Negative.      Vitals:    06/12/18 1300   BP: 130/74   BP Location: Left arm   Patient Position: Sitting   Pulse: 81   Weight: 91.3 kg (201 lb 3.2 oz)   Height: 152.4 cm (60\")   BMI- 39.3    Objective:       Physical Exam   Constitutional: She is oriented to person, place, and time. She appears well-developed and well-nourished.   HENT:   Head: Normocephalic and atraumatic.   Neck: Normal range of motion. No thyromegaly present.   Cardiovascular: Normal rate, regular rhythm, normal heart sounds and intact distal pulses.  Exam reveals no gallop and no friction rub.    No murmur heard.  Pulmonary/Chest: Effort normal and breath sounds normal.   Abdominal: Soft. Bowel sounds are normal.   Musculoskeletal: Normal range of motion. She exhibits no edema.   Neurological: She is alert and oriented to person, place, and time.   Skin: Skin is warm and dry.   Psychiatric: She has a normal mood and affect. Her behavior is normal. Judgment and thought content normal. " status:      Spouse name: Not on file    Number of children: Not on file    Years of education: Not on file    Highest education level: Not on file   Occupational History    Not on file   Social Needs    Financial resource strain: Not on file    Food insecurity:     Worry: Not on file     Inability: Not on file    Transportation needs:     Medical: Not on file     Non-medical: Not on file   Tobacco Use    Smoking status: Never Smoker    Smokeless tobacco: Never Used   Substance and Sexual Activity    Alcohol use: Yes     Alcohol/week: 4.8 oz     Types: 8 Cans of beer per week     Comment: states drinks one 24 ounce beer daily    Drug use: No     Types: Marijuana    Sexual activity: Not Currently   Lifestyle    Physical activity:     Days per week: Not on file     Minutes per session: Not on file    Stress: Not on file   Relationships    Social connections:     Talks on phone: Not on file     Gets together: Not on file     Attends Tenriism service: Not on file     Active member of club or organization: Not on file     Attends meetings of clubs or organizations: Not on file     Relationship status: Not on file    Intimate partner violence:     Fear of current or ex partner: Not on file     Emotionally abused: Not on file     Physically abused: Not on file     Forced sexual activity: Not on file   Other Topics Concern    Not on file   Social History Narrative    Not on file       ROS:   Review of Systems   Constitutional: Positive for fatigue and fever. Negative for chills and diaphoresis. HENT: Negative for congestion, ear discharge, ear pain, hearing loss, nosebleeds and tinnitus. Eyes: Negative for photophobia, pain and discharge. Respiratory: Negative for shortness of breath. Cardiovascular: Negative for palpitations and leg swelling. Gastrointestinal: Negative for abdominal pain, blood in stool, constipation, diarrhea, nausea and vomiting. Endocrine: Negative for polydipsia.   Vitals reviewed.      Data Review:     ECG 12 Lead  Date/Time: 6/12/2018 1:07 PM  Performed by: OJ LEON  Authorized by: OJ LEON   Comparison: not compared with previous ECG   Previous ECG: no previous ECG available  Rhythm: sinus rhythm  Rate: normal  BPM: 81  Conduction: conduction normal  ST Segments: ST segments normal  T Waves: T waves normal  QRS axis: normal  Clinical impression: normal ECG             Assessment:      Diagnosis Plan   1. Preop cardiovascular exam  ECG 12 Lead   2. Essential hypertension     3. Mixed hyperlipidemia     4. Type 2 diabetes mellitus without complication, without long-term current use of insulin       Plan:   1. Echocardiogram to evaluate for Pulmonary Hypertension in the setting of CREST Syndrome.  2. If echocardiogram normal, she would be an acceptable cardiac risk to proceed with sleeve gastrectomy.  3. Follow-up on a prn basis.    Scribed for Oj Leon MD by Reyna Lopez. 6/12/2018  1:16 PM  IOj MD, personally performed the services described in this documentation as scribed by the above named individual in my presence, and it is both accurate and complete.  6/12/2018  2:43 PM

## 2019-03-25 NOTE — PROGRESS NOTES
[facility] Medicine    Subjective:  Pt alert conversive sitting up in chair      Current Facility-Administered Medications:     0.9% NaCl with KCl 20 mEq infusion, , Intravenous, Continuous, Sudarshan Najera MD, Last Rate: 100 mL/hr at 03/25/19 2369    lisinopril (PRINIVIL;ZESTRIL) tablet 20 mg, 20 mg, Oral, Daily, Alicia Josefina Malmer, DO, 20 mg at 03/24/19 0830    metoprolol succinate (TOPROL XL) extended release tablet 50 mg, 50 mg, Oral, Daily, Alicia Josefina Malmer, DO, 50 mg at 03/24/19 0830    pravastatin (PRAVACHOL) tablet 80 mg, 80 mg, Oral, Nightly, Alicia Josefina Malmer, DO, 80 mg at 03/24/19 2120    tamsulosin (FLOMAX) capsule 0.4 mg, 0.4 mg, Oral, Daily, Alicia Josefina Malmer, DO, 0.4 mg at 03/24/19 0830    sodium chloride flush 0.9 % injection 10 mL, 10 mL, Intravenous, 2 times per day, Ceja Hashimoto, DO, 10 mL at 03/24/19 0830    sodium chloride flush 0.9 % injection 10 mL, 10 mL, Intravenous, PRN, Alicia Josefina Malmer, DO, 10 mL at 03/24/19 2342    magnesium hydroxide (MILK OF MAGNESIA) 400 MG/5ML suspension 30 mL, 30 mL, Oral, Daily PRN, Alicia Josefina Malmer, DO    ondansetron [facility]) injection 4 mg, 4 mg, Intravenous, Q6H PRN, Alicia Josefina Malmer, DO, 4 mg at 03/23/19 1555    enoxaparin (LOVENOX) injection 40 mg, 40 mg, Subcutaneous, Daily, Alicia Josefina Malmer, DO, 40 mg at 03/24/19 0830    acetaminophen (TYLENOL) tablet 650 mg, 650 mg, Oral, Q4H PRN, Alicia Josefina Malmer, DO, 650 mg at 03/24/19 2120    gabapentin (NEURONTIN) capsule 100 mg, 100 mg, Oral, BID PRN, Hilary Mcgill MD    pantoprazole (PROTONIX) tablet 40 mg, 40 mg, Oral, REECE EPPERSON, Hilary Mcgill MD, 40 mg at 03/25/19 0614    piperacillin-tazobactam (ZOSYN) 3.375 g in dextrose 5 % 100 mL IVPB extended infusion (mini-bag), 3.375 g, Intravenous, Q8H, Hilda Mayen MD, Stopped at 03/25/19 0344    Objective:    BP (!) 95/54   Pulse 74   Temp 99.2 °F (37.3 °C) (Temporal)   Resp 20   Ht 5' 9\" (1.753 m)   Wt 124 lb 3.2 oz (56.3 kg)   SpO2 95% BMI 18.34 kg/m²     Heart:  Reg  Lungs:  CTA bilaterally, no wheeze, rales or rhonchi  Abd: bowel sounds present, nondistended  Extrem:  No clubbing, cyanosis, or edema    CBC with Differential:    Lab Results   Component Value Date    WBC 15.0 03/24/2019    RBC 3.26 03/24/2019    HGB 9.5 03/24/2019    HCT 28.3 03/24/2019    PLT 34 03/24/2019    MCV 86.8 03/24/2019    MCH 29.1 03/24/2019    MCHC 33.6 03/24/2019    RDW 14.8 03/24/2019    NRBC 0.9 01/15/2019    SEGSPCT 68 01/11/2013    LYMPHOPCT 2.6 03/21/2019    MONOPCT 6.1 03/21/2019    MYELOPCT 0.9 01/06/2019    BASOPCT 0.2 03/21/2019    MONOSABS 0.64 03/21/2019    LYMPHSABS 0.32 03/21/2019    EOSABS 0.00 03/21/2019    BASOSABS 0.00 03/21/2019     CMP:    Lab Results   Component Value Date     03/24/2019    K 4.5 03/24/2019    K 3.9 01/22/2019     03/24/2019    CO2 20 03/24/2019    BUN 22 03/24/2019    CREATININE 1.0 03/24/2019    GFRAA >60 03/24/2019    LABGLOM >60 03/24/2019    GLUCOSE 154 03/24/2019    PROT 5.4 03/23/2019    LABALBU 2.0 03/23/2019    CALCIUM 7.6 03/24/2019    BILITOT 5.3 03/23/2019    ALKPHOS 417 03/23/2019     03/23/2019     03/23/2019     Warfarin PT/INR:    Lab Results   Component Value Date    INR 1.1 01/22/2019    INR 1.1 01/21/2019    INR 1.1 01/20/2019    PROTIME 12.3 01/22/2019    PROTIME 12.6 (H) 01/21/2019    PROTIME 12.2 01/20/2019       Assessment:    Principal Problem:    Bacteremia  Active Problems:    HTN (hypertension), benign    Hyperbilirubinemia    Obstructive jaundice due to malignant neoplasm (HCC)    Moderate protein-calorie malnutrition (HCC)    Pancreatic adenocarcinoma (HCC)    UTI (urinary tract infection)  Resolved Problems:    * No resolved hospital problems.  *      Plan:  Cont atb per id / will need ercp stent exchange        Dominguez Perry  8:23 AM  3/25/2019

## 2019-03-25 NOTE — CARE COORDINATION
3/25/2019  Social Work Discharge Planning:  SW was informed by Irma Phillips that she has been following Pt for [email or web address]. SW called Pts daughter Cassy Lopez to confirm this. Cassy Lopez stated that thye are considering [employer] however, are also interested in [address] if possible, because they are thinking about Pt possibly gpoing into [facility] In the near future. SW made a referral to Rocío Hubbard at [facility] as well. Waiting reply. Daughter N-17 generated and transport form is in chart. Electronically signed by ABBE Grant on 3/25/2019 at 1:50 PM

## 2019-03-25 NOTE — H&P
[facility] History and Physical      CHIEF COMPLAINT:  Fatigue     History of Present Illness:   Mr. Domingo Ferrari is a 76year-old male patient of Dr. Anthony Horton with a PMHx of HTN, CVA, and pancreatic cancer who was admitted to [facility] from [address] [place] [address] for an ERCP. Patient is unable to describe his symptoms that made him seek medical care initially, so the history is taken from the EMR. Patient presented to [address] [place] [address] ED on 3/22/19 for positive blood cultures with gram negative rods. He was seen at [address] [place] [address] ED on 3/21/19 for fatigue and was discharged. Having positive blood cultures and a complicated UTI, patient was admitted. ID was consulted for gram-negative bacteremia, and he was started on Zosyn. General Surgery was consulted for bacteremia likely secondary to obstructive jaundice from occluded biliary stent. An MRCP was performed on 3/23/19, which showed \"biliary dilatation with abnormal signal at the level of the distal duct, which could be compatible with a calculus or an occluded stent. \" \"Pancreatic duct dilatation\" as well as a \"new cystic lesion in the right lobe of the liver measuring 4 cm in size\" were also noted on the ERCP. Patient was subsequently transferred to [facility] for an ERCP. Informant(s) for H&P: EMR     REVIEW OF SYSTEMS:  General: Denies fatigue, fevers, and chills. Head: Denies headache, lightheadedness, and dizziness. Eyes: Denies double vision and blurry vision. Ears: Denies hearing loss, tinnitus, and pain. Nose: When asked about rhinorrhea or congestion, patient replies \"In my left leg. \" When asked again, patient replies, \"In my left leg. \"  Throat: When asked about sore throat, patient replies \"In my left leg. \" When asked again, patient replies, \"In my left leg. \"  Resp: When asked about SOB, patient replies \"In my left leg. \" When asked again, patient replies, \"In my left leg. \" Denies wheezing and coughing. CV: Denies chest pain and palpitations.    GI: Admits month.   Skin: Warm and dry. Head: Normocephalic and atraumatic. Eyes: PERRL. Sclera icteric. Lungs: CTAB. No wheezing, rhonci, or rales. No respiratory distress. Cardiovascular: RRR. Normal S1 and S2. No murmur. Abdomen: Bowel sounds normoactive. Soft. Nontender. No masses. Extremities: No cyanosis, no clubbing and no edema. Neurologic: Upper extremity strength +4/5. When asked to lift bilateral legs against resistance, patient cannot elevate legs bilaterally. LABS:  Recent Labs     19  0759    130* 127*   K 2.8* 4.5 4.3    104 103   CO2 18* 20* 19*   BUN 28* 22 16   CREATININE 1.1 1.0 0.9   GLUCOSE 60* 154* 129*   CALCIUM 7.7* 7.6* 7.4*       Recent Labs     19  0759   WBC 18.5* 15.0* 11.0   RBC 3.06* 3.26* 3.52*   HGB 9.0* 9.5* 10.3*   HCT 26.3* 28.3* 29.6*   MCV 85.9 86.8 84.1   MCH 29.4 29.1 29.3   MCHC 34.2 33.6 34.8*   RDW 14.6 14.8 15.0   PLT 52* 34* 34*   MPV 12.2* 12.0 13.5*       No results for input(s): POCGLU in the last 72 hours. Radiology:   Narrative   Patient MRN:  [id number]   : 1950   Age: 76 years   Gender: Male   Order Date:  3/22/2019 5:15 PM   EXAM: MRI ABDOMEN W WO CONTRAST MRCP   NUMBER OF IMAGES \ views:  953   INDICATION:  evaluate for blockage of CBD stent    COMPARISON: CT scan dated 2019           20  Sequences are submitted       Multiple sequence of the abdomen with sagittal and coronal MPR   reconstructions were obtained from the top of the diaphragm to the   bottom of the kidneys. The visualized portions of the abdomen reveal:       The liver is abnormal. There is a region of increased signal in the   right lobe of the liver new from previous. There is massive biliary   dilatation. There is a filling defect in the distal common duct this   is suspicious for a large calculus or mass this could also represent   artifact from a stent with obstruction.  Please correlate clinically. .  .   The spleen is unremarkable. .   The kidneys are unremarkable  .   The adrenals is  unremarkable. The pancreas is abnormal. There is dilatation of the pancreatic duct   there is a suggestion of mass effect at the level the head of the   pancreas. This is poorly evaluated due to significant motion artifact   There is no significant ascites   MRCP demonstrates massive biliary dilatation and dilatation of the   pancreatic duct.           Impression   Biliary dilatation with abnormal signal at the level the   distal duct which could be compatible with a calculus a stent or an   occluded stent    Pancreatic duct dilatation   New cystic lesion in the right lobe of the liver measuring 4 cm in   size possibly a cyst.            EKG:   Sinus tachycardia  Left axis deviation  Right bundle branch block  Abnormal ECG  When compared with ECG of 21-MAR-2019 19:02,  Significant changes have occurred    ASSESSMENT:      Active Problems:    Biliary obstruction  Resolved Problems:    * No resolved hospital problems. *      PLAN:    1. Biliary obstruction  - GI following (Dr. Yenny Pereira). ERCP scheduled today. Liver enzymes elevated as such currently: alk phos 388, , . Bilirubin 7.2.   - Amylase 29 and lipase 9.   - On PPI 40 mg daily.   - ID following. On Zosyn 3.375 g q 8 hrs. - On 0.9% NaCl with KCl 20 mEq at 100 cc/hr   - Labs: CBC and CMP    2. Bacteremia  - UA from 3/22/19 showed moderate blood, positive nitrite, and large leukocyte esterase. Urine culture from 3/22/19 showed >100,000 cfu of Klebsiella pneumoniae. - Blood culture from 3/21/10 showed E coli and Klebsiella pneumoniae. Blood cultures from 3/22/19 showed E coli, Klebsiella pneumoniae, and Streptococcus. Blood culture from 3/25/19 pending.   - ID following. On Zosyn 3.375 g q 8 hrs. - Lactic acid from 3/23/19 WNL. WBC count today WNL. 3. HTN  - Monitor BP.   - On home lisinopril 20 mg daily.   - On home Toprol XL 50 mg daily. 4. HLD  - On home pravastatin 80 mg nightly. 5. BPH   - On home Flomax 0.4 mg daily. 6. Hyponatremia  - Na+ currently 127.  - NPO currently. Will do PO fluid restriction to 1500 mL. 7. Anemia  - Hgb 10.3 currently. Will monitor.   - Transfuse if Hgb <7.0.    8. Thrombocytopenia  - Platelets 34 currently. - Receiving 6 unit platelets per GI before ERCP. DVT prophylaxis: Lovenox 40 mg daily  Code: Full   Diet: NPO     NOTE: This report was transcribed using voice recognition software. Every effort was made to ensure accuracy; however, inadvertent computerized transcription errors may be present.     Electronically signed by Marlen Monahan DO on 3/25/2019 at 1:15 PM

## 2019-03-25 NOTE — PROGRESS NOTES
Called Dr. Dee araujo RN [facility].   Reason Update.   Yanelis Settler  7:18 PM  3/25/2019 Home

## 2019-03-25 NOTE — DISCHARGE INSTR - COC
jaundice due to malignant neoplasm (HCC) K83.1, C80.1    Moderate protein-calorie malnutrition (HCC) E44.0    Pancreatic mass K86.9    Pancreatic adenocarcinoma (HCC) C25.9    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    Bacteremia R78.81    UTI (urinary tract infection) N39.0    Biliary obstruction K83.1       Isolation/Infection:   Isolation          No Isolation            Nurse Assessment:  Last Vital Signs: There were no vitals taken for this visit. Last documented pain score (0-10 scale): Pain Level: 8  Last Weight:   Wt Readings from Last 1 Encounters:   03/22/19 124 lb 3.2 oz (56.3 kg)     Mental Status:  oriented and alert    IV Access:  - PICC - site  R Upper Arm, insertion date: 3/27/19    Nursing Mobility/ADLs:  Walking   Assisted  Transfer  Assisted  Bathing  Assisted  Dressing  Assisted  Toileting  Assisted  Feeding  Independent  Med Admin  Independent  Med Delivery   none    Wound Care Documentation and Therapy:        Elimination:  Continence:   · Bowel: No  · Bladder: HEMAL  Urinary Catheter: Removal Date 3/29/19   Colostomy/Ileostomy/Ileal Conduit: No       Date of Last BM: 3/29/19  No intake or output data in the 24 hours ending 03/25/19 1402  No intake/output data recorded. Safety Concerns: At Risk for Falls    Impairments/Disabilities:          Nutrition Therapy:  Current Nutrition Therapy:   General Carb Control 5 Low Fat    Routes of Feeding: Oral  Liquids: Thin Liquids  Daily Fluid Restriction: yes - amount 1000 mL  Last Modified Barium Swallow with Video (Video Swallowing Test): not done    Treatments at the Time of Hospital Discharge:   Respiratory Treatments:   Oxygen Therapy:  is not on home oxygen therapy.   Ventilator:    - No ventilator support    Rehab Therapies: Physical Therapy and Occupational Therapy  Weight Bearing Status/Restrictions: No weight bearing restirctions  Other Medical Equipment (for information only, NOT a DME order):  walker  Other Treatments:     Patient's personal belongings (please select all that are sent with patient):      RN SIGNATURE:  Electronically signed by Jose Polanco RN on 3/29/19 at 10:41 AM    CASE MANAGEMENT/SOCIAL WORK SECTION    Inpatient Status Date: ***    Readmission Risk Assessment Score:  Readmission Risk              Risk of Unplanned Readmission:        17           Discharging to Facility/ Agency   · Name:   · Address:  · Phone:  · Fax:    Dialysis Facility (if applicable)   · Name:  · Address:  · Dialysis Schedule:  · Phone:  · Fax:    / signature: {Esignature:[id number]}    PHYSICIAN SECTION    Prognosis: Fair    Condition at Discharge: Stable    Rehab Potential (if transferring to Rehab): Good    Recommended Labs or Other Treatments After Discharge: continue antibiotics    Physician Certification: I certify the above information and transfer of Duke Wise  is necessary for the continuing treatment of the diagnosis listed and that he requires [facility] for greater 30 days.      Update Admission H&P: Changes in H&P as follows - covered stent placed within metal stent, unable to remove previous stent    PHYSICIAN SIGNATURE:  Electronically signed by Beau Levine MD on 3/29/19 at 11:20 AM

## 2019-03-25 NOTE — PLAN OF CARE
Problem: Risk for Impaired Skin Integrity  Goal: Tissue integrity - skin and mucous membranes  Description  Structural intactness and normal physiological function of skin and  mucous membranes.   3/25/2019 0353 by Jose Baer RN  Outcome: Met This Shift     Problem: Falls - Risk of:  Goal: Will remain free from falls  Description  Will remain free from falls  3/25/2019 0353 by Jose Baer RN  Outcome: Met This Shift    Problem: Falls - Risk of:  Goal: Absence of physical injury  Description  Absence of physical injury  3/25/2019 0353 by Jose Baer RN  Outcome: Met This Shift

## 2019-03-25 NOTE — PROGRESS NOTES
Nursing Transfer Note    Data:  Summary of patients progress: general anesthesia recovery    Reason for transfer: PACU discharge criteria met, transferred to next level of care. Action:  Explained reason for transfer to Patient/Family  Report given to: manda garcia, using RN Handoff Navigator.   Mode of transportation: Cart    Response:  RN Recommendations:continuation of care

## 2019-03-26 ENCOUNTER — APPOINTMENT (OUTPATIENT)
Dept: ULTRASOUND IMAGING | Age: 69
DRG: 444 | End: 2019-03-26
Attending: INTERNAL MEDICINE
Payer: MEDICARE

## 2019-03-26 ENCOUNTER — APPOINTMENT (OUTPATIENT)
Dept: GENERAL RADIOLOGY | Age: 69
DRG: 444 | End: 2019-03-26
Attending: INTERNAL MEDICINE
Payer: MEDICARE

## 2019-03-26 LAB
ALBUMIN SERPL-MCNC: 1.5 G/DL (ref 3.5–5.2)
ALP BLD-CCNC: 311 U/L (ref 40–129)
ALT SERPL-CCNC: 70 U/L (ref 0–40)
AMYLASE: 27 U/L (ref 20–100)
AMYLASE: 63 U/L (ref 20–100)
ANION GAP SERPL CALCULATED.3IONS-SCNC: 7 MMOL/L (ref 7–16)
ANISOCYTOSIS: ABNORMAL
AST SERPL-CCNC: 45 U/L (ref 0–39)
BASOPHILS ABSOLUTE: 0 E9/L (ref 0–0.2)
BASOPHILS RELATIVE PERCENT: 0 % (ref 0–2)
BILIRUB SERPL-MCNC: 2.4 MG/DL (ref 0–1.2)
BLOOD CULTURE, ROUTINE: ABNORMAL
BUN BLDV-MCNC: 18 MG/DL (ref 8–23)
CALCIUM SERPL-MCNC: 7.3 MG/DL (ref 8.6–10.2)
CHLORIDE BLD-SCNC: 103 MMOL/L (ref 98–107)
CO2: 18 MMOL/L (ref 22–29)
CREAT SERPL-MCNC: 1 MG/DL (ref 0.7–1.2)
CULTURE, BLOOD 2: ABNORMAL
EKG ATRIAL RATE: 102 BPM
EKG ATRIAL RATE: 95 BPM
EKG P AXIS: 48 DEGREES
EKG P AXIS: 67 DEGREES
EKG P-R INTERVAL: 130 MS
EKG P-R INTERVAL: 136 MS
EKG Q-T INTERVAL: 368 MS
EKG Q-T INTERVAL: 394 MS
EKG QRS DURATION: 150 MS
EKG QRS DURATION: 152 MS
EKG QTC CALCULATION (BAZETT): 479 MS
EKG QTC CALCULATION (BAZETT): 495 MS
EKG R AXIS: -39 DEGREES
EKG R AXIS: -53 DEGREES
EKG T AXIS: 43 DEGREES
EKG T AXIS: 54 DEGREES
EKG VENTRICULAR RATE: 102 BPM
EKG VENTRICULAR RATE: 95 BPM
EOSINOPHILS ABSOLUTE: 0 E9/L (ref 0.05–0.5)
EOSINOPHILS RELATIVE PERCENT: 0 % (ref 0–6)
GFR AFRICAN AMERICAN: >60
GFR NON-AFRICAN AMERICAN: >60 ML/MIN/1.73
GLUCOSE BLD-MCNC: 177 MG/DL (ref 74–99)
HCT VFR BLD CALC: 29.6 % (ref 37–54)
HEMOGLOBIN: 10 G/DL (ref 12.5–16.5)
HYPOCHROMIA: ABNORMAL
LIPASE: 10 U/L (ref 13–60)
LIPASE: 13 U/L (ref 13–60)
LV EF: 49 %
LVEF MODALITY: NORMAL
LYMPHOCYTES ABSOLUTE: 0.54 E9/L (ref 1.5–4)
LYMPHOCYTES RELATIVE PERCENT: 6.1 % (ref 20–42)
MCH RBC QN AUTO: 28.9 PG (ref 26–35)
MCHC RBC AUTO-ENTMCNC: 33.8 % (ref 32–34.5)
MCV RBC AUTO: 85.5 FL (ref 80–99.9)
MONOCYTES ABSOLUTE: 0.63 E9/L (ref 0.1–0.95)
MONOCYTES RELATIVE PERCENT: 7 % (ref 2–12)
NEUTROPHILS ABSOLUTE: 7.83 E9/L (ref 1.8–7.3)
NEUTROPHILS RELATIVE PERCENT: 86.9 % (ref 43–80)
NUCLEATED RED BLOOD CELLS: 0 /100 WBC
ORGANISM: ABNORMAL
OSMOLALITY URINE: 472 MOSM/KG (ref 300–900)
OSMOLALITY: 285 MOSM/KG (ref 285–310)
OVALOCYTES: ABNORMAL
PDW BLD-RTO: 15.4 FL (ref 11.5–15)
PLATELET # BLD: 67 E9/L (ref 130–450)
PLATELET CONFIRMATION: NORMAL
PMV BLD AUTO: 12.9 FL (ref 7–12)
POIKILOCYTES: ABNORMAL
POLYCHROMASIA: ABNORMAL
POTASSIUM SERPL-SCNC: 4.7 MMOL/L (ref 3.5–5)
RBC # BLD: 3.46 E12/L (ref 3.8–5.8)
SCHISTOCYTES: ABNORMAL
SODIUM BLD-SCNC: 128 MMOL/L (ref 132–146)
TARGET CELLS: ABNORMAL
TOTAL PROTEIN: 5.4 G/DL (ref 6.4–8.3)
WBC # BLD: 9 E9/L (ref 4.5–11.5)

## 2019-03-26 PROCEDURE — 51798 US URINE CAPACITY MEASURE: CPT

## 2019-03-26 PROCEDURE — 99233 SBSQ HOSP IP/OBS HIGH 50: CPT | Performed by: INTERNAL MEDICINE

## 2019-03-26 PROCEDURE — 99232 SBSQ HOSP IP/OBS MODERATE 35: CPT | Performed by: TRANSPLANT SURGERY

## 2019-03-26 PROCEDURE — 82150 ASSAY OF AMYLASE: CPT

## 2019-03-26 PROCEDURE — 97530 THERAPEUTIC ACTIVITIES: CPT

## 2019-03-26 PROCEDURE — 51701 INSERT BLADDER CATHETER: CPT

## 2019-03-26 PROCEDURE — 76775 US EXAM ABDO BACK WALL LIM: CPT

## 2019-03-26 PROCEDURE — 2580000003 HC RX 258: Performed by: SPECIALIST

## 2019-03-26 PROCEDURE — 1200000000 HC SEMI PRIVATE

## 2019-03-26 PROCEDURE — 6370000000 HC RX 637 (ALT 250 FOR IP): Performed by: TRANSPLANT SURGERY

## 2019-03-26 PROCEDURE — 83930 ASSAY OF BLOOD OSMOLALITY: CPT

## 2019-03-26 PROCEDURE — 6360000002 HC RX W HCPCS: Performed by: INTERNAL MEDICINE

## 2019-03-26 PROCEDURE — 36415 COLL VENOUS BLD VENIPUNCTURE: CPT

## 2019-03-26 PROCEDURE — 6370000000 HC RX 637 (ALT 250 FOR IP): Performed by: INTERNAL MEDICINE

## 2019-03-26 PROCEDURE — 80053 COMPREHEN METABOLIC PANEL: CPT

## 2019-03-26 PROCEDURE — 85025 COMPLETE CBC W/AUTO DIFF WBC: CPT

## 2019-03-26 PROCEDURE — 51702 INSERT TEMP BLADDER CATH: CPT

## 2019-03-26 PROCEDURE — 83935 ASSAY OF URINE OSMOLALITY: CPT

## 2019-03-26 PROCEDURE — 83690 ASSAY OF LIPASE: CPT

## 2019-03-26 PROCEDURE — 2580000003 HC RX 258: Performed by: INTERNAL MEDICINE

## 2019-03-26 PROCEDURE — 74018 RADEX ABDOMEN 1 VIEW: CPT

## 2019-03-26 PROCEDURE — 97165 OT EVAL LOW COMPLEX 30 MIN: CPT

## 2019-03-26 PROCEDURE — 6360000002 HC RX W HCPCS: Performed by: SPECIALIST

## 2019-03-26 PROCEDURE — 93306 TTE W/DOPPLER COMPLETE: CPT

## 2019-03-26 PROCEDURE — 87040 BLOOD CULTURE FOR BACTERIA: CPT

## 2019-03-26 RX ORDER — LIDOCAINE HYDROCHLORIDE 10 MG/ML
5 INJECTION, SOLUTION EPIDURAL; INFILTRATION; INTRACAUDAL; PERINEURAL ONCE
Status: COMPLETED | OUTPATIENT
Start: 2019-03-26 | End: 2019-03-27

## 2019-03-26 RX ORDER — SODIUM CHLORIDE 0.9 % (FLUSH) 0.9 %
10 SYRINGE (ML) INJECTION EVERY 12 HOURS SCHEDULED
Status: DISCONTINUED | OUTPATIENT
Start: 2019-03-26 | End: 2019-03-29 | Stop reason: HOSPADM

## 2019-03-26 RX ORDER — SODIUM CHLORIDE 0.9 % (FLUSH) 0.9 %
10 SYRINGE (ML) INJECTION PRN
Status: DISCONTINUED | OUTPATIENT
Start: 2019-03-26 | End: 2019-03-29 | Stop reason: HOSPADM

## 2019-03-26 RX ADMIN — TAMSULOSIN HYDROCHLORIDE 0.4 MG: 0.4 CAPSULE ORAL at 10:43

## 2019-03-26 RX ADMIN — PANTOPRAZOLE SODIUM 40 MG: 40 TABLET, DELAYED RELEASE ORAL at 05:47

## 2019-03-26 RX ADMIN — POTASSIUM CHLORIDE AND SODIUM CHLORIDE: 900; 150 INJECTION, SOLUTION INTRAVENOUS at 07:24

## 2019-03-26 RX ADMIN — PRAVASTATIN SODIUM 80 MG: 20 TABLET ORAL at 20:08

## 2019-03-26 RX ADMIN — CEFTRIAXONE 2 G: 2 INJECTION, POWDER, FOR SOLUTION INTRAMUSCULAR; INTRAVENOUS at 18:43

## 2019-03-26 RX ADMIN — PIPERACILLIN SODIUM AND TAZOBACTAM SODIUM 3.38 G: 3; .375 INJECTION, POWDER, LYOPHILIZED, FOR SOLUTION INTRAVENOUS at 01:50

## 2019-03-26 RX ADMIN — Medication 10 ML: at 20:09

## 2019-03-26 RX ADMIN — PREDNISONE 40 MG: 20 TABLET ORAL at 12:05

## 2019-03-26 RX ADMIN — AMPICILLIN SODIUM 2 G: 2 INJECTION, POWDER, FOR SOLUTION INTRAMUSCULAR; INTRAVENOUS at 18:13

## 2019-03-26 RX ADMIN — POTASSIUM CHLORIDE AND SODIUM CHLORIDE: 900; 150 INJECTION, SOLUTION INTRAVENOUS at 18:05

## 2019-03-26 RX ADMIN — Medication 10 ML: at 10:41

## 2019-03-26 RX ADMIN — PIPERACILLIN SODIUM AND TAZOBACTAM SODIUM 3.38 G: 3; .375 INJECTION, POWDER, LYOPHILIZED, FOR SOLUTION INTRAVENOUS at 10:43

## 2019-03-26 RX ADMIN — PIPERACILLIN SODIUM AND TAZOBACTAM SODIUM 3.38 G: 3; .375 INJECTION, POWDER, LYOPHILIZED, FOR SOLUTION INTRAVENOUS at 18:05

## 2019-03-26 RX ADMIN — AMPICILLIN SODIUM 2 G: 2 INJECTION, POWDER, FOR SOLUTION INTRAMUSCULAR; INTRAVENOUS at 21:58

## 2019-03-26 ASSESSMENT — PAIN SCALES - GENERAL
PAINLEVEL_OUTOF10: 0

## 2019-03-26 NOTE — PROGRESS NOTES
tamsulosin (FLOMAX) capsule 0.4 mg Daily   predniSONE (DELTASONE) tablet 40 mg Daily   HYDROmorphone (DILAUDID) injection 0.5 mg Q4H PRN   piperacillin-tazobactam (ZOSYN) 3.375 g in dextrose 5 % 50 mL IVPB extended infusion (mini-bag) Q8H        Data Review  CBC: Lab Results   Component Value Date    WBC 11.0 03/25/2019    RBC 3.52 03/25/2019    HGB 10.3 03/25/2019    HCT 29.6 03/25/2019    MCV 84.1 03/25/2019    MCH 29.3 03/25/2019    MCHC 34.8 03/25/2019    RDW 15.0 03/25/2019    PLT 34 03/25/2019    MPV 13.5 03/25/2019     CMP:  Lab Results   Component Value Date     03/25/2019    K 4.3 03/25/2019    K 3.9 01/22/2019     03/25/2019    CO2 19 03/25/2019    BUN 16 03/25/2019    CREATININE 0.9 03/25/2019    GFRAA >60 03/25/2019    LABGLOM >60 03/25/2019    GLUCOSE 129 03/25/2019    PROT 5.4 03/25/2019    LABALBU 1.7 03/25/2019    CALCIUM 7.4 03/25/2019    BILITOT 7.2 03/25/2019    ALKPHOS 388 03/25/2019     03/25/2019     03/25/2019     Hepatic Function Panel:  Lab Results   Component Value Date    ALKPHOS 388 03/25/2019     03/25/2019     03/25/2019    PROT 5.4 03/25/2019    BILITOT 7.2 03/25/2019    BILIDIR 13.7 01/06/2019    IBILI 8.3 01/06/2019    LABALBU 1.7 03/25/2019       PT/INR:    Lab Results   Component Value Date    PROTIME 14.1 03/25/2019    INR 1.3 03/25/2019     IRON:    Lab Results   Component Value Date    IRON 16 09/12/2015     Iron Saturation:  No components found for: PERCENTFE  FERRITIN:    Lab Results   Component Value Date    FERRITIN 7 09/12/2015         Assessment:     Active Problems:  · Obstructive jaundice, occluded stent  · Prior ERCP with stent [facility] at [facility]  · HX aborted whipple d/t desmoplastic reaction with Dr. Carter Player  · Pancreatic CA  · AIP  · +BC- gram +cocci; ID following  · Anemia, normocytic, normochromic  · Thrombocytopenia   · Weight loss  · Elevated LFTs  · Electrolyte abnormalities defer to PCP  · Recent EUS with Dr. Scott Bone- negative for CA  · CA 19-9 239 (3/18/19)  · +UTI- Klebsiella  · ERCP 3/25/19: SURPRISINGLY PT. HAS AN UNCOVERED WALL STENT WITH OBSTRUCTING TISSUE INGROWTH !! MASSIVE DILATATION OF CBD PROXIMALLY WITH DISTAL OBSTRUCTION. FULLY COVERED WALL STENT 8X60 WAS PLACED INSIDE THE UNCOVERED ONE AND A REMARKABLE AMOUNT OF PURULENT BILIARY DRAINAGE WAS OBTAINED.  PT. TOLERATED WELL      Plan:     · Continue IVF as ordered  · IV antibiotics per ID  · Protonix 40 MG PO daily  · OK for clears, AAT  · Continue Prednisone per Surgery orders  · Continue to medicate for pain, nausea as ordered  · Monitor CBC, CMP, amylase, lipase daily  · Will follow      Discussed with Dr. Paige Hoffman per Dr. Clarice Medina, NP-C 3/26/2019 8:23 AM For Dr. Neil May

## 2019-03-26 NOTE — PROGRESS NOTES
Infectious Disease  Progress Note  [facility]    Chief Complaint: weakness    Subjective:  Feels better today and Is more animated     Scheduled Meds:   ampicillin IV  2 g Intravenous 6 times per day    cefTRIAXone (ROCEPHIN) IV  2 g Intravenous Q24H    indomethacin  100 mg Rectal 60 Min Pre-Op    sodium chloride  250 mL Intravenous Once    sodium chloride flush  10 mL Intravenous 2 times per day    lisinopril  20 mg Oral Daily    metoprolol succinate  50 mg Oral Daily    pantoprazole  40 mg Oral QAM AC    pravastatin  80 mg Oral Nightly    tamsulosin  0.4 mg Oral Daily    predniSONE  40 mg Oral Daily    piperacillin-tazobactam  3.375 g Intravenous Q8H     Continuous Infusions:   0.9% NaCl with KCl 20 mEq 100 mL/hr at 03/26/19 0724     PRN Meds:acetaminophen, magnesium hydroxide, ondansetron, sodium chloride flush, HYDROmorphone    ROS:  Constitutional:No Fever, chills or rigors. No unexplained weight loss. HEENT: No headache, dizziness or lightheadedness. No recent change in vision or hearing. No sore throat or runny nose. Respiratory: no cough, chest pain, shortness of breath or wheeze. Cardiovascular: no chest pain or palpitations  Gastrointestinal: no nausea, vomiting, diarrhea, constipation. No blood in stool. Genitourinary: No h/o dysuria, hematuria, urgency, frequency or incontinence. Musculoskeletal: No h/o joint pain anywhere in body, or bodyache. Neurologic: No h/o passing out, focal weakness or seizure. Skin: No h/o rashes or easy bruising. Hematologic: No gum bleeding or easy bruising. No hemoptysis.     Patient Vitals for the past 24 hrs:   BP Temp Temp src Pulse Resp SpO2   03/26/19 0945 110/64 97.4 °F (36.3 °C) Oral 61 16 95 %   03/25/19 1945 (!) 116/58 98.4 °F (36.9 °C) Oral 68 16 95 %       CBC with Differential:    Lab Results   Component Value Date    WBC 9.0 03/26/2019    WBC 11.0 03/25/2019    WBC 15.0 03/24/2019    WBC 18.5 03/23/2019    WBC 29.2 03/22/2019    RBC 3.46 03/26/2019    RBC 3.52 03/25/2019    RBC 3.26 03/24/2019    RBC 3.06 03/23/2019    RBC 3.19 03/22/2019    HGB 10.0 03/26/2019    HGB 10.3 03/25/2019    HGB 9.5 03/24/2019    HGB 9.0 03/23/2019    HGB 9.3 03/22/2019    HCT 29.6 03/26/2019    HCT 29.6 03/25/2019    HCT 28.3 03/24/2019    HCT 26.3 03/23/2019    HCT 27.9 03/22/2019    PLT 67 03/26/2019    PLT 34 03/25/2019    PLT 34 03/24/2019    PLT 52 03/23/2019     03/22/2019    MCV 85.5 03/26/2019    MCV 84.1 03/25/2019    MCV 86.8 03/24/2019    MCV 85.9 03/23/2019    MCV 87.5 03/22/2019    MCH 28.9 03/26/2019    MCH 29.3 03/25/2019    MCH 29.1 03/24/2019    MCH 29.4 03/23/2019    MCH 29.2 03/22/2019    MCHC 33.8 03/26/2019    MCHC 34.8 03/25/2019    MCHC 33.6 03/24/2019    MCHC 34.2 03/23/2019    MCHC 33.3 03/22/2019    RDW 15.4 03/26/2019    RDW 15.0 03/25/2019    RDW 14.8 03/24/2019    RDW 14.6 03/23/2019    RDW 14.6 03/22/2019    NRBC 0.0 03/26/2019    NRBC 0.9 01/15/2019    SEGSPCT 68 01/11/2013    SEGSPCT 65 01/08/2013    LYMPHOPCT 6.1 03/26/2019    LYMPHOPCT 2.6 03/21/2019    LYMPHOPCT 1.7 03/18/2019    LYMPHOPCT 12.9 02/07/2019    LYMPHOPCT 19.0 01/22/2019    MONOPCT 7.0 03/26/2019    MONOPCT 6.1 03/21/2019    MONOPCT 2.6 03/18/2019    MONOPCT 7.5 02/07/2019    MONOPCT 8.6 01/22/2019    MYELOPCT 0.9 01/06/2019    BASOPCT 0.0 03/26/2019    BASOPCT 0.2 03/21/2019    BASOPCT 0.1 03/18/2019    BASOPCT 0.3 02/07/2019    BASOPCT 0.2 01/22/2019    MONOSABS 0.63 03/26/2019    MONOSABS 0.64 03/21/2019    MONOSABS 0.30 03/18/2019    MONOSABS 0.51 02/07/2019    MONOSABS 0.52 01/22/2019    LYMPHSABS 0.54 03/26/2019    LYMPHSABS 0.32 03/21/2019    LYMPHSABS 0.20 03/18/2019    LYMPHSABS 0.88 02/07/2019    LYMPHSABS 1.15 01/22/2019    EOSABS 0.00 03/26/2019    EOSABS 0.00 03/21/2019    EOSABS 0.00 03/18/2019    EOSABS 0.02 02/07/2019    EOSABS 0.04 01/22/2019    BASOSABS 0.00 03/26/2019    BASOSABS 0.00 03/21/2019    BASOSABS 0.00 03/18/2019    BASOSABS 0.02

## 2019-03-26 NOTE — CARE COORDINATION
3/26/2019  Social Work Discharge Planning:  SW was informed by liaison Russell Willis that she spoke to spouse and daughter and [facility]. [facility] is their choice. Russell Willis will start precert. Hens done, transport form is in chart and N-17 generated.  Electronically signed by ABBE Caldwell on 3/26/2019 at 1:00 PM

## 2019-03-26 NOTE — PROGRESS NOTES
HEPATOBILIARY AND PANCREATIC SURGERY  DAILY PROGRESS NOTE  3/26/2019    CC: biliary stent occlusion    Subjective:  No complaints this morning. No n/v. Afebrile    Objective:  BP (!) 116/58   Pulse 68   Temp 98.4 °F (36.9 °C) (Oral)   Resp 16   SpO2 95%     GENERAL:  Laying in bed, drowsy, no distress  HEAD: Normocephalic, atraumatic  LUNGS:  No increased work of breathing on room air  CARDIOVASCULAR:  RR  ABDOMEN:  Soft, non-tender, mildly distended  SKIN: Warm and dry    Assessment/Plan:  76 y.o. 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 Continue prednisone  Thrombocytopenia--HIT labs pending. No heparin.   Bacteremia--zosyn restarted last night by ID, had been held due to thrombocytopenia  Am labs pending    Electronically signed by Colt Rojas MD on 3/26/2019 at 6:57 AM     Thrombocytopenia still present but improving off lovenox  HIT panel pending  Continue prednisone 40 for total 2 weeks, then will begin to taper  Greatly appreciate ID's input  Bilirubin markedly improved  Okay to discharge tomorrow from my standpoint    Electronically signed by Ashley Burleson MD on 3/26/2019 at 3:16 PM

## 2019-03-26 NOTE — CONSULTS
Inpatient consult to Urology  Consult performed by: NAHUM Euceda - CNP  Consult ordered by: Aris Aguilar MD        3/26/2019 11:45 AM  Service: Urology  Group: [facility] (Aidan/Michael/Mak)    Jag Franco  [id number]     Chief Complaint:    Urinary retention    History of Present Illness: The patient is a 76 y.o. male patient who presents with fatigue. He was found to have positive blood cultures and obstructive jaundice. He has a biliary stent. He has been unable to void for about 24 hours according to the nursing staff. Pt and his estranged wife are not good historians. Pt states he does take a pill to help him uraninite. He is not sure if he has seen a urologist. He denies any  surgery however he is not really sure if his bladder is full. He can not really say when he last voided or when he last had a catheter. The nursing staff reports he had a catheter about 24 hours ago but it was removed. There was a catheter attempted today but resistance was met and pt screamed in severe pain so the catheter attempt was abandoned. Upon discussion with the wife, who does not live with the pt, she reports pt lives in deplorable circumstances. He is not able to care for himself. He has a hx of schizophrenia and alcoholism. She reports that she is the POA. She says she has known him not to urinate well and always appears to be grimacing with urination. He has never smoked. He has had stroke x 2 in 2013.        Past Medical History:   Diagnosis Date    Cancer [facility])     pancreatic    Cerebral artery occlusion with cerebral infarction ([facility] [address]     Hypertension     Syncope          Past Surgical History:   Procedure Laterality Date    ERCP N/A 3/25/2019    ERCP STENT INSERTION performed by Apollo Herrmann MD at [address] [place] [address] N/A 3/8/2019    REDO ENDOSCOPIC EGD  ULTRASOUND performed by Leopold Gouty, DO at [facility] [place] ENDOSCOPY N/A 3/8/2019    EGD DIAGNOSTIC ONLY performed by Rosenda Haddad DO at [address] 1/16/2019    EXPLORATORY LAPAROTOMY; ABORTED WHIPPLE PROCEDURE; GASTROJEJUNOSTOMY; OPEN CHOLECYSTECTOMY performed by Carlo Pimentel MD at [address]       Medications Prior to Admission:    Medications Prior to Admission: predniSONE (DELTASONE) 5 MG tablet, Take 4 tablets by mouth daily  pravastatin (PRAVACHOL) 80 MG tablet, Take 1 tablet by mouth nightly  lisinopril (PRINIVIL;ZESTRIL) 20 MG tablet, Take 1 tablet by mouth daily  metoprolol succinate (TOPROL XL) 50 MG extended release tablet, Take 1 tablet by mouth daily  tamsulosin (FLOMAX) 0.4 MG capsule, Take 1 capsule by mouth daily    Allergies:    Patient has no known allergies. Social History:    reports that he has never smoked. He has never used smokeless tobacco. He reports that he drinks about 4.8 oz of alcohol per week. He reports that he does not use drugs. Family History:   Non-contributory to this Urological problem  family history includes Cancer (age of onset: 50) in his mother. Review of Systems:  Constitutional: no chills or sweats reported, he reportedly has had a fever. Respiratory: negative for cough and hemoptysis  Cardiovascular: negative for chest pain and dyspnea  Gastrointestinal: negative for abdominal pain, diarrhea, nausea and vomiting   Derm: negative for rash and skin lesion(s)  Neurological: negative for seizures and tremors, hx syncope  Musculoskeletal: negative  Endocrine: negative for diabetic symptoms including polydipsia and polyuria  Psychiatric: hx psychiatric disease per wife, alcohol abuse  : As above in the HPI, otherwise negative  All other reviews are negative    Physical Exam:     Vitals:  /64   Pulse 61   Temp 97.4 °F (36.3 °C) (Oral)   Resp 16   SpO2 95%     General:  Awake, alert, conversing  Well developed, well nourished, well groomed.   No apparent distress. HEENT:  Normocephalic, atraumatic. Pupils equal, round. No scleral icterus. No conjunctival injection. Normal lips, teeth, and gums. No nasal discharge. Neck:  Supple, no masses. Heart:  RRR  Lungs:  No audible wheezing. Respirations symmetric and non-labored. Abdomen: distended, no tenderness, no CV tenderness  Extremities:  No clubbing, cyanosis, or edema  Skin:  Warm and dry  Neuro: No focal motor deficits   Rectal: deferred  Genitalia: uncircumcised male, no lesions of penis or scrotum.     Labs:     Recent Labs     03/24/19  0614 03/25/19 0759 03/26/19  0835   WBC 15.0* 11.0 9.0   RBC 3.26* 3.52* 3.46*   HGB 9.5* 10.3* 10.0*   HCT 28.3* 29.6* 29.6*   MCV 86.8 84.1 85.5   MCH 29.1 29.3 28.9   MCHC 33.6 34.8* 33.8   RDW 14.8 15.0 15.4*   PLT 34* 34* 67*   MPV 12.0 13.5* 12.9*         Recent Labs     03/24/19  0614 03/25/19 0759 03/26/19  0835   CREATININE 1.0 0.9 1.0     3/26/2019 12:18 PM - Stan, Mhy Incoming Results From [facility]     Specimen Information: Blood        Component Collected Lab   Culture, Blood 2 03/22/2019 12:25 PM [address] Lab   Organism Abnormal  03/22/2019 12:25 PM [address] Lab   Escherichia coli    Culture, Blood 2 03/22/2019 12:25 PM [address] Lab   Refer to previous blood culture collected   3/21/19 @ 1905 for susceptibility results    Organism Abnormal  03/22/2019 12:25 PM [address] Lab   Escherichia coli    Culture, Blood 2 03/22/2019 12:25 PM [address] Lab   Second morphology   Refer to previous blood culture collected   3/21/19 @ 1905 for susceptibility results    Organism Abnormal  03/22/2019 12:25 PM [facility] [address] [facility]   Klebsiella pneumoniae ssp pneumoniae    Culture, Blood 2 03/22/2019 12:25 PM [facility] [address] [facility]   Refer to previous blood culture collected   3/21/19 @ 1905 for susceptibility results            3/24/2019  7:43 AM - Stan, Aidey Incoming Results From [facility]     Specimen Information: Urine, clean catch        Component Collected Lab   Urine Culture, Routine 03/22/2019  2:30 PM [facility]   Organism Abnormal  03/22/2019  2:30 PM [facility] [facility]   Klebsiella pneumoniae ssp pneumoniae    Urine Culture, Routine 03/22/2019  2:30 PM [place] [address] [facility] Lab   >100,000 CFU/ml    Testing Performed By     Lab - [address] Name Director Address Valid Date Range   [facility] - [address] Sean Corea  [facility]. [facility] [address] 08/30/17 0821-Present   Narrative   Performed by: [facility] Lab   Source: URINE       Site:              Susceptibility     Klebsiella pneumoniae ssp pneumoniae (1)     Antibiotic Interpretation [facility] Status   ampicillin Resistant >=^32 mcg/mL    ceFAZolin Sensitive <=^4 mcg/mL    cefepime Sensitive <=^0.12 mcg/mL    cefTRIAXone Sensitive <=^0.25 mcg/mL    Confirmatory Extended Spectrum Beta-Lactamase  Neg mcg/mL    gentamicin Sensitive <=^1 mcg/mL    imipenem Sensitive <=^0.25 mcg/mL    levofloxacin Sensitive <=^0.12 mcg/mL    nitrofurantoin Intermediate =^64 mcg/mL    piperacillin-tazobactam Sensitive <=^4 mcg/mL    trimethoprim-sulfamethoxazole Sensitive <=^20 mcg/mL      Procedure note  After cleansing and prepping pt, # 16 fr coude catheter placed via sterile technique with return of 600 cc orange urine. Pt tolerated well. No resistance met. Catheter secured to leg with provided device. Assessment/plan:  Urinary retention  Polymicrobial Sepsis  ID following  Continue talavera catheter  Continue Flomax, caution orthostatic hypotension. Will get retroperitoneal US and KUB to eval upper tracts  Voiding trial when ambulatory   Will need outpt follow up for YOLI and PSA    COBY Vallejo  [facility]  March 26, 2019            Electronically signed by NAHUM Presley CNP on 3/26/2019 at 11:45 AM           The patient was seen and examined. I have reviewed the medical record in detail.   I agree with the plan as outlined by NAHUM Ramsay-LUIS MIGUEL.     Jessica Le MD  7:50 AM  3/27/2019

## 2019-03-26 NOTE — OP NOTE
[address] [address]                                OPERATIVE REPORT    PATIENT NAME: Norm Ku                   :        1950  MED REC NO:   [id number]                            ROOM:       0708  ACCOUNT NO:   [de-identified]                           ADMIT DATE: 2019  PROVIDER:     Michi Casarez MD    DATE OF PROCEDURE:  2019    PROCEDURE PERFORMED:  ERCP with stent placement. PREOPERATIVE DIAGNOSES:  This is a 69-year-old gentleman who has a long  past history including obstructive jaundice with a possibility of  pancreatic head mass. He underwent an EUS with biopsy, showed initially  IPMN with high-grade dysplasia. The patient underwent an attempted  Whipple procedure; however, secondary to lack of adequate planes for  dissection, procedure could not be done. The patient had repeat EUS  with stent placement at [facility], and during the time the  patient did not experience significant weight loss or metastatic  disease, etc.  He presented to the hospital now with jaundice and early  sepsis clinical picture. ERCP is done for evaluation. POSTOPERATIVE DIAGNOSES:  Surprisingly, an uncovered Wallstent was found  placed in the common bile duct! with near-total obstruction secondary to  the tissue ingrowth. The papillotome was introduced as well as a  balloon, and injection showed massively dilated proximal common bile  duct and a filling defect at the distal common bile duct. Full  cholangiography was not done for fear of injecting more dye in view of  patient's obstruction. Over a guidewire, a fully covered Wallstent 8 x  60 was placed inside uncovered stent with a gush of purulent as well as  massive biliary drainage obtained. Pictures were taken, and procedure  was terminated. ANESTHESIA:  LMAC.     DESCRIPTION OF PROCEDURE:  With the patient in his left lateral  decubitus position, the Olympus side-viewing video duodenoscope was  introduced into the esophagus, advanced through the GE junction into the  gastric body, advanced through the pylorus and duodenal bulb, second  portion of duodenum where what appeared to be an uncovered Wallstent was  placed in the common bile duct. There was massive tissue ingrowth and  lack of any drainage. Carefully, a papillotome was introduced. Inside  it was the guidewire carefully manipulated to be introduced inside the  uncovered stent into the intrahepatic ductal system. Over the  guidewire, a balloon was introduced, and balloon cholangiography showed  a filling defect at the distal common bile duct and marked dilatation of  the proximal common bile duct area. I did not want to inject more dye  in view of the obstruction, so over the guidewire, the balloon was  exchanged with 8 x 60 fully covered Wallstent, and upon introducing it,  there was marked drainage, copious amount of pus and biliary drainage  obtained; and the scope was then retrieved, the area decompressed, and  procedure was terminated. The patient tolerated the procedure well.         Nayana Hanson MD    D: 03/25/2019 16:25:36       T: 03/25/2019 16:27:36     [id number]  Job#: [id number]     Doc#: [id number]    CC:  MD Cely Hsu MD [place], DO

## 2019-03-26 NOTE — PROGRESS NOTES
Straight cath attempted, unsuccessful because pt is tense and yelling out. Comfort measures given. Dr. Zoya Marsh notified. Will attempt again see new orders.

## 2019-03-26 NOTE — PATIENT CARE CONFERENCE
P Quality Flow/Interdisciplinary Rounds Progress Note        Quality Flow Rounds held on March 26, 2019    Disciplines Attending:  Bedside Nurse, ,  and Nursing Unit Leadership    Sandeep Orr was admitted on 3/25/2019 11:13 AM    Anticipated Discharge Date:  Expected Discharge Date: 03/26/19    Disposition:    Dylan Score:  Dylan Scale Score: 19    Readmission Risk              Risk of Unplanned Readmission:        16           Discussed patient goal for the day, patient clinical progression, and barriers to discharge. The following Goal(s) of the Day/Commitment(s) have been identified: Patient due to echo today. Adequate pain control/safety.        Leatha Ruiz  March 26, 2019

## 2019-03-26 NOTE — PROGRESS NOTES
Occupational Therapy  OCCUPATIONAL THERAPY INITIAL EVALUATION      Date:3/26/2019  Patient Name: Vickki Koyanagi  MRN: [id number]  : 1950  Room: [address] [place] [address]A        Evaluating OT: Marciano Casiano OTR/L [id number]    AM-PAC Daily Activity Raw Score: 15/24  Recommended Adaptive Equipment:  TBD      Diagnosis: Bacteremia. S/p PROCEDURE PERFORMED:  ERCP with stent placement 3/25/19   PMHX:  CA, Syncope,   Precautions:  Falls, , bed alarm     Home Living: Info obtained from family member:   Pt lives alone, but  is moving from current residence[facility]  Prior Level of Function: per family member, - patient was not able to care for himself, falls, has a walker     Pain Level: no pain ;   Cognition:  Alert, grossly oriented     Judgement/safety:  Fair-     Functional Assessment:   Initial Eval Status  Date: 3/26/19 Tx Session   Date: Short Term Goals  Treatment frequency: PRN   Feeding Set-up      Grooming SBA/set-up ,seated   Mod I    UB Dressing Min A   Mod I    LB Dressing Mod A   Mod I    Bathing      Toileting      Bed Mobility  SBA   supine to sit  Min A  Sit-supine        Functional Transfers Mod A  Sit-stand from bed  Noted decrease balance, safety     Patient c/o dizziness upon standing        Mod I    Functional Mobility Mod Aw/walker   Attempting side steps   Mod I with good tolerance    Balance Sitting:     Static:  SBA    Standing: Mod A     Activity Tolerance Fair-  Patient c/o dizziness with activity   Good with ADL activity    Visual/  Perceptual Glasses: no                    Strength ROM Additional Info:    RUE  3+/5  WFL good  and wfl FMC/dexterity noted during ADL tasks     LUE 3+/5  WFL good  and wfl FMC/dexterity noted during ADL tasks         Hearing: WFL   Sensation:  No c/o numbness or tingling                             Comments/Treatment: Sitting EOB with SBA - patient alert and conversing, sit-stand from bed x2 attempts Mod A.   Patient demonstrated decrease endurance, safety and strength, c/o dizziness. Attemting side steps next to bed - Mod A with w/walker - decrease balance, strength and safety. Scooting to head of bed - Mod A. Returned to bed - Min A. Nurse present- checking vitals. While seated in bed - patient brushed teeth SBA/set-up. Educate on safety awareness    Upon arrival, patient lying in bed . At end of session, patient returned to bed  with call light and phone within reach, all lines and tubes intact. **Pt would benefit from continued skilled OT to increase safety and independence with completion of ADL/IADL tasks for functional independence and quality of life. ALARM ON    Eval Complexity: Low    Assessment of current deficits   Functional mobility [x]  ADLs [x] Strength [x]  Cognition []  Functional transfers  [x] IADLs [x] Safety Awareness [x]  Endurance [x]  Fine Motor Coordination [] Balance [x] Vision/perception [] Sensation []   Gross Motor Coordination [] ROM [] Delirium []                  Motor Control []    Plan of Care:   ADL retraining [x]   Equipment needs [x]   Neuromuscular re-education [x] Energy Conservation Techniques [x]  Functional Transfer training [x] Patient and/or Family Education [x]  Functional Mobility training [x]  Environmental Modifications [x]  Cognitive re-training []   Compensatory techniques for ADLs [x]  Splinting Needs []   Positioning to improve overall function [x]   Therapeutic Activity [x]  Therapeutic Exercise  [x]  Visual/Perceptual: []    Delirium prevention/treatment  []   Other:  []    Rehab Potential: Good for established goals     Family Goal:   Rehab     Patient and/or family were instructed on functional diagnosis, prognosis/goals and OT plan of care. Demonstrated fair  understanding.     Low Evaluation + 15  timed tx minutes  Tx Time in: 0798  Tx Time out: 1010    Evaluation time includes thorough review of current medical information, gathering information on past medical history/social history and prior level of function, completion of standardized testing/informal observation of tasks, assessment of data, and development of POC/Goals      Christy Hyde OTR/L [id number]

## 2019-03-26 NOTE — DISCHARGE SUMMARY
[address]                  [address] [facility],  [facility]                               DISCHARGE SUMMARY    PATIENT NAME: Gillis Duane                   :        1950  MED REC NO:   [id number]                            ROOM:       7402  ACCOUNT NO:   [de-identified]                           ADMIT DATE: 2019  PROVIDER:     Sarah Mccallum DO                  DISCHARGE DATE:  2019    DISCHARGE DIAGNOSES:  1. Bacteremia. 2.  UTI. 3.  Biliary obstruction. 4.  Hyperbilirubinemia. 5.  Hypertension. 6.  Pancreatic CA. HOSPITAL COURSE:  The patient is a 51-year-old  male who is  admitted to the hospital for positive blood cultures. His bilirubin was  elevated. He had a history of history of pancreatic CA, status post  biliary stent. The patient was seen by Surgery, Infectious Disease, GI  who recommended biliary stent exchange. The patient was transferred to  [address] [place] [address] for further evaluation, ERCP and  biliary stent exchange on 2019 in stable condition.         Ruddy Sarmiento DO    D: 2019 13:28:24       T: 2019 4:58:11     [email or web address]  Job#: [id number]     Doc#: [id number]    CC:  Tete Long DO

## 2019-03-26 NOTE — PLAN OF CARE
Problem: Falls - Risk of:  Goal: Will remain free from falls  Description  Will remain free from falls  Outcome: Met This Shift     Problem: Risk for Impaired Skin Integrity  Goal: Tissue integrity - skin and mucous membranes  Description  Structural intactness and normal physiological function of skin and  mucous membranes.   Outcome: Met This Shift

## 2019-03-26 NOTE — PROGRESS NOTES
[facility] Progress Note    Admitting Date and Time: 3/25/2019 11:13 AM  Admit Dx: Biliary obstruction [K83.1]  Biliary obstruction [K83.1]    Subjective:  Patient is being followed for Biliary obstruction [K83.1]  Biliary obstruction [K83.1]   Pt feels ok, had no complaints. ROS: denies fever, chills, cp, sob, n/v, HA unless stated above.       indomethacin  100 mg Rectal 60 Min Pre-Op    sodium chloride  250 mL Intravenous Once    sodium chloride flush  10 mL Intravenous 2 times per day    lisinopril  20 mg Oral Daily    metoprolol succinate  50 mg Oral Daily    pantoprazole  40 mg Oral QAM AC    pravastatin  80 mg Oral Nightly    tamsulosin  0.4 mg Oral Daily    predniSONE  40 mg Oral Daily    piperacillin-tazobactam  3.375 g Intravenous Q8H       acetaminophen 650 mg Q4H PRN   magnesium hydroxide 30 mL Daily PRN   ondansetron 4 mg Q6H PRN   sodium chloride flush 10 mL PRN   HYDROmorphone 0.5 mg Q4H PRN        Objective:    /64   Pulse 61   Temp 97.4 °F (36.3 °C) (Oral)   Resp 16   SpO2 95%     General Appearance: alert and orientedx3  Skin: warm and dry  Head: normocephalic and atraumatic  Eyes: pupils equal, round, and reactive to light, extraocular eye movements intact, conjunctivae normal  Neck: neck supple and non tender without mass   Pulmonary/Chest: clear to auscultation bilaterally- no wheezes, rales or rhonchi, normal air movement, no respiratory distress  Cardiovascular: normal rate, normal S1 and S2 and no carotid bruits  Abdomen: soft, non-tender, non-distended, normal bowel sounds  Extremities: no cyanosis, no clubbing and no edema  Neurologic: no cranial nerve deficit and speech normal        Recent Labs     03/24/19  0614 03/25/19  0759 03/26/19  0835   * 127* 128*   K 4.5 4.3 4.7    103 103   CO2 20* 19* 18*   BUN 22 16 18   CREATININE 1.0 0.9 1.0   GLUCOSE 154* 129* 177*   CALCIUM 7.6* 7.4* 7.3*       Recent Labs     03/24/19  0546

## 2019-03-26 NOTE — PROGRESS NOTES
Called surgical resident per RN TM  Reason are we continuing Zosyn?   Sol Navarro  3/26/2019  10:26 AM

## 2019-03-27 LAB
ALBUMIN SERPL-MCNC: 1.8 G/DL (ref 3.5–5.2)
ALP BLD-CCNC: 277 U/L (ref 40–129)
ALT SERPL-CCNC: 60 U/L (ref 0–40)
AMYLASE: 83 U/L (ref 20–100)
ANION GAP SERPL CALCULATED.3IONS-SCNC: 7 MMOL/L (ref 7–16)
ANION GAP SERPL CALCULATED.3IONS-SCNC: 8 MMOL/L (ref 7–16)
ANION GAP SERPL CALCULATED.3IONS-SCNC: 9 MMOL/L (ref 7–16)
ANISOCYTOSIS: ABNORMAL
AST SERPL-CCNC: 22 U/L (ref 0–39)
ATYPICAL LYMPHOCYTE RELATIVE PERCENT: 2 % (ref 0–4)
BASOPHILS ABSOLUTE: 0 E9/L (ref 0–0.2)
BASOPHILS RELATIVE PERCENT: 0 % (ref 0–2)
BILIRUB SERPL-MCNC: 1.8 MG/DL (ref 0–1.2)
BUN BLDV-MCNC: 18 MG/DL (ref 8–23)
BUN BLDV-MCNC: 19 MG/DL (ref 8–23)
BUN BLDV-MCNC: 19 MG/DL (ref 8–23)
CALCIUM SERPL-MCNC: 7.3 MG/DL (ref 8.6–10.2)
CALCIUM SERPL-MCNC: 7.4 MG/DL (ref 8.6–10.2)
CALCIUM SERPL-MCNC: 7.6 MG/DL (ref 8.6–10.2)
CHLORIDE BLD-SCNC: 100 MMOL/L (ref 98–107)
CHLORIDE BLD-SCNC: 100 MMOL/L (ref 98–107)
CHLORIDE BLD-SCNC: 99 MMOL/L (ref 98–107)
CO2: 16 MMOL/L (ref 22–29)
CO2: 17 MMOL/L (ref 22–29)
CO2: 17 MMOL/L (ref 22–29)
CORTISOL TOTAL: 2.37 MCG/DL (ref 2.68–18.4)
CREAT SERPL-MCNC: 0.9 MG/DL (ref 0.7–1.2)
EOSINOPHILS ABSOLUTE: 0 E9/L (ref 0.05–0.5)
EOSINOPHILS RELATIVE PERCENT: 0 % (ref 0–6)
GFR AFRICAN AMERICAN: >60
GFR NON-AFRICAN AMERICAN: >60 ML/MIN/1.73
GLUCOSE BLD-MCNC: 153 MG/DL (ref 74–99)
GLUCOSE BLD-MCNC: 205 MG/DL (ref 74–99)
GLUCOSE BLD-MCNC: 415 MG/DL (ref 74–99)
HCT VFR BLD CALC: 30.5 % (ref 37–54)
HEMOGLOBIN: 10 G/DL (ref 12.5–16.5)
HYPOCHROMIA: ABNORMAL
LIPASE: 14 U/L (ref 13–60)
LYMPHOCYTES ABSOLUTE: 0.43 E9/L (ref 1.5–4)
LYMPHOCYTES RELATIVE PERCENT: 4 % (ref 20–42)
MCH RBC QN AUTO: 28.6 PG (ref 26–35)
MCHC RBC AUTO-ENTMCNC: 32.8 % (ref 32–34.5)
MCV RBC AUTO: 87.1 FL (ref 80–99.9)
METER GLUCOSE: 154 MG/DL (ref 74–99)
METER GLUCOSE: 197 MG/DL (ref 74–99)
METER GLUCOSE: 201 MG/DL (ref 74–99)
METER GLUCOSE: 380 MG/DL (ref 74–99)
METER GLUCOSE: 393 MG/DL (ref 74–99)
MONOCYTES ABSOLUTE: 0.22 E9/L (ref 0.1–0.95)
MONOCYTES RELATIVE PERCENT: 3 % (ref 2–12)
NEUTROPHILS ABSOLUTE: 6.55 E9/L (ref 1.8–7.3)
NEUTROPHILS RELATIVE PERCENT: 91 % (ref 43–80)
OVALOCYTES: ABNORMAL
PDW BLD-RTO: 15.5 FL (ref 11.5–15)
PLATELET # BLD: 111 E9/L (ref 130–450)
PMV BLD AUTO: 13.6 FL (ref 7–12)
POIKILOCYTES: ABNORMAL
POTASSIUM SERPL-SCNC: 4.6 MMOL/L (ref 3.5–5)
POTASSIUM SERPL-SCNC: 4.7 MMOL/L (ref 3.5–5)
POTASSIUM SERPL-SCNC: 5.6 MMOL/L (ref 3.5–5)
RBC # BLD: 3.5 E12/L (ref 3.8–5.8)
SODIUM BLD-SCNC: 124 MMOL/L (ref 132–146)
SODIUM BLD-SCNC: 124 MMOL/L (ref 132–146)
SODIUM BLD-SCNC: 125 MMOL/L (ref 132–146)
TOTAL PROTEIN: 5.8 G/DL (ref 6.4–8.3)
WBC # BLD: 7.2 E9/L (ref 4.5–11.5)

## 2019-03-27 PROCEDURE — 6360000002 HC RX W HCPCS: Performed by: SPECIALIST

## 2019-03-27 PROCEDURE — 1200000000 HC SEMI PRIVATE

## 2019-03-27 PROCEDURE — 97530 THERAPEUTIC ACTIVITIES: CPT

## 2019-03-27 PROCEDURE — 6370000000 HC RX 637 (ALT 250 FOR IP): Performed by: INTERNAL MEDICINE

## 2019-03-27 PROCEDURE — 85025 COMPLETE CBC W/AUTO DIFF WBC: CPT

## 2019-03-27 PROCEDURE — 82533 TOTAL CORTISOL: CPT

## 2019-03-27 PROCEDURE — 6370000000 HC RX 637 (ALT 250 FOR IP): Performed by: TRANSPLANT SURGERY

## 2019-03-27 PROCEDURE — 6370000000 HC RX 637 (ALT 250 FOR IP): Performed by: STUDENT IN AN ORGANIZED HEALTH CARE EDUCATION/TRAINING PROGRAM

## 2019-03-27 PROCEDURE — 02HV33Z INSERTION OF INFUSION DEVICE INTO SUPERIOR VENA CAVA, PERCUTANEOUS APPROACH: ICD-10-PCS | Performed by: INTERNAL MEDICINE

## 2019-03-27 PROCEDURE — 80048 BASIC METABOLIC PNL TOTAL CA: CPT

## 2019-03-27 PROCEDURE — 82150 ASSAY OF AMYLASE: CPT

## 2019-03-27 PROCEDURE — 76937 US GUIDE VASCULAR ACCESS: CPT

## 2019-03-27 PROCEDURE — 2580000003 HC RX 258: Performed by: INTERNAL MEDICINE

## 2019-03-27 PROCEDURE — 36569 INSJ PICC 5 YR+ W/O IMAGING: CPT

## 2019-03-27 PROCEDURE — 2500000003 HC RX 250 WO HCPCS: Performed by: SPECIALIST

## 2019-03-27 PROCEDURE — 87040 BLOOD CULTURE FOR BACTERIA: CPT

## 2019-03-27 PROCEDURE — 2580000003 HC RX 258: Performed by: SPECIALIST

## 2019-03-27 PROCEDURE — 83690 ASSAY OF LIPASE: CPT

## 2019-03-27 PROCEDURE — C1751 CATH, INF, PER/CENT/MIDLINE: HCPCS

## 2019-03-27 PROCEDURE — 97161 PT EVAL LOW COMPLEX 20 MIN: CPT

## 2019-03-27 PROCEDURE — 36415 COLL VENOUS BLD VENIPUNCTURE: CPT

## 2019-03-27 PROCEDURE — 80053 COMPREHEN METABOLIC PANEL: CPT

## 2019-03-27 PROCEDURE — 99232 SBSQ HOSP IP/OBS MODERATE 35: CPT | Performed by: TRANSPLANT SURGERY

## 2019-03-27 PROCEDURE — 82962 GLUCOSE BLOOD TEST: CPT

## 2019-03-27 RX ORDER — NICOTINE POLACRILEX 4 MG
15 LOZENGE BUCCAL PRN
Status: DISCONTINUED | OUTPATIENT
Start: 2019-03-27 | End: 2019-03-29 | Stop reason: HOSPADM

## 2019-03-27 RX ORDER — DEXTROSE MONOHYDRATE 25 G/50ML
25 INJECTION, SOLUTION INTRAVENOUS ONCE
Status: DISCONTINUED | OUTPATIENT
Start: 2019-03-27 | End: 2019-03-27

## 2019-03-27 RX ORDER — DEXTROSE MONOHYDRATE 25 G/50ML
12.5 INJECTION, SOLUTION INTRAVENOUS PRN
Status: DISCONTINUED | OUTPATIENT
Start: 2019-03-27 | End: 2019-03-29 | Stop reason: HOSPADM

## 2019-03-27 RX ORDER — DEXTROSE MONOHYDRATE 50 MG/ML
100 INJECTION, SOLUTION INTRAVENOUS PRN
Status: DISCONTINUED | OUTPATIENT
Start: 2019-03-27 | End: 2019-03-29 | Stop reason: HOSPADM

## 2019-03-27 RX ADMIN — AMPICILLIN SODIUM 2 G: 2 INJECTION, POWDER, FOR SOLUTION INTRAMUSCULAR; INTRAVENOUS at 19:30

## 2019-03-27 RX ADMIN — LIDOCAINE HYDROCHLORIDE 3 ML: 10 INJECTION, SOLUTION EPIDURAL; INFILTRATION; INTRACAUDAL; PERINEURAL at 13:15

## 2019-03-27 RX ADMIN — Medication 10 ML: at 17:55

## 2019-03-27 RX ADMIN — AMPICILLIN SODIUM 2 G: 2 INJECTION, POWDER, FOR SOLUTION INTRAMUSCULAR; INTRAVENOUS at 06:24

## 2019-03-27 RX ADMIN — PIPERACILLIN SODIUM AND TAZOBACTAM SODIUM 3.38 G: 3; .375 INJECTION, POWDER, LYOPHILIZED, FOR SOLUTION INTRAVENOUS at 02:44

## 2019-03-27 RX ADMIN — TAMSULOSIN HYDROCHLORIDE 0.4 MG: 0.4 CAPSULE ORAL at 09:21

## 2019-03-27 RX ADMIN — PIPERACILLIN SODIUM AND TAZOBACTAM SODIUM 3.38 G: 3; .375 INJECTION, POWDER, LYOPHILIZED, FOR SOLUTION INTRAVENOUS at 21:14

## 2019-03-27 RX ADMIN — PREDNISONE 40 MG: 20 TABLET ORAL at 09:21

## 2019-03-27 RX ADMIN — AMPICILLIN SODIUM 2 G: 2 INJECTION, POWDER, FOR SOLUTION INTRAMUSCULAR; INTRAVENOUS at 15:21

## 2019-03-27 RX ADMIN — INSULIN LISPRO 15 UNITS: 100 INJECTION, SOLUTION INTRAVENOUS; SUBCUTANEOUS at 11:18

## 2019-03-27 RX ADMIN — PIPERACILLIN SODIUM AND TAZOBACTAM SODIUM 3.38 G: 3; .375 INJECTION, POWDER, LYOPHILIZED, FOR SOLUTION INTRAVENOUS at 11:13

## 2019-03-27 RX ADMIN — INSULIN LISPRO 3 UNITS: 100 INJECTION, SOLUTION INTRAVENOUS; SUBCUTANEOUS at 19:28

## 2019-03-27 RX ADMIN — Medication 10 ML: at 09:20

## 2019-03-27 RX ADMIN — PRAVASTATIN SODIUM 80 MG: 20 TABLET ORAL at 21:21

## 2019-03-27 RX ADMIN — Medication 10 ML: at 10:28

## 2019-03-27 RX ADMIN — PANTOPRAZOLE SODIUM 40 MG: 40 TABLET, DELAYED RELEASE ORAL at 05:35

## 2019-03-27 RX ADMIN — CEFTRIAXONE 2 G: 2 INJECTION, POWDER, FOR SOLUTION INTRAMUSCULAR; INTRAVENOUS at 17:55

## 2019-03-27 RX ADMIN — INSULIN LISPRO 3 UNITS: 100 INJECTION, SOLUTION INTRAVENOUS; SUBCUTANEOUS at 15:28

## 2019-03-27 RX ADMIN — Medication 10 ML: at 23:00

## 2019-03-27 RX ADMIN — AMPICILLIN SODIUM 2 G: 2 INJECTION, POWDER, FOR SOLUTION INTRAMUSCULAR; INTRAVENOUS at 02:04

## 2019-03-27 RX ADMIN — Medication 30 ML: at 14:06

## 2019-03-27 RX ADMIN — AMPICILLIN SODIUM 2 G: 2 INJECTION, POWDER, FOR SOLUTION INTRAMUSCULAR; INTRAVENOUS at 10:27

## 2019-03-27 RX ADMIN — INSULIN LISPRO 6 UNITS: 100 INJECTION, SOLUTION INTRAVENOUS; SUBCUTANEOUS at 23:29

## 2019-03-27 ASSESSMENT — PAIN SCALES - GENERAL
PAINLEVEL_OUTOF10: 0
PAINLEVEL_OUTOF10: 0

## 2019-03-27 NOTE — PROCEDURES
PICC    Catheter insertion date 3/27/2019     Product Number:  [id number]   Lot No: [id number]   Gauge: 5FR   Lumen: DUAL   RIGHT BRACHIAL VEIN    Vein Diameter : 0.52CM   Upper arm circumference (10CM ABOVE AC): 28CM   Catheter Length : 42CM   Internal Length: 42CM   Exposed Catheter Length: 0CM   Ultrasound Used:YES  ZOFIA Engseye confirms PICC tip is placed in the lower 1/3 of the SVC or at the Cavoatrial junction. Floor nurse notified PICC is okay to use. : ARIADNA Calvo Ace, RN

## 2019-03-27 NOTE — PROGRESS NOTES
Infectious Disease  Progress Note  [facility]    Chief Complaint: weakness    Subjective:  Feels better today and Is more animated     Scheduled Meds:   insulin lispro  0-18 Units Subcutaneous Q4H    ampicillin IV  2 g Intravenous 6 times per day    cefTRIAXone (ROCEPHIN) IV  2 g Intravenous Q24H    lidocaine 1 % injection  5 mL Intradermal Once    sodium chloride flush  10 mL Intravenous 2 times per day    indomethacin  100 mg Rectal 60 Min Pre-Op    sodium chloride  250 mL Intravenous Once    sodium chloride flush  10 mL Intravenous 2 times per day    lisinopril  20 mg Oral Daily    metoprolol succinate  50 mg Oral Daily    pantoprazole  40 mg Oral QAM AC    pravastatin  80 mg Oral Nightly    tamsulosin  0.4 mg Oral Daily    predniSONE  40 mg Oral Daily    piperacillin-tazobactam  3.375 g Intravenous Q8H     Continuous Infusions:   dextrose       PRN Meds:glucose, dextrose, glucagon (rDNA), dextrose, sodium chloride flush, acetaminophen, magnesium hydroxide, ondansetron, sodium chloride flush, HYDROmorphone    ROS:  Constitutional:No Fever, chills or rigors. No unexplained weight loss. HEENT: No headache, dizziness or lightheadedness. No recent change in vision or hearing. No sore throat or runny nose. Respiratory: no cough, chest pain, shortness of breath or wheeze. Cardiovascular: no chest pain or palpitations  Gastrointestinal: no nausea, vomiting, diarrhea, constipation. No blood in stool. Genitourinary: No h/o dysuria, hematuria, urgency, frequency or incontinence. Musculoskeletal: No h/o joint pain anywhere in body, or bodyache. Neurologic: No h/o passing out, focal weakness or seizure. Skin: No h/o rashes or easy bruising. Hematologic: No gum bleeding or easy bruising. No hemoptysis.     Patient Vitals for the past 24 hrs:   BP Temp Temp src Pulse Resp SpO2 Height Weight   03/27/19 0926 -- -- -- -- -- -- -- 158 lb 6 oz (71.8 kg)   03/27/19 0745 100/72 98.3 °F (36.8 °C) Oral 62 16 99 % 5' 10\" (1.778 m) --   03/26/19 1930 (!) 105/58 97.5 °F (36.4 °C) Oral 67 16 98 % -- --       CBC with Differential:    Lab Results   Component Value Date    WBC 7.2 03/27/2019    WBC 9.0 03/26/2019    WBC 11.0 03/25/2019    WBC 15.0 03/24/2019    WBC 18.5 03/23/2019    RBC 3.50 03/27/2019    RBC 3.46 03/26/2019    RBC 3.52 03/25/2019    RBC 3.26 03/24/2019    RBC 3.06 03/23/2019    HGB 10.0 03/27/2019    HGB 10.0 03/26/2019    HGB 10.3 03/25/2019    HGB 9.5 03/24/2019    HGB 9.0 03/23/2019    HCT 30.5 03/27/2019    HCT 29.6 03/26/2019    HCT 29.6 03/25/2019    HCT 28.3 03/24/2019    HCT 26.3 03/23/2019     03/27/2019    PLT 67 03/26/2019    PLT 34 03/25/2019    PLT 34 03/24/2019    PLT 52 03/23/2019    MCV 87.1 03/27/2019    MCV 85.5 03/26/2019    MCV 84.1 03/25/2019    MCV 86.8 03/24/2019    MCV 85.9 03/23/2019    MCH 28.6 03/27/2019    MCH 28.9 03/26/2019    MCH 29.3 03/25/2019    MCH 29.1 03/24/2019    MCH 29.4 03/23/2019    MCHC 32.8 03/27/2019    MCHC 33.8 03/26/2019    MCHC 34.8 03/25/2019    MCHC 33.6 03/24/2019    MCHC 34.2 03/23/2019    RDW 15.5 03/27/2019    RDW 15.4 03/26/2019    RDW 15.0 03/25/2019    RDW 14.8 03/24/2019    RDW 14.6 03/23/2019    NRBC 0.0 03/26/2019    NRBC 0.9 01/15/2019    SEGSPCT 68 01/11/2013    SEGSPCT 65 01/08/2013    LYMPHOPCT 4.0 03/27/2019    LYMPHOPCT 6.1 03/26/2019    LYMPHOPCT 2.6 03/21/2019    LYMPHOPCT 1.7 03/18/2019    LYMPHOPCT 12.9 02/07/2019    MONOPCT 3.0 03/27/2019    MONOPCT 7.0 03/26/2019    MONOPCT 6.1 03/21/2019    MONOPCT 2.6 03/18/2019    MONOPCT 7.5 02/07/2019    MYELOPCT 0.9 01/06/2019    BASOPCT 0.0 03/27/2019    BASOPCT 0.0 03/26/2019    BASOPCT 0.2 03/21/2019    BASOPCT 0.1 03/18/2019    BASOPCT 0.3 02/07/2019    MONOSABS 0.22 03/27/2019    MONOSABS 0.63 03/26/2019    MONOSABS 0.64 03/21/2019    MONOSABS 0.30 03/18/2019    MONOSABS 0.51 02/07/2019    LYMPHSABS 0.43 03/27/2019    LYMPHSABS 0.54 03/26/2019    LYMPHSABS 0.32 03/21/2019    LYMPHSABS 0.20 03/18/2019    LYMPHSABS 0.88 02/07/2019    EOSABS 0.00 03/27/2019    EOSABS 0.00 03/26/2019    EOSABS 0.00 03/21/2019    EOSABS 0.00 03/18/2019    EOSABS 0.02 02/07/2019    BASOSABS 0.00 03/27/2019    BASOSABS 0.00 03/26/2019    BASOSABS 0.00 03/21/2019    BASOSABS 0.00 03/18/2019    BASOSABS 0.02 02/07/2019     CMP:    Lab Results   Component Value Date     03/27/2019    K 5.6 03/27/2019    K 3.9 01/22/2019     03/27/2019    CO2 17 03/27/2019    BUN 19 03/27/2019    BUN 18 03/26/2019    BUN 16 03/25/2019    BUN 22 03/24/2019    BUN 28 03/23/2019    CREATININE 0.9 03/27/2019    CREATININE 1.0 03/26/2019    CREATININE 0.9 03/25/2019    CREATININE 1.0 03/24/2019    CREATININE 1.1 03/23/2019    GFRAA >60 03/27/2019    LABGLOM >60 03/27/2019    GLUCOSE 415 03/27/2019    PROT 5.8 03/27/2019    LABALBU 1.8 03/27/2019    CALCIUM 7.3 03/27/2019    BILITOT 1.8 03/27/2019    ALKPHOS 277 03/27/2019    AST 22 03/27/2019    ALT 60 03/27/2019     No results found for: CRP  No results found for: SEDRATE      /72   Pulse 62   Temp 98.3 °F (36.8 °C) (Oral)   Resp 16   Ht 5' 10\" (1.778 m)   Wt 158 lb 6 oz (71.8 kg)   SpO2 99%   BMI 22.72 kg/m²     Physical Exam  Const/Neuro- unchanged, no signs of acute distress, Alert  ENMT- Within Normal Limits, Normocephalic, mucous membranes pink/moist, No thrush  Neck: Neck supple  Heart- Regular, Rate, Rhythm- no murmur appreciated. Lungs- clear to ascultation. Respirations even and nonlabored. Abdomen- Soft, bowel sounds positive, non tender  Musculo/Extremities-  Equal and symmetrical, no edema. No tenderness. Neurological- No focal motor or sensory loss. No confusion  Skin:  Warm and dry, free from rashes. Cultures reviewed  Blood cult from 3-25 + strep and all previous blood cult + E avium FROM 3-22-19  Radiology reviewed  US RETROPERITONEAL LIMITED   Final Result      XR ABDOMEN (KUB) (SINGLE AP VIEW)   Final Result   No evidence of abnormal stones. FL ERCP BILIARY AND PANCREATIC S&I   Final Result   Fluoroscopic guidance during ERCP, distal stent does not   demonstrate opacifications, clinical correlation with findings during   dynamic exam recommended.                       Assessment  Ascending cholangitis and polymicrobial sepsis   SBE c E avium as part of the GI issue; ECHO NOT DIAGNOSTIC BUT PERSISTENT BACTEREMIA IS DIAGNOSTIC     Plan  Amp and ceftriaxone X 6 WEEKS  picc  BLOOD CULT FOLLOW UPS    Electronically signed by Carey Lentz MD on 3/27/2019 at 12:04 PM

## 2019-03-27 NOTE — PROGRESS NOTES
Patient received boxed lunch due to clears at dinner time diet advanced. Had two cans of chicken noodle and still hungry.

## 2019-03-27 NOTE — PROGRESS NOTES
[facility]   Progress Note    Admitting Date and Time: 3/25/2019 11:13 AM  Admit Dx: Biliary obstruction [K83.1]  Biliary obstruction [K83.1]    Subjective: Patient was seen and examined this a.m. His wife was in the room. He reported that he was able to tolerate breakfast without any nausea, vomiting or abdominal pain. ROS: denies fever, chills, cp, sob, n/v, HA unless stated above.      insulin lispro  0-18 Units Subcutaneous TID WC    insulin lispro  0-9 Units Subcutaneous Nightly    ampicillin IV  2 g Intravenous 6 times per day    cefTRIAXone (ROCEPHIN) IV  2 g Intravenous Q24H    lidocaine 1 % injection  5 mL Intradermal Once    sodium chloride flush  10 mL Intravenous 2 times per day    indomethacin  100 mg Rectal 60 Min Pre-Op    sodium chloride  250 mL Intravenous Once    sodium chloride flush  10 mL Intravenous 2 times per day    lisinopril  20 mg Oral Daily    metoprolol succinate  50 mg Oral Daily    pantoprazole  40 mg Oral QAM AC    pravastatin  80 mg Oral Nightly    tamsulosin  0.4 mg Oral Daily    predniSONE  40 mg Oral Daily    piperacillin-tazobactam  3.375 g Intravenous Q8H       glucose 15 g PRN   dextrose 12.5 g PRN   glucagon (rDNA) 1 mg PRN   dextrose 100 mL/hr PRN   sodium chloride flush 10 mL PRN   acetaminophen 650 mg Q4H PRN   magnesium hydroxide 30 mL Daily PRN   ondansetron 4 mg Q6H PRN   sodium chloride flush 10 mL PRN   HYDROmorphone 0.5 mg Q4H PRN        Objective:    /72   Pulse 62   Temp 98.3 °F (36.8 °C) (Oral)   Resp 16   Ht 5' 10\" (1.778 m)   Wt 158 lb 6 oz (71.8 kg)   SpO2 99%   BMI 22.72 kg/m²   General Appearance: alert and oriented to person, place and time and in no acute distress  Skin: warm and dry  Head: normocephalic and atraumatic  Pulmonary/Chest: clear to auscultation bilaterally- no wheezes, rales or rhonchi, normal air movement, no respiratory distress  Cardiovascular: normal rate, normal S1 and S2 and no carotid bruits  Abdomen: soft, non-tender, non-distended, normal bowel sounds, no masses or organomegaly  Extremities: no cyanosis, no clubbing and no edema  Neurologic: no cranial nerve deficit and speech normal      Recent Labs     03/25/19  0759 03/26/19  0835 03/27/19  0310   * 128* 124*   K 4.3 4.7 5.6*    103 100   CO2 19* 18* 17*   BUN 16 18 19   CREATININE 0.9 1.0 0.9   GLUCOSE 129* 177* 415*   CALCIUM 7.4* 7.3* 7.3*       Recent Labs     03/25/19  0759 03/26/19  0835 03/27/19  0310   WBC 11.0 9.0 7.2   RBC 3.52* 3.46* 3.50*   HGB 10.3* 10.0* 10.0*   HCT 29.6* 29.6* 30.5*   MCV 84.1 85.5 87.1   MCH 29.3 28.9 28.6   MCHC 34.8* 33.8 32.8   RDW 15.0 15.4* 15.5*   PLT 34* 67* 111*   MPV 13.5* 12.9* 13.6*           Radiology:   US RETROPERITONEAL LIMITED   Final Result      XR ABDOMEN (KUB) (SINGLE AP VIEW)   Final Result   No evidence of abnormal stones. FL ERCP BILIARY AND PANCREATIC S&I   Final Result   Fluoroscopic guidance during ERCP, distal stent does not   demonstrate opacifications, clinical correlation with findings during   dynamic exam recommended. 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 *      Plan:  1. Status post ERCP on March 26, 2019 with restenting. 2. Continue steroids for the pancreatitis. 3. Platelets are improving today. Hit panel still pending. We'll hold off on heparin. 4. Bacteremia with E. coli and Streptococcus. 1 ampicillin and ceftriaxone as per ID recommendations. ID input is appreciated. ECHO negative for vegetations. 5. Hypertension, blood pressure is stable right now we'll continue lisinopril and Toprol-XL  6. Hyperlipidemia continue statin  7. Hyponatremia 124 today but the corrected sodium for hypoglycemia is 129.   Giving the hyperkalemia as well and the metabolic acidosis will order cortisol level to rule out acute adrenal insufficiency. 8. Hyperkalemia noticed today. Follow cortisol levels. 9. Hyperglycemia, likely secondary to stress. He has a recent A1c in January was 5.3. NOTE: This report was transcribed using voice recognition software.  Every effort was made to ensure accuracy; however, inadvertent computerized transcription errors may be present.     Electronically signed by Breann Lanza MD on 3/27/2019 at 10:32 AM

## 2019-03-27 NOTE — PLAN OF CARE
Problem: Falls - Risk of:  Goal: Will remain free from falls  Description  Will remain free from falls  3/26/2019 2320 by Isaac Malave RN  Outcome: Met This Shift  3/26/2019 1131 by Kei Doty RN  Outcome: Met This Shift

## 2019-03-27 NOTE — PROGRESS NOTES
Occupational Therapy  OT BEDSIDE TREATMENT NOTE      Date:3/27/2019  Patient Name: Susan Wooten  MRN: [id number]  : 1950  Room: [address] [place], [place] [address]     Evaluating OT: Cary Back OTR/L [id number]     AM-PAC Daily Activity Raw Score: 15/24  Recommended Adaptive Equipment:  TBD      Diagnosis: Bacteremia. S/p PROCEDURE PERFORMED:  ERCP with stent placement 3/25/19   PMHX:  CA, Syncope,   Precautions:  Falls, bed alarm     Home Living: Info obtained from family member:   Pt lives alone, but  is moving from current residence[facility]  Prior Level of Function: per family member, - patient was not able to care for himself, falls, has a walker      Pain Level: no pain    Cognition:  Alert, grossly oriented                           Judgement/safety:  Fair-                Functional Assessment:    Initial Eval Status  Date: 3/26/19 Tx Session   Date: 3/27/19 Short Term Goals  Treatment frequency: PRN   Feeding Set-up        Grooming SBA/set-up ,seated    Mod I    UB Dressing Min A    Mod I    LB Dressing Mod A  Max A to don socks  Mod I    Bathing         Toileting         Bed Mobility  SBA   supine to sit  Min A  Sit-supine     Supine to sit: SBA    Sit to supine: Min A to lift B LE on bed     Functional Transfers Mod A  Sit-stand from bed  Noted decrease balance, safety      Patient c/o dizziness upon standing        STS: Min A from EOB  Mod I    Functional Mobility Mod Aw/walker   Attempting side steps   Mod A using FWW limited distance in room due to fatigue and unsteadiness. Posterior lean exhibited by pt. Mod I with good tolerance            B UE were WFL during tasks. Comments: Upon arrival pt was supine in bed with visitor present. Pt dragged R foot during functional mobility but pt reported it was his \"normal\" gait pattern. At end of session pt was transferred to bed per pt request with alarm on and all lines and tubes intact, call light within reach.      Education: Hand placement during transfers. · Pt has made good progress towards set goals.    · Continue with current plan of care      Total Tx Time: [address] HOLMAN/L [id number]

## 2019-03-27 NOTE — PROGRESS NOTES
HEPATOBILIARY AND PANCREATIC SURGERY  DAILY PROGRESS NOTE  3/27/2019    CC: biliary stent occlusion    Subjective:  No complaints this morning. No n/v. Afebrile, tolerating diet, no problems overnight, +BM    Objective:  BP (!) 105/58   Pulse 67   Temp 97.5 °F (36.4 °C) (Oral)   Resp 16   SpO2 98%     GENERAL:  Laying in bed, drowsy, no distress  HEAD: Normocephalic, atraumatic  LUNGS:  No increased work of breathing on room air  CARDIOVASCULAR:  RR  ABDOMEN:  Soft, non-tender, mildly distended  SKIN: Warm and dry    Assessment/Plan:  76 y.o. 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 Continue prednisone 2 weeks, then taper  Thrombocytopenia--HIT labs pending. No heparin.   Bacteremia--amp and ceftriaxone per ID, will require PICC placement prior to DC  Echo wnl  Hyperbilirubinemia improving  Hyperglycemia: start High dose SSI  Nephrology consult for electrolyte derangements      Electronically signed by Arcadio Esqueda MD on 3/27/2019 at 5:18 AM     Patient states that he is doing well  Continue prednisone  Greatly appreciate ID and nephrology's standpoint    Electronically signed by Jocelyne Wang MD on 3/27/2019 at 2:26 PM

## 2019-03-27 NOTE — PLAN OF CARE
Problem: Falls - Risk of:  Goal: Will remain free from falls  Description  Will remain free from falls  3/27/2019 0946 by Sabine Antunez RN  Outcome: Met This Shift    Problem: Risk for Impaired Skin Integrity  Goal: Tissue integrity - skin and mucous membranes  Description  Structural intactness and normal physiological function of skin and  mucous membranes.   3/27/2019 0946 by Sabine Antunez RN  Outcome: Met This Shift

## 2019-03-27 NOTE — PROGRESS NOTES
Physical Therapy    Facility/Department: [facility] SURGERY  Initial Assessment    NAME: Lucy Hernández  : 1950  MRN: [id number]    Date of Service: 3/27/2019       REQUIRES PT FOLLOW UP: Yes       Patient Diagnosis(es): There were no encounter diagnoses. has a past medical history of Cancer [facility]), Cerebral artery occlusion with cerebral infarction [facility]), Hypertension, and Syncope.   has a past surgical history that includes whipple procedure (N/A, 2019); eye surgery; Upper gastrointestinal endoscopy (N/A, 3/8/2019); Upper gastrointestinal endoscopy (N/A, 3/8/2019); ERCP (N/A, 3/25/2019); and picc line insertion nurse (3/27/2019). Evaluating Therapist: Meir Allen PT       Room #: 312   DIAGNOSIS:  Biliary obstruction  PRECAUTIONS: falls     Social:  Pt lives alone  in a  Second  floor apartment with  15  steps and  1 rails to enter. States he plans to move to [facility]  Prior to admission pt walked with  ww     Pt a questionable historian      Initial Evaluation  Date: 3/27/19 Treatment      Short Term/ Long Term   Goals   Was pt agreeable to Eval/treatment? yes      Does pt have pain? R sided abd pain      Bed Mobility  Rolling:  SBA   Supine to sit:  Min assist   Sit to supine:  SBA   Scooting:  Min assist in sit    S/I    Transfers Sit to stand: Mod assist   Stand to sit:  Min assist   Stand pivot:  NT    SBA    Ambulation     30 feet with  ww  with  Min assist    feet with  ww  with SBA /CGA       Stair negotiation: ascended and descended Nt    4 steps with 1-2  rail with  CGA/min assist    LE ROM  WFL     LE strength  4- to 4/ 5      AM- PAC RAW score   15/ 24            Pt is alert and Oriented x  2      Balance:  Min assist with gait, high fall risk  , R LE instability noted with gait   Endurance: decreased   Bed alarm:  yes     ASSESSMENT  Pt displays functional ability as noted in the objective portion of this evaluation.       Comments/Treatment:  Increased fall risk due to R LE instability with gait. Pt requires cues for ww safety, to keep feet in between base of ww. RTB after mobility as pt reports fatigue from a lot of testing today , call light in reach        Examination of body systems Decreased   Functional mobility x   ROM    Strength x   Safety Awareness x   Cognition x   Endurance x   Sensation    Balance x   Vision/Visual Deficits    Coordination        Patient education  Pt educated on  Fall risk, ww safety     Patient response to education:   Pt verbalized understanding Pt demonstrated skill Pt requires further education in this area   x  x     Rehab potential is Good for reaching above PT goals. Pts/ family goals   1. None stated     Patient and or family understand(s) diagnosis, prognosis, and plan of care. -  Yes ? PLAN  PT care will be provided in accordance with the objectives noted above. Whenever appropriate, clear delegation orders will be provided for nursing staff. Exercises and functional mobility practice will be used as well as appropriate assistive devices or modalities to obtain goals. Patient and family education will also be administered as needed. Frequency of treatments will be 2-3 x/week x  7 days.     Time in:  1610   Time out:  Elvis Sandoval number:  PT [id number]

## 2019-03-27 NOTE — CONSULTS
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          His serum Na was noted low at 130 --128 range since he had been started on IVF and     He did admit to drinking more fluids , his blood sugar shot up to 415 this am and hence his NA was     Noted further lower at 124 however his corrected NA for sugar was at 129       He appears comfortable , stable and denies any abdominal pain         Also patient developed urinary retention yesterday , post removal of talavera which was replaced     And being treated for urosepsis          Past Medical History:   Diagnosis Date    Cancer [facility])     pancreatic    Cerebral artery occlusion with cerebral infarction ([facility] [address]     Hypertension     Syncope          Past Surgical History:   Procedure Laterality Date    ERCP N/A 3/25/2019    ERCP STENT INSERTION performed by Kushal Melgar MD at [facility]  3/27/2019         UPPER GASTROINTESTINAL ENDOSCOPY N/A 3/8/2019    REDO ENDOSCOPIC EGD  ULTRASOUND performed by Malathi Miller DO at [address] [facility] N/A 3/8/2019    EGD DIAGNOSTIC ONLY performed by Malathi Miller DO at [address] [facility] N/A 1/16/2019    EXPLORATORY LAPAROTOMY; ABORTED WHIPPLE PROCEDURE; GASTROJEJUNOSTOMY; OPEN CHOLECYSTECTOMY performed by Lena Billingsley MD at [address] [place] [address] History     Socioeconomic History    Marital status:      Spouse name: Not on file    Number of children: Not on file    Years of education: Not on file    Highest education level: Not on file   Occupational History    Not on file   Social Needs    Financial resource strain: Not on file    Food insecurity:     Worry: Not on file     Inability: Not on file    Transportation needs:     Medical: Not on file     Non-medical: Not on file   Tobacco Use    Smoking status: Never Smoker    Smokeless tobacco: Never Used   Substance and Sexual Activity    Alcohol use:  Yes     Alcohol/week: 4.8 oz     Types: 8 Cans of beer per week     Comment: states drinks one 24 ounce beer daily    Drug use: No     Types: Marijuana    Sexual activity: Not Currently   Lifestyle    Physical activity:     Days per week: Not on file     Minutes per session: Not on file    Stress: Not on file   Relationships    Social connections:     Talks on phone: Not on file     Gets together: Not on file     Attends Hoahaoism service: Not on file     Active member of club or organization: Not on file     Attends meetings of clubs or organizations: Not on file     Relationship status: Not on file    Intimate partner violence:     Fear of current or ex partner: Not on file     Emotionally abused: Not on file     Physically abused: Not on file     Forced sexual activity: Not on file   Other Topics Concern    Not on file   Social History Narrative    Not on file       Family History  Family History   Problem Relation Age of Onset    Cancer Mother 50        breast       Scheduled Meds:   insulin lispro  0-18 Units Subcutaneous Q4H    ampicillin IV  2 g Intravenous 6 times per day    cefTRIAXone (ROCEPHIN) IV  2 g Intravenous Q24H    sodium chloride flush  10 mL Intravenous 2 times per day    indomethacin  100 mg Rectal 60 Min Pre-Op    sodium chloride  250 mL Intravenous Once    sodium chloride flush  10 mL Intravenous 2 times per day    lisinopril  20 mg Oral Daily    metoprolol succinate  50 mg Oral Daily    pantoprazole  40 mg Oral QAM AC    pravastatin  80 mg Oral Nightly    tamsulosin  0.4 mg Oral Daily    predniSONE  40 mg Oral Daily    piperacillin-tazobactam  3.375 g Intravenous Q8H       Continuous Infusions:  [unfilled]    PRN meds  glucose, dextrose, glucagon (rDNA), dextrose, sodium chloride flush, acetaminophen, magnesium hydroxide, ondansetron, sodium chloride flush, HYDROmorphone    Allergies:  Patient has no known allergies. Review of Systems     Pertinent positives and negatives as in HPI. Other systems reviewed and were negative. LAST 3 CMP  Recent Labs     03/25/19 0759 03/26/19  0835 03/27/19  0310   * 128* 124*   K 4.3 4.7 5.6*    103 100   CO2 19* 18* 17*   BUN 16 18 19   CREATININE 0.9 1.0 0.9   GLUCOSE 129* 177* 415*   CALCIUM 7.4* 7.3* 7.3*   PROT 5.4* 5.4* 5.8*   LABALBU 1.7* 1.5* 1.8*   BILITOT 7.2* 2.4* 1.8*   ALKPHOS 388* 311* 277*   * 45* 22       LAST 3 CBC:  Recent Labs     03/25/19 0759 03/26/19  0835 03/27/19  0310   WBC 11.0 9.0 7.2   RBC 3.52* 3.46* 3.50*   HGB 10.3* 10.0* 10.0*   HCT 29.6* 29.6* 30.5*   PLT 34* 67* 111*         Intake/Output Summary (Last 24 hours) at 3/27/2019 1446  Last data filed at 3/27/2019 0539  Gross per 24 hour   Intake --   Output 1350 ml   Net -1350 ml     Patient Vitals for the past 24 hrs:   BP Temp Temp src Pulse Resp SpO2 Height Weight   03/27/19 1216 125/65 98 °F (36.7 °C) Oral 66 16 -- -- --   03/27/19 0926 -- -- -- -- -- -- -- 158 lb 6 oz (71.8 kg)   03/27/19 0745 100/72 98.3 °F (36.8 °C) Oral 62 16 99 % 5' 10\" (1.778 m) --   03/26/19 1930 (!) 105/58 97.5 °F (36.4 °C) Oral 67 16 98 % -- --       General appearance:  Appears stated age  Skin: color, texture, turgor normal. No rashes or lesions.   Neck: supple, no masses, no JVD, No carotid bruits, No thyromegaly  Lungs: respirations unlabored. Clear to auscultation. No rales, wheezes, no rhonchi. Equal chest excursion with respirations. Heart RRR. pmi not laterally displaced. No S3 or S4, no rub  Abdomen:  Soft, ND, NT. Bowel sounds present. No HSM. No epigastric bruit, no increased Ao pulsation,  Extremities: warm to touch. No LE edema or cyanosis. No fem bruit. 2+ upstrokes and equal bilaterally. PT/Pedal 2+ equal bilat  Neuro: Cr N 2-12 grossly intact. No focal motor neuro deficit. No alteration in recent remote memory.     Assessment/Plan                                      Hyponatremia in the setting of billiary duct obstruction , s/p stent placement                                     Acute retention of urine s/p catheter replacement , Urosepsis and on IVF with k                                          supplement , and excess fluid intake , his IVF are at present on hold , fluid                                                  restricted , and corrected NA for high sugars is at 129                                       Autoimmune pancreatitis VS pancreatic Malignancy ( biopsy neg so far )                                       Ascending cholangitis and polymicrobial sepsis with possible Subacute bacterial                                     Endocarditis on AB per ID                                        DM uncontrolled , steroids on board , on insulin                                        Acute urinary retention , UTI , s/p talavera replacement      further recommendations based on repeat BMP     Thank you for allowing us to participate in the care of [address]  3/27/2019  2:46 PM

## 2019-03-27 NOTE — PROGRESS NOTES
[facility]  PROGRESS NOTE    Chief Complaint:  BPH/Retention/UTI  HPI:  He feels well. He denies any catheter discomfort. He has taken Flomax for some time without side effects he states. He hopes to go to rehab soon    Vitals:    03/26/19 1930   BP: (!) 105/58   Pulse: 67   Resp: 16   Temp: 97.5 °F (36.4 °C)   SpO2: 98%       Allergies: Patient has no known allergies. PAST MEDICAL HISTORY:   Past Medical History:   Diagnosis Date    Cancer [facility])     pancreatic    Cerebral artery occlusion with cerebral infarction ([facility] [address]     Hypertension     Syncope        PAST SURGICAL HISTORY:   Past Surgical History:   Procedure Laterality Date    ERCP N/A 3/25/2019    ERCP STENT INSERTION performed by Jaimee Woodson MD at [address] [facility] ENDOSCOPY N/A 3/8/2019    REDO ENDOSCOPIC EGD  ULTRASOUND performed by Janelle Kendrick DO at [address] [place] [address] N/A 3/8/2019    EGD DIAGNOSTIC ONLY performed by Janelle Kendrick DO at [address] [place] [address] N/A 1/16/2019    EXPLORATORY LAPAROTOMY; ABORTED WHIPPLE PROCEDURE; GASTROJEJUNOSTOMY; OPEN CHOLECYSTECTOMY performed by Sari Sr MD at [facility]        PAST FAMILY HISTORY:    Family History   Problem Relation Age of Onset    Cancer Mother 50        breast       PAST SOCIAL HISTORY:    Social History     Socioeconomic History    Marital status:      Spouse name: Not on file    Number of children: Not on file    Years of education: Not on file    Highest education level: Not on file   Occupational History    Not on file   Social Needs    Financial resource strain: Not on file    Food insecurity:     Worry: Not on file     Inability: Not on file    Transportation needs:     Medical: Not on file     Non-medical: Not on file   Tobacco Use    Smoking status: Never Smoker    Smokeless tobacco: Never Used   Substance and Sexual Activity    Alcohol use:  Yes Alcohol/week: 4.8 oz     Types: 8 Cans of beer per week     Comment: states drinks one 24 ounce beer daily    Drug use: No     Types: Marijuana    Sexual activity: Not Currently   Lifestyle    Physical activity:     Days per week: Not on file     Minutes per session: Not on file    Stress: Not on file   Relationships    Social connections:     Talks on phone: Not on file     Gets together: Not on file     Attends Yazidism service: Not on file     Active member of club or organization: Not on file     Attends meetings of clubs or organizations: Not on file     Relationship status: Not on file    Intimate partner violence:     Fear of current or ex partner: Not on file     Emotionally abused: Not on file     Physically abused: Not on file     Forced sexual activity: Not on file   Other Topics Concern    Not on file   Social History Narrative    Not on file       IV:    0.9% NaCl with KCl 20 mEq 100 mL/hr at 03/26/19 1805       PRN: sodium chloride flush, acetaminophen, magnesium hydroxide, ondansetron, sodium chloride flush, HYDROmorphone    Scheduled:    ampicillin IV  2 g Intravenous 6 times per day    cefTRIAXone (ROCEPHIN) IV  2 g Intravenous Q24H    lidocaine 1 % injection  5 mL Intradermal Once    sodium chloride flush  10 mL Intravenous 2 times per day    indomethacin  100 mg Rectal 60 Min Pre-Op    sodium chloride  250 mL Intravenous Once    sodium chloride flush  10 mL Intravenous 2 times per day    lisinopril  20 mg Oral Daily    metoprolol succinate  50 mg Oral Daily    pantoprazole  40 mg Oral QAM AC    pravastatin  80 mg Oral Nightly    tamsulosin  0.4 mg Oral Daily    predniSONE  40 mg Oral Daily    piperacillin-tazobactam  3.375 g Intravenous Q8H       Lab Results   Component Value Date     03/27/2019    K 5.6 03/27/2019    K 3.9 01/22/2019    BUN 19 03/27/2019    CREATININE 0.9 03/27/2019        Lab Results   Component Value Date    HGB 10.0 03/27/2019    HCT 30.5 03/27/2019       No results found for: PSA    Constitutional: Tired  Eyes: negative  Ears, nose, mouth, throat, and face: negative  Respiratory: negative  Cardiovascular: negative  Gastrointestinal: negative  Genitourinary: BPH  Musculoskeletal: negative  Neurological: Stroke  Behavioral/Psych: negative  Endocrine: Pancreatic cancer    Skin dry, without rashes  Respirations non-labored, intact  Abdomen soft, non-tender, non-distended. Active bowel sounds. Alert and oriented x3  Freed draining clear, yellow urine      Assessment and Plan:  BPH/Retention/UTI  -Urine culture grew Klebsiella. Continue antibiotics  -Continue Flomax  -Renal ultrasound and KUB were negative for upper tract obstruction or stone disease  -He is stable for discharge to rehab from my standpoint with the catheter to a leg bag. He will be given a voiding trial as an outpatient once he is fully ambulatory and medically stable.   -He will follow-up in the office in several weeks  -PSA, rectal exam, bladder scan residual will be measured as an outpatient    Esteban Odonnell MD  3/27/2019  7:22 AM

## 2019-03-27 NOTE — PROGRESS NOTES
LFTs  · Electrolyte abnormalities defer to PCP  · Recent EUS with Dr. Radha Reich- negative for CA  · CA [id number] (3/18/19)  · +UTI- Klebsiella  · ERCP 3/25/19: SURPRISINGLY PT. HAS AN UNCOVERED WALL STENT WITH OBSTRUCTING TISSUE INGROWTH !! MASSIVE DILATATION OF CBD PROXIMALLY WITH DISTAL OBSTRUCTION. FULLY COVERED WALL STENT [id number] WAS PLACED INSIDE THE UNCOVERED ONE AND A REMARKABLE AMOUNT OF PURULENT BILIARY DRAINAGE WAS OBTAINED.  PT. TOLERATED WELL      Plan:     · Continue IVF as ordered  · IV antibiotics per ID  · Protonix 40 MG PO daily  · Low fat diet, with FR  · Continue Prednisone per Surgery orders  · Continue to medicate for pain, nausea as ordered  · Monitor CBC, CMP, amylase, lipase daily  · Discharge planning  · OP follow up in 1 month  · OK to DC from GI POV, when OK with others      Discussed with Dr. Corrinne Jabs per Dr. Sabas Ghosh, NP-C 3/27/2019 8:48 AM For Dr. Kimber Montilla

## 2019-03-27 NOTE — PATIENT CARE CONFERENCE
[facility] Quality Flow/Interdisciplinary Rounds Progress Note        Quality Flow Rounds held on March 27, 2019    Disciplines Attending:  Bedside Nurse, ,  and Nursing Unit Leadership    Denise Sánchez was admitted on 3/25/2019 11:13 AM    Anticipated Discharge Date:  Expected Discharge Date: 03/26/19    Disposition:    Dylan Score:  Dylan Scale Score: 20    Readmission Risk              Risk of Unplanned Readmission:        18           Discussed patient goal for the day, patient clinical progression, and barriers to discharge. The following Goal(s) of the Day/Commitment(s) have been identified:  Patient education regarding hyperglycemia control. Safety.       [facility]  March 27, 2019

## 2019-03-28 LAB
ALBUMIN SERPL-MCNC: 1.9 G/DL (ref 3.5–5.2)
ALP BLD-CCNC: 239 U/L (ref 40–129)
ALT SERPL-CCNC: 48 U/L (ref 0–40)
AMYLASE: 72 U/L (ref 20–100)
ANION GAP SERPL CALCULATED.3IONS-SCNC: 6 MMOL/L (ref 7–16)
ANION GAP SERPL CALCULATED.3IONS-SCNC: 7 MMOL/L (ref 7–16)
ANION GAP SERPL CALCULATED.3IONS-SCNC: 8 MMOL/L (ref 7–16)
ANION GAP SERPL CALCULATED.3IONS-SCNC: 8 MMOL/L (ref 7–16)
ANISOCYTOSIS: ABNORMAL
AST SERPL-CCNC: 22 U/L (ref 0–39)
ATYPICAL LYMPHOCYTE RELATIVE PERCENT: 0.9 % (ref 0–4)
BASOPHILS ABSOLUTE: 0 E9/L (ref 0–0.2)
BASOPHILS RELATIVE PERCENT: 0 % (ref 0–2)
BILIRUB SERPL-MCNC: 1.4 MG/DL (ref 0–1.2)
BLOOD CULTURE, ROUTINE: ABNORMAL
BLOOD CULTURE, ROUTINE: ABNORMAL
BUN BLDV-MCNC: 19 MG/DL (ref 8–23)
CALCIUM SERPL-MCNC: 7.6 MG/DL (ref 8.6–10.2)
CALCIUM SERPL-MCNC: 7.6 MG/DL (ref 8.6–10.2)
CALCIUM SERPL-MCNC: 7.7 MG/DL (ref 8.6–10.2)
CALCIUM SERPL-MCNC: 8 MG/DL (ref 8.6–10.2)
CHLORIDE BLD-SCNC: 100 MMOL/L (ref 98–107)
CHLORIDE BLD-SCNC: 103 MMOL/L (ref 98–107)
CHLORIDE BLD-SCNC: 105 MMOL/L (ref 98–107)
CHLORIDE BLD-SCNC: 99 MMOL/L (ref 98–107)
CO2: 16 MMOL/L (ref 22–29)
CO2: 18 MMOL/L (ref 22–29)
CO2: 20 MMOL/L (ref 22–29)
CO2: 20 MMOL/L (ref 22–29)
CREAT SERPL-MCNC: 0.8 MG/DL (ref 0.7–1.2)
CREAT SERPL-MCNC: 0.8 MG/DL (ref 0.7–1.2)
CREAT SERPL-MCNC: 0.9 MG/DL (ref 0.7–1.2)
CREAT SERPL-MCNC: 0.9 MG/DL (ref 0.7–1.2)
EOSINOPHILS ABSOLUTE: 0 E9/L (ref 0.05–0.5)
EOSINOPHILS RELATIVE PERCENT: 0 % (ref 0–6)
GFR AFRICAN AMERICAN: >60
GFR NON-AFRICAN AMERICAN: >60 ML/MIN/1.73
GLUCOSE BLD-MCNC: 192 MG/DL (ref 74–99)
GLUCOSE BLD-MCNC: 198 MG/DL (ref 74–99)
GLUCOSE BLD-MCNC: 203 MG/DL (ref 74–99)
GLUCOSE BLD-MCNC: 265 MG/DL (ref 74–99)
HCT VFR BLD CALC: 27.7 % (ref 37–54)
HEMOGLOBIN: 9.2 G/DL (ref 12.5–16.5)
HEPARIN PF4 ANTIBODY: 0.22 OD
HYPOCHROMIA: ABNORMAL
LIPASE: 12 U/L (ref 13–60)
LYMPHOCYTES ABSOLUTE: 0.38 E9/L (ref 1.5–4)
LYMPHOCYTES RELATIVE PERCENT: 4.3 % (ref 20–42)
MCH RBC QN AUTO: 28.7 PG (ref 26–35)
MCHC RBC AUTO-ENTMCNC: 33.2 % (ref 32–34.5)
MCV RBC AUTO: 86.3 FL (ref 80–99.9)
METER GLUCOSE: 165 MG/DL (ref 74–99)
METER GLUCOSE: 175 MG/DL (ref 74–99)
METER GLUCOSE: 182 MG/DL (ref 74–99)
METER GLUCOSE: 236 MG/DL (ref 74–99)
METER GLUCOSE: 254 MG/DL (ref 74–99)
METER GLUCOSE: 261 MG/DL (ref 74–99)
MONOCYTES ABSOLUTE: 0.23 E9/L (ref 0.1–0.95)
MONOCYTES RELATIVE PERCENT: 2.6 % (ref 2–12)
NEUTROPHILS ABSOLUTE: 6.99 E9/L (ref 1.8–7.3)
NEUTROPHILS RELATIVE PERCENT: 92.2 % (ref 43–80)
NUCLEATED RED BLOOD CELLS: 0 /100 WBC
ORGANISM: ABNORMAL
PDW BLD-RTO: 15.6 FL (ref 11.5–15)
PLATELET # BLD: 137 E9/L (ref 130–450)
PMV BLD AUTO: 13.5 FL (ref 7–12)
POLYCHROMASIA: ABNORMAL
POTASSIUM SERPL-SCNC: 4.8 MMOL/L (ref 3.5–5)
POTASSIUM SERPL-SCNC: 4.9 MMOL/L (ref 3.5–5)
POTASSIUM SERPL-SCNC: 5 MMOL/L (ref 3.5–5)
POTASSIUM SERPL-SCNC: 5.2 MMOL/L (ref 3.5–5)
RBC # BLD: 3.21 E12/L (ref 3.8–5.8)
SODIUM BLD-SCNC: 127 MMOL/L (ref 132–146)
SODIUM BLD-SCNC: 127 MMOL/L (ref 132–146)
SODIUM BLD-SCNC: 128 MMOL/L (ref 132–146)
SODIUM BLD-SCNC: 128 MMOL/L (ref 132–146)
TARGET CELLS: ABNORMAL
TOTAL PROTEIN: 5.7 G/DL (ref 6.4–8.3)
WBC # BLD: 7.6 E9/L (ref 4.5–11.5)

## 2019-03-28 PROCEDURE — 83690 ASSAY OF LIPASE: CPT

## 2019-03-28 PROCEDURE — 2580000003 HC RX 258: Performed by: INTERNAL MEDICINE

## 2019-03-28 PROCEDURE — 97530 THERAPEUTIC ACTIVITIES: CPT

## 2019-03-28 PROCEDURE — 36415 COLL VENOUS BLD VENIPUNCTURE: CPT

## 2019-03-28 PROCEDURE — 99232 SBSQ HOSP IP/OBS MODERATE 35: CPT | Performed by: INTERNAL MEDICINE

## 2019-03-28 PROCEDURE — 80048 BASIC METABOLIC PNL TOTAL CA: CPT

## 2019-03-28 PROCEDURE — 6370000000 HC RX 637 (ALT 250 FOR IP): Performed by: INTERNAL MEDICINE

## 2019-03-28 PROCEDURE — 85025 COMPLETE CBC W/AUTO DIFF WBC: CPT

## 2019-03-28 PROCEDURE — 82150 ASSAY OF AMYLASE: CPT

## 2019-03-28 PROCEDURE — 1200000000 HC SEMI PRIVATE

## 2019-03-28 PROCEDURE — 6360000002 HC RX W HCPCS: Performed by: INTERNAL MEDICINE

## 2019-03-28 PROCEDURE — 80053 COMPREHEN METABOLIC PANEL: CPT

## 2019-03-28 PROCEDURE — 87040 BLOOD CULTURE FOR BACTERIA: CPT

## 2019-03-28 PROCEDURE — 6370000000 HC RX 637 (ALT 250 FOR IP): Performed by: TRANSPLANT SURGERY

## 2019-03-28 PROCEDURE — 99232 SBSQ HOSP IP/OBS MODERATE 35: CPT | Performed by: TRANSPLANT SURGERY

## 2019-03-28 PROCEDURE — 6370000000 HC RX 637 (ALT 250 FOR IP): Performed by: STUDENT IN AN ORGANIZED HEALTH CARE EDUCATION/TRAINING PROGRAM

## 2019-03-28 PROCEDURE — 2580000003 HC RX 258: Performed by: SPECIALIST

## 2019-03-28 PROCEDURE — 6360000002 HC RX W HCPCS: Performed by: SPECIALIST

## 2019-03-28 PROCEDURE — 82962 GLUCOSE BLOOD TEST: CPT

## 2019-03-28 RX ORDER — SODIUM CHLORIDE 1000 MG
1 TABLET, SOLUBLE MISCELLANEOUS 2 TIMES DAILY WITH MEALS
Status: DISCONTINUED | OUTPATIENT
Start: 2019-03-28 | End: 2019-03-29 | Stop reason: HOSPADM

## 2019-03-28 RX ORDER — HEPARIN SODIUM (PORCINE) LOCK FLUSH IV SOLN 100 UNIT/ML 100 UNIT/ML
300 SOLUTION INTRAVENOUS EVERY 12 HOURS
Status: DISCONTINUED | OUTPATIENT
Start: 2019-03-28 | End: 2019-03-29 | Stop reason: HOSPADM

## 2019-03-28 RX ADMIN — AMPICILLIN SODIUM 2 G: 2 INJECTION, POWDER, FOR SOLUTION INTRAMUSCULAR; INTRAVENOUS at 01:16

## 2019-03-28 RX ADMIN — PIPERACILLIN SODIUM AND TAZOBACTAM SODIUM 3.38 G: 3; .375 INJECTION, POWDER, LYOPHILIZED, FOR SOLUTION INTRAVENOUS at 05:22

## 2019-03-28 RX ADMIN — AMPICILLIN SODIUM 2 G: 2 INJECTION, POWDER, FOR SOLUTION INTRAMUSCULAR; INTRAVENOUS at 12:59

## 2019-03-28 RX ADMIN — Medication 300 UNITS: at 11:16

## 2019-03-28 RX ADMIN — METOPROLOL SUCCINATE 50 MG: 50 TABLET, EXTENDED RELEASE ORAL at 08:52

## 2019-03-28 RX ADMIN — INSULIN LISPRO 3 UNITS: 100 INJECTION, SOLUTION INTRAVENOUS; SUBCUTANEOUS at 07:33

## 2019-03-28 RX ADMIN — LISINOPRIL 20 MG: 20 TABLET ORAL at 08:52

## 2019-03-28 RX ADMIN — INSULIN LISPRO 3 UNITS: 100 INJECTION, SOLUTION INTRAVENOUS; SUBCUTANEOUS at 11:18

## 2019-03-28 RX ADMIN — AMPICILLIN SODIUM 2 G: 2 INJECTION, POWDER, FOR SOLUTION INTRAMUSCULAR; INTRAVENOUS at 08:52

## 2019-03-28 RX ADMIN — Medication 10 ML: at 05:25

## 2019-03-28 RX ADMIN — PREDNISONE 40 MG: 20 TABLET ORAL at 08:53

## 2019-03-28 RX ADMIN — INSULIN LISPRO 9 UNITS: 100 INJECTION, SOLUTION INTRAVENOUS; SUBCUTANEOUS at 18:31

## 2019-03-28 RX ADMIN — INSULIN LISPRO 3 UNITS: 100 INJECTION, SOLUTION INTRAVENOUS; SUBCUTANEOUS at 03:53

## 2019-03-28 RX ADMIN — AMPICILLIN SODIUM 2 G: 2 INJECTION, POWDER, FOR SOLUTION INTRAMUSCULAR; INTRAVENOUS at 21:41

## 2019-03-28 RX ADMIN — PIPERACILLIN SODIUM AND TAZOBACTAM SODIUM 3.38 G: 3; .375 INJECTION, POWDER, LYOPHILIZED, FOR SOLUTION INTRAVENOUS at 21:58

## 2019-03-28 RX ADMIN — AMPICILLIN SODIUM 2 G: 2 INJECTION, POWDER, FOR SOLUTION INTRAMUSCULAR; INTRAVENOUS at 17:22

## 2019-03-28 RX ADMIN — INSULIN LISPRO 9 UNITS: 100 INJECTION, SOLUTION INTRAVENOUS; SUBCUTANEOUS at 21:59

## 2019-03-28 RX ADMIN — CEFTRIAXONE 2 G: 2 INJECTION, POWDER, FOR SOLUTION INTRAMUSCULAR; INTRAVENOUS at 17:29

## 2019-03-28 RX ADMIN — PIPERACILLIN SODIUM AND TAZOBACTAM SODIUM 3.38 G: 3; .375 INJECTION, POWDER, LYOPHILIZED, FOR SOLUTION INTRAVENOUS at 12:59

## 2019-03-28 RX ADMIN — INSULIN LISPRO 6 UNITS: 100 INJECTION, SOLUTION INTRAVENOUS; SUBCUTANEOUS at 14:54

## 2019-03-28 RX ADMIN — Medication 10 ML: at 21:59

## 2019-03-28 RX ADMIN — Medication 300 UNITS: at 21:40

## 2019-03-28 RX ADMIN — TAMSULOSIN HYDROCHLORIDE 0.4 MG: 0.4 CAPSULE ORAL at 08:53

## 2019-03-28 RX ADMIN — PANTOPRAZOLE SODIUM 40 MG: 40 TABLET, DELAYED RELEASE ORAL at 05:25

## 2019-03-28 RX ADMIN — Medication 10 ML: at 21:40

## 2019-03-28 RX ADMIN — AMPICILLIN SODIUM 2 G: 2 INJECTION, POWDER, FOR SOLUTION INTRAMUSCULAR; INTRAVENOUS at 05:22

## 2019-03-28 RX ADMIN — PRAVASTATIN SODIUM 80 MG: 20 TABLET ORAL at 21:42

## 2019-03-28 RX ADMIN — SODIUM CHLORIDE TAB 1 GM 1 G: 1 TAB at 19:25

## 2019-03-28 ASSESSMENT — PAIN SCALES - GENERAL
PAINLEVEL_OUTOF10: 0
PAINLEVEL_OUTOF10: 0

## 2019-03-28 ASSESSMENT — PAIN - FUNCTIONAL ASSESSMENT: PAIN_FUNCTIONAL_ASSESSMENT: ACTIVITIES ARE NOT PREVENTED

## 2019-03-28 NOTE — PROGRESS NOTES
Progress Note  3/28/2019 2:26 PM  Subjective:   Admit Date: 3/25/2019  PCP: Ham Perdomo DO  Interval History: Patient doing better feels ok no complains alert and responsive     Diet: DIET GENERAL; Carb Control: 5 carb choices (75 gms)/meal; Daily Fluid Restriction: 1000 ml; Low Fat    Data:   Scheduled Meds:   heparin flush  300 Units Intercatheter Q12H    insulin lispro  0-18 Units Subcutaneous Q4H    ampicillin IV  2 g Intravenous 6 times per day    cefTRIAXone (ROCEPHIN) IV  2 g Intravenous Q24H    sodium chloride flush  10 mL Intravenous 2 times per day    indomethacin  100 mg Rectal 60 Min Pre-Op    sodium chloride  250 mL Intravenous Once    sodium chloride flush  10 mL Intravenous 2 times per day    lisinopril  20 mg Oral Daily    metoprolol succinate  50 mg Oral Daily    pantoprazole  40 mg Oral QAM AC    pravastatin  80 mg Oral Nightly    tamsulosin  0.4 mg Oral Daily    predniSONE  40 mg Oral Daily    piperacillin-tazobactam  3.375 g Intravenous Q8H     Continuous Infusions:   dextrose       PRN Meds:glucose, dextrose, glucagon (rDNA), dextrose, sodium chloride flush, acetaminophen, magnesium hydroxide, ondansetron, sodium chloride flush, HYDROmorphone  I/O last 3 completed shifts:  In: -   Out: 1825 [Urine:1825]  No intake/output data recorded.     Intake/Output Summary (Last 24 hours) at 3/28/2019 1426  Last data filed at 3/28/2019 0611  Gross per 24 hour   Intake --   Output 1825 ml   Net -1825 ml     CBC:   Recent Labs     03/26/19  0835 03/27/19  0310 03/28/19  0635   WBC 9.0 7.2 7.6   HGB 10.0* 10.0* 9.2*   PLT 67* 111* 137     BMP:    Recent Labs     03/28/19  0010 03/28/19  0635 03/28/19  1215   * 128* 128*   K 5.2* 5.0 4.8    103 100   CO2 16* 18* 20*   BUN 19 19 19   CREATININE 0.9 0.9 0.8   GLUCOSE 198* 192* 203*     Hepatic:   Recent Labs     03/26/19  0835 03/27/19  0310 03/28/19  0635   AST 45* 22 22   ALT 70* 60* 48*   BILITOT 2.4* 1.8* 1.4*   ALKPHOS 311* 277* 239*     Troponin: No results for input(s): TROPONINI in the last 72 hours. BNP: No results for input(s): BNP in the last 72 hours. Lipids: No results for input(s): CHOL, HDL in the last 72 hours. Invalid input(s): LDLCALCU  ABGs:   Lab Results   Component Value Date    PO2ART 132.1 2019    EAS2YNX 33.0 2019     INR: No results for input(s): INR in the last 72 hours. -----------------------------------------------------------------  RAD: Xr Abdomen (kub) (single Ap View)    Result Date: 3/26/2019  Patient MRN:  [id number] : 1950 Age: 76 years Gender: Male Order Date:  3/26/2019 12:30 PM EXAM: XR ABDOMEN (KUB) (SINGLE AP VIEW) NUMBER OF IMAGES:  2 view(s) INDICATION: Evaluation for stones, urinary retention, fatigue COMPARISON: MRI of the abdomen performed on 3/23/2019 FINDINGS: The bowel gas pattern is nonobstructive. No pneumatosis is seen. A rounded calcification is seen projecting over the right pelvis consistent with phlebolith. No calcifications are seen projecting over the right upper quadrant or the renal shadows. Surgical clips and a stent is identified within the upper midline abdomen and right upper quadrant. A few streaky opacities are seen within the left midlung and left lung base favored to be atelectatic in nature. No evidence of abnormal stones. Fl Ercp Biliary And Pancreatic S&i    Result Date: 3/25/2019  Patient MRN: [id number] : 1950 Age:  76 years Gender: Male Order Date: 3/25/2019 11:15 AM Exam: FL ERCP BILIARY AND PANCREATIC S&I Number of Images: 9 Indication:   Common bile duct obstruction Comparison: CT of the abdomen and pelvis 2019. Findings: 9 fluoroscopic images were obtained in the OR. This radiologist was not present during the fluoroscopic examination/procedure, limiting the radiologists' knowledge and understanding of the exact examination and other findings that were not captured.  These images demonstrate ERCP device with stent placement noted. The distal aspects of the stent are not well opacified and therefore not evaluated. . Please see surgical report for complete details. Fluoroscopic time    2.9 minutes. Fluoroscopic guidance during ERCP, distal stent does not demonstrate opacifications, clinical correlation with findings during dynamic exam recommended. Us Retroperitoneal Limited    Result Date: 3/26/2019  Patient MRN:  [id number] : 1950 Age: 76 years Gender: Male Order Date:  3/26/2019 12:30 PM EXAM: US RETROPERITONEAL LIMITED INDICATION:  BLADDER OUTLET SYMPTOMS, BPH SUSPECT  COMPARISON: None NUMBER OF IMAGES:  43 FINDINGS:  Real-time ultrasound of the kidneys was performed. Color Doppler imaging was utilized. The right kidney measures 11.7 x 4.5 x 4.6 cm in the left kidney measures 12.2 x 6.3 x 4.8 cm. The renal parenchyma is hyperechoic. There is no evidence of hydronephrosis, mass or shadowing calculi on either side. No perinephric fluid collections are seen however there is a trace of free fluid seen in the perihepatic space. The urinary bladder is catheterized and decompressed. The prostate gland was not clearly identified. CONCLUSION: 1. Mildly hyperechoic texture to the kidneys suggesting medical renal disease. 2. No evidence of hydronephrosis to suggest obstructive uropathy. 3. Decompressed urinary bladder with Freed catheter in place. 4. Small amount of ascites in the perihepatic space. Objective:   Vitals: /69   Pulse 68   Temp 98.3 °F (36.8 °C) (Oral)   Resp 16   Ht 5' 10\" (1.778 m)   Wt 158 lb 6 oz (71.8 kg)   SpO2 99%   BMI 22.72 kg/m²   General appearance: appears stated age   Skin:  No rashes or lesions  HEENT: Head: Normocephalic, no lesions, without obvious abnormality.   Neck: no adenopathy, no carotid bruit, no JVD, supple, symmetrical, trachea midline and thyroid not enlarged, symmetric, no tenderness/mass/nodules  Lungs: clear to auscultation bilaterally  Heart: regular rate and rhythm, S1, S2 normal, no murmur, click, rub or gallop  Abdomen: soft, non-tender; bowel sounds normal; no masses,  no organomegaly  Extremities: extremities normal, atraumatic, no cyanosis or edema  Neurologic: Mental status: Alert, oriented, thought content appropriate    Assessment:   Patient Active Problem List:     HTN (hypertension), benign     Other specified anemias     Obstructive jaundice     Hyperbilirubinemia     Palliative care encounter     Obstructive jaundice due to malignant neoplasm ([address]     Moderate protein-calorie malnutrition ([address]     Pancreatic mass     Pancreatic adenocarcinoma ([address]     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[address]     Bacteremia     UTI (urinary tract infection)     Biliary obstruction     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[address]     Thrombocytopathia ([address]     Bacteremia due to Gram-negative bacteria    Plan:                                        Hyponatremia in the setting of billiary duct obstruction , s/p stent placement                                        Acute retention of urine s/p catheter replacement , Urosepsis and on IVF with k                                      supplement , and excess fluid intake , his IVF are at present on hold , fluid                                       restricted ,Na better at 128 -- will add salt tablets                                           Autoimmune pancreatitis VS pancreatic Malignancy ( biopsy neg so far )                                           Ascending cholangitis and polymicrobial sepsis with possible Subacute bacterial                                        Endocarditis on AB per ID                                           DM uncontrolled , steroids on board , on insulin                                          Acute urinary retention , UTI , s/p talavera replacement           Thank you for allowing us to participate in the care of Treyjeremías Watsonfavian              [address]

## 2019-03-28 NOTE — PROGRESS NOTES
Occupational Therapy  OT BEDSIDE TREATMENT NOTE      Date:3/28/2019  Patient Name: Patricio Alcocer  MRN: [id number]  : 1950  Room: [address] [place], [place] [address]     Evaluating OT: Morris Gifford OTR/L [id number]     AM-PAC Daily Activity Raw Score: 15/24  Recommended Adaptive Equipment:  TBD      Diagnosis: Bacteremia.   S/p PROCEDURE PERFORMED:  ERCP with stent placement 3/25/19   PMHX:  CA, Syncope,   Precautions:  Falls, bed alarm     Home Living: Info obtained from family member:   Pt lives alone, but  is moving from current residence[facility]  Prior Level of Function: per family member, - patient was not able to care for himself, falls, has a walker      Pain Level: no pain    Cognition:  Alert, grossly oriented                           Judgement/safety:  Fair-     Functional Assessment:    Initial Eval Status  Date: 3/26/19 Tx Session   Date: 3/28/19 Short Term Goals  Treatment frequency: PRN   Feeding Set-up        Grooming SBA/set-up ,seated  ADLs NT due to fatigue level post activities  Mod I    UB Dressing Min A    Mod I    LB Dressing Mod A    Mod I    Bathing         Toileting         Bed Mobility  SBA   supine to sit  Min A  Sit-supine     Supine to sit: Min A        Functional Transfers Mod A  Sit-stand from bed  Noted decrease balance, safety      Patient c/o dizziness upon standing        STS: Min A from EOB  Mod I    Functional Mobility Mod Aw/walker   Attempting side steps   Min-Mod A using FWW around room and in hallway with various standing rest breaks. Mod I with good tolerance            B UE were WFL during tasks. Comments: Upon arrival pt was supine in bed initially declining to transfer to chair. Educated pt on benefits of performing tasks OOB to prevent further weakness and pt was then agreeable. Provided pt chair with high back rest due to poor activity tolerance to maximize safety.  At end of session pt was transferred to chair with alarm on and all lines and tubes intact, call light within reach. Education: Benefits of participating in activities OOB to increase activity tolerance during future ADLs. · Pt has made good progress towards set goals.    · Continue with current plan of care      Total Tx Time: [address] HOLMAN/L [id number]

## 2019-03-28 NOTE — PROGRESS NOTES
Infectious Disease  Progress Note  [facility]    Chief Complaint: weakness    Subjective:  Feels better today and Is more animated                         Total bili improved  Scheduled Meds:   heparin flush  300 Units Intercatheter Q12H    insulin lispro  0-18 Units Subcutaneous Q4H    ampicillin IV  2 g Intravenous 6 times per day    cefTRIAXone (ROCEPHIN) IV  2 g Intravenous Q24H    sodium chloride flush  10 mL Intravenous 2 times per day    indomethacin  100 mg Rectal 60 Min Pre-Op    sodium chloride  250 mL Intravenous Once    sodium chloride flush  10 mL Intravenous 2 times per day    lisinopril  20 mg Oral Daily    metoprolol succinate  50 mg Oral Daily    pantoprazole  40 mg Oral QAM AC    pravastatin  80 mg Oral Nightly    tamsulosin  0.4 mg Oral Daily    predniSONE  40 mg Oral Daily    piperacillin-tazobactam  3.375 g Intravenous Q8H     Continuous Infusions:   dextrose       PRN Meds:glucose, dextrose, glucagon (rDNA), dextrose, sodium chloride flush, acetaminophen, magnesium hydroxide, ondansetron, sodium chloride flush, HYDROmorphone    ROS:  Constitutional:No Fever, chills or rigors. No unexplained weight loss. HEENT: No headache, dizziness or lightheadedness. No recent change in vision or hearing. No sore throat or runny nose. Respiratory: no cough, chest pain, shortness of breath or wheeze. Cardiovascular: no chest pain or palpitations  Gastrointestinal: no nausea, vomiting, diarrhea, constipation. No blood in stool. Genitourinary: No h/o dysuria, hematuria, urgency, frequency or incontinence. Musculoskeletal: No h/o joint pain anywhere in body, or bodyache. Neurologic: No h/o passing out, focal weakness or seizure. Skin: No h/o rashes or easy bruising. Hematologic: No gum bleeding or easy bruising. No hemoptysis.     Patient Vitals for the past 24 hrs:   BP Temp Temp src Pulse Resp SpO2   03/28/19 1238 128/69 98.3 °F (36.8 °C) Oral 68 16 --   03/28/19 0738 (!) 147/84 97.9 °F (36.6 °C) Oral 80 16 99 %   03/28/19 0255 (!) 157/80 97.6 °F (36.4 °C) Oral 66 16 --   03/27/19 2358 138/60 99.2 °F (37.3 °C) Oral 72 16 --   03/27/19 2120 136/76 98.2 °F (36.8 °C) Oral 70 16 98 %   03/27/19 1547 122/68 98.3 °F (36.8 °C) Oral 69 16 --       CBC with Differential:    Lab Results   Component Value Date    WBC 7.6 03/28/2019    WBC 7.2 03/27/2019    WBC 9.0 03/26/2019    WBC 11.0 03/25/2019    WBC 15.0 03/24/2019    RBC 3.21 03/28/2019    RBC 3.50 03/27/2019    RBC 3.46 03/26/2019    RBC 3.52 03/25/2019    RBC 3.26 03/24/2019    HGB 9.2 03/28/2019    HGB 10.0 03/27/2019    HGB 10.0 03/26/2019    HGB 10.3 03/25/2019    HGB 9.5 03/24/2019    HCT 27.7 03/28/2019    HCT 30.5 03/27/2019    HCT 29.6 03/26/2019    HCT 29.6 03/25/2019    HCT 28.3 03/24/2019     03/28/2019     03/27/2019    PLT 67 03/26/2019    PLT 34 03/25/2019    PLT 34 03/24/2019    MCV 86.3 03/28/2019    MCV 87.1 03/27/2019    MCV 85.5 03/26/2019    MCV 84.1 03/25/2019    MCV 86.8 03/24/2019    MCH 28.7 03/28/2019    MCH 28.6 03/27/2019    MCH 28.9 03/26/2019    MCH 29.3 03/25/2019    MCH 29.1 03/24/2019    MCHC 33.2 03/28/2019    MCHC 32.8 03/27/2019    MCHC 33.8 03/26/2019    MCHC 34.8 03/25/2019    MCHC 33.6 03/24/2019    RDW 15.6 03/28/2019    RDW 15.5 03/27/2019    RDW 15.4 03/26/2019    RDW 15.0 03/25/2019    RDW 14.8 03/24/2019    NRBC 0.0 03/28/2019    NRBC 0.0 03/26/2019    NRBC 0.9 01/15/2019    SEGSPCT 68 01/11/2013    SEGSPCT 65 01/08/2013    LYMPHOPCT 4.3 03/28/2019    LYMPHOPCT 4.0 03/27/2019    LYMPHOPCT 6.1 03/26/2019    LYMPHOPCT 2.6 03/21/2019    LYMPHOPCT 1.7 03/18/2019    MONOPCT 2.6 03/28/2019    MONOPCT 3.0 03/27/2019    MONOPCT 7.0 03/26/2019    MONOPCT 6.1 03/21/2019    MONOPCT 2.6 03/18/2019    MYELOPCT 0.9 01/06/2019    BASOPCT 0.0 03/28/2019    BASOPCT 0.0 03/27/2019    BASOPCT 0.0 03/26/2019    BASOPCT 0.2 03/21/2019    BASOPCT 0.1 03/18/2019    MONOSABS 0.23 03/28/2019    MONOSABS 0.22 03/27/2019 MONOSABS 0.63 03/26/2019    MONOSABS 0.64 03/21/2019    MONOSABS 0.30 03/18/2019    LYMPHSABS 0.38 03/28/2019    LYMPHSABS 0.43 03/27/2019    LYMPHSABS 0.54 03/26/2019    LYMPHSABS 0.32 03/21/2019    LYMPHSABS 0.20 03/18/2019    EOSABS 0.00 03/28/2019    EOSABS 0.00 03/27/2019    EOSABS 0.00 03/26/2019    EOSABS 0.00 03/21/2019    EOSABS 0.00 03/18/2019    BASOSABS 0.00 03/28/2019    BASOSABS 0.00 03/27/2019    BASOSABS 0.00 03/26/2019    BASOSABS 0.00 03/21/2019    BASOSABS 0.00 03/18/2019     CMP:    Lab Results   Component Value Date     03/28/2019    K 5.0 03/28/2019    K 3.9 01/22/2019     03/28/2019    CO2 18 03/28/2019    BUN 19 03/28/2019    BUN 19 03/28/2019    BUN 18 03/27/2019    BUN 19 03/27/2019    BUN 19 03/27/2019    CREATININE 0.9 03/28/2019    CREATININE 0.9 03/28/2019    CREATININE 0.9 03/27/2019    CREATININE 0.9 03/27/2019    CREATININE 0.9 03/27/2019    GFRAA >60 03/28/2019    LABGLOM >60 03/28/2019    GLUCOSE 192 03/28/2019    PROT 5.7 03/28/2019    LABALBU 1.9 03/28/2019    CALCIUM 7.6 03/28/2019    BILITOT 1.4 03/28/2019    ALKPHOS 239 03/28/2019    AST 22 03/28/2019    ALT 48 03/28/2019     No results found for: CRP  No results found for: SEDRATE      /69   Pulse 68   Temp 98.3 °F (36.8 °C) (Oral)   Resp 16   Ht 5' 10\" (1.778 m)   Wt 158 lb 6 oz (71.8 kg)   SpO2 99%   BMI 22.72 kg/m²     Physical Exam  Const/Neuro- unchanged, no signs of acute distress, Alert  ENMT- Within Normal Limits, Normocephalic, mucous membranes pink/moist, No thrush  Neck: Neck supple  Heart- Regular, Rate, Rhythm- no murmur appreciated. Lungs- clear to ascultation. Respirations even and nonlabored. Abdomen- Soft, bowel sounds positive, non tender  Musculo/Extremities-  Equal and symmetrical, no edema. No tenderness. Neurological- No focal motor or sensory loss. No confusion  Skin:  Warm and dry, free from rashes.       Cultures reviewed  Blood cult from 3-25 + strep and all previous blood cult + E avium FROM 3-22-19                               3-26 and 3-27 pending  Radiology reviewed  2011 [facility]   Final Result      XR ABDOMEN (KUB) (SINGLE AP VIEW)   Final Result   No evidence of abnormal stones. FL ERCP BILIARY AND PANCREATIC S&I   Final Result   Fluoroscopic guidance during ERCP, distal stent does not   demonstrate opacifications, clinical correlation with findings during   dynamic exam recommended.                       Assessment  Ascending cholangitis and polymicrobial sepsis   SBE c E avium as part of the GI issue; ECHO NOT DIAGNOSTIC BUT PERSISTENT BACTEREMIA IS DIAGNOSTIC;Will check ZOË      Plan  Amp and ceftriaxone X 6 WEEKS  picc  BLOOD CULT FOLLOW UPS  WILL DISCUSS C GI IF STENT COULD BE AN ISSUE    Electronically signed by Urmila Schwartz MD on 3/28/2019 at 12:40 PM

## 2019-03-28 NOTE — PROGRESS NOTES
Physical Therapy    Facility/Department: [address] ORTHO SURGERY  Treatment note    NAME: Patricio Alcocer  : 1950  MRN: [id number]    Date of Service: 3/28/2019               Patient Diagnosis(es): There were no encounter diagnoses. has a past medical history of Cancer [facility]), Cerebral artery occlusion with cerebral infarction [facility]), Hypertension, and Syncope.   has a past surgical history that includes whipple procedure (N/A, 2019); eye surgery; Upper gastrointestinal endoscopy (N/A, 3/8/2019); Upper gastrointestinal endoscopy (N/A, 3/8/2019); ERCP (N/A, 3/25/2019); and picc line insertion nurse (3/27/2019). Evaluating Therapist: Christy Diaz PT       Room #: 255   DIAGNOSIS:  Biliary obstruction  PRECAUTIONS: falls     Social:  Pt lives alone  in a  Second  floor apartment with  15  steps and  1 rails to enter. States he plans to move to [facility]  Prior to admission pt walked with  ww     Pt a questionable historian      Initial Evaluation  Date: 3/27/19 Treatment  3/28/19    Short Term/ Long Term   Goals   Was pt agreeable to Eval/treatment? yes  yes    Does pt have pain? R sided abd pain  No complaints    Bed Mobility  Rolling:  SBA   Supine to sit:  Min assist   Sit to supine:  SBA   Scooting:  Min assist in sit  Rolling: NT  Supine to sit: Min A  Scooting: Min A to EOB  S/I    Transfers Sit to stand: Mod assist   Stand to sit:  Min assist   Stand pivot:  NT  Sit to stand: Min A  Stand to sit: Min A  Stand Pivot; Min A using [employer] for support  SBA    Ambulation     30 feet with  ww  with  Min assist  60 feet x 1 using WW for support Min/Mod A for balance.  See comments  feet with  ww  with SBA /CGA       Stair negotiation: ascended and descended Nt  NT  4 steps with 1-2  rail with  CGA/min assist    LE ROM  WFL     LE strength  4- to 4/ 5      AM- PAC RAW score   15/ 24  16/24        Balance: poor dynamic using WW for support    Patient education  Pt was educated on UE usage with transfers, gait promoting posture and staying within [employer] base of support    Patient response to education:   Pt verbalized understanding Pt demonstrated skill Pt requires further education in this area   yes With prompt for transfers and [employer] safety yes     Additional Comments: Rolanda during gait slow, guarded and laboring, Pt unsteady throughout, states B LE weak, increased assist level required at end of gait session for safety. Pt was left in a bedside chair with call light in reach, waffle cushion placed    Chair/bed alarm: chair alarm active    Treatment time: 16 minutes  Time out: 0927    Pt is making fair progress toward established Physical Therapy goals as per increased functional mobility performed. Pt required increased assist for balance this session due to deconditioning. Continue with physical therapy current plan of care.     [facility]   License Number: [id number]

## 2019-03-28 NOTE — PROGRESS NOTES
[facility] Progress Note    Admitting Date and Time: 3/25/2019 11:13 AM  Admit Dx: Biliary obstruction [K83.1]  Biliary obstruction [K83.1]    Subjective:  Patient is being followed for Biliary obstruction [K83.1]  Biliary obstruction [K83.1]   Pt feels ok, had no complaints. ROS: denies fever, chills, cp, sob, n/v, HA unless stated above.       insulin lispro  0-18 Units Subcutaneous Q4H    ampicillin IV  2 g Intravenous 6 times per day    cefTRIAXone (ROCEPHIN) IV  2 g Intravenous Q24H    sodium chloride flush  10 mL Intravenous 2 times per day    indomethacin  100 mg Rectal 60 Min Pre-Op    sodium chloride  250 mL Intravenous Once    sodium chloride flush  10 mL Intravenous 2 times per day    lisinopril  20 mg Oral Daily    metoprolol succinate  50 mg Oral Daily    pantoprazole  40 mg Oral QAM AC    pravastatin  80 mg Oral Nightly    tamsulosin  0.4 mg Oral Daily    predniSONE  40 mg Oral Daily    piperacillin-tazobactam  3.375 g Intravenous Q8H       glucose 15 g PRN   dextrose 12.5 g PRN   glucagon (rDNA) 1 mg PRN   dextrose 100 mL/hr PRN   sodium chloride flush 10 mL PRN   acetaminophen 650 mg Q4H PRN   magnesium hydroxide 30 mL Daily PRN   ondansetron 4 mg Q6H PRN   sodium chloride flush 10 mL PRN   HYDROmorphone 0.5 mg Q4H PRN        Objective:    BP (!) 147/84   Pulse 80   Temp 97.9 °F (36.6 °C) (Oral)   Resp 16   Ht 5' 10\" (1.778 m)   Wt 158 lb 6 oz (71.8 kg)   SpO2 99%   BMI 22.72 kg/m²     General Appearance: alert and orientedx3  Skin: warm and dry  Head: normocephalic and atraumatic  Eyes: pupils equal, round, and reactive to light, extraocular eye movements intact, conjunctivae normal  Neck: neck supple and non tender without mass   Pulmonary/Chest: clear to auscultation bilaterally- no wheezes, rales or rhonchi, normal air movement, no respiratory distress  Cardiovascular: normal rate, normal S1 and S2 and no carotid bruits  Abdomen: soft, non-tender, non-distended, normal bowel sounds  Extremities: no cyanosis, no clubbing and no edema  Neurologic: no cranial nerve deficit and speech normal        Recent Labs     03/27/19  2038 03/28/19  0010 03/28/19  0635   * 127* 128*   K 4.6 5.2* 5.0    105 103   CO2 17* 16* 18*   BUN 18 19 19   CREATININE 0.9 0.9 0.9   GLUCOSE 153* 198* 192*   CALCIUM 7.6* 7.6* 7.6*       Recent Labs     03/26/19  0835 03/27/19  0310 03/28/19  0635   WBC 9.0 7.2 7.6   RBC 3.46* 3.50* 3.21*   HGB 10.0* 10.0* 9.2*   HCT 29.6* 30.5* 27.7*   MCV 85.5 87.1 86.3   MCH 28.9 28.6 28.7   MCHC 33.8 32.8 33.2   RDW 15.4* 15.5* 15.6*   PLT 67* 111* 137   MPV 12.9* 13.6* 13.5*         Assessment:    Active Problems:    Biliary obstruction    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[facility]    Thrombocytopathia ([facility]    Bacteremia due to Gram-negative bacteria  Resolved Problems:    * No resolved hospital problems. *      Plan:  1. Obstructive jaundice, ERCP showed occluded stent, s/p opening of the stent and placement of another stent inside the uncovered one. With remarkable amount of purlulent biliary drainage. .  2. Bacteremia with E. coli and Klebsiella Streptococcus, currently on Zosyn and ceftriaxone, appreciate ID input. Questioning endocarditits  3. HTN, BP is controlled, continue lisinopril and toprol XL. 4. HLD, continue on statin. 5. Hyponatremia, NA at 128, mild improvement. Continue fluid restriction. 6. Thrombocytopenia, improving at 67      NOTE: This report was transcribed using voice recognition software. Every effort was made to ensure accuracy; however, inadvertent computerized transcription errors may be present.   Electronically signed by Maryan Blanchard MD on 3/28/2019 at 9:32 AM

## 2019-03-28 NOTE — PATIENT CARE CONFERENCE
[facility] Quality Flow/Interdisciplinary Rounds Progress Note        Quality Flow Rounds held on March 28, 2019    Disciplines Attending:  Bedside Nurse, ,  and Nursing Unit Leadership    Janet Chamorro was admitted on 3/25/2019 11:13 AM    Anticipated Discharge Date:  Expected Discharge Date: 03/26/19    Disposition:    Dylan Score:  Dylan Scale Score: 19    Readmission Risk              Risk of Unplanned Readmission:        18           Discussed patient goal for the day, patient clinical progression, and barriers to discharge. The following Goal(s) of the Day/Commitment(s) have been identified:  Continued skin care and Q2 hour turning. Discharge planning.       Lyle Torre  March 28, 2019

## 2019-03-28 NOTE — PROGRESS NOTES
HEPATOBILIARY AND PANCREATIC SURGERY  DAILY PROGRESS NOTE  3/28/2019    CC: biliary stent occlusion    Subjective:  No complaints this morning. No n/v. Afebrile, tolerating diet, no problems overnight, +BM    Objective:  BP (!) 147/84   Pulse 80   Temp 97.9 °F (36.6 °C) (Oral)   Resp 16   Ht 5' 10\" (1.778 m)   Wt 158 lb 6 oz (71.8 kg)   SpO2 99%   BMI 22.72 kg/m²     GENERAL:  Laying in bed, drowsy, no distress  HEAD: Normocephalic, atraumatic  LUNGS:  No increased work of breathing on room air  CARDIOVASCULAR:  RR  ABDOMEN:  Soft, non-tender, mildly distended  SKIN: Warm and dry    Assessment/Plan:  76 y.o. 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 Continue prednisone 2 weeks, then taper  Thrombocytopenia--HIT labs pending. No heparin.   Bacteremia--amp and ceftriaxone per ID, PICC placed  Echo wnl  Hyperbilirubinemia improving  Hyperglycemia: start High dose SSI  Nephrology consult for electrolyte derangements  Continue prednisone  Okay to dsicharge from my standpoint  Appreciate all consultants input    Electronically signed by Arcenio Guadalupe MD on 3/28/2019 at 10:35 AM

## 2019-03-28 NOTE — PROGRESS NOTES
injection 4 mg Q6H PRN   sodium chloride flush 0.9 % injection 10 mL 2 times per day   sodium chloride flush 0.9 % injection 10 mL PRN   lisinopril (PRINIVIL;ZESTRIL) tablet 20 mg Daily   metoprolol succinate (TOPROL XL) extended release tablet 50 mg Daily   pantoprazole (PROTONIX) tablet 40 mg QAM AC   pravastatin (PRAVACHOL) tablet 80 mg Nightly   tamsulosin (FLOMAX) capsule 0.4 mg Daily   predniSONE (DELTASONE) tablet 40 mg Daily   HYDROmorphone (DILAUDID) injection 0.5 mg Q4H PRN   piperacillin-tazobactam (ZOSYN) 3.375 g in dextrose 5 % 50 mL IVPB extended infusion (mini-bag) Q8H        Data Review  CBC:   Lab Results   Component Value Date    WBC 7.6 03/28/2019    RBC 3.21 03/28/2019    HGB 9.2 03/28/2019    HCT 27.7 03/28/2019    MCV 86.3 03/28/2019    MCH 28.7 03/28/2019    MCHC 33.2 03/28/2019    RDW 15.6 03/28/2019     03/28/2019    MPV 13.5 03/28/2019     CMP:    Lab Results   Component Value Date     03/28/2019    K 5.0 03/28/2019    K 3.9 01/22/2019     03/28/2019    CO2 18 03/28/2019    BUN 19 03/28/2019    CREATININE 0.9 03/28/2019    GFRAA >60 03/28/2019    LABGLOM >60 03/28/2019    GLUCOSE 192 03/28/2019    PROT 5.7 03/28/2019    LABALBU 1.9 03/28/2019    CALCIUM 7.6 03/28/2019    BILITOT 1.4 03/28/2019    ALKPHOS 239 03/28/2019    AST 22 03/28/2019    ALT 48 03/28/2019     Hepatic Function Panel:    Lab Results   Component Value Date    ALKPHOS 239 03/28/2019    ALT 48 03/28/2019    AST 22 03/28/2019    PROT 5.7 03/28/2019    BILITOT 1.4 03/28/2019    BILIDIR 13.7 01/06/2019    IBILI 8.3 01/06/2019    LABALBU 1.9 03/28/2019       PT/INR:    Lab Results   Component Value Date    PROTIME 14.1 03/25/2019    INR 1.3 03/25/2019     IRON:    Lab Results   Component Value Date    IRON 16 09/12/2015     Iron Saturation:  No components found for: PERCENTFE  FERRITIN:    Lab Results   Component Value Date    FERRITIN 7 09/12/2015         Assessment:     Active Problems:  · Obstructive jaundice, occluded stent  · Prior ERCP with stent placement at [facility]  · HX aborted whipple d/t desmoplastic reaction with Dr. John Simmons  · Pancreatic CA  · AIP  · +BC- gram +cocci; ID following  · Anemia, normocytic, normochromic  · Thrombocytopenia   · Weight loss  · Elevated LFTs  · Electrolyte abnormalities defer to PCP  · Recent EUS with Dr. Tapia Flavin- negative for CA  · CA 19-9 239 (3/18/19)  · +UTI- Klebsiella  · ERCP 3/25/19: SURPRISINGLY PT. HAS AN UNCOVERED WALL STENT WITH OBSTRUCTING TISSUE INGROWTH !! MASSIVE DILATATION OF CBD PROXIMALLY WITH DISTAL OBSTRUCTION. FULLY COVERED WALL STENT 8X60 WAS PLACED INSIDE THE UNCOVERED ONE AND A REMARKABLE AMOUNT OF PURULENT BILIARY DRAINAGE WAS OBTAINED. PT. TOLERATED WELL      Plan:     · Continue IVF as ordered  · IV antibiotics per ID  · Protonix 40 MG PO daily  · Low fat diet, with FR  · Continue Prednisone per Surgery orders  · Continue to medicate for pain, nausea as ordered  · Monitor CBC, CMP, amylase, lipase daily  · Discharge planning  · OP follow up in 1 month  · Exchange of original stent as OP, will discuss with Dr. Bunny Kapoor.   · OK to DC from GI POV      Discussed with Dr. Ivania Chance per Dr. Reva Handley, NP-C 3/28/2019 7:40 AM For Dr. Bunny Kapoor

## 2019-03-29 ENCOUNTER — ANESTHESIA EVENT (OUTPATIENT)
Dept: NON INVASIVE DIAGNOSTICS | Age: 69
DRG: 444 | End: 2019-03-29
Payer: MEDICARE

## 2019-03-29 ENCOUNTER — ANESTHESIA (OUTPATIENT)
Dept: NON INVASIVE DIAGNOSTICS | Age: 69
DRG: 444 | End: 2019-03-29
Payer: MEDICARE

## 2019-03-29 ENCOUNTER — APPOINTMENT (OUTPATIENT)
Dept: NON INVASIVE DIAGNOSTICS | Age: 69
DRG: 444 | End: 2019-03-29
Attending: INTERNAL MEDICINE
Payer: MEDICARE

## 2019-03-29 VITALS
DIASTOLIC BLOOD PRESSURE: 80 MMHG | RESPIRATION RATE: 33 BRPM | SYSTOLIC BLOOD PRESSURE: 137 MMHG | OXYGEN SATURATION: 93 %

## 2019-03-29 VITALS
RESPIRATION RATE: 18 BRPM | OXYGEN SATURATION: 98 % | TEMPERATURE: 97.8 F | HEIGHT: 70 IN | SYSTOLIC BLOOD PRESSURE: 144 MMHG | WEIGHT: 158.38 LBS | DIASTOLIC BLOOD PRESSURE: 77 MMHG | BODY MASS INDEX: 22.67 KG/M2 | HEART RATE: 65 BPM

## 2019-03-29 LAB
ALBUMIN SERPL-MCNC: 2.1 G/DL (ref 3.5–5.2)
ALP BLD-CCNC: 228 U/L (ref 40–129)
ALT SERPL-CCNC: 45 U/L (ref 0–40)
AMYLASE: 94 U/L (ref 20–100)
ANION GAP SERPL CALCULATED.3IONS-SCNC: 5 MMOL/L (ref 7–16)
ANION GAP SERPL CALCULATED.3IONS-SCNC: 7 MMOL/L (ref 7–16)
ANION GAP SERPL CALCULATED.3IONS-SCNC: 7 MMOL/L (ref 7–16)
ANISOCYTOSIS: ABNORMAL
AST SERPL-CCNC: 31 U/L (ref 0–39)
BASOPHILS ABSOLUTE: 0 E9/L (ref 0–0.2)
BASOPHILS RELATIVE PERCENT: 0 % (ref 0–2)
BILIRUB SERPL-MCNC: 1.3 MG/DL (ref 0–1.2)
BUN BLDV-MCNC: 18 MG/DL (ref 8–23)
BUN BLDV-MCNC: 19 MG/DL (ref 8–23)
BUN BLDV-MCNC: 19 MG/DL (ref 8–23)
CALCIUM SERPL-MCNC: 7.8 MG/DL (ref 8.6–10.2)
CALCIUM SERPL-MCNC: 8 MG/DL (ref 8.6–10.2)
CALCIUM SERPL-MCNC: 8 MG/DL (ref 8.6–10.2)
CHLORIDE BLD-SCNC: 102 MMOL/L (ref 98–107)
CHLORIDE BLD-SCNC: 104 MMOL/L (ref 98–107)
CHLORIDE BLD-SCNC: 104 MMOL/L (ref 98–107)
CO2: 21 MMOL/L (ref 22–29)
CREAT SERPL-MCNC: 0.9 MG/DL (ref 0.7–1.2)
EKG ATRIAL RATE: 99 BPM
EKG P AXIS: 48 DEGREES
EKG P-R INTERVAL: 134 MS
EKG Q-T INTERVAL: 366 MS
EKG QRS DURATION: 146 MS
EKG QTC CALCULATION (BAZETT): 469 MS
EKG R AXIS: -58 DEGREES
EKG T AXIS: 55 DEGREES
EKG VENTRICULAR RATE: 99 BPM
EOSINOPHILS ABSOLUTE: 0.14 E9/L (ref 0.05–0.5)
EOSINOPHILS RELATIVE PERCENT: 1.7 % (ref 0–6)
GFR AFRICAN AMERICAN: >60
GFR NON-AFRICAN AMERICAN: >60 ML/MIN/1.73
GLUCOSE BLD-MCNC: 121 MG/DL (ref 74–99)
GLUCOSE BLD-MCNC: 75 MG/DL (ref 74–99)
GLUCOSE BLD-MCNC: 77 MG/DL (ref 74–99)
HCT VFR BLD CALC: 29.9 % (ref 37–54)
HEMOGLOBIN: 10 G/DL (ref 12.5–16.5)
HYPOCHROMIA: ABNORMAL
LIPASE: 15 U/L (ref 13–60)
LV EF: 50 %
LVEF MODALITY: NORMAL
LYMPHOCYTES ABSOLUTE: 0.9 E9/L (ref 1.5–4)
LYMPHOCYTES RELATIVE PERCENT: 11.4 % (ref 20–42)
MCH RBC QN AUTO: 29.1 PG (ref 26–35)
MCHC RBC AUTO-ENTMCNC: 33.4 % (ref 32–34.5)
MCV RBC AUTO: 86.9 FL (ref 80–99.9)
METER GLUCOSE: 111 MG/DL (ref 74–99)
METER GLUCOSE: 70 MG/DL (ref 74–99)
METER GLUCOSE: 90 MG/DL (ref 74–99)
METER GLUCOSE: 97 MG/DL (ref 74–99)
MONOCYTES ABSOLUTE: 0.41 E9/L (ref 0.1–0.95)
MONOCYTES RELATIVE PERCENT: 5.3 % (ref 2–12)
NEUTROPHILS ABSOLUTE: 6.72 E9/L (ref 1.8–7.3)
NEUTROPHILS RELATIVE PERCENT: 81.6 % (ref 43–80)
NUCLEATED RED BLOOD CELLS: 0 /100 WBC
PDW BLD-RTO: 15.6 FL (ref 11.5–15)
PLATELET # BLD: 196 E9/L (ref 130–450)
PMV BLD AUTO: 12.2 FL (ref 7–12)
POIKILOCYTES: ABNORMAL
POTASSIUM SERPL-SCNC: 4.3 MMOL/L (ref 3.5–5)
POTASSIUM SERPL-SCNC: 4.3 MMOL/L (ref 3.5–5)
POTASSIUM SERPL-SCNC: 4.4 MMOL/L (ref 3.5–5)
RBC # BLD: 3.44 E12/L (ref 3.8–5.8)
SCHISTOCYTES: ABNORMAL
SODIUM BLD-SCNC: 128 MMOL/L (ref 132–146)
SODIUM BLD-SCNC: 132 MMOL/L (ref 132–146)
SODIUM BLD-SCNC: 132 MMOL/L (ref 132–146)
TARGET CELLS: ABNORMAL
TEAR DROP CELLS: ABNORMAL
TOTAL PROTEIN: 5.6 G/DL (ref 6.4–8.3)
WBC # BLD: 8.2 E9/L (ref 4.5–11.5)

## 2019-03-29 PROCEDURE — 2500000003 HC RX 250 WO HCPCS: Performed by: NURSE ANESTHETIST, CERTIFIED REGISTERED

## 2019-03-29 PROCEDURE — 2580000003 HC RX 258: Performed by: NURSE ANESTHETIST, CERTIFIED REGISTERED

## 2019-03-29 PROCEDURE — 82962 GLUCOSE BLOOD TEST: CPT

## 2019-03-29 PROCEDURE — B246ZZ4 ULTRASONOGRAPHY OF RIGHT AND LEFT HEART, TRANSESOPHAGEAL: ICD-10-PCS | Performed by: INTERNAL MEDICINE

## 2019-03-29 PROCEDURE — 6360000002 HC RX W HCPCS: Performed by: SPECIALIST

## 2019-03-29 PROCEDURE — 93325 DOPPLER ECHO COLOR FLOW MAPG: CPT

## 2019-03-29 PROCEDURE — 3700000000 HC ANESTHESIA ATTENDED CARE

## 2019-03-29 PROCEDURE — 93312 ECHO TRANSESOPHAGEAL: CPT

## 2019-03-29 PROCEDURE — 82150 ASSAY OF AMYLASE: CPT

## 2019-03-29 PROCEDURE — 2580000003 HC RX 258: Performed by: INTERNAL MEDICINE

## 2019-03-29 PROCEDURE — 2580000003 HC RX 258: Performed by: SPECIALIST

## 2019-03-29 PROCEDURE — 97535 SELF CARE MNGMENT TRAINING: CPT

## 2019-03-29 PROCEDURE — 85025 COMPLETE CBC W/AUTO DIFF WBC: CPT

## 2019-03-29 PROCEDURE — 6360000002 HC RX W HCPCS: Performed by: NURSE ANESTHETIST, CERTIFIED REGISTERED

## 2019-03-29 PROCEDURE — 6360000002 HC RX W HCPCS

## 2019-03-29 PROCEDURE — 36415 COLL VENOUS BLD VENIPUNCTURE: CPT

## 2019-03-29 PROCEDURE — 97530 THERAPEUTIC ACTIVITIES: CPT

## 2019-03-29 PROCEDURE — 80048 BASIC METABOLIC PNL TOTAL CA: CPT

## 2019-03-29 PROCEDURE — 80053 COMPREHEN METABOLIC PANEL: CPT

## 2019-03-29 PROCEDURE — 6360000002 HC RX W HCPCS: Performed by: INTERNAL MEDICINE

## 2019-03-29 PROCEDURE — 83690 ASSAY OF LIPASE: CPT

## 2019-03-29 PROCEDURE — 7100000010 HC PHASE II RECOVERY - FIRST 15 MIN

## 2019-03-29 PROCEDURE — 93312 ECHO TRANSESOPHAGEAL: CPT | Performed by: INTERNAL MEDICINE

## 2019-03-29 PROCEDURE — 99232 SBSQ HOSP IP/OBS MODERATE 35: CPT | Performed by: INTERNAL MEDICINE

## 2019-03-29 PROCEDURE — 2580000003 HC RX 258: Performed by: STUDENT IN AN ORGANIZED HEALTH CARE EDUCATION/TRAINING PROGRAM

## 2019-03-29 PROCEDURE — 7100000011 HC PHASE II RECOVERY - ADDTL 15 MIN

## 2019-03-29 PROCEDURE — 99232 SBSQ HOSP IP/OBS MODERATE 35: CPT | Performed by: TRANSPLANT SURGERY

## 2019-03-29 RX ORDER — SODIUM CHLORIDE 9 MG/ML
INJECTION, SOLUTION INTRAVENOUS CONTINUOUS PRN
Status: DISCONTINUED | OUTPATIENT
Start: 2019-03-29 | End: 2019-03-29 | Stop reason: SDUPTHER

## 2019-03-29 RX ORDER — SODIUM CHLORIDE 0.9 % (FLUSH) 0.9 %
10 SYRINGE (ML) INJECTION PRN
Status: DISCONTINUED | OUTPATIENT
Start: 2019-03-29 | End: 2019-03-29 | Stop reason: HOSPADM

## 2019-03-29 RX ORDER — PANTOPRAZOLE SODIUM 40 MG/1
40 TABLET, DELAYED RELEASE ORAL
Qty: 30 TABLET | Refills: 0 | Status: SHIPPED | OUTPATIENT
Start: 2019-03-30

## 2019-03-29 RX ORDER — LIDOCAINE HYDROCHLORIDE 20 MG/ML
INJECTION, SOLUTION EPIDURAL; INFILTRATION; INTRACAUDAL; PERINEURAL PRN
Status: DISCONTINUED | OUTPATIENT
Start: 2019-03-29 | End: 2019-03-29 | Stop reason: SDUPTHER

## 2019-03-29 RX ORDER — PREDNISONE 1 MG/1
40 TABLET ORAL DAILY
Qty: 240 TABLET | Refills: 0 | Status: SHIPPED | OUTPATIENT
Start: 2019-03-29 | End: 2019-03-30 | Stop reason: SDUPTHER

## 2019-03-29 RX ORDER — PROPOFOL 10 MG/ML
INJECTION, EMULSION INTRAVENOUS PRN
Status: DISCONTINUED | OUTPATIENT
Start: 2019-03-29 | End: 2019-03-29 | Stop reason: SDUPTHER

## 2019-03-29 RX ORDER — SODIUM CHLORIDE 0.9 % (FLUSH) 0.9 %
10 SYRINGE (ML) INJECTION EVERY 12 HOURS SCHEDULED
Status: DISCONTINUED | OUTPATIENT
Start: 2019-03-29 | End: 2019-03-29 | Stop reason: HOSPADM

## 2019-03-29 RX ADMIN — PIPERACILLIN SODIUM AND TAZOBACTAM SODIUM 3.38 G: 3; .375 INJECTION, POWDER, LYOPHILIZED, FOR SOLUTION INTRAVENOUS at 06:25

## 2019-03-29 RX ADMIN — AMPICILLIN SODIUM 2 G: 2 INJECTION, POWDER, FOR SOLUTION INTRAMUSCULAR; INTRAVENOUS at 05:03

## 2019-03-29 RX ADMIN — Medication 10 ML: at 05:03

## 2019-03-29 RX ADMIN — PROPOFOL 20 MG: 10 INJECTION, EMULSION INTRAVENOUS at 09:43

## 2019-03-29 RX ADMIN — Medication 10 ML: at 11:01

## 2019-03-29 RX ADMIN — AMPICILLIN SODIUM 2 G: 2 INJECTION, POWDER, FOR SOLUTION INTRAMUSCULAR; INTRAVENOUS at 01:16

## 2019-03-29 RX ADMIN — Medication 300 UNITS: at 11:00

## 2019-03-29 RX ADMIN — LIDOCAINE HYDROCHLORIDE 40 MG: 20 INJECTION, SOLUTION EPIDURAL; INFILTRATION; INTRACAUDAL; PERINEURAL at 09:40

## 2019-03-29 RX ADMIN — DEXTROSE MONOHYDRATE 12.5 G: 25 INJECTION, SOLUTION INTRAVENOUS at 07:25

## 2019-03-29 RX ADMIN — AMPICILLIN SODIUM 2 G: 2 INJECTION, POWDER, FOR SOLUTION INTRAMUSCULAR; INTRAVENOUS at 11:00

## 2019-03-29 RX ADMIN — PROPOFOL 50 MG: 10 INJECTION, EMULSION INTRAVENOUS at 09:40

## 2019-03-29 RX ADMIN — PROPOFOL 10 MG: 10 INJECTION, EMULSION INTRAVENOUS at 09:46

## 2019-03-29 RX ADMIN — SODIUM CHLORIDE: 9 INJECTION, SOLUTION INTRAVENOUS at 09:10

## 2019-03-29 ASSESSMENT — PAIN SCALES - GENERAL
PAINLEVEL_OUTOF10: 0

## 2019-03-29 NOTE — PLAN OF CARE
Problem: Falls - Risk of:  Goal: Will remain free from falls  Description  Will remain free from falls  3/29/2019 0805 by Sari Li RN  Outcome: Met This Shift  3/28/2019 2237 by Ronnie Rosado RN  Outcome: Met This Shift  Goal: Absence of physical injury  Description  Absence of physical injury  3/29/2019 0805 by Sari Li RN  Outcome: Met This Shift  3/28/2019 2237 by Ronnie Rosado RN  Outcome: Met This Shift     Problem: Risk for Impaired Skin Integrity  Goal: Tissue integrity - skin and mucous membranes  Description  Structural intactness and normal physiological function of skin and  mucous membranes.   3/29/2019 0805 by Sari Li RN  Outcome: Met This Shift  3/28/2019 2237 by Ronnie Rosado RN  Outcome: Met This Shift

## 2019-03-29 NOTE — PLAN OF CARE
Problem: Falls - Risk of:  Goal: Will remain free from falls  Description  Will remain free from falls  Outcome: Met This Shift  Goal: Absence of physical injury  Description  Absence of physical injury  Outcome: Met This Shift     Problem: Risk for Impaired Skin Integrity  Goal: Tissue integrity - skin and mucous membranes  Description  Structural intactness and normal physiological function of skin and  mucous membranes.   Outcome: Met This Shift

## 2019-03-29 NOTE — PROGRESS NOTES
Infectious Disease  Progress Note  [facility]    Chief Complaint: weakness    Subjective:  Feels better today and Is more animated                         Total bili improved  Scheduled Meds:   sodium chloride flush  10 mL Intravenous 2 times per day    heparin flush  300 Units Intercatheter Q12H    sodium chloride  1 g Oral BID WC    insulin lispro  0-18 Units Subcutaneous Q4H    ampicillin IV  2 g Intravenous 6 times per day    cefTRIAXone (ROCEPHIN) IV  2 g Intravenous Q24H    sodium chloride flush  10 mL Intravenous 2 times per day    indomethacin  100 mg Rectal 60 Min Pre-Op    sodium chloride  250 mL Intravenous Once    sodium chloride flush  10 mL Intravenous 2 times per day    lisinopril  20 mg Oral Daily    metoprolol succinate  50 mg Oral Daily    pantoprazole  40 mg Oral QAM AC    pravastatin  80 mg Oral Nightly    tamsulosin  0.4 mg Oral Daily    predniSONE  40 mg Oral Daily    piperacillin-tazobactam  3.375 g Intravenous Q8H     Continuous Infusions:   dextrose       PRN Meds:sodium chloride flush, glucose, dextrose, glucagon (rDNA), dextrose, sodium chloride flush, acetaminophen, magnesium hydroxide, ondansetron, sodium chloride flush, HYDROmorphone    ROS:  Constitutional:No Fever, chills or rigors. No unexplained weight loss. HEENT: No headache, dizziness or lightheadedness. No recent change in vision or hearing. No sore throat or runny nose. Respiratory: no cough, chest pain, shortness of breath or wheeze. Cardiovascular: no chest pain or palpitations  Gastrointestinal: no nausea, vomiting, diarrhea, constipation. No blood in stool. Genitourinary: No h/o dysuria, hematuria, urgency, frequency or incontinence. Musculoskeletal: No h/o joint pain anywhere in body, or bodyache. Neurologic: No h/o passing out, focal weakness or seizure. Skin: No h/o rashes or easy bruising. Hematologic: No gum bleeding or easy bruising. No hemoptysis.     Patient Vitals for the past 24 hrs:   BP Temp Temp src Pulse Resp SpO2   03/29/19 1015 120/65 -- -- 70 20 97 %   03/29/19 1000 109/66 -- -- 83 14 97 %   03/29/19 0950 100/65 -- -- 86 18 99 %   03/29/19 0949 130/64 -- -- 83 18 98 %   03/29/19 0920 138/76 97.4 °F (36.3 °C) Temporal 65 14 98 %   03/29/19 0729 139/71 97.7 °F (36.5 °C) Oral 73 16 99 %   03/29/19 0120 123/65 98.8 °F (37.1 °C) Oral 65 16 96 %   03/28/19 1915 (!) 148/72 99 °F (37.2 °C) Oral 68 16 99 %   03/28/19 1238 128/69 98.3 °F (36.8 °C) Oral 68 16 --       CBC with Differential:    Lab Results   Component Value Date    WBC 8.2 03/29/2019    WBC 7.6 03/28/2019    WBC 7.2 03/27/2019    WBC 9.0 03/26/2019    WBC 11.0 03/25/2019    RBC 3.44 03/29/2019    RBC 3.21 03/28/2019    RBC 3.50 03/27/2019    RBC 3.46 03/26/2019    RBC 3.52 03/25/2019    HGB 10.0 03/29/2019    HGB 9.2 03/28/2019    HGB 10.0 03/27/2019    HGB 10.0 03/26/2019    HGB 10.3 03/25/2019    HCT 29.9 03/29/2019    HCT 27.7 03/28/2019    HCT 30.5 03/27/2019    HCT 29.6 03/26/2019    HCT 29.6 03/25/2019     03/29/2019     03/28/2019     03/27/2019    PLT 67 03/26/2019    PLT 34 03/25/2019    MCV 86.9 03/29/2019    MCV 86.3 03/28/2019    MCV 87.1 03/27/2019    MCV 85.5 03/26/2019    MCV 84.1 03/25/2019    MCH 29.1 03/29/2019    MCH 28.7 03/28/2019    MCH 28.6 03/27/2019    MCH 28.9 03/26/2019    MCH 29.3 03/25/2019    MCHC 33.4 03/29/2019    MCHC 33.2 03/28/2019    MCHC 32.8 03/27/2019    MCHC 33.8 03/26/2019    MCHC 34.8 03/25/2019    RDW 15.6 03/29/2019    RDW 15.6 03/28/2019    RDW 15.5 03/27/2019    RDW 15.4 03/26/2019    RDW 15.0 03/25/2019    NRBC 0.0 03/29/2019    NRBC 0.0 03/28/2019    NRBC 0.0 03/26/2019    NRBC 0.9 01/15/2019    SEGSPCT 68 01/11/2013    SEGSPCT 65 01/08/2013    LYMPHOPCT 11.4 03/29/2019    LYMPHOPCT 4.3 03/28/2019    LYMPHOPCT 4.0 03/27/2019    LYMPHOPCT 6.1 03/26/2019    LYMPHOPCT 2.6 03/21/2019    MONOPCT 5.3 03/29/2019    MONOPCT 2.6 03/28/2019    MONOPCT 3.0 03/27/2019    MONOPCT 7.0 thrush  Neck: Neck supple  Heart- Regular, Rate, Rhythm- no murmur appreciated. Lungs- clear to ascultation. Respirations even and nonlabored. Abdomen- Soft, bowel sounds positive, non tender  Musculo/Extremities-  Equal and symmetrical, no edema. No tenderness. Neurological- No focal motor or sensory loss. No confusion  Skin:  Warm and dry, free from rashes. Cultures reviewed  Blood cult from 3-25 + strep and all previous blood cult + E avium FROM 3-22-19                               3-26 and 3-27 pending  Radiology reviewed  2011 [facility]   Final Result      XR ABDOMEN (KUB) (SINGLE AP VIEW)   Final Result   No evidence of abnormal stones. FL ERCP BILIARY AND PANCREATIC S&I   Final Result   Fluoroscopic guidance during ERCP, distal stent does not   demonstrate opacifications, clinical correlation with findings during   dynamic exam recommended.                       Assessment  Ascending cholangitis and polymicrobial sepsis   SBE c E avium as part of the GI issue; ECHO NOT DIAGNOSTIC BUT PERSISTENT BACTEREMIA CAN BE DIAGNOSTIC; ZOË ALSO NEG  SPOKE C GI AND AT THIS POINT STENT IS COLONIZED       Plan  Amp and ceftriaxone X 6 WEEKS  picc  BLOOD CULT FOLLOW UPS  PT MAY NEED SUPPRESSION    Electronically signed by Js Sprague MD on 3/29/2019 at 10:31 AM

## 2019-03-29 NOTE — PROGRESS NOTES
Dr. Schreiber [employer] answering service called to relay ZOË results and ok for discharge. Awaiting call back.

## 2019-03-29 NOTE — PROGRESS NOTES
0.8 0.9 0.9  0.9   GLUCOSE 265* 121* 77  75     Hepatic:   Recent Labs     19  0310 19  0635 19  0710   AST 22 22 31   ALT 60* 48* 45*   BILITOT 1.8* 1.4* 1.3*   ALKPHOS 277* 239* 228*     Troponin: No results for input(s): TROPONINI in the last 72 hours. BNP: No results for input(s): BNP in the last 72 hours. Lipids: No results for input(s): CHOL, HDL in the last 72 hours. Invalid input(s): LDLCALCU  ABGs:   Lab Results   Component Value Date    PO2ART 132.1 2019    ICW9XSS 33.0 2019     INR: No results for input(s): INR in the last 72 hours. -----------------------------------------------------------------  RAD: Xr Abdomen (kub) (single Ap View)    Result Date: 3/26/2019  Patient MRN:  [id number] : 1950 Age: 76 years Gender: Male Order Date:  3/26/2019 12:30 PM EXAM: XR ABDOMEN (KUB) (SINGLE AP VIEW) NUMBER OF IMAGES:  2 view(s) INDICATION: Evaluation for stones, urinary retention, fatigue COMPARISON: MRI of the abdomen performed on 3/23/2019 FINDINGS: The bowel gas pattern is nonobstructive. No pneumatosis is seen. A rounded calcification is seen projecting over the right pelvis consistent with phlebolith. No calcifications are seen projecting over the right upper quadrant or the renal shadows. Surgical clips and a stent is identified within the upper midline abdomen and right upper quadrant. A few streaky opacities are seen within the left midlung and left lung base favored to be atelectatic in nature. No evidence of abnormal stones. Fl Ercp Biliary And Pancreatic S&i    Result Date: 3/25/2019  Patient MRN: [id number] : 1950 Age:  76 years Gender: Male Order Date: 3/25/2019 11:15 AM Exam: FL ERCP BILIARY AND PANCREATIC S&I Number of Images: 9 Indication:   Common bile duct obstruction Comparison: CT of the abdomen and pelvis 2019. Findings: 9 fluoroscopic images were obtained in the OR.  This radiologist was not present during the fluoroscopic examination/procedure, limiting the radiologists' knowledge and understanding of the exact examination and other findings that were not captured. These images demonstrate ERCP device with stent placement noted. The distal aspects of the stent are not well opacified and therefore not evaluated. . Please see surgical report for complete details. Fluoroscopic time    2.9 minutes. Fluoroscopic guidance during ERCP, distal stent does not demonstrate opacifications, clinical correlation with findings during dynamic exam recommended. Us Retroperitoneal Limited    Result Date: 3/26/2019  Patient MRN:  [id number] : 1950 Age: 76 years Gender: Male Order Date:  3/26/2019 12:30 PM EXAM: US RETROPERITONEAL LIMITED INDICATION:  BLADDER OUTLET SYMPTOMS, BPH SUSPECT  COMPARISON: None NUMBER OF IMAGES:  43 FINDINGS:  Real-time ultrasound of the kidneys was performed. Color Doppler imaging was utilized. The right kidney measures 11.7 x 4.5 x 4.6 cm in the left kidney measures 12.2 x 6.3 x 4.8 cm. The renal parenchyma is hyperechoic. There is no evidence of hydronephrosis, mass or shadowing calculi on either side. No perinephric fluid collections are seen however there is a trace of free fluid seen in the perihepatic space. The urinary bladder is catheterized and decompressed. The prostate gland was not clearly identified. CONCLUSION: 1. Mildly hyperechoic texture to the kidneys suggesting medical renal disease. 2. No evidence of hydronephrosis to suggest obstructive uropathy. 3. Decompressed urinary bladder with Freed catheter in place. 4. Small amount of ascites in the perihepatic space. Objective:   Vitals: BP (!) 144/77   Pulse 65   Temp 97.8 °F (36.6 °C) (Oral)   Resp 18   Ht 5' 10\" (1.778 m)   Wt 158 lb 6 oz (71.8 kg)   SpO2 98%   BMI 22.72 kg/m²   General appearance: appears stated age   Skin:  No rashes or lesions  HEENT: Head: Normocephalic, no lesions, without obvious abnormality.   Neck: no adenopathy, no carotid bruit, no JVD, supple, symmetrical, trachea midline and thyroid not enlarged, symmetric, no tenderness/mass/nodules  Lungs: clear to auscultation bilaterally  Heart: regular rate and rhythm, S1, S2 normal, no murmur, click, rub or gallop  Abdomen: soft, non-tender; bowel sounds normal; no masses,  no organomegaly  Extremities: extremities normal, atraumatic, no cyanosis or edema  Neurologic: Mental status: Alert, oriented, thought content appropriate      Assessment:   Patient Active Problem List:     HTN (hypertension), benign     Other specified anemias     Obstructive jaundice     Hyperbilirubinemia     Palliative care encounter     Obstructive jaundice due to malignant neoplasm (HCC)     Moderate protein-calorie malnutrition (HCC)     Pancreatic mass     Pancreatic adenocarcinoma (HCC)     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HCC)     Bacteremia     UTI (urinary tract infection)     Biliary obstruction     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[address]     Thrombocytopathia ([facility] [address]     Bacteremia due to Gram-negative bacteria     Endocarditis, suspected    Plan:                                         Hyponatremia in the setting of billiary duct obstruction , s/p stent placement                                         Acute retention of urine s/p catheter replacement , Urosepsis and on IVF with k                                                                             supplement , and excess fluid intake , his IVF are at present on hold , fluid                                                                              restricted ,Na better at 132                                           Autoimmune pancreatitis VS pancreatic Malignancy ( biopsy neg so far )                                           Ascending cholangitis and polymicrobial sepsis with ?  Subacute bacterial                                        Endocarditis on AB per ID , ZOË negative for SBE                                           DM controlled , steroids on board , on insulin                                          Acute urinary retention , UTI , s/p talavera replacement     Ok to discharge for renal pov            Thank you for allowing us to participate in the care [address] [place] [address]

## 2019-03-29 NOTE — PROGRESS NOTES
Nursing Transfer Note    Data:  Summary of patients progress: post deidre  Reason for transfer: inpatient    Action:  Explained reason for transfer to patient. No family present to pacu  Report given to: rabia, using RN Handoff Navigator.   Mode of transportation: cart    Response:  RN Recommendations:

## 2019-03-29 NOTE — PROGRESS NOTES
Occupational Therapy  OT BEDSIDE TREATMENT NOTE      Date:3/29/2019  Patient Name: Marcia Estevez  MRN: [id number]  : 1950  Room: [address] [place], [place] [address]        Evaluating OT: Arturoericabhijit Mary Ellen OTR/L [id number]     AM-PAC Daily Activity Raw Score: 15/24  Recommended Adaptive Equipment:  TBD      Diagnosis: Bacteremia. S/p PROCEDURE PERFORMED:  ERCP with stent placement 3/25/19   PMHX:  CA, Syncope,   Precautions:  Falls, bed alarm     Home Living: Info obtained from family member:   Pt lives alone, but  is moving from current residence[facility]  Prior Level of Function: per family member, - patient was not able to care for himself, falls, has a walker      Pain Level: no pain ;   Cognition:  Alert, grossly oriented                           Judgement/safety:  Fair-                Functional Assessment:    Initial Eval Status  Date: 3/26/19 Tx Session   Date: 3/29/19 Short Term Goals  Treatment frequency: PRN   Feeding Set-up        Grooming SBA/set-up ,seated    Mod I    UB Dressing Min A  SBA to don gown  Mod I    LB Dressing Mod A  Min A to don socks seated in chair. Pt able to doff socks without assistance  Mod I    Bathing    UE:  SBA seated    UE: Mod A from seated/ standing positions at [address]. Extended time required     Toileting   Mod A      Bed Mobility  SBA   supine to sit  Min A  Sit-supine     Supine to sit: Min A     Functional Transfers Mod A  Sit-stand from bed  Noted decrease balance, safety      Patient c/o dizziness upon standing         STS: Min A from EOB and chair    Toilet transfers: Min A Mod I    Functional Mobility Mod Aw/walker   Attempting side steps  Min A using FWW around room. B UE/ B LE weakness noted. Mod I with good tolerance    Balance Sitting:     Static:  SBA    Standing: Mod A  Sitting: SBA    Standing: Min A     Activity Tolerance Fair-  Patient c/o dizziness with activity  Fair.   Pt required rest breaks due to fatigue.  Good with ADL activity Visual/  Perceptual Glasses: no                   B UE were WFL during tasks. Comments: Upon arrival pt was supine in bed. At end of session pt was transferred to chair with alarm on and all lines and tubes intact, call light within reach. Education: Safe toilet transfer techniques and AD placement during ADLs. · Pt has made good progress towards set goals.    · Continue with current plan of care      Total Tx Time: [address] [facility] HOLMAN/L [id number]

## 2019-03-29 NOTE — PATIENT CARE CONFERENCE
[facility] Quality Flow/Interdisciplinary Rounds Progress Note        Quality Flow Rounds held on March 29, 2019    Disciplines Attending:  Bedside Nurse, ,  and Nursing Unit Leadership    Shabana Veronica was admitted on 3/25/2019 11:13 AM    Anticipated Discharge Date:  Expected Discharge Date: 03/26/19    Disposition:    Dylan Score:  Dylan Scale Score: 19    Readmission Risk              Risk of Unplanned Readmission:        21           Discussed patient goal for the day, patient clinical progression, and barriers to discharge.   The following Goal(s) of the Day/Commitment(s) have been identified:  Discharge planning      Yasmani Morton  March 29, 2019

## 2019-03-29 NOTE — PROGRESS NOTES
[facility] Progress Note    Admitting Date and Time: 3/25/2019 11:13 AM  Admit Dx: Biliary obstruction [K83.1]  Biliary obstruction [K83.1]    Subjective:  Patient is being followed for Biliary obstruction [K83.1]  Biliary obstruction [K83.1]   Pt feels ok, had no complaints. Had a BM, reported that he had a great night, denied any pain    ROS: denies fever, chills, cp, sob, n/v, HA unless stated above.       heparin flush  300 Units Intercatheter Q12H    sodium chloride  1 g Oral BID WC    insulin lispro  0-18 Units Subcutaneous Q4H    ampicillin IV  2 g Intravenous 6 times per day    cefTRIAXone (ROCEPHIN) IV  2 g Intravenous Q24H    sodium chloride flush  10 mL Intravenous 2 times per day    indomethacin  100 mg Rectal 60 Min Pre-Op    sodium chloride  250 mL Intravenous Once    sodium chloride flush  10 mL Intravenous 2 times per day    lisinopril  20 mg Oral Daily    metoprolol succinate  50 mg Oral Daily    pantoprazole  40 mg Oral QAM AC    pravastatin  80 mg Oral Nightly    tamsulosin  0.4 mg Oral Daily    predniSONE  40 mg Oral Daily    piperacillin-tazobactam  3.375 g Intravenous Q8H       glucose 15 g PRN   dextrose 12.5 g PRN   glucagon (rDNA) 1 mg PRN   dextrose 100 mL/hr PRN   sodium chloride flush 10 mL PRN   acetaminophen 650 mg Q4H PRN   magnesium hydroxide 30 mL Daily PRN   ondansetron 4 mg Q6H PRN   sodium chloride flush 10 mL PRN   HYDROmorphone 0.5 mg Q4H PRN        Objective:    /71   Pulse 73   Temp 97.7 °F (36.5 °C) (Oral)   Resp 16   Ht 5' 10\" (1.778 m)   Wt 158 lb 6 oz (71.8 kg)   SpO2 99%   BMI 22.72 kg/m²     General Appearance: alert and orientedx3  Skin: warm and dry  Head: normocephalic and atraumatic  Eyes: pupils equal, round, and reactive to light, extraocular eye movements intact, conjunctivae normal  Neck: neck supple and non tender without mass   Pulmonary/Chest: clear to auscultation bilaterally- no wheezes, rales or rhonchi, signed by Guru Basurto MD on 3/29/2019 at 8:13 AM

## 2019-03-29 NOTE — PROGRESS NOTES
ASH- Lucy Hernández with Dr. Divya Michael. Timeout [id number]. Sedation per anesthesia 0940. Ash start 9270. Ash END [id number] [address] given to RN from [facility]. Patient stable no complications noted.

## 2019-03-29 NOTE — PROGRESS NOTES
Indication:suspected endocarditis  Post Op diagnosis: no vegetation  Procedure: ZOË   Anesthesia: propofol under anesthesia monitoring. Informed consent was obtained. Patient after being adequately sedated was placed in left lateral position. ZOË was  performed. per protocol . report to follow Initial review >>no endocarditis   Complication: None     IMP:   1. Initial review >>no imaging evidence for endocarditis    PLAN:   1. Routine post ZOË      Electronically signed by Ifeoma Long MD on 3/29/2019 at 9:52 AM

## 2019-03-29 NOTE — CARE COORDINATION
3/29/2019  Dulce Maria informed that [facility] will provide the transport to facility at [facility] 14 notified nurse here and spouse.  Electronically signed by ABBE Avilez on 3/29/2019 at 11:58 AM

## 2019-03-29 NOTE — PROGRESS NOTES
HEPATOBILIARY AND PANCREATIC SURGERY  DAILY PROGRESS NOTE  3/29/2019    CC: biliary stent occlusion    Subjective:  No complaints this morning. No n/v. Afebrile, tolerating diet, no problems overnight, +BM, happy with his care this AM    Objective:  /65   Pulse 65   Temp 98.8 °F (37.1 °C) (Oral)   Resp 16   Ht 5' 10\" (1.778 m)   Wt 158 lb 6 oz (71.8 kg)   SpO2 96%   BMI 22.72 kg/m²     GENERAL:  Laying in bed, drowsy, no distress  HEAD: Normocephalic, atraumatic  LUNGS:  No increased work of breathing on room air  CARDIOVASCULAR:  RR  ABDOMEN:  Soft, non-tender, non distended, talavera with yellow urine  SKIN: Warm and dry, R PICC    Assessment/Plan:  76 y.o. 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 Continue prednisone 2 weeks, then taper  Thrombocytopenia--HIT labs negative. No heparin.   Bacteremia--amp and ceftriaxone per ID, PICC placed  Echo wnl  Hyperbilirubinemia improving  Hyperglycemia: start High dose SSI  Nephrology consult for electrolyte derangements  Continue prednisone  Okay to dsicharge pending nephrology  Appreciate all consultants input    Electronically signed by Cesar Rod MD on 3/29/2019 at 5:24 AM     Agree with above  Okay to discharge  Uncovered stent will not be able to be removed    Electronically signed by Reji Marroquin MD on 3/29/2019 at 8:04 AM

## 2019-03-29 NOTE — PROGRESS NOTES
Physical Therapy    Facility/Department: [address] ORTHO SURGERY  Treatment note    NAME: Shimon Keating  : 1950  MRN: [id number]    Date of Service: 3/29/2019               Patient Diagnosis(es): There were no encounter diagnoses. has a past medical history of Cancer [facility]), Cerebral artery occlusion with cerebral infarction [facility]), Hypertension, and Syncope.   has a past surgical history that includes whipple procedure (N/A, 2019); eye surgery; Upper gastrointestinal endoscopy (N/A, 3/8/2019); Upper gastrointestinal endoscopy (N/A, 3/8/2019); ERCP (N/A, 3/25/2019); and picc line insertion nurse (3/27/2019). Evaluating Therapist: Yasmine Suggs PT       Room #: 829   DIAGNOSIS:  Biliary obstruction  PRECAUTIONS: falls     Social:  Pt lives alone  in a  Second  floor apartment with  15  steps and  1 rails to enter. States he plans to move to [facility]  Prior to admission pt walked with  ww     Pt a questionable historian      Initial Evaluation  Date: 3/27/19 Treatment  3/29/19    Short Term/ Long Term   Goals   Was pt agreeable to Eval/treatment? yes  yes    Does pt have pain? R sided abd pain  No complaints    Bed Mobility  Rolling:  SBA   Supine to sit:  Min assist   Sit to supine:  SBA   Scooting:  Min assist in sit  Rolling: NT  Supine to sit: NT  Sit to supine: Mod LE LE support  Scooting: NT  S/I    Transfers Sit to stand: Mod assist   Stand to sit:  Min assist   Stand pivot:  NT  Sit to stand: Min A  Stand to sit: Min A  Stand Pivot;   SBA    Ambulation     30 feet with  ww  with  Min assist  60 feet and  15 feet x 1 each using WW for support Min A for balance.    feet with  ww  with SBA /CGA       Stair negotiation: ascended and descended Nt  NT  4 steps with 1-2  rail with  CGA/min assist    LE ROM  WFL     LE strength  4- to 4/ 5      AM- PAC RAW score   15/ 24  16/24        Balance: poor dynamic using WW for support    Patient education  Pt was educated on UE usage with transfers, gait

## 2019-03-29 NOTE — PROGRESS NOTES
PROGRESS NOTE    Patient Presents with/Seen in Consultation For      *Reason for Consult: ERCP     CHIEF COMPLAINT:  Fatigue, and abdominal pain    Subjective:     Patient states he feels ok today, \"no problems. Can I eat. \" Pt had ZOË this am, d/w charge RN pt asking to eat. Pt denies abd pain, n/v. Reports to a soft formed brown stool this yesterday. Review of Systems  Aside from what was mentioned in the PMH and HPI, essentially unremarkable, all others negative. Objective:     Patient Vitals for the past 8 hrs:   BP Temp Temp src Pulse Resp SpO2   03/29/19 1059 (!) 144/77 97.8 °F (36.6 °C) Oral 65 18 98 %   03/29/19 1030 128/66 -- -- 65 18 98 %   03/29/19 1015 120/65 -- -- 70 20 97 %   03/29/19 1000 109/66 -- -- 83 14 97 %   03/29/19 0950 100/65 -- -- 86 18 99 %   03/29/19 0949 130/64 -- -- 83 18 98 %   03/29/19 0920 138/76 97.4 °F (36.3 °C) Temporal 65 14 98 %   03/29/19 0729 139/71 97.7 °F (36.5 °C) Oral 73 16 99 %       General appearance: alert, awake, laying in bed, and cooperative  Eyes: conjunctivae/corneas clear. PERRL. Lungs: clear to auscultation bilaterally  Heart: regular rate and rhythm, no murmur, 2+ pulses;  without edema  Abdomen: soft, non-tender, no guarding or rebound; bowel sounds normal; no masses,  no organomegaly  Extremities: extremities without edema  Pulses: 2+ and symmetric  Skin: AA Skin color, texture, turgor normal.   Neurologic: alert to self, place; slight confusion noted.  PEDERSEN strong.       sodium chloride flush 0.9 % injection 10 mL 2 times per day   sodium chloride flush 0.9 % injection 10 mL PRN   heparin flush 100 UNIT/ML injection 300 Units Q12H   sodium chloride tablet 1 g BID WC   glucose (GLUTOSE) 40 % oral gel 15 g PRN   dextrose 50 % solution 12.5 g PRN   glucagon (rDNA) injection 1 mg PRN   dextrose 5 % solution PRN   insulin lispro (HUMALOG) injection vial 0-18 Units Q4H   ampicillin 2 g ivpb mini bag 6 times per day   cefTRIAXone (ROCEPHIN) 2 g IVPB in D5W 50ml minibag Q24H   sodium chloride flush 0.9 % injection 10 mL 2 times per day   sodium chloride flush 0.9 % injection 10 mL PRN   indomethacin (INDOCIN) 50 MG suppository 100 mg 60 Min Pre-Op   0.9 % sodium chloride bolus Once   acetaminophen (TYLENOL) tablet 650 mg Q4H PRN   magnesium hydroxide (MILK OF MAGNESIA) 400 MG/5ML suspension 30 mL Daily PRN   ondansetron (ZOFRAN) injection 4 mg Q6H PRN   sodium chloride flush 0.9 % injection 10 mL 2 times per day   sodium chloride flush 0.9 % injection 10 mL PRN   lisinopril (PRINIVIL;ZESTRIL) tablet 20 mg Daily   metoprolol succinate (TOPROL XL) extended release tablet 50 mg Daily   pantoprazole (PROTONIX) tablet 40 mg QAM AC   pravastatin (PRAVACHOL) tablet 80 mg Nightly   tamsulosin (FLOMAX) capsule 0.4 mg Daily   predniSONE (DELTASONE) tablet 40 mg Daily   HYDROmorphone (DILAUDID) injection 0.5 mg Q4H PRN   piperacillin-tazobactam (ZOSYN) 3.375 g in dextrose 5 % 50 mL IVPB extended infusion (mini-bag) Q8H        Data Review  CBC:   Lab Results   Component Value Date    WBC 8.2 03/29/2019    RBC 3.44 03/29/2019    HGB 10.0 03/29/2019    HCT 29.9 03/29/2019    MCV 86.9 03/29/2019    MCH 29.1 03/29/2019    MCHC 33.4 03/29/2019    RDW 15.6 03/29/2019     03/29/2019    MPV 12.2 03/29/2019     CMP:    Lab Results   Component Value Date     03/29/2019     03/29/2019    K 4.3 03/29/2019    K 4.3 03/29/2019    K 3.9 01/22/2019     03/29/2019     03/29/2019    CO2 21 03/29/2019    CO2 21 03/29/2019    BUN 19 03/29/2019    BUN 19 03/29/2019    CREATININE 0.9 03/29/2019    CREATININE 0.9 03/29/2019    GFRAA >60 03/29/2019    GFRAA >60 03/29/2019    LABGLOM >60 03/29/2019    LABGLOM >60 03/29/2019    GLUCOSE 77 03/29/2019    GLUCOSE 75 03/29/2019    PROT 5.6 03/29/2019    LABALBU 2.1 03/29/2019    CALCIUM 8.0 03/29/2019    CALCIUM 8.0 03/29/2019    BILITOT 1.3 03/29/2019    ALKPHOS 228 03/29/2019    AST 31 03/29/2019    ALT 45 03/29/2019     Hepatic Function Panel:    Lab Results   Component Value Date    ALKPHOS 228 03/29/2019    ALT 45 03/29/2019    AST 31 03/29/2019    PROT 5.6 03/29/2019    BILITOT 1.3 03/29/2019    BILIDIR 13.7 01/06/2019    IBILI 8.3 01/06/2019    LABALBU 2.1 03/29/2019       PT/INR:    Lab Results   Component Value Date    PROTIME 14.1 03/25/2019    INR 1.3 03/25/2019     IRON:    Lab Results   Component Value Date    IRON 16 09/12/2015     Iron Saturation:  No components found for: PERCENTFE  FERRITIN:    Lab Results   Component Value Date    FERRITIN 7 09/12/2015         Assessment:     Active Problems:  · Obstructive jaundice, occluded stent  · Prior ERCP with stent placement at [facility]  · HX aborted whipple d/t desmoplastic reaction with Dr. Francine Gill  · Pancreatic CA  · AIP  · +BC- gram +cocci; ID following  · Anemia, normocytic, normochromic  · Thrombocytopenia   · Weight loss  · Elevated LFTs  · Electrolyte abnormalities defer to PCP  · Recent EUS with Dr. Keli Hamilton- negative for CA  · CA 19-9 239 (3/18/19)  · +UTI- Klebsiella  · ERCP with stent placement 3/25/19 - Surprisingly, an uncovered Wallstent was found placed in the common bile duct! with near-total obstruction secondary to the tissue ingrowth. The papillotome was introduced as well as a balloon, and injection showed massively dilated proximal common bile duct and a filling defect at the distal common bile duct. Full cholangiography was not done for fear of injecting more dye in view of patient's obstruction. Over a guidewire, a fully covered Wallstent 8 x 60 was placed inside uncovered stent with a gush of purulent as well as massive biliary drainage obtained. Pictures were taken, and procedure was terminated.       Plan:     · Continue IVF as ordered  · IV antibiotics per ID  · Continue Protonix 40 MG PO daily  · Low fat diet, with FR  · Continue Prednisone per Surgery orders  · Continue to medicate for pain, nausea as ordered  · Monitor CBC, CMP, amylase, lipase daily  · OP

## 2019-03-30 LAB
BLOOD CULTURE, ROUTINE: ABNORMAL
BLOOD CULTURE, ROUTINE: ABNORMAL
ORGANISM: ABNORMAL
ORGANISM: ABNORMAL

## 2019-03-30 RX ORDER — PREDNISONE 1 MG/1
40 TABLET ORAL DAILY
Qty: 112 TABLET | Refills: 0 | Status: SHIPPED | OUTPATIENT
Start: 2019-03-30 | End: 2019-04-13

## 2019-03-30 RX ORDER — PREDNISONE 1 MG/1
40 TABLET ORAL DAILY
Qty: 240 TABLET | Refills: 0 | Status: SHIPPED | OUTPATIENT
Start: 2019-03-30 | End: 2019-03-30 | Stop reason: SDUPTHER

## 2019-03-30 NOTE — DISCHARGE SUMMARY
Physician Discharge Summary     Horacio Mustafa  [id number]    Admit date: 3/25/2019    Discharge date and time: 3/29/2019  1:34 PM    Admitting Physician: Kushal Hernandez MD     Admission Diagnoses:   Patient Active Problem List   Diagnosis    HTN (hypertension), benign    Other specified anemias    Obstructive jaundice    Hyperbilirubinemia    Palliative care encounter    Obstructive jaundice due to malignant neoplasm ([address]    Moderate protein-calorie malnutrition (HCC)    Pancreatic mass    Pancreatic adenocarcinoma (HCC)    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HCC)    Bacteremia    UTI (urinary tract infection)    Biliary obstruction    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[address]    Thrombocytopathia ([address]    Bacteremia due to Gram-negative bacteria    Endocarditis, suspected       Discharge Diagnoses:   Patient Active Problem List   Diagnosis    HTN (hypertension), benign    Other specified anemias    Obstructive jaundice    Hyperbilirubinemia    Palliative care encounter    Obstructive jaundice due to malignant neoplasm (HCC)    Moderate protein-calorie malnutrition (HCC)    Pancreatic mass    Pancreatic adenocarcinoma (HCC)    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HCC)    Bacteremia    UTI (urinary tract infection)    Biliary obstruction    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[address]    Thrombocytopathia ([address]    Bacteremia due to Gram-negative bacteria    Endocarditis, suspected         Hospital Course: Horacio Mustafa is a 76 y.o. male who presented for evaluation of obstructive jaundice from stent occlusion with likely autoimmune pancreatitis, bacteremia, and thrombocytopenia. He had a ERCP for restenting, was given prednisone for pancreatitis, ampicillin and ceftriaxone for his bacteremia. He had an otherwise uneventful course and progressed well. Pain was well controlled.    He was tolerating a regular diet with no nausea or vomiting, was ambulating well, and was in a suitable condition for discharge to [place] daily     metoprolol succinate 50 MG extended release tablet  Commonly known as:  TOPROL XL  Take 1 tablet by mouth daily     pravastatin 80 MG tablet  Commonly known as:  PRAVACHOL  Take 1 tablet by mouth nightly     tamsulosin 0.4 MG capsule  Commonly known as:  FLOMAX  Take 1 capsule by mouth daily           Where to Get Your Medications      These medications were sent to [address] [place] [address], [address] [place] [address], [address] [place] [address]    Phone:  [phone]   · pantoprazole 40 MG tablet  · predniSONE 5 MG tablet     You can get these medications from any pharmacy    Bring a paper prescription for each of these medications  · ampicillin infusion  · cefTRIAXone infusion         Patient Instructions: Activity: activity as tolerated   Diet: regular diet  Wound Care: shower normally and leave the incisions open to air     Follow up:   Follow-up With  Details  Why  Contact Info   DO [address]  Mann Murry MD  In 1 month    [place] [id number]  Ras CARRILLO MD  In 2 weeks  for directions for tapering steroids  [address]  [phone]         Signed:  Era Barkley  3/30/2019  3:33 PM

## 2019-03-31 LAB — BLOOD CULTURE, ROUTINE: NORMAL

## 2019-04-01 LAB — BLOOD CULTURE, ROUTINE: NORMAL

## 2019-04-02 LAB — CULTURE, BLOOD 2: NORMAL

## 2019-04-12 ENCOUNTER — TELEPHONE (OUTPATIENT)
Dept: ONCOLOGY | Age: 69
End: 2019-04-12

## 2019-04-12 NOTE — TELEPHONE ENCOUNTER
Called Hilario Gimenez this am to have him reschedule his appointment with . Patient had a appointment on April 10,2019 which he no showed. I left the number for him to reschedule this appointment and stressed the importance.

## 2019-04-23 PROBLEM — N39.0 UTI (URINARY TRACT INFECTION): Status: RESOLVED | Noted: 2019-03-24 | Resolved: 2019-04-23

## 2019-05-01 ENCOUNTER — APPOINTMENT (OUTPATIENT)
Dept: CT IMAGING | Age: 69
DRG: 871 | End: 2019-05-01
Payer: MEDICARE

## 2019-05-01 ENCOUNTER — TELEPHONE (OUTPATIENT)
Dept: ONCOLOGY | Age: 69
End: 2019-05-01

## 2019-05-01 ENCOUNTER — APPOINTMENT (OUTPATIENT)
Dept: GENERAL RADIOLOGY | Age: 69
DRG: 871 | End: 2019-05-01
Payer: MEDICARE

## 2019-05-01 ENCOUNTER — HOSPITAL ENCOUNTER (INPATIENT)
Age: 69
LOS: 9 days | Discharge: HOME HEALTH | DRG: 871 | End: 2019-05-10
Attending: EMERGENCY MEDICINE | Admitting: INTERNAL MEDICINE
Payer: MEDICARE

## 2019-05-01 DIAGNOSIS — R56.9 SEIZURES (HCC): ICD-10-CM

## 2019-05-01 DIAGNOSIS — C80.1 OBSTRUCTIVE JAUNDICE DUE TO MALIGNANT NEOPLASM (HCC): Primary | Chronic | ICD-10-CM

## 2019-05-01 DIAGNOSIS — K83.1 OBSTRUCTIVE JAUNDICE DUE TO MALIGNANT NEOPLASM (HCC): Primary | Chronic | ICD-10-CM

## 2019-05-01 DIAGNOSIS — R41.82 ALTERED MENTAL STATUS, UNSPECIFIED ALTERED MENTAL STATUS TYPE: ICD-10-CM

## 2019-05-01 DIAGNOSIS — R73.9 HYPERGLYCEMIA: ICD-10-CM

## 2019-05-01 PROBLEM — I73.89 HHS (HYPOTHENAR HAMMER SYNDROME) (HCC): Status: ACTIVE | Noted: 2019-05-01

## 2019-05-01 LAB
ALBUMIN SERPL-MCNC: 3.2 G/DL (ref 3.5–5.2)
ALP BLD-CCNC: 86 U/L (ref 40–129)
ALT SERPL-CCNC: 10 U/L (ref 0–40)
AMMONIA: 14 UMOL/L (ref 16–60)
ANION GAP SERPL CALCULATED.3IONS-SCNC: 12 MMOL/L (ref 7–16)
ANION GAP SERPL CALCULATED.3IONS-SCNC: 15 MMOL/L (ref 7–16)
APTT: 25.4 SEC (ref 24.5–35.1)
AST SERPL-CCNC: 8 U/L (ref 0–39)
B.E.: -5.3 MMOL/L (ref -3–3)
BASOPHILS ABSOLUTE: 0.01 E9/L (ref 0–0.2)
BASOPHILS RELATIVE PERCENT: 0.2 % (ref 0–2)
BETA-HYDROXYBUTYRATE: 0.1 MMOL/L (ref 0.02–0.27)
BILIRUB SERPL-MCNC: 0.6 MG/DL (ref 0–1.2)
BUN BLDV-MCNC: 17 MG/DL (ref 8–23)
BUN BLDV-MCNC: 18 MG/DL (ref 8–23)
BURR CELLS: ABNORMAL
CALCIUM SERPL-MCNC: 8.6 MG/DL (ref 8.6–10.2)
CALCIUM SERPL-MCNC: 8.7 MG/DL (ref 8.6–10.2)
CHLORIDE BLD-SCNC: 91 MMOL/L (ref 98–107)
CHLORIDE BLD-SCNC: 97 MMOL/L (ref 98–107)
CO2: 20 MMOL/L (ref 22–29)
CO2: 23 MMOL/L (ref 22–29)
COHB: 0.3 % (ref 0–1.5)
CREAT SERPL-MCNC: 1.3 MG/DL (ref 0.7–1.2)
CREAT SERPL-MCNC: 1.4 MG/DL (ref 0.7–1.2)
CRITICAL: ABNORMAL
DATE ANALYZED: ABNORMAL
DATE OF COLLECTION: ABNORMAL
EOSINOPHILS ABSOLUTE: 0.02 E9/L (ref 0.05–0.5)
EOSINOPHILS RELATIVE PERCENT: 0.4 % (ref 0–6)
GFR AFRICAN AMERICAN: >60
GFR AFRICAN AMERICAN: >60
GFR NON-AFRICAN AMERICAN: >60 ML/MIN/1.73
GFR NON-AFRICAN AMERICAN: >60 ML/MIN/1.73
GLUCOSE BLD-MCNC: 1087 MG/DL (ref 74–99)
GLUCOSE BLD-MCNC: 370 MG/DL (ref 74–99)
GLUCOSE BLD-MCNC: 815 MG/DL (ref 74–99)
HCO3: 18.9 MMOL/L (ref 22–26)
HCT VFR BLD CALC: 31.1 % (ref 37–54)
HEMOGLOBIN: 9.6 G/DL (ref 12.5–16.5)
HHB: 4.3 % (ref 0–5)
IMMATURE GRANULOCYTES #: 0.03 E9/L
IMMATURE GRANULOCYTES %: 0.6 % (ref 0–5)
INR BLD: 1
LAB: ABNORMAL
LACTIC ACID: 6.2 MMOL/L (ref 0.5–2.2)
LIPASE: 15 U/L (ref 13–60)
LYMPHOCYTES ABSOLUTE: 0.59 E9/L (ref 1.5–4)
LYMPHOCYTES RELATIVE PERCENT: 12.6 % (ref 20–42)
Lab: ABNORMAL
MAGNESIUM: 1.9 MG/DL (ref 1.6–2.6)
MCH RBC QN AUTO: 27.8 PG (ref 26–35)
MCHC RBC AUTO-ENTMCNC: 30.9 % (ref 32–34.5)
MCV RBC AUTO: 90.1 FL (ref 80–99.9)
METHB: 0.4 % (ref 0–1.5)
MODE: ABNORMAL
MONOCYTES ABSOLUTE: 0.49 E9/L (ref 0.1–0.95)
MONOCYTES RELATIVE PERCENT: 10.5 % (ref 2–12)
NEUTROPHILS ABSOLUTE: 3.54 E9/L (ref 1.8–7.3)
NEUTROPHILS RELATIVE PERCENT: 75.7 % (ref 43–80)
O2 CONTENT: 13.9 ML/DL
O2 SATURATION: 95.7 % (ref 92–98.5)
O2HB: 95 % (ref 94–97)
OPERATOR ID: ABNORMAL
OSMOLALITY: 321 MOSM/KG (ref 285–310)
PATIENT TEMP: 37 C
PCO2: 32.1 MMHG (ref 35–45)
PDW BLD-RTO: 15.9 FL (ref 11.5–15)
PH BLOOD GAS: 7.39 (ref 7.35–7.45)
PHOSPHORUS: 3.4 MG/DL (ref 2.5–4.5)
PLATELET # BLD: 129 E9/L (ref 130–450)
PMV BLD AUTO: 11.9 FL (ref 7–12)
PO2: 85 MMHG (ref 60–100)
POIKILOCYTES: ABNORMAL
POTASSIUM SERPL-SCNC: 3.7 MMOL/L (ref 3.5–5)
POTASSIUM SERPL-SCNC: 4.5 MMOL/L (ref 3.5–5)
PRO-BNP: 7211 PG/ML (ref 0–125)
PROTHROMBIN TIME: 12.1 SEC (ref 9.3–12.4)
RBC # BLD: 3.45 E12/L (ref 3.8–5.8)
SCHISTOCYTES: ABNORMAL
SODIUM BLD-SCNC: 126 MMOL/L (ref 132–146)
SODIUM BLD-SCNC: 132 MMOL/L (ref 132–146)
SOURCE, BLOOD GAS: ABNORMAL
THB: 10.3 G/DL (ref 11.5–16.5)
TIME ANALYZED: 1922
TOTAL PROTEIN: 6.7 G/DL (ref 6.4–8.3)
TROPONIN: <0.01 NG/ML (ref 0–0.03)
WBC # BLD: 4.7 E9/L (ref 4.5–11.5)

## 2019-05-01 PROCEDURE — 87106 FUNGI IDENTIFICATION YEAST: CPT

## 2019-05-01 PROCEDURE — 96365 THER/PROPH/DIAG IV INF INIT: CPT

## 2019-05-01 PROCEDURE — 71045 X-RAY EXAM CHEST 1 VIEW: CPT

## 2019-05-01 PROCEDURE — 85610 PROTHROMBIN TIME: CPT

## 2019-05-01 PROCEDURE — 2580000003 HC RX 258: Performed by: INTERNAL MEDICINE

## 2019-05-01 PROCEDURE — 2580000003 HC RX 258: Performed by: EMERGENCY MEDICINE

## 2019-05-01 PROCEDURE — 80053 COMPREHEN METABOLIC PANEL: CPT

## 2019-05-01 PROCEDURE — 83930 ASSAY OF BLOOD OSMOLALITY: CPT

## 2019-05-01 PROCEDURE — 99291 CRITICAL CARE FIRST HOUR: CPT | Performed by: PSYCHIATRY & NEUROLOGY

## 2019-05-01 PROCEDURE — 36415 COLL VENOUS BLD VENIPUNCTURE: CPT

## 2019-05-01 PROCEDURE — 6370000000 HC RX 637 (ALT 250 FOR IP): Performed by: EMERGENCY MEDICINE

## 2019-05-01 PROCEDURE — 85025 COMPLETE CBC W/AUTO DIFF WBC: CPT

## 2019-05-01 PROCEDURE — 6360000002 HC RX W HCPCS: Performed by: EMERGENCY MEDICINE

## 2019-05-01 PROCEDURE — 82947 ASSAY GLUCOSE BLOOD QUANT: CPT

## 2019-05-01 PROCEDURE — 83880 ASSAY OF NATRIURETIC PEPTIDE: CPT

## 2019-05-01 PROCEDURE — 87186 SC STD MICRODIL/AGAR DIL: CPT

## 2019-05-01 PROCEDURE — 96375 TX/PRO/DX INJ NEW DRUG ADDON: CPT

## 2019-05-01 PROCEDURE — 99285 EMERGENCY DEPT VISIT HI MDM: CPT

## 2019-05-01 PROCEDURE — 85730 THROMBOPLASTIN TIME PARTIAL: CPT

## 2019-05-01 PROCEDURE — 82010 KETONE BODYS QUAN: CPT

## 2019-05-01 PROCEDURE — 83690 ASSAY OF LIPASE: CPT

## 2019-05-01 PROCEDURE — 6360000002 HC RX W HCPCS

## 2019-05-01 PROCEDURE — 6360000002 HC RX W HCPCS: Performed by: INTERNAL MEDICINE

## 2019-05-01 PROCEDURE — 2000000000 HC ICU R&B

## 2019-05-01 PROCEDURE — 87040 BLOOD CULTURE FOR BACTERIA: CPT

## 2019-05-01 PROCEDURE — 82140 ASSAY OF AMMONIA: CPT

## 2019-05-01 PROCEDURE — 80048 BASIC METABOLIC PNL TOTAL CA: CPT

## 2019-05-01 PROCEDURE — 84100 ASSAY OF PHOSPHORUS: CPT

## 2019-05-01 PROCEDURE — 84484 ASSAY OF TROPONIN QUANT: CPT

## 2019-05-01 PROCEDURE — 83605 ASSAY OF LACTIC ACID: CPT

## 2019-05-01 PROCEDURE — 82805 BLOOD GASES W/O2 SATURATION: CPT

## 2019-05-01 PROCEDURE — 93005 ELECTROCARDIOGRAM TRACING: CPT | Performed by: EMERGENCY MEDICINE

## 2019-05-01 PROCEDURE — 70450 CT HEAD/BRAIN W/O DYE: CPT

## 2019-05-01 PROCEDURE — 83735 ASSAY OF MAGNESIUM: CPT

## 2019-05-01 RX ORDER — FOSPHENYTOIN SODIUM 50 MG/ML
100 INJECTION, SOLUTION INTRAMUSCULAR; INTRAVENOUS EVERY 8 HOURS
Status: DISCONTINUED | OUTPATIENT
Start: 2019-05-01 | End: 2019-05-01 | Stop reason: SDUPTHER

## 2019-05-01 RX ORDER — LORAZEPAM 2 MG/ML
INJECTION INTRAMUSCULAR
Status: COMPLETED
Start: 2019-05-01 | End: 2019-05-01

## 2019-05-01 RX ORDER — SODIUM CHLORIDE 450 MG/100ML
INJECTION, SOLUTION INTRAVENOUS CONTINUOUS
Status: DISCONTINUED | OUTPATIENT
Start: 2019-05-01 | End: 2019-05-02

## 2019-05-01 RX ORDER — DEXTROSE MONOHYDRATE 50 MG/ML
100 INJECTION, SOLUTION INTRAVENOUS PRN
Status: DISCONTINUED | OUTPATIENT
Start: 2019-05-01 | End: 2019-05-10 | Stop reason: HOSPADM

## 2019-05-01 RX ORDER — POTASSIUM CHLORIDE 7.45 MG/ML
10 INJECTION INTRAVENOUS PRN
Status: DISCONTINUED | OUTPATIENT
Start: 2019-05-01 | End: 2019-05-10 | Stop reason: HOSPADM

## 2019-05-01 RX ORDER — SODIUM CHLORIDE 0.9 % (FLUSH) 0.9 %
10 SYRINGE (ML) INJECTION EVERY 12 HOURS SCHEDULED
Status: DISCONTINUED | OUTPATIENT
Start: 2019-05-01 | End: 2019-05-10 | Stop reason: HOSPADM

## 2019-05-01 RX ORDER — 0.9 % SODIUM CHLORIDE 0.9 %
1000 INTRAVENOUS SOLUTION INTRAVENOUS ONCE
Status: COMPLETED | OUTPATIENT
Start: 2019-05-01 | End: 2019-05-01

## 2019-05-01 RX ORDER — DEXTROSE MONOHYDRATE 25 G/50ML
12.5 INJECTION, SOLUTION INTRAVENOUS PRN
Status: DISCONTINUED | OUTPATIENT
Start: 2019-05-01 | End: 2019-05-01 | Stop reason: SDUPTHER

## 2019-05-01 RX ORDER — DEXTROSE MONOHYDRATE 25 G/50ML
12.5 INJECTION, SOLUTION INTRAVENOUS PRN
Status: DISCONTINUED | OUTPATIENT
Start: 2019-05-01 | End: 2019-05-10 | Stop reason: HOSPADM

## 2019-05-01 RX ORDER — MAGNESIUM SULFATE 1 G/100ML
1 INJECTION INTRAVENOUS PRN
Status: DISCONTINUED | OUTPATIENT
Start: 2019-05-01 | End: 2019-05-10 | Stop reason: HOSPADM

## 2019-05-01 RX ORDER — LEVETIRACETAM 10 MG/ML
INJECTION INTRAVASCULAR
Status: COMPLETED
Start: 2019-05-01 | End: 2019-05-01

## 2019-05-01 RX ORDER — DEXTROSE AND SODIUM CHLORIDE 5; .45 G/100ML; G/100ML
INJECTION, SOLUTION INTRAVENOUS CONTINUOUS PRN
Status: DISCONTINUED | OUTPATIENT
Start: 2019-05-01 | End: 2019-05-01 | Stop reason: SDUPTHER

## 2019-05-01 RX ORDER — NICOTINE POLACRILEX 4 MG
15 LOZENGE BUCCAL PRN
Status: DISCONTINUED | OUTPATIENT
Start: 2019-05-01 | End: 2019-05-10 | Stop reason: HOSPADM

## 2019-05-01 RX ORDER — SODIUM CHLORIDE 450 MG/100ML
INJECTION, SOLUTION INTRAVENOUS CONTINUOUS
Status: DISCONTINUED | OUTPATIENT
Start: 2019-05-01 | End: 2019-05-01 | Stop reason: SDUPTHER

## 2019-05-01 RX ORDER — MAGNESIUM SULFATE 1 G/100ML
1 INJECTION INTRAVENOUS PRN
Status: DISCONTINUED | OUTPATIENT
Start: 2019-05-01 | End: 2019-05-01 | Stop reason: SDUPTHER

## 2019-05-01 RX ORDER — HEPARIN SODIUM 10000 [USP'U]/ML
5000 INJECTION, SOLUTION INTRAVENOUS; SUBCUTANEOUS EVERY 8 HOURS SCHEDULED
Status: DISCONTINUED | OUTPATIENT
Start: 2019-05-01 | End: 2019-05-10 | Stop reason: HOSPADM

## 2019-05-01 RX ORDER — TAMSULOSIN HYDROCHLORIDE 0.4 MG/1
0.4 CAPSULE ORAL DAILY
Status: DISCONTINUED | OUTPATIENT
Start: 2019-05-02 | End: 2019-05-10 | Stop reason: HOSPADM

## 2019-05-01 RX ORDER — DEXTROSE, SODIUM CHLORIDE, AND POTASSIUM CHLORIDE 5; .45; .15 G/100ML; G/100ML; G/100ML
INJECTION INTRAVENOUS CONTINUOUS PRN
Status: DISCONTINUED | OUTPATIENT
Start: 2019-05-01 | End: 2019-05-07

## 2019-05-01 RX ORDER — LORAZEPAM 2 MG/ML
2 INJECTION INTRAMUSCULAR ONCE
Status: COMPLETED | OUTPATIENT
Start: 2019-05-01 | End: 2019-05-01

## 2019-05-01 RX ORDER — PRAVASTATIN SODIUM 20 MG
80 TABLET ORAL NIGHTLY
Status: DISCONTINUED | OUTPATIENT
Start: 2019-05-01 | End: 2019-05-10 | Stop reason: HOSPADM

## 2019-05-01 RX ORDER — ONDANSETRON 2 MG/ML
4 INJECTION INTRAMUSCULAR; INTRAVENOUS EVERY 6 HOURS PRN
Status: DISCONTINUED | OUTPATIENT
Start: 2019-05-01 | End: 2019-05-10 | Stop reason: HOSPADM

## 2019-05-01 RX ORDER — LEVETIRACETAM 10 MG/ML
1000 INJECTION INTRAVASCULAR ONCE
Status: COMPLETED | OUTPATIENT
Start: 2019-05-01 | End: 2019-05-01

## 2019-05-01 RX ORDER — SODIUM CHLORIDE 0.9 % (FLUSH) 0.9 %
10 SYRINGE (ML) INJECTION PRN
Status: DISCONTINUED | OUTPATIENT
Start: 2019-05-01 | End: 2019-05-10 | Stop reason: HOSPADM

## 2019-05-01 RX ADMIN — DEXTROSE MONOHYDRATE 1200 MG PE: 50 INJECTION, SOLUTION INTRAVENOUS at 18:59

## 2019-05-01 RX ADMIN — SODIUM CHLORIDE 1000 ML: 9 INJECTION, SOLUTION INTRAVENOUS at 19:07

## 2019-05-01 RX ADMIN — LEVETIRACETAM 1000 MG: 10 INJECTION INTRAVENOUS at 18:07

## 2019-05-01 RX ADMIN — LORAZEPAM 2 MG: 2 INJECTION INTRAMUSCULAR; INTRAVENOUS at 18:07

## 2019-05-01 RX ADMIN — POTASSIUM CHLORIDE 10 MEQ: 7.46 INJECTION, SOLUTION INTRAVENOUS at 22:45

## 2019-05-01 RX ADMIN — POTASSIUM CHLORIDE 10 MEQ: 7.46 INJECTION, SOLUTION INTRAVENOUS at 22:01

## 2019-05-01 RX ADMIN — SODIUM CHLORIDE 1000 ML: 9 INJECTION, SOLUTION INTRAVENOUS at 21:23

## 2019-05-01 RX ADMIN — POTASSIUM CHLORIDE 10 MEQ: 7.46 INJECTION, SOLUTION INTRAVENOUS at 21:23

## 2019-05-01 RX ADMIN — LORAZEPAM 2 MG: 2 INJECTION INTRAMUSCULAR at 18:07

## 2019-05-01 RX ADMIN — LEVETIRACETAM 1000 MG: 10 INJECTION INTRAVASCULAR at 18:07

## 2019-05-01 RX ADMIN — SODIUM CHLORIDE: 4.5 INJECTION, SOLUTION INTRAVENOUS at 23:28

## 2019-05-01 RX ADMIN — SODIUM CHLORIDE: 4.5 INJECTION, SOLUTION INTRAVENOUS at 19:26

## 2019-05-01 RX ADMIN — INSULIN HUMAN 6 UNITS: 100 INJECTION, SOLUTION PARENTERAL at 19:32

## 2019-05-01 RX ADMIN — LORAZEPAM 2 MG: 2 INJECTION INTRAMUSCULAR; INTRAVENOUS at 18:29

## 2019-05-01 RX ADMIN — SODIUM CHLORIDE 0.1 UNITS/KG/HR: 9 INJECTION, SOLUTION INTRAVENOUS at 19:35

## 2019-05-01 NOTE — CONSULTS
Consults     Neurology Consult Note    5/1/2019     REASON FOR CONSULTATION:   nat     HISTORY OF PRESENT ILLNESS:   A 70-year-old man with recent bacteremia discharge from hospital 3 weeks ago presented with altered mental status. He was in nursing home and was \"alert and oriented ×3\". He was found around 2:45 PM unresponsive apparently a urinary accident was noticed 2. He was transferred to the ED and was seen having some random movements of the right arm. After that he was found to have complete plegia of the right upper extremity. Neurology was consulted because of concern for seizure. Past Medical History:   Diagnosis Date    Cancer [facility])     pancreatic    Cerebral artery occlusion with cerebral infarction ([facility]     Hypertension     Syncope         Past Surgical History:   Procedure Laterality Date    ERCP N/A 3/25/2019    ERCP STENT INSERTION performed by Nichole Barragan MD at [facility]  3/27/2019         UPPER GASTROINTESTINAL ENDOSCOPY N/A 3/8/2019    REDO ENDOSCOPIC EGD  ULTRASOUND performed by Jenna Alvares DO at [address] [place] [address] 3/8/2019    EGD DIAGNOSTIC ONLY performed by Jenna Alvares DO at [address] [place] [address] 1/16/2019    EXPLORATORY LAPAROTOMY; ABORTED WHIPPLE PROCEDURE; GASTROJEJUNOSTOMY; OPEN CHOLECYSTECTOMY performed by Janes Perez MD at [address] [place] [address]       Prior to Admission medications    Medication Sig Start Date End Date Taking?  Authorizing Provider   pantoprazole (PROTONIX) 40 MG tablet Take 1 tablet by mouth every morning (before breakfast) 3/30/19   Linda Howard MD   pravastatin (PRAVACHOL) 80 MG tablet Take 1 tablet by mouth nightly 1/10/19   Blane Rodriguez MD   lisinopril (PRINIVIL;ZESTRIL) 20 MG tablet Take 1 tablet by mouth daily 1/11/19   Blane Rodriguez MD   metoprolol succinate (TOPROL XL) 50 MG extended release tablet Take 1 tablet by mouth daily 1/11/19   Brando Palmer MD   tamsulosin (FLOMAX) 0.4 MG capsule Take 1 capsule by mouth daily 1/11/19   Duglas Short MD       Allergies:  Patient has no known allergies. Family History   Problem Relation Age of Onset    Cancer Mother 50        breast       Social History     Tobacco Use    Smoking status: Never Smoker    Smokeless tobacco: Never Used   Substance Use Topics    Alcohol use: Yes     Alcohol/week: 4.8 oz     Types: 8 Cans of beer per week     Comment: states drinks one 24 ounce beer daily    Drug use: No     Types: Marijuana          ROS:  Cannot obtain review of systems as he is nonverbal.     EXAMINATION:  BP (!) 117/95   Pulse 93   Temp 98.2 °F (36.8 °C)   Resp 16   Ht 5' 10\" (1.778 m)   Wt 132 lb (59.9 kg)   SpO2 97%   BMI 18.94 kg/m²     Neuro Exam:  Mental:  Patient is sleeping. He doesn't with neither verbal or tactile stimuli even if it is noxious. He has a constant chewing movements of mouth interrupted at times for few seconds but resumes quickly. At the same time he has left hand fidgetiness   PERRL, at times he is looking at the left side with conjugate gaze, visual field testing cannot be done, no clear nystagmus. no facial asymmetry. Tongue midline. Symmetric palate elevation. Motor: Upon applying noxious stimuli he can move all his extremities except right upper extremity against gravity. Right upper extremity doesn't move at all. All his extremities are hypotonic. Sensation: intact all over to noxious stimuli  DTRs: +2 biceps/triceps/BR/Knees, +1 ankles, plantars down B/L. Coordination: Cannot be performed due to lack of cooperation  Gait: Cannot be performed due to lack of cooperation    ASSESSMENT:  Altered mental status with normal-appearing CT of the head. Earlier when he was in ED he was seen to have some irregular movements of the right hand. After that he became paralyzed in the right upper extremity. I was consulted in ED. I was present in the ED to observe him.  He was seen to have automatism in the mouth with chewing movements. Also I seen fidgety movements of the left upper extremity. He could move all of his extremities except right upper extremity. Impression: The right hand movement could have been a focal seizure followed by Gustavo's paralysis. 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 Before I saw him he already received 2 mg of Ativan plus Keppra 1000 mg. After my observation of possible automotor seizure, suggested another 2 mg of Ativan. He didn't change in his chewing mouth movements or fidgety movements in the left hand nor his paralysis in the right upper extremity improved. Later on it was found that his glucose was more than 1000. It is possible that he had a focal seizure due to hyperglycemia. He was started on phenytoin. PLAN:   Critical care management per ED and ICU if necessary. I put the order for fosphenytoin 100 mg 3 times a day. I will request total and free level of phenytoin before morning dose of this medication tomorrow. EEG will be done  Brain MRI when the patient is more stable  As soon as the patient is back mentally to baseline phenytoin can be discontinued as I suspect that seizure was symptomatic to hyperglycemia.       Electronically signed by Jose Guadalupe Rivera MD on 5/1/2019 at 7:18 PM

## 2019-05-01 NOTE — ED PROVIDER NOTES
Hanna Clarke is a 76 y.o female male with a PMH of bacteremia recently discharged from the hospital 3 weeks ago on ampicillin presented with AMS. Patient is unable to provide hx due to change in mental status. Per nursing facility they state that patient was found unresponsive at 2:45 PM and did start to respond when she walked back in the room. She states that he was acutely altered and was difficult to understand. She states that he is prone to urinary tract infections. She denies that he's been febrile. Review of Systems   Unable to perform ROS: Mental status change       Physical Exam   Constitutional: He appears well-developed and well-nourished. HENT:   Head: Normocephalic and atraumatic. Eyes: Pupils are equal, round, and reactive to light. Neck: Normal range of motion. Neck supple. Cardiovascular: Normal rate, regular rhythm and normal heart sounds. Pulmonary/Chest: Effort normal and breath sounds normal. No respiratory distress. He has no wheezes. He has no rales. Abdominal: Soft. Bowel sounds are normal. There is no tenderness. There is no rebound and no guarding. Musculoskeletal: He exhibits no edema. Neurological: He is alert. No cranial nerve deficit. Coordination normal.   Not oriented to person, place or time   Skin: Skin is warm and dry. Nursing note and vitals reviewed. Procedures    MDM  Number of Diagnoses or Management Options  Altered mental status, unspecified altered mental status type:   Hyperglycemia:   Seizures [facility]):   Diagnosis management comments: Patient presented with altered mental status, incontinent stool and urine or nursing facility. I was called in the room and patient was having right upper and lower extremity shaking and was not responding. 2 mg Ativan and 1 g of Keppra were given. Shaking did resolve but patient continued to have lip smacking. Dr. Rahat Short  evaluated the patient and stated that patient might be still having seizures.  2 more mg ativan given. Patient continued to have lip smacking and cerebryx loading dose given. Patient's symptoms did improve. Labs obtained and glucose was 1087. There was no hx of DM II. There was concern for HHS and insulin bolus and gtt started. CT head negative for acute ischemic event although symptoms present for 3 to 4 hours and may not be present on scan yet. Case discussed with Dr. Ace Baez and she accepts patient into ICU. Case discussed with Dr. Saundra Clements and he will be admitting the patient. ED Course as of May 01 1938   Wed May 01, 2019   1920 Shaking has resolved. Occasionally will have lip smacking    []   1933 Case discussed with Dr. Saundra Clements, he will admit the patient. []      ED Course User Index  [] Belle Tellez DO           --------------------------------------------- PAST HISTORY ---------------------------------------------  Past Medical History:  has a past medical history of Cancer [facility]), Cerebral artery occlusion with cerebral infarction [facility]), Hypertension, and Syncope. Past Surgical History:  has a past surgical history that includes whipple procedure (N/A, 1/16/2019); eye surgery; Upper gastrointestinal endoscopy (N/A, 3/8/2019); Upper gastrointestinal endoscopy (N/A, 3/8/2019); ERCP (N/A, 3/25/2019); and picc line insertion nurse (3/27/2019). Social History:  reports that he has never smoked. He has never used smokeless tobacco. He reports that he drinks about 4.8 oz of alcohol per week. He reports that he does not use drugs. Family History: family history includes Cancer (age of onset: 50) in his mother. The patients home medications have been reviewed. Allergies: Patient has no known allergies.     -------------------------------------------------- RESULTS -------------------------------------------------    LABS:  Results for orders placed or performed during the hospital encounter of 05/01/19   CBC Auto Differential   Result Value Ref Range    WBC 4.7 4.5 - 11.5 E9/L    RBC 3.45 (L) 3.80 - 5.80 E12/L    Hemoglobin 9.6 (L) 12.5 - 16.5 g/dL    Hematocrit 31.1 (L) 37.0 - 54.0 %    MCV 90.1 80.0 - 99.9 fL    MCH 27.8 26.0 - 35.0 pg    MCHC 30.9 (L) 32.0 - 34.5 %    RDW 15.9 (H) 11.5 - 15.0 fL    Platelets 773 (L) 242 - 450 E9/L    MPV 11.9 7.0 - 12.0 fL    Neutrophils % 75.7 43.0 - 80.0 %    Immature Granulocytes % 0.6 0.0 - 5.0 %    Lymphocytes % 12.6 (L) 20.0 - 42.0 %    Monocytes % 10.5 2.0 - 12.0 %    Eosinophils % 0.4 0.0 - 6.0 %    Basophils % 0.2 0.0 - 2.0 %    Neutrophils # 3.54 1.80 - 7.30 E9/L    Immature Granulocytes # 0.03 E9/L    Lymphocytes # 0.59 (L) 1.50 - 4.00 E9/L    Monocytes # 0.49 0.10 - 0.95 E9/L    Eosinophils # 0.02 (L) 0.05 - 0.50 E9/L    Basophils # 0.01 0.00 - 0.20 E9/L    Poikilocytes 2+     Schistocytes 1+     [place] Cells 2+    Comprehensive Metabolic Panel   Result Value Ref Range    Sodium 126 (L) 132 - 146 mmol/L    Potassium 4.5 3.5 - 5.0 mmol/L    Chloride 91 (L) 98 - 107 mmol/L    CO2 20 (L) 22 - 29 mmol/L    Anion Gap 15 7 - 16 mmol/L    Glucose 1,087 (HH) 74 - 99 mg/dL    BUN 18 8 - 23 mg/dL    CREATININE 1.4 (H) 0.7 - 1.2 mg/dL    GFR Non-African American >60 >=60 mL/min/1.73    GFR African American >60     Calcium 8.7 8.6 - 10.2 mg/dL    Total Protein 6.7 6.4 - 8.3 g/dL    Alb 3.2 (L) 3.5 - 5.2 g/dL    Total Bilirubin 0.6 0.0 - 1.2 mg/dL    Alkaline Phosphatase 86 40 - 129 U/L    ALT 10 0 - 40 U/L    AST 8 0 - 39 U/L   Lipase   Result Value Ref Range    Lipase 15 13 - 60 U/L   Lactic Acid, Plasma   Result Value Ref Range    Lactic Acid 6.2 (HH) 0.5 - 2.2 mmol/L   Troponin   Result Value Ref Range    Troponin <0.01 0.00 - 0.03 ng/mL   Protime-INR   Result Value Ref Range    Protime 12.1 9.3 - 12.4 sec    INR 1.0    APTT   Result Value Ref Range    aPTT 25.4 24.5 - 35.1 sec   Brain Natriuretic Peptide   Result Value Ref Range    Pro-BNP 7,211 (H) 0 - 125 pg/mL   Ammonia   Result Value Ref Range    Ammonia 14.0 (L) 16.0 - 60.0 umol/L Beta-Hydroxybutyrate   Result Value Ref Range    Beta-Hydroxybutyrate 0.10 0.02 - 0.27 mmol/L   Blood Gas, Arterial   Result Value Ref Range    Date Analyzed 20190501     Time Analyzed 1922     Source: Blood Arterial     pH, Blood Gas 7.387 7.350 - 7.450    PCO2 32.1 (L) 35.0 - 45.0 mmHg    PO2 85.0 60.0 - 100.0 mmHg    HCO3 18.9 (L) 22.0 - 26.0 mmol/L    B.E. -5.3 (L) -3.0 - 3.0 mmol/L    O2 Sat 95.7 92.0 - 98.5 %    O2Hb 95.0 94.0 - 97.0 %    COHb 0.3 0.0 - 1.5 %    MetHb 0.4 0.0 - 1.5 %    O2 Content 13.9 mL/dL    HHb 4.3 0.0 - 5.0 %    tHb (est) 10.3 (L) 11.5 - 16.5 g/dL    Mode RA     Date Of Collection      Time Collected      Pt Temp 37.0 C     ID [id number]     Lab [id number]     Critical(s) Notified . No Critical Values    PHENYTOIN LEVEL, TOTAL   Result Value Ref Range    Phenytoin Dose Amount Unknown        RADIOLOGY:  XR CHEST PORTABLE   Final Result   No acute cardiopulmonary process. CT Head WO Contrast   Final Result   1. Chronic ischemic changes in the brain parenchyma predominantly   supratentorial areas with overall loss of volume of the brain   parenchyma for the age group, as above commented. 2. No indication for an acute intracranial process. EKG: This EKG is signed and interpreted by me. Rate: 95  Rhythm: Sinus  Interpretation: NSR, RBBB, ST depression lateral leads  Comparison: changes compared to previous EKG      ------------------------- NURSING NOTES AND VITALS REVIEWED ---------------------------  Date / Time Roomed:  5/1/2019  4:58 PM  ED Bed Assignment:  21/21    The nursing notes within the ED encounter and vital signs as below have been reviewed.      Patient Vitals for the past 24 hrs:   BP Temp Pulse Resp SpO2 Height Weight   05/01/19 1908 (!) 117/95 98.2 °F (36.8 °C) 93 16 97 % -- --   05/01/19 1703 (!) 141/95 98.1 °F (36.7 °C) 105 16 97 % 5' 10\" (1.778 m) 132 lb (59.9 kg)       Oxygen Saturation Interpretation: Normal    ------------------------------------------ PROGRESS NOTES ------------------------------------------  Re-evaluation(s):  Time:   Patients symptoms are improving  Repeat physical examination is improved    Counseling:  I have spoken with the patient and discussed todays results, in addition to providing specific details for the plan of care and counseling regarding the diagnosis and prognosis. Their questions are answered at this time and they are agreeable with the plan of admission.    --------------------------------- ADDITIONAL PROVIDER NOTES ---------------------------------  Consultations:  Time: . Spoke with Dr. Arnoldo Frederick. Discussed case. They will admit the patient. This patient's ED course included: a personal history and physicial examination, multiple bedside re-evaluations, cardiac monitoring and continuous pulse oximetry    This patient has remained hemodynamically stable during their ED course. Diagnosis:  1. Altered mental status, unspecified altered mental status type    2. Seizures ([address]    3. Hyperglycemia        Disposition:  Patient's disposition: Admit to CCU/ICU  Patient's condition is stable.             Gosia Morillo DO  Resident  05/01/19 5835

## 2019-05-02 ENCOUNTER — APPOINTMENT (OUTPATIENT)
Dept: NEUROLOGY | Age: 69
DRG: 871 | End: 2019-05-02
Payer: MEDICARE

## 2019-05-02 ENCOUNTER — HOSPITAL ENCOUNTER (OUTPATIENT)
Dept: INFUSION THERAPY | Age: 69
End: 2019-05-02

## 2019-05-02 ENCOUNTER — APPOINTMENT (OUTPATIENT)
Dept: MRI IMAGING | Age: 69
DRG: 871 | End: 2019-05-02
Payer: MEDICARE

## 2019-05-02 LAB
ALBUMIN SERPL-MCNC: 3 G/DL (ref 3.5–5.2)
ALP BLD-CCNC: 69 U/L (ref 40–129)
ALT SERPL-CCNC: 8 U/L (ref 0–40)
ANION GAP SERPL CALCULATED.3IONS-SCNC: 11 MMOL/L (ref 7–16)
ANION GAP SERPL CALCULATED.3IONS-SCNC: 12 MMOL/L (ref 7–16)
ANION GAP SERPL CALCULATED.3IONS-SCNC: 6 MMOL/L (ref 7–16)
AST SERPL-CCNC: 8 U/L (ref 0–39)
B.E.: -2.5 MMOL/L (ref -3–3)
BACTERIA: ABNORMAL /HPF
BACTERIA: ABNORMAL /HPF
BASOPHILS ABSOLUTE: 0.03 E9/L (ref 0–0.2)
BASOPHILS RELATIVE PERCENT: 0.4 % (ref 0–2)
BILIRUB SERPL-MCNC: 0.5 MG/DL (ref 0–1.2)
BILIRUBIN DIRECT: 0.3 MG/DL (ref 0–0.3)
BILIRUBIN URINE: NEGATIVE
BILIRUBIN URINE: NEGATIVE
BILIRUBIN, INDIRECT: 0.2 MG/DL (ref 0–1)
BLOOD, URINE: ABNORMAL
BLOOD, URINE: ABNORMAL
BUN BLDV-MCNC: 10 MG/DL (ref 8–23)
BUN BLDV-MCNC: 11 MG/DL (ref 8–23)
BUN BLDV-MCNC: 8 MG/DL (ref 8–23)
CALCIUM SERPL-MCNC: 8.4 MG/DL (ref 8.6–10.2)
CALCIUM SERPL-MCNC: 8.4 MG/DL (ref 8.6–10.2)
CALCIUM SERPL-MCNC: 8.5 MG/DL (ref 8.6–10.2)
CHLORIDE BLD-SCNC: 101 MMOL/L (ref 98–107)
CHLORIDE BLD-SCNC: 103 MMOL/L (ref 98–107)
CHLORIDE BLD-SCNC: 103 MMOL/L (ref 98–107)
CHLORIDE URINE RANDOM: 97 MMOL/L
CLARITY: ABNORMAL
CLARITY: CLEAR
CO2: 23 MMOL/L (ref 22–29)
CO2: 23 MMOL/L (ref 22–29)
CO2: 26 MMOL/L (ref 22–29)
COHB: 0.5 % (ref 0–1.5)
COLOR: YELLOW
COLOR: YELLOW
CORTISOL TOTAL: 4.2 MCG/DL (ref 2.68–18.4)
CREAT SERPL-MCNC: 0.7 MG/DL (ref 0.7–1.2)
CREAT SERPL-MCNC: 0.8 MG/DL (ref 0.7–1.2)
CREAT SERPL-MCNC: 0.9 MG/DL (ref 0.7–1.2)
CREATININE URINE: 20 MG/DL (ref 40–278)
CRITICAL: ABNORMAL
DATE ANALYZED: ABNORMAL
DATE OF COLLECTION: ABNORMAL
EKG ATRIAL RATE: 77 BPM
EKG ATRIAL RATE: 95 BPM
EKG P AXIS: 61 DEGREES
EKG P AXIS: 67 DEGREES
EKG P-R INTERVAL: 144 MS
EKG P-R INTERVAL: 154 MS
EKG Q-T INTERVAL: 416 MS
EKG Q-T INTERVAL: 420 MS
EKG QRS DURATION: 140 MS
EKG QRS DURATION: 144 MS
EKG QTC CALCULATION (BAZETT): 475 MS
EKG QTC CALCULATION (BAZETT): 522 MS
EKG R AXIS: -49 DEGREES
EKG R AXIS: -69 DEGREES
EKG T AXIS: 102 DEGREES
EKG T AXIS: 99 DEGREES
EKG VENTRICULAR RATE: 77 BPM
EKG VENTRICULAR RATE: 95 BPM
EOSINOPHILS ABSOLUTE: 0.18 E9/L (ref 0.05–0.5)
EOSINOPHILS RELATIVE PERCENT: 2.5 % (ref 0–6)
GFR AFRICAN AMERICAN: >60
GFR NON-AFRICAN AMERICAN: >60 ML/MIN/1.73
GLUCOSE BLD-MCNC: 118 MG/DL (ref 74–99)
GLUCOSE BLD-MCNC: 194 MG/DL (ref 74–99)
GLUCOSE BLD-MCNC: 48 MG/DL (ref 74–99)
GLUCOSE BLD-MCNC: 51 MG/DL (ref 74–99)
GLUCOSE URINE: >=1000 MG/DL
GLUCOSE URINE: NEGATIVE MG/DL
HBA1C MFR BLD: 10 % (ref 4–5.6)
HCO3: 22.6 MMOL/L (ref 22–26)
HCT VFR BLD CALC: 28.1 % (ref 37–54)
HEMOGLOBIN: 8.8 G/DL (ref 12.5–16.5)
HHB: 5.3 % (ref 0–5)
IMMATURE GRANULOCYTES #: 0.05 E9/L
IMMATURE GRANULOCYTES %: 0.7 % (ref 0–5)
KETONES, URINE: NEGATIVE MG/DL
KETONES, URINE: NEGATIVE MG/DL
LAB: ABNORMAL
LACTIC ACID: 0.8 MMOL/L (ref 0.5–2.2)
LACTIC ACID: 1.9 MMOL/L (ref 0.5–2.2)
LACTIC ACID: 2.7 MMOL/L (ref 0.5–2.2)
LEUKOCYTE ESTERASE, URINE: ABNORMAL
LEUKOCYTE ESTERASE, URINE: ABNORMAL
LYMPHOCYTES ABSOLUTE: 2.09 E9/L (ref 1.5–4)
LYMPHOCYTES RELATIVE PERCENT: 28.6 % (ref 20–42)
Lab: ABNORMAL
MAGNESIUM: 1.6 MG/DL (ref 1.6–2.6)
MAGNESIUM: 1.7 MG/DL (ref 1.6–2.6)
MAGNESIUM: 1.8 MG/DL (ref 1.6–2.6)
MCH RBC QN AUTO: 27.8 PG (ref 26–35)
MCHC RBC AUTO-ENTMCNC: 31.3 % (ref 32–34.5)
MCV RBC AUTO: 88.9 FL (ref 80–99.9)
METER GLUCOSE: 101 MG/DL (ref 74–99)
METER GLUCOSE: 116 MG/DL (ref 74–99)
METER GLUCOSE: 119 MG/DL (ref 74–99)
METER GLUCOSE: 127 MG/DL (ref 74–99)
METER GLUCOSE: 145 MG/DL (ref 74–99)
METER GLUCOSE: 159 MG/DL (ref 74–99)
METER GLUCOSE: 195 MG/DL (ref 74–99)
METER GLUCOSE: 208 MG/DL (ref 74–99)
METER GLUCOSE: 284 MG/DL (ref 74–99)
METER GLUCOSE: 284 MG/DL (ref 74–99)
METER GLUCOSE: 308 MG/DL (ref 74–99)
METER GLUCOSE: 340 MG/DL (ref 74–99)
METER GLUCOSE: 53 MG/DL (ref 74–99)
METER GLUCOSE: 80 MG/DL (ref 74–99)
METHB: 0.1 % (ref 0–1.5)
MODE: ABNORMAL
MONOCYTES ABSOLUTE: 0.64 E9/L (ref 0.1–0.95)
MONOCYTES RELATIVE PERCENT: 8.7 % (ref 2–12)
NEUTROPHILS ABSOLUTE: 4.33 E9/L (ref 1.8–7.3)
NEUTROPHILS RELATIVE PERCENT: 59.1 % (ref 43–80)
NITRITE, URINE: NEGATIVE
NITRITE, URINE: NEGATIVE
O2 SATURATION: 94.7 % (ref 92–98.5)
O2HB: 94.1 % (ref 94–97)
OPERATOR ID: 1868
PATIENT TEMP: 37 C
PCO2: 40.2 MMHG (ref 35–45)
PDW BLD-RTO: 15.6 FL (ref 11.5–15)
PH BLOOD GAS: 7.37 (ref 7.35–7.45)
PH UA: 5.5 (ref 5–9)
PH UA: 6 (ref 5–9)
PHOSPHORUS: 2.7 MG/DL (ref 2.5–4.5)
PHOSPHORUS: 2.8 MG/DL (ref 2.5–4.5)
PHOSPHORUS: 2.9 MG/DL (ref 2.5–4.5)
PLATELET # BLD: 138 E9/L (ref 130–450)
PMV BLD AUTO: 11.4 FL (ref 7–12)
PO2: 77.6 MMHG (ref 60–100)
POTASSIUM SERPL-SCNC: 3.6 MMOL/L (ref 3.5–5)
POTASSIUM SERPL-SCNC: 4.2 MMOL/L (ref 3.5–5)
POTASSIUM SERPL-SCNC: 4.5 MMOL/L (ref 3.5–5)
POTASSIUM, UR: 8.9 MMOL/L
PROTEIN UA: 30 MG/DL
PROTEIN UA: NEGATIVE MG/DL
RBC # BLD: 3.16 E12/L (ref 3.8–5.8)
RBC UA: ABNORMAL /HPF (ref 0–2)
RBC UA: ABNORMAL /HPF (ref 0–2)
RENAL EPITHELIAL, UA: ABNORMAL /HPF
SODIUM BLD-SCNC: 135 MMOL/L (ref 132–146)
SODIUM BLD-SCNC: 135 MMOL/L (ref 132–146)
SODIUM BLD-SCNC: 138 MMOL/L (ref 132–146)
SODIUM URINE: 105 MMOL/L
SOURCE, BLOOD GAS: ABNORMAL
SPECIFIC GRAVITY UA: 1.01 (ref 1–1.03)
SPECIFIC GRAVITY UA: 1.01 (ref 1–1.03)
THB: 10 G/DL (ref 11.5–16.5)
TIME ANALYZED: 921
TOTAL PROTEIN: 6 G/DL (ref 6.4–8.3)
TROPONIN: <0.01 NG/ML (ref 0–0.03)
TSH SERPL DL<=0.05 MIU/L-ACNC: 3.97 UIU/ML (ref 0.27–4.2)
UREA NITROGEN, UR: 164 MG/DL (ref 800–1666)
UROBILINOGEN, URINE: 0.2 E.U./DL
UROBILINOGEN, URINE: 0.2 E.U./DL
WBC # BLD: 7.3 E9/L (ref 4.5–11.5)
WBC UA: ABNORMAL /HPF (ref 0–5)
WBC UA: ABNORMAL /HPF (ref 0–5)

## 2019-05-02 PROCEDURE — 36415 COLL VENOUS BLD VENIPUNCTURE: CPT

## 2019-05-02 PROCEDURE — 6360000002 HC RX W HCPCS: Performed by: EMERGENCY MEDICINE

## 2019-05-02 PROCEDURE — 6370000000 HC RX 637 (ALT 250 FOR IP): Performed by: INTERNAL MEDICINE

## 2019-05-02 PROCEDURE — 84484 ASSAY OF TROPONIN QUANT: CPT

## 2019-05-02 PROCEDURE — 81001 URINALYSIS AUTO W/SCOPE: CPT

## 2019-05-02 PROCEDURE — 70553 MRI BRAIN STEM W/O & W/DYE: CPT

## 2019-05-02 PROCEDURE — 95816 EEG AWAKE AND DROWSY: CPT | Performed by: PSYCHIATRY & NEUROLOGY

## 2019-05-02 PROCEDURE — 82947 ASSAY GLUCOSE BLOOD QUANT: CPT

## 2019-05-02 PROCEDURE — 6360000002 HC RX W HCPCS: Performed by: INTERNAL MEDICINE

## 2019-05-02 PROCEDURE — 99233 SBSQ HOSP IP/OBS HIGH 50: CPT | Performed by: NURSE PRACTITIONER

## 2019-05-02 PROCEDURE — 2580000003 HC RX 258: Performed by: PSYCHIATRY & NEUROLOGY

## 2019-05-02 PROCEDURE — C9113 INJ PANTOPRAZOLE SODIUM, VIA: HCPCS | Performed by: INTERNAL MEDICINE

## 2019-05-02 PROCEDURE — 87088 URINE BACTERIA CULTURE: CPT

## 2019-05-02 PROCEDURE — 80076 HEPATIC FUNCTION PANEL: CPT

## 2019-05-02 PROCEDURE — 82805 BLOOD GASES W/O2 SATURATION: CPT

## 2019-05-02 PROCEDURE — 6360000002 HC RX W HCPCS: Performed by: PSYCHIATRY & NEUROLOGY

## 2019-05-02 PROCEDURE — 51702 INSERT TEMP BLADDER CATH: CPT

## 2019-05-02 PROCEDURE — 2500000003 HC RX 250 WO HCPCS: Performed by: INTERNAL MEDICINE

## 2019-05-02 PROCEDURE — 83735 ASSAY OF MAGNESIUM: CPT

## 2019-05-02 PROCEDURE — 84100 ASSAY OF PHOSPHORUS: CPT

## 2019-05-02 PROCEDURE — 93010 ELECTROCARDIOGRAM REPORT: CPT | Performed by: INTERNAL MEDICINE

## 2019-05-02 PROCEDURE — A9579 GAD-BASE MR CONTRAST NOS,1ML: HCPCS | Performed by: RADIOLOGY

## 2019-05-02 PROCEDURE — 82436 ASSAY OF URINE CHLORIDE: CPT

## 2019-05-02 PROCEDURE — 95816 EEG AWAKE AND DROWSY: CPT

## 2019-05-02 PROCEDURE — 2580000003 HC RX 258: Performed by: INTERNAL MEDICINE

## 2019-05-02 PROCEDURE — 84443 ASSAY THYROID STIM HORMONE: CPT

## 2019-05-02 PROCEDURE — 2580000003 HC RX 258: Performed by: EMERGENCY MEDICINE

## 2019-05-02 PROCEDURE — 80048 BASIC METABOLIC PNL TOTAL CA: CPT

## 2019-05-02 PROCEDURE — 85025 COMPLETE CBC W/AUTO DIFF WBC: CPT

## 2019-05-02 PROCEDURE — 82570 ASSAY OF URINE CREATININE: CPT

## 2019-05-02 PROCEDURE — 82962 GLUCOSE BLOOD TEST: CPT

## 2019-05-02 PROCEDURE — 84540 ASSAY OF URINE/UREA-N: CPT

## 2019-05-02 PROCEDURE — 6360000004 HC RX CONTRAST MEDICATION: Performed by: RADIOLOGY

## 2019-05-02 PROCEDURE — 84133 ASSAY OF URINE POTASSIUM: CPT

## 2019-05-02 PROCEDURE — 83605 ASSAY OF LACTIC ACID: CPT

## 2019-05-02 PROCEDURE — 83036 HEMOGLOBIN GLYCOSYLATED A1C: CPT

## 2019-05-02 PROCEDURE — 84300 ASSAY OF URINE SODIUM: CPT

## 2019-05-02 PROCEDURE — 82533 TOTAL CORTISOL: CPT

## 2019-05-02 PROCEDURE — 2000000000 HC ICU R&B

## 2019-05-02 RX ORDER — INSULIN GLARGINE 100 [IU]/ML
8 INJECTION, SOLUTION SUBCUTANEOUS NIGHTLY
Status: DISCONTINUED | OUTPATIENT
Start: 2019-05-02 | End: 2019-05-03

## 2019-05-02 RX ORDER — LEVETIRACETAM 500 MG/1
500 TABLET ORAL 2 TIMES DAILY
Status: DISCONTINUED | OUTPATIENT
Start: 2019-05-03 | End: 2019-05-04

## 2019-05-02 RX ORDER — PANTOPRAZOLE SODIUM 40 MG/10ML
40 INJECTION, POWDER, LYOPHILIZED, FOR SOLUTION INTRAVENOUS DAILY
Status: DISCONTINUED | OUTPATIENT
Start: 2019-05-02 | End: 2019-05-07

## 2019-05-02 RX ORDER — LEVETIRACETAM 10 MG/ML
1000 INJECTION INTRAVASCULAR ONCE
Status: COMPLETED | OUTPATIENT
Start: 2019-05-02 | End: 2019-05-02

## 2019-05-02 RX ORDER — LEVETIRACETAM 5 MG/ML
500 INJECTION INTRAVASCULAR 2 TIMES DAILY
Status: DISCONTINUED | OUTPATIENT
Start: 2019-05-03 | End: 2019-05-04

## 2019-05-02 RX ORDER — DEXTROSE AND SODIUM CHLORIDE 5; .9 G/100ML; G/100ML
INJECTION, SOLUTION INTRAVENOUS CONTINUOUS
Status: DISCONTINUED | OUTPATIENT
Start: 2019-05-02 | End: 2019-05-07

## 2019-05-02 RX ADMIN — INSULIN LISPRO 4 UNITS: 100 INJECTION, SOLUTION INTRAVENOUS; SUBCUTANEOUS at 16:42

## 2019-05-02 RX ADMIN — FOSPHENYTOIN SODIUM 100 MG PE: 50 INJECTION, SOLUTION INTRAMUSCULAR; INTRAVENOUS at 02:49

## 2019-05-02 RX ADMIN — DEXTROSE AND SODIUM CHLORIDE: 5; 900 INJECTION, SOLUTION INTRAVENOUS at 19:19

## 2019-05-02 RX ADMIN — Medication 10 ML: at 08:31

## 2019-05-02 RX ADMIN — HEPARIN SODIUM 5000 UNITS: 10000 INJECTION INTRAVENOUS; SUBCUTANEOUS at 13:51

## 2019-05-02 RX ADMIN — POTASSIUM CHLORIDE 10 MEQ: 7.46 INJECTION, SOLUTION INTRAVENOUS at 03:01

## 2019-05-02 RX ADMIN — DEXTROSE, SODIUM CHLORIDE, AND POTASSIUM CHLORIDE: 5; .45; .15 INJECTION INTRAVENOUS at 01:20

## 2019-05-02 RX ADMIN — POTASSIUM CHLORIDE 10 MEQ: 7.46 INJECTION, SOLUTION INTRAVENOUS at 08:30

## 2019-05-02 RX ADMIN — Medication 10 ML: at 21:37

## 2019-05-02 RX ADMIN — GADOTERIDOL 13 ML: 279.3 INJECTION, SOLUTION INTRAVENOUS at 20:39

## 2019-05-02 RX ADMIN — LEVETIRACETAM 1000 MG: 10 INJECTION INTRAVENOUS at 18:58

## 2019-05-02 RX ADMIN — DEXTROSE AND SODIUM CHLORIDE: 5; 900 INJECTION, SOLUTION INTRAVENOUS at 08:29

## 2019-05-02 RX ADMIN — INSULIN GLARGINE 8 UNITS: 100 INJECTION, SOLUTION SUBCUTANEOUS at 21:26

## 2019-05-02 RX ADMIN — DEXTROSE MONOHYDRATE 12.5 G: 500 INJECTION PARENTERAL at 23:59

## 2019-05-02 RX ADMIN — POTASSIUM CHLORIDE 10 MEQ: 7.46 INJECTION, SOLUTION INTRAVENOUS at 09:44

## 2019-05-02 RX ADMIN — POTASSIUM CHLORIDE 10 MEQ: 7.46 INJECTION, SOLUTION INTRAVENOUS at 02:23

## 2019-05-02 RX ADMIN — DEXTROSE MONOHYDRATE 12.5 G: 500 INJECTION PARENTERAL at 06:50

## 2019-05-02 RX ADMIN — HEPARIN SODIUM 5000 UNITS: 10000 INJECTION INTRAVENOUS; SUBCUTANEOUS at 06:50

## 2019-05-02 RX ADMIN — FOSPHENYTOIN SODIUM 100 MG PE: 50 INJECTION, SOLUTION INTRAMUSCULAR; INTRAVENOUS at 10:49

## 2019-05-02 RX ADMIN — PANTOPRAZOLE SODIUM 40 MG: 40 INJECTION, POWDER, FOR SOLUTION INTRAVENOUS at 08:31

## 2019-05-02 RX ADMIN — INSULIN LISPRO 4 UNITS: 100 INJECTION, SOLUTION INTRAVENOUS; SUBCUTANEOUS at 21:24

## 2019-05-02 RX ADMIN — PHENYTOIN SODIUM 100 MG: 50 INJECTION INTRAMUSCULAR; INTRAVENOUS at 18:55

## 2019-05-02 RX ADMIN — HEPARIN SODIUM 5000 UNITS: 10000 INJECTION INTRAVENOUS; SUBCUTANEOUS at 21:42

## 2019-05-02 ASSESSMENT — PAIN SCALES - PAIN ASSESSMENT IN ADVANCED DEMENTIA (PAINAD)
CONSOLABILITY: 0
BODYLANGUAGE: 0
NEGVOCALIZATION: 0
TOTALSCORE: 0
BODYLANGUAGE: 0
NEGVOCALIZATION: 0
BREATHING: 0
BREATHING: 0
FACIALEXPRESSION: 0
NEGVOCALIZATION: 0
TOTALSCORE: 0
BODYLANGUAGE: 0
FACIALEXPRESSION: 0
BREATHING: 0
CONSOLABILITY: 0
TOTALSCORE: 0
BREATHING: 0
CONSOLABILITY: 0
CONSOLABILITY: 0
FACIALEXPRESSION: 0
BREATHING: 0
CONSOLABILITY: 0
TOTALSCORE: 0
NEGVOCALIZATION: 0
CONSOLABILITY: 0
NEGVOCALIZATION: 0
FACIALEXPRESSION: 0
BODYLANGUAGE: 0
BODYLANGUAGE: 0
FACIALEXPRESSION: 0
TOTALSCORE: 0
CONSOLABILITY: 0
TOTALSCORE: 0
NEGVOCALIZATION: 0
CONSOLABILITY: 0
BREATHING: 0
NEGVOCALIZATION: 0
FACIALEXPRESSION: 0
BODYLANGUAGE: 0
BODYLANGUAGE: 0
BREATHING: 0
BODYLANGUAGE: 0
NEGVOCALIZATION: 0
TOTALSCORE: 0
NEGVOCALIZATION: 0
BODYLANGUAGE: 0
CONSOLABILITY: 0
TOTALSCORE: 0
FACIALEXPRESSION: 0
TOTALSCORE: 0

## 2019-05-02 NOTE — PROCEDURES
5/2 2:45 pm- per Lindsey Willoughby, RN they have been unable to reach family to clear, nursing home was contacted, but they have limited history on patient

## 2019-05-02 NOTE — PROGRESS NOTES
Unable to awaken patient fully to participate for swallow evaluation.   Please re-order when more responsive

## 2019-05-02 NOTE — CARE COORDINATION
Social work spoke with markus Alexander from [facility]. Patient is not a bed hold and will need precert to return. Will need PT/OT to return. N-17 started. Ambulette form, facesheet, and envelope in soft chart.   Electronically signed by ABBE Ortiz on 5/2/2019 at 9:22 AM

## 2019-05-02 NOTE — PROGRESS NOTES
Vickki Koyanagi is a 76 y.o. male     Neurology following for seizures    No family at bedside    Hx of HTN, stroke, pancreatic cancer, BPH, HLD, GERD    Presented from NH with unresponsiveness and random movements to right arm and incontinent of urine x 3  ---complete plegia to RUE  ---prior to being found unresponsive he was A&O x 3  ---was recently d/c 3 weeks ago drom bacteremia     CTH negative for acute findings  ---in ED had irregular movements of right hand  ---would become paralyzed in RUE  ---automatism in mouth with chewing movements  ---LUE fidgety able to move all extremities except for RUE  ---was given Ativan 2 mg x 2 and did not help  ---it was found that his BS was 1000  ---started him on phenytoin 100 mg TID   ---phenytoin levels prior to today morning dose of phenytoin     ROS unable to obtain patient would not awaken      Objective:     /82   Pulse 76   Temp 95.5 °F (35.3 °C) (Bladder)   Resp 15   Ht 5' 10\" (1.778 m)   Wt 141 lb 8.6 oz (64.2 kg)   SpO2 100%   BMI 20.31 kg/m²     General: Patient lying bed in NAD. Appears frail and well developed. Head:  Normocephalic and atraumatic  Eyes: Sclera white, conjunctiva pink  Neck:  Trachea midline.  Neck supple and normal ROM, no bruits, no lymphadenopathy  Lungs: Clear to auscultation bilaterally, respirations unlabored  Heart: Regular rate and rhythm  Abdomen: Soft with hyperactive bowel sounds in all quadrants   Extremities: No edema to BLE, +2 pulses bilaterally  Skin: No lesions or rashes     Mental Status: Unable to assess pt obtunded and would not awaken even with sternal rub (nurse at bedside and advised that patient awakened briefly when his name was called out loudly but returned to sleep)    Speech/Language: nonverbal     Cranial Nerves: limited patient unresponsive/obtunded  I: smell NA   II: visual acuity  NA   II: visual fields    II: pupils Pupils 2mm and sluggish    III,VII: ptosis None   III,IV,VI: extraocular muscles V: mastication    V: facial light touch sensation     V,VII: corneal reflex  Present   VII: facial muscle function - upper     VII: facial muscle function - lower    VIII: hearing Only responded to loud yell    IX: soft palate elevation     IX,X: gag reflex Present   XI: trapezius strength     XI: sternocleidomastoid strength    XI: neck extension strength     XII: tongue strength       Motor:  Decreased muscle tone and bulk  No abnormal movements noted      Sensory:  Moves all extremities to painful stimuli     DTR:   Right Brachioradialis reflex 1+  Left Brachioradialis reflex 1+  Right Biceps reflex 1+  Left Biceps reflex 1+  Right Triceps reflex 1+  Left Triceps reflex 1+  Right Quadriceps reflex 0  Left Quadriceps reflex 0  Right Achilles reflex NOT DONE  Left Achilles reflex NOT DONE    No Babinskis  No Navas's    No pathological reflexes    Laboratory/Radiology:     CBC with Differential:    Lab Results   Component Value Date    WBC 7.3 05/02/2019    RBC 3.16 05/02/2019    HGB 8.8 05/02/2019    HCT 28.1 05/02/2019     05/02/2019    MCV 88.9 05/02/2019    MCH 27.8 05/02/2019    MCHC 31.3 05/02/2019    RDW 15.6 05/02/2019    NRBC 0.0 03/29/2019    SEGSPCT 68 01/11/2013    LYMPHOPCT 28.6 05/02/2019    MONOPCT 8.7 05/02/2019    MYELOPCT 0.9 01/06/2019    BASOPCT 0.4 05/02/2019    MONOSABS 0.64 05/02/2019    LYMPHSABS 2.09 05/02/2019    EOSABS 0.18 05/02/2019    BASOSABS 0.03 05/02/2019     CMP:    Lab Results   Component Value Date     05/02/2019    K 3.6 05/02/2019    K 3.9 01/22/2019     05/02/2019    CO2 23 05/02/2019    BUN 10 05/02/2019    CREATININE 0.8 05/02/2019    GFRAA >60 05/02/2019    LABGLOM >60 05/02/2019    GLUCOSE 48 05/02/2019    GLUCOSE 51 05/02/2019    PROT 6.0 05/02/2019    LABALBU 3.0 05/02/2019    CALCIUM 8.5 05/02/2019    BILITOT 0.5 05/02/2019    ALKPHOS 69 05/02/2019    AST 8 05/02/2019    ALT 8 05/02/2019     Hepatic Function Panel:    Lab Results   Component Value Date    ALKPHOS 69 05/02/2019    ALT 8 05/02/2019    AST 8 05/02/2019    PROT 6.0 05/02/2019    BILITOT 0.5 05/02/2019    BILIDIR 0.3 05/02/2019    IBILI 0.2 05/02/2019    LABALBU 3.0 05/02/2019     ABG:    Lab Results   Component Value Date    PH 7.368 05/02/2019    PCO2 40.2 05/02/2019    PO2 77.6 05/02/2019    HCO3 22.6 05/02/2019    BE -2.5 05/02/2019    O2SAT 94.7 05/02/2019     HgBA1c:    Lab Results   Component Value Date    LABA1C 10.0 05/02/2019     Phenytoin Free: pending    Phenytoin Total: pending     CTH: negative for acute findings    MRI brain pending    EEG pending     All labs and imaging studies reviewed independently today.     Assessment:     Patient with focal seizures in the setting of hyperglycemia  ---possible seizures symptomatic to hyperglycemia  ---metabolic cause     Neuro assessment limited patient obtunded and did not awaken     Hgb and Hct trending down    Plan:     MRI brain pending when stable   EEG pending  Phenytoin levels Free and Total pending  ---these were taken prior to morning dose   Continue Phenytoin 100 mg TID  Continue current care  Neurology to follow         NAHUM Polanco, CNP  11:47 AM  5/2/2019

## 2019-05-02 NOTE — PLAN OF CARE
Due to seizure activity in EEG I loaded the patient with Keppra and started him on 500 mg twice a day as maintenance dose.

## 2019-05-02 NOTE — DISCHARGE INSTR - COC
Continuity of Care Form    Patient Name: Jag Franco   :  1950  MRN:  [id number]    Admit date:  2019  Discharge date:  5/10/19    Code Status Order: Full Code   Advance Directives:   Advance Care Flowsheet Documentation     Date/Time Healthcare Directive Type of Healthcare Directive Copy in [address] Agent's Name Healthcare Agent's Phone Number    [phone]  No, patient does not have an advance directive for healthcare treatment -- -- -- -- --          Admitting Physician:  Lulú Person DO  PCP: Zuleika Mccoy DO    Discharging Nurse: [facility] Unit/Room#: 1660/5291-F  Discharging Unit Phone Number:[phone]    Emergency Contact:   Extended Emergency Contact Information  Primary Emergency Contact: Krystal Pierre [facility] [address] [facility] Phone: [phone]  Mobile Phone: [phone]  Relation: Spouse  Secondary Emergency Contact: [address] Phone: [phone]  Relation: Child    Past Surgical History:  Past Surgical History:   Procedure Laterality Date    ERCP N/A 3/25/2019    ERCP STENT INSERTION performed by Apollo [facility]mann MD at [facility]  3/27/2019         UPPER GASTROINTESTINAL ENDOSCOPY N/A 3/8/2019    REDO ENDOSCOPIC EGD  ULTRASOUND performed by Leopold Gouty, DO at [address] [place] [address] N/A 3/8/2019    EGD DIAGNOSTIC ONLY performed by Leopold Gouty, DO at [address] [place] [address] N/A 2019    EXPLORATORY LAPAROTOMY; ABORTED WHIPPLE PROCEDURE; GASTROJEJUNOSTOMY; OPEN CHOLECYSTECTOMY performed by Ty Coulter MD at [address] [place] [address]       Immunization History: There is no immunization history for the selected administration types on file for this patient.     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    Thrombocytopathia (HCC) D69.1    Bacteremia due to Gram-negative bacteria R78.81    Endocarditis, suspected R09.89    Seizures (HCC) R56.9    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       Isolation/Infection:   Isolation          No Isolation            Nurse Assessment:  Last Vital Signs: BP (!) 150/74   Pulse 77   Temp 97.9 °F (36.6 °C) (Temporal)   Resp 18   Ht 5' 10\" (1.778 m)   Wt 130 lb (59 kg)   SpO2 98%   BMI 18.65 kg/m²     Last documented pain score (0-10 scale):    Last Weight:   Wt Readings from Last 1 Encounters:   05/02/19 141 lb 8.6 oz (64.2 kg)     Mental Status:  {IP PT MENTAL STATUS:20030}    IV Access:  - PICC - site  L Basilic, insertion date: 5/7/19    Nursing Mobility/ADLs:  Walking   Assisted  Transfer  Assisted  Bathing  Assisted  Dressing  Assisted  Toileting  Assisted  Feeding  Assisted  Med Admin  Independent  Med Delivery   whole    Wound Care Documentation and Therapy:        Elimination:  Continence:   · Bowel: Yes  · Bladder: HAS A DELGADO CATHETER  Urinary Catheter: Insertion Date: 5/5/19 INSERTED BY DR Morgan Terrell. PLEASE DO NOT REMOVE. SCHEDULE AN APPOINTMENT IN 1 WEEK AND CATHETER WILL BE REMOVED IN DR FRAIRE OFFICE   Colostomy/Ileostomy/Ileal Conduit: No       Date of Last BM: 5/7/19    Intake/Output Summary (Last 24 hours) at 5/2/2019 1034  Last data filed at 5/2/2019 1000  Gross per 24 hour   Intake 3614 ml   Output 5025 ml   Net -1411 ml     I/O last 3 completed shifts: In: 3995 [I.V.:3614]  Out: 4600 [Urine:4600]    Safety Concerns:      At Risk {[facility]:[id number]}

## 2019-05-02 NOTE — PROGRESS NOTES
Attempted to call nursing home staff to complete MRI checklist and they stated they are unable to provide this information.

## 2019-05-02 NOTE — ED NOTES
Floor Called, nurse to nurse given. Spoke with [employer]. Patients test results review, VS reported to receiving nurse. Any and all important information regarding patient disclosed.         Nasima Rodrigues RN  05/01/19 2017

## 2019-05-02 NOTE — PROCEDURES
EEG Report  Isaiah Gan is a 76 y.o. male      Appointment Date 5/2/2019   Appointment Time 11:30 am   [place] [facility] [place] [id number]   Type of Study Portable routine Floor 4401     Technical Specifications  Technician Wiley 26 of consciousness Awake/drowsy   Sleep deprived? No   Hyperventilation tested? No   Photic stim tested? Yes   EEG recording Standard 10-20 electrode placement    Duration of recording 30 mins   EEG complete? Yes       Clinical History   Sz?     Medications    Current Facility-Administered Medications:     pantoprazole (PROTONIX) injection 40 mg, 40 mg, Intravenous, Daily, Corey Saab MD, 40 mg at 05/02/19 0831    dextrose 5 % and 0.9 % sodium chloride infusion, , Intravenous, Continuous, Corey Saab MD, Last Rate: 100 mL/hr at 05/02/19 0829    phenytoin (DILANTIN) 100 mg in sodium chloride 0.9 % 100 mL IVPB (maintenance dose), 100 mg, Intravenous, Q8H, Breann Petty MD    insulin lispro (HUMALOG) injection vial 0-6 Units, 0-6 Units, Subcutaneous, TID WC, Erik Hilario MD    insulin lispro (HUMALOG) injection vial 0-3 Units, 0-3 Units, Subcutaneous, Nightly, Erik Hilario MD    glucose (GLUTOSE) 40 % oral gel 15 g, 15 g, Oral, PRN, Amy Pellant, DO    glucagon (rDNA) injection 1 mg, 1 mg, Intramuscular, PRN, Amy Pellant, DO    dextrose 5 % solution, 100 mL/hr, Intravenous, PRN, Amy Pellant, DO    dextrose 50 % solution 12.5 g, 12.5 g, Intravenous, PRN, Amy Pellant, DO, 12.5 g at 05/02/19 0650    potassium chloride 10 mEq/100 mL IVPB (Peripheral Line), 10 mEq, Intravenous, PRN, Amy Pellant, DO, Last Rate: 100 mL/hr at 05/02/19 0944, 10 mEq at 05/02/19 0944    pravastatin (PRAVACHOL) tablet 80 mg, 80 mg, Oral, Nightly, Paralee Signs MD    tamsulosin [facility]) capsule 0.4 mg, 0.4 mg, Oral, Daily, Paralee Signs, MD    sodium chloride flush 0.9 % injection 10 mL, 10 mL, Intravenous, 2 times per day, Paralee Signs, MD, 10 mL at 05/02/19 0831    sodium chloride flush 0.9 % injection 10 mL, 10 mL, Intravenous, PRN, Meryl Moya MD    magnesium hydroxide (MILK OF MAGNESIA) 400 MG/5ML suspension 30 mL, 30 mL, Oral, Daily PRN, Meryl Moya MD    [facility]etron [facility]) injection 4 mg, 4 mg, Intravenous, Q6H PRN, Meryl Moya MD    heparin (porcine) injection 5,000 Units, 5,000 Units, Subcutaneous, 3 times per day, Meryl Moya MD, 5,000 Units at 05/02/19 0650    dextrose 50 % solution 12.5 g, 12.5 g, Intravenous, PRN, Meryl Moya MD    potassium chloride 10 mEq/100 mL IVPB (Peripheral Line), 10 mEq, Intravenous, PRN, Meryl Moya MD, Last Rate: 100 mL/hr at 05/02/19 0830, 10 mEq at 05/02/19 0830    magnesium sulfate 1 g in dextrose 5% 100 mL IVPB, 1 g, Intravenous, PRN, Meryl Moya MD    dextrose 5 % and 0.45 % NaCl with KCl 20 mEq infusion, , Intravenous, Continuous PRN, Meryl Moya MD, Last Rate: 150 mL/hr at 05/02/19 0120        Physician Interpretation    General EEG Report        Epileptiform Discharge   Left central parietal sharp waves                  Non-Epileptiform Abnormality  Background is slow, PDR is 7 hz inconsistent    Ictal  Seizure pattern no obvious clinical signs in video, starts with electrodecrement in all electrodes but in closer look there is a betafrequency evolution in the left central parietal area. General Impression  This EEG shows epilepsy arising from left central parietal area. Also it shows a mild diffuse encephalopathy.

## 2019-05-02 NOTE — CONSULTS
GASTROJEJUNOSTOMY; OPEN CHOLECYSTECTOMY performed by Harmony Barry MD at [facility] OR       Medications Prior to Admission:    Prior to Admission medications    Medication Sig Start Date End Date Taking? Authorizing Provider   pantoprazole (PROTONIX) 40 MG tablet Take 1 tablet by mouth every morning (before breakfast) 3/30/19   Jorge Velez MD   pravastatin (PRAVACHOL) 80 MG tablet Take 1 tablet by mouth nightly 1/10/19   Veronika Goodwin MD   lisinopril (PRINIVIL;ZESTRIL) 20 MG tablet Take 1 tablet by mouth daily 1/11/19   Veronika Goodwin MD   metoprolol succinate (TOPROL XL) 50 MG extended release tablet Take 1 tablet by mouth daily 1/11/19   Veronika Goodwin MD   tamsulosin (FLOMAX) 0.4 MG capsule Take 1 capsule by mouth daily 1/11/19   Veronika Goodwin MD       Allergies:  Patient has no known allergies. Social History:   TOBACCO:   reports that he has never smoked. He has never used smokeless tobacco.  ETOH:   reports that he drinks about 4.8 oz of alcohol per week. OCCUPATION:      Family History:       Problem Relation Age of Onset    Cancer Mother 50        breast       REVIEW OF SYSTEMS:    · Constitutional: No fever, no chills, no change in weight; good appetite  · HEENT: No blurred vision, no ear problems, no sore throat, no rhinorrhea. · Respiratory: No cough, no sputum production, no pleuritic chest pain, no shortness of breath. · Cardiology: No angina, no dyspnea on exertion, no paroxysmal nocturnal dyspnea, no orthopnea, no palpitation, no leg swelling. · Gastroenterology: No dysphagia, no reflux; no abdominal pain, no nausea or vomiting; no constipation or diarrhea.  No hematochezia   · Genitourinary: No dysuria, no frequency, hesitancy; no hematuria  · Musculoskeletal: no joint pain, no myalgia, no change in range of movement  · Neurology: no focal weakness in extremities, no slurred speech, no double vision, no tingling or numbness sensation  · Endocrinology: no temperature intolerance, no polyphagia, polydipsia previous discrete ischemic insults. An area of encephalomalacia is seen in the inferior aspect of the left cerebellar hemisphere in the area of the medial and inferior cerebellar peduncles relate with old insult. There is no indication for a sizable area of an acute or recent insult in progression to the brain parenchyma. Midline structures demonstrate no significant findings. There is no indication for an acute intracranial hemorrhagic event. Images with bone window settings demonstrate no significant findings are.     1. Chronic ischemic changes in the brain parenchyma predominantly supratentorial areas with overall loss of volume of the brain parenchyma for the age group, as above commented. 2. No indication for an acute intracranial process. Xr Chest Portable    Result Date: 2019  Patient MRN:  [id number] : 1950 Age: 76 years Gender: Male Order Date:  2019 5:45 PM TECHNIQUE/NUMBER OF IMAGES/COMPARISON/CLINICAL HISTORY: Chest AP 1 image one view Comparison . Clinical history chest pain FINDINGS: Right-sided PICC line tip in the SVC right atrial junction. No pneumothorax on the right on the left. Cardiac has normal size. Lungs are clear, no infiltrates, consolidations or pleural effusions are observed. There is no perihilar vascular congestion. There are signs of pneumobilia in the right upper quadrant. Patient previous biliary tract procedure including ERCP . No acute cardiopulmonary process. EKG: normal sinus rhythm, unchanged from previous tracings. Resident's Assessment and Plan     Citlaly Olmos is a 76 y.o. male    Neurology   Acute encephalopathy   2/2 likely HHS  CT head negative for hemorrhage  Ammonia 14  Check TSH  Monitor neuro status     ? Focal seizure   Likely 2/2 from hyperglycemia   S/p ativan 2mg x2, Keppra 1g  Started on fosphenytoin 100 mg TID  Obtain EEG, brain MRI when more stable   Neurology consulted   Seizure precaution     Endocrine Hyperosmolar Hyperglycemic State  New diagnosis of DM, HbA1c of 10, HbA1C 5.3 in Jan/2019  Hx of autoimmune pancreatitis, ? Pancreatic cancer   Initial BG of 1087, BHB 0.1, AG 15, serum osm 321  On DKA protocol   Check BMP, Mg, Phos Q4h, BG Q1h  UTI: UA 10-20 WBC with small amount of leukocyte esterase   Sent blood culture and urine culture   Ct IVF    GI  Hx of autoimmune pancreatitis, biliary obstruction  Triphasic CT showed a large mass at the pancreatic head/distal extrahepatic common bile duct region measuring approximately 2.3 cm  EUS on 03/08/2019 Pancreatic head mass and portal lymph node without satifactory interpretation    Elevated IgG4 369 on 03/08/2019, was on prednisone 20 mg daily for 6-8 weeks  Lipase 15  Will check cortisol   Check INR   Follow HFT    Cardiovascular   Hx of CAD, HFrEF (50%)  Pro-BNP 7211, baseline. Troponin negative     HTN  On lisinopril 20 mg, Toprol 50 mg daily, on hold   Monitor BP     Renal   CATHY, resolved   Cr. Baseline 0.9, initial Cr 1.4  Check urine electrolytes and creatinine       PT/OT evaluation:   DVT prophylaxis/ GI prophylaxis: Heparin / Protonix. Disposition: [facility]    Edwina Turk MD, PGY-1   Attending physician: Dr. Karlyn Closs  Seen and evaluated with HS, agree with above A/P  AMS, etiology? Seizures. Metabolic encephalopathy. [address] Encephalitis? EEG and MRI to follow. Neuro consult, anticonvulsants.

## 2019-05-02 NOTE — PLAN OF CARE
Problem: Falls - Risk of:  Goal: Will remain free from falls  Description  Will remain free from falls  Outcome: Met This Shift     Problem: Risk for Impaired Skin Integrity  Goal: Tissue integrity - skin and mucous membranes  Description  Structural intactness and normal physiological function of skin and  mucous membranes.   Outcome: Met This Shift     Problem: Serum Glucose Level - Abnormal:  Goal: Ability to maintain appropriate glucose levels will improve to within specified parameters  Description  Ability to maintain appropriate glucose levels will improve to within specified parameters  Outcome: Met This Shift

## 2019-05-03 ENCOUNTER — APPOINTMENT (OUTPATIENT)
Dept: CT IMAGING | Age: 69
DRG: 871 | End: 2019-05-03
Payer: MEDICARE

## 2019-05-03 ENCOUNTER — APPOINTMENT (OUTPATIENT)
Dept: GENERAL RADIOLOGY | Age: 69
DRG: 871 | End: 2019-05-03
Payer: MEDICARE

## 2019-05-03 ENCOUNTER — APPOINTMENT (OUTPATIENT)
Dept: NEUROLOGY | Age: 69
DRG: 871 | End: 2019-05-03
Payer: MEDICARE

## 2019-05-03 PROBLEM — E11.01 HYPEROSMOLAR HYPERGLYCEMIC COMA DUE TO DIABETES MELLITUS WITHOUT KETOACIDOSIS (HCC): Status: ACTIVE | Noted: 2019-05-03

## 2019-05-03 PROBLEM — E44.0 MODERATE PROTEIN-CALORIE MALNUTRITION (HCC): Chronic | Status: ACTIVE | Noted: 2019-05-03

## 2019-05-03 LAB
ALBUMIN SERPL-MCNC: 2.7 G/DL (ref 3.5–5.2)
ALP BLD-CCNC: 73 U/L (ref 40–129)
ALT SERPL-CCNC: 8 U/L (ref 0–40)
ANION GAP SERPL CALCULATED.3IONS-SCNC: 10 MMOL/L (ref 7–16)
AST SERPL-CCNC: 11 U/L (ref 0–39)
BASOPHILS ABSOLUTE: 0.02 E9/L (ref 0–0.2)
BASOPHILS RELATIVE PERCENT: 0.4 % (ref 0–2)
BILIRUB SERPL-MCNC: 0.5 MG/DL (ref 0–1.2)
BILIRUBIN DIRECT: 0.3 MG/DL (ref 0–0.3)
BILIRUBIN, INDIRECT: 0.2 MG/DL (ref 0–1)
BUN BLDV-MCNC: 5 MG/DL (ref 8–23)
CALCIUM SERPL-MCNC: 8.4 MG/DL (ref 8.6–10.2)
CEA: 2.4 NG/ML (ref 0–5.2)
CHLORIDE BLD-SCNC: 103 MMOL/L (ref 98–107)
CHOLESTEROL, FASTING: 183 MG/DL (ref 0–199)
CO2: 23 MMOL/L (ref 22–29)
CREAT SERPL-MCNC: 0.6 MG/DL (ref 0.7–1.2)
EOSINOPHILS ABSOLUTE: 0.13 E9/L (ref 0.05–0.5)
EOSINOPHILS RELATIVE PERCENT: 2.5 % (ref 0–6)
GFR AFRICAN AMERICAN: >60
GFR NON-AFRICAN AMERICAN: >60 ML/MIN/1.73
GLUCOSE BLD-MCNC: 83 MG/DL (ref 74–99)
HCT VFR BLD CALC: 31.6 % (ref 37–54)
HDLC SERPL-MCNC: 83 MG/DL
HEMOGLOBIN: 9.7 G/DL (ref 12.5–16.5)
IMMATURE GRANULOCYTES #: 0.02 E9/L
IMMATURE GRANULOCYTES %: 0.4 % (ref 0–5)
LDL CHOLESTEROL CALCULATED: 91 MG/DL (ref 0–99)
LYMPHOCYTES ABSOLUTE: 1.39 E9/L (ref 1.5–4)
LYMPHOCYTES RELATIVE PERCENT: 27.3 % (ref 20–42)
MCH RBC QN AUTO: 27.5 PG (ref 26–35)
MCHC RBC AUTO-ENTMCNC: 30.7 % (ref 32–34.5)
MCV RBC AUTO: 89.5 FL (ref 80–99.9)
METER GLUCOSE: 120 MG/DL (ref 74–99)
METER GLUCOSE: 127 MG/DL (ref 74–99)
METER GLUCOSE: 133 MG/DL (ref 74–99)
METER GLUCOSE: 155 MG/DL (ref 74–99)
METER GLUCOSE: 163 MG/DL (ref 74–99)
METER GLUCOSE: 65 MG/DL (ref 74–99)
METER GLUCOSE: 70 MG/DL (ref 74–99)
METER GLUCOSE: 71 MG/DL (ref 74–99)
METER GLUCOSE: 75 MG/DL (ref 74–99)
METER GLUCOSE: 81 MG/DL (ref 74–99)
METER GLUCOSE: 86 MG/DL (ref 74–99)
MONOCYTES ABSOLUTE: 0.34 E9/L (ref 0.1–0.95)
MONOCYTES RELATIVE PERCENT: 6.7 % (ref 2–12)
NEUTROPHILS ABSOLUTE: 3.2 E9/L (ref 1.8–7.3)
NEUTROPHILS RELATIVE PERCENT: 62.7 % (ref 43–80)
PDW BLD-RTO: 15.9 FL (ref 11.5–15)
PHENYTOIN DOSE AMOUNT: NORMAL
PHENYTOIN LEVEL: 18.4 UG/ML (ref 10–20)
PLATELET # BLD: 147 E9/L (ref 130–450)
PMV BLD AUTO: 11 FL (ref 7–12)
POTASSIUM SERPL-SCNC: 3.7 MMOL/L (ref 3.5–5)
RBC # BLD: 3.53 E12/L (ref 3.8–5.8)
SODIUM BLD-SCNC: 136 MMOL/L (ref 132–146)
TOTAL PROTEIN: 6.2 G/DL (ref 6.4–8.3)
TRIGLYCERIDE, FASTING: 46 MG/DL (ref 0–149)
VLDLC SERPL CALC-MCNC: 9 MG/DL
WBC # BLD: 5.1 E9/L (ref 4.5–11.5)

## 2019-05-03 PROCEDURE — 2580000003 HC RX 258: Performed by: INTERNAL MEDICINE

## 2019-05-03 PROCEDURE — 74174 CTA ABD&PLVS W/CONTRAST: CPT

## 2019-05-03 PROCEDURE — 2580000003 HC RX 258: Performed by: PSYCHIATRY & NEUROLOGY

## 2019-05-03 PROCEDURE — 71045 X-RAY EXAM CHEST 1 VIEW: CPT

## 2019-05-03 PROCEDURE — C9113 INJ PANTOPRAZOLE SODIUM, VIA: HCPCS | Performed by: INTERNAL MEDICINE

## 2019-05-03 PROCEDURE — 86301 IMMUNOASSAY TUMOR CA 19-9: CPT

## 2019-05-03 PROCEDURE — 99233 SBSQ HOSP IP/OBS HIGH 50: CPT | Performed by: NURSE PRACTITIONER

## 2019-05-03 PROCEDURE — 80061 LIPID PANEL: CPT

## 2019-05-03 PROCEDURE — 02HV33Z INSERTION OF INFUSION DEVICE INTO SUPERIOR VENA CAVA, PERCUTANEOUS APPROACH: ICD-10-PCS | Performed by: INTERNAL MEDICINE

## 2019-05-03 PROCEDURE — 82378 CARCINOEMBRYONIC ANTIGEN: CPT

## 2019-05-03 PROCEDURE — 95819 EEG AWAKE AND ASLEEP: CPT

## 2019-05-03 PROCEDURE — 82962 GLUCOSE BLOOD TEST: CPT

## 2019-05-03 PROCEDURE — 80185 ASSAY OF PHENYTOIN TOTAL: CPT

## 2019-05-03 PROCEDURE — 6360000002 HC RX W HCPCS: Performed by: PSYCHIATRY & NEUROLOGY

## 2019-05-03 PROCEDURE — 6360000002 HC RX W HCPCS: Performed by: INTERNAL MEDICINE

## 2019-05-03 PROCEDURE — 92610 EVALUATE SWALLOWING FUNCTION: CPT

## 2019-05-03 PROCEDURE — 80053 COMPREHEN METABOLIC PANEL: CPT

## 2019-05-03 PROCEDURE — 2000000000 HC ICU R&B

## 2019-05-03 PROCEDURE — 36415 COLL VENOUS BLD VENIPUNCTURE: CPT

## 2019-05-03 PROCEDURE — 87040 BLOOD CULTURE FOR BACTERIA: CPT

## 2019-05-03 PROCEDURE — 36556 INSERT NON-TUNNEL CV CATH: CPT

## 2019-05-03 PROCEDURE — 95819 EEG AWAKE AND ASLEEP: CPT | Performed by: PSYCHIATRY & NEUROLOGY

## 2019-05-03 PROCEDURE — 6360000004 HC RX CONTRAST MEDICATION: Performed by: RADIOLOGY

## 2019-05-03 PROCEDURE — 87070 CULTURE OTHR SPECIMN AEROBIC: CPT

## 2019-05-03 PROCEDURE — 82248 BILIRUBIN DIRECT: CPT

## 2019-05-03 PROCEDURE — 85025 COMPLETE CBC W/AUTO DIFF WBC: CPT

## 2019-05-03 RX ORDER — POTASSIUM CHLORIDE 7.45 MG/ML
10 INJECTION INTRAVENOUS
Status: COMPLETED | OUTPATIENT
Start: 2019-05-03 | End: 2019-05-03

## 2019-05-03 RX ORDER — INSULIN GLARGINE 100 [IU]/ML
5 INJECTION, SOLUTION SUBCUTANEOUS NIGHTLY
Status: DISCONTINUED | OUTPATIENT
Start: 2019-05-03 | End: 2019-05-10 | Stop reason: HOSPADM

## 2019-05-03 RX ADMIN — DEXTROSE MONOHYDRATE 12.5 G: 500 INJECTION PARENTERAL at 00:32

## 2019-05-03 RX ADMIN — VANCOMYCIN HYDROCHLORIDE 1000 MG: 1 INJECTION, POWDER, LYOPHILIZED, FOR SOLUTION INTRAVENOUS at 21:45

## 2019-05-03 RX ADMIN — PHENYTOIN SODIUM 100 MG: 50 INJECTION INTRAMUSCULAR; INTRAVENOUS at 02:02

## 2019-05-03 RX ADMIN — Medication 10 ML: at 09:30

## 2019-05-03 RX ADMIN — IOPAMIDOL 90 ML: 755 INJECTION, SOLUTION INTRAVENOUS at 20:42

## 2019-05-03 RX ADMIN — HEPARIN SODIUM 5000 UNITS: 10000 INJECTION INTRAVENOUS; SUBCUTANEOUS at 06:05

## 2019-05-03 RX ADMIN — DEXTROSE AND SODIUM CHLORIDE: 5; 900 INJECTION, SOLUTION INTRAVENOUS at 18:30

## 2019-05-03 RX ADMIN — PIPERACILLIN AND TAZOBACTAM 3.38 G: 3; .375 INJECTION, POWDER, FOR SOLUTION INTRAVENOUS at 21:45

## 2019-05-03 RX ADMIN — HEPARIN SODIUM 5000 UNITS: 10000 INJECTION INTRAVENOUS; SUBCUTANEOUS at 21:59

## 2019-05-03 RX ADMIN — Medication 10 ML: at 09:22

## 2019-05-03 RX ADMIN — LEVETIRACETAM 500 MG: 5 INJECTION INTRAVENOUS at 06:44

## 2019-05-03 RX ADMIN — DEXTROSE AND SODIUM CHLORIDE: 5; 900 INJECTION, SOLUTION INTRAVENOUS at 06:10

## 2019-05-03 RX ADMIN — PHENYTOIN SODIUM 100 MG: 50 INJECTION INTRAMUSCULAR; INTRAVENOUS at 20:04

## 2019-05-03 RX ADMIN — Medication 10 ML: at 14:04

## 2019-05-03 RX ADMIN — DEXTROSE MONOHYDRATE 200 MG: 50 INJECTION, SOLUTION INTRAVENOUS at 14:02

## 2019-05-03 RX ADMIN — PHENYTOIN SODIUM 100 MG: 50 INJECTION INTRAMUSCULAR; INTRAVENOUS at 12:37

## 2019-05-03 RX ADMIN — POTASSIUM CHLORIDE 10 MEQ: 7.46 INJECTION, SOLUTION INTRAVENOUS at 06:05

## 2019-05-03 RX ADMIN — HEPARIN SODIUM 5000 UNITS: 10000 INJECTION INTRAVENOUS; SUBCUTANEOUS at 14:17

## 2019-05-03 RX ADMIN — LEVETIRACETAM 500 MG: 5 INJECTION INTRAVENOUS at 19:54

## 2019-05-03 RX ADMIN — POTASSIUM CHLORIDE 10 MEQ: 7.46 INJECTION, SOLUTION INTRAVENOUS at 06:45

## 2019-05-03 RX ADMIN — DEXTROSE MONOHYDRATE 12.5 G: 500 INJECTION PARENTERAL at 13:04

## 2019-05-03 RX ADMIN — Medication 10 ML: at 20:13

## 2019-05-03 RX ADMIN — PANTOPRAZOLE SODIUM 40 MG: 40 INJECTION, POWDER, FOR SOLUTION INTRAVENOUS at 09:21

## 2019-05-03 RX ADMIN — DEXTROSE MONOHYDRATE 12.5 G: 500 INJECTION PARENTERAL at 02:02

## 2019-05-03 ASSESSMENT — PAIN SCALES - PAIN ASSESSMENT IN ADVANCED DEMENTIA (PAINAD)
NEGVOCALIZATION: 1
NEGVOCALIZATION: 0
BODYLANGUAGE: 0
BREATHING: 0
CONSOLABILITY: 0
CONSOLABILITY: 0
TOTALSCORE: 0
FACIALEXPRESSION: 0
BODYLANGUAGE: 0
BODYLANGUAGE: 0
BREATHING: 0
BREATHING: 0
BODYLANGUAGE: 0
NEGVOCALIZATION: 0
CONSOLABILITY: 0
FACIALEXPRESSION: 0
TOTALSCORE: 2
FACIALEXPRESSION: 0
BODYLANGUAGE: 0
TOTALSCORE: 0
NEGVOCALIZATION: 0
CONSOLABILITY: 0
BODYLANGUAGE: 0
NEGVOCALIZATION: 1
CONSOLABILITY: 0
BREATHING: 0
FACIALEXPRESSION: 0
CONSOLABILITY: 0
NEGVOCALIZATION: 1
BREATHING: 0
TOTALSCORE: 2
TOTALSCORE: 0
FACIALEXPRESSION: 1
CONSOLABILITY: 0
FACIALEXPRESSION: 0
CONSOLABILITY: 0
TOTALSCORE: 0
NEGVOCALIZATION: 0
BREATHING: 0
FACIALEXPRESSION: 1
BODYLANGUAGE: 0
BODYLANGUAGE: 0
TOTALSCORE: 0
BREATHING: 0
TOTALSCORE: 0
NEGVOCALIZATION: 0
FACIALEXPRESSION: 1
NEGVOCALIZATION: 0
BODYLANGUAGE: 0
FACIALEXPRESSION: 0
FACIALEXPRESSION: 0
CONSOLABILITY: 0
NEGVOCALIZATION: 0
CONSOLABILITY: 0
TOTALSCORE: 0
TOTALSCORE: 2
BODYLANGUAGE: 0
BREATHING: 0

## 2019-05-03 ASSESSMENT — PAIN SCALES - GENERAL
PAINLEVEL_OUTOF10: 0
PAINLEVEL_OUTOF10: 2
PAINLEVEL_OUTOF10: 2

## 2019-05-03 NOTE — PROGRESS NOTES
Occupational Therapy    OT consult received to eval/treat and chart review complete. Patient not currently following commands. OT to re-attempt at a later time. Thank you.        Nelli Durant OTR/HORTENCIA  [id number]

## 2019-05-03 NOTE — PROGRESS NOTES
[facility] Medicine    Subjective:  Pt more responsive today now hypoglycemic      Current Facility-Administered Medications:     insulin glargine (LANTUS) injection vial 5 Units, 5 Units, Subcutaneous, Nightly, Corey Saab MD    pantoprazole (PROTONIX) injection 40 mg, 40 mg, Intravenous, Daily, Corey Saab MD, 40 mg at 05/03/19 0921    dextrose 5 % and 0.9 % sodium chloride infusion, , Intravenous, Continuous, Corey Saab MD, Last Rate: 100 mL/hr at 05/03/19 0610    phenytoin (DILANTIN) 100 mg in sodium chloride 0.9 % 100 mL IVPB (maintenance dose), 100 mg, Intravenous, Q8H, Latesha Noland MD, Stopped at 05/03/19 0230    insulin lispro (HUMALOG) injection vial 0-6 Units, 0-6 Units, Subcutaneous, Q4H, Corey Saab MD, Stopped at 05/03/19 0419    levetiracetam (KEPPRA) 500 mg/100 mL IVPB, 500 mg, Intravenous, BID, 500 mg at 05/03/19 0644 **OR** levETIRAcetam (KEPPRA) tablet 500 mg, 500 mg, Oral, BID, Latesha Noland MD    glucose (GLUTOSE) 40 % oral gel 15 g, 15 g, Oral, PRN, Pat Large, DO    glucagon (rDNA) injection 1 mg, 1 mg, Intramuscular, PRN, Pat Large, DO    dextrose 5 % solution, 100 mL/hr, Intravenous, PRN, Pat Large, DO    dextrose 50 % solution 12.5 g, 12.5 g, Intravenous, PRN, Pat Large, DO, 12.5 g at 05/02/19 0650    potassium chloride 10 mEq/100 mL IVPB (Peripheral Line), 10 mEq, Intravenous, PRN, Pat Large, DO, Last Rate: 100 mL/hr at 05/02/19 0944, 10 mEq at 05/02/19 0944    pravastatin (PRAVACHOL) tablet 80 mg, 80 mg, Oral, Nightly, Tyler Gimenez MD    [facility]) capsule 0.4 mg, 0.4 mg, Oral, Daily, Tyler Gimenez MD, Stopped at 05/03/19 8131    sodium chloride flush 0.9 % injection 10 mL, 10 mL, Intravenous, 2 times per day, Tyler Gimenez MD, 10 mL at 05/03/19 0930    sodium chloride flush 0.9 % injection 10 mL, 10 mL, Intravenous, PRN, Tyelr Gimenez MD    magnesium hydroxide (MILK OF MAGNESIA) 400 MG/5ML suspension 30 mL, 30 mL, Oral, CALCIUM 8.4 05/03/2019    BILITOT 0.5 05/03/2019    ALKPHOS 73 05/03/2019    AST 11 05/03/2019    ALT 8 05/03/2019     Warfarin PT/INR:    Lab Results   Component Value Date    INR 1.0 05/01/2019    INR 1.3 03/25/2019    INR 1.1 01/22/2019    PROTIME 12.1 05/01/2019    PROTIME 14.1 (H) 03/25/2019    PROTIME 12.3 01/22/2019       Assessment:    Principal Problem:    Hyperosmolar hyperglycemic coma due to diabetes mellitus without ketoacidosis (HCC)  Active Problems:    Seizures ([address]    Seizure-like activity (HCC)    Altered mental status    Acute ischemic stroke ([facility] [facility]  Resolved Problems:    * No resolved hospital problems.  *      Plan:  Cont serial blood sugars cont supportive care        Heath Burns  12:31 PM  5/3/2019

## 2019-05-03 NOTE — PROGRESS NOTES
Horacio Mustafa is a 76 y.o. male     Neurology following for seizures    Prior to being found unresponsive he was A&O x 3. Presented from NH with unresponsiveness and random movements to right arm and incontinent of urine x 3 with complete plegia to RUE afterwards.     ROS unable to obtain patient will not awaken or speak    Temp this morning is 94.5 and overnight pt's BG dropped to 65 at 2350 after being 340 at 2140    No family at bedside    Current Facility-Administered Medications   Medication Dose Route Frequency Provider Last Rate Last Dose    insulin glargine (LANTUS) injection vial 5 Units  5 Units Subcutaneous Nightly Corey Saab MD        pantoprazole (PROTONIX) injection 40 mg  40 mg Intravenous Daily Corey Saab MD   40 mg at 05/03/19 0921    dextrose 5 % and 0.9 % sodium chloride infusion   Intravenous Continuous Corey Saab  mL/hr at 05/03/19 0610      phenytoin (DILANTIN) 100 mg in sodium chloride 0.9 % 100 mL IVPB (maintenance dose)  100 mg Intravenous Jimbo Vaz MD   Stopped at 05/03/19 0230    insulin lispro (HUMALOG) injection vial 0-6 Units  0-6 Units Subcutaneous Q4H Corey Saab MD   Stopped at 05/03/19 0419    levetiracetam (KEPPRA) 500 mg/100 mL IVPB  500 mg Intravenous BID Dianelys Jiang MD   500 mg at 05/03/19 5007    Or    levETIRAcetam (KEPPRA) tablet 500 mg  500 mg Oral BID Dianelys Jiang MD        glucose (GLUTOSE) 40 % oral gel 15 g  15 g Oral PRN Pleas Hunter, DO        glucagon (rDNA) injection 1 mg  1 mg Intramuscular PRN Pleas Hunter, DO        dextrose 5 % solution  100 mL/hr Intravenous PRN Pleas Hunter, DO        dextrose 50 % solution 12.5 g  12.5 g Intravenous PRN Pleas Hunter, DO   12.5 g at 05/02/19 0650    potassium chloride 10 mEq/100 mL IVPB (Peripheral Line)  10 mEq Intravenous PRN Pleas Hunter,  mL/hr at 05/02/19 0944 10 mEq at 05/02/19 0944    pravastatin (PRAVACHOL) tablet 80 mg  80 mg Oral Nightly Trish Hawley MD        tamsulosin [facility]) capsule 0.4 mg  0.4 mg Oral Daily Trish Hawley MD   Stopped at 05/03/19 6610    sodium chloride flush 0.9 % injection 10 mL  10 mL Intravenous 2 times per day Trish Hawley MD   10 mL at 05/03/19 3822    sodium chloride flush 0.9 % injection 10 mL  10 mL Intravenous PRN Trish Hawley MD        magnesium hydroxide (MILK OF MAGNESIA) 400 MG/5ML suspension 30 mL  30 mL Oral Daily PRN Trish Hawley MD        ondansetron [facility]) injection 4 mg  4 mg Intravenous Q6H PRN Trish Hawley MD        heparin (porcine) injection 5,000 Units  5,000 Units Subcutaneous 3 times per day Trish Hawley MD   5,000 Units at 05/03/19 0605    dextrose 50 % solution 12.5 g  12.5 g Intravenous PRN Trish Hawley MD   12.5 g at 05/03/19 0202    potassium chloride 10 mEq/100 mL IVPB (Peripheral Line)  10 mEq Intravenous PRN Trish Hawley  mL/hr at 05/02/19 0830 10 mEq at 05/02/19 0830    magnesium sulfate 1 g in dextrose 5% 100 mL IVPB  1 g Intravenous PRN Trish Hawley MD        dextrose 5 % and 0.45 % NaCl with KCl 20 mEq infusion   Intravenous Continuous PRN Trish Hawley  mL/hr at 05/02/19 0120         Objective:     BP (!) 162/99   Pulse 74   Temp 94.5 °F (34.7 °C) (Axillary)   Resp 18   Ht 5' 10\" (1.778 m)   Wt 139 lb 5.3 oz (63.2 kg)   SpO2 100%   BMI 19.99 kg/m²     General: Patient lying bed in NAD. Appears frail and well developed. Head:  Normocephalic and atraumatic  Eyes: Sclera white, conjunctiva pink  Neck:  Trachea midline.  Neck supple and normal ROM, no bruits, no lymphadenopathy  Abdomen: Soft with hyperactive bowel sounds in all quadrants   Extremities: No edema to BLE, +2 pulses bilaterally  Skin: No lesions or rashes     Mental Status: Unable to assess pt obtunded, moaning to pain only, keep eyes squeezed shut and not following commands    Speech/Language: nonverbal--moans only     Cranial Nerves: limited patient unresponsive/obtunded and not following commands   I: smell NA   II: visual acuity  NA   II: visual fields Keeps eyes squeezed shut--unable to assess    II: pupils Keeps eyes squeezed shut--unable to assess    III,VII: ptosis None   III,IV,VI: extraocular muscles     V: mastication    V: facial light touch sensation     V,VII: corneal reflex  Present   VII: facial muscle function - upper  Appears normal    VII: facial muscle function - lower Appears normal    VIII: hearing Appears normal    IX: soft palate elevation     IX,X: gag reflex    XI: trapezius strength     XI: sternocleidomastoid strength    XI: neck extension strength     XII: tongue strength       Motor:   Withdraws to noxious stimuli in all 4 extremities   Decreased muscle tone and bulk  No abnormal movements noted    Sensory:  Moves all extremities to painful stimuli     DTR:   Right Brachioradialis reflex 1+  Left Brachioradialis reflex 1+  Right Biceps reflex 1+  Left Biceps reflex 1+  Right Triceps reflex 1+  Left Triceps reflex 1+  Right Quadriceps reflex 0  Left Quadriceps reflex 0  Right Achilles reflex NOT DONE  Left Achilles reflex NOT DONE    No Babinskis  No Navas's    No pathological reflexes    Laboratory/Radiology:     CBC with Differential:    Lab Results   Component Value Date    WBC 5.1 05/03/2019    RBC 3.53 05/03/2019    HGB 9.7 05/03/2019    HCT 31.6 05/03/2019     05/03/2019    MCV 89.5 05/03/2019    MCH 27.5 05/03/2019    MCHC 30.7 05/03/2019    RDW 15.9 05/03/2019    LYMPHOPCT 27.3 05/03/2019    MONOPCT 6.7 05/03/2019    BASOPCT 0.4 05/03/2019    MONOSABS 0.34 05/03/2019    LYMPHSABS 1.39 05/03/2019    EOSABS 0.13 05/03/2019    BASOSABS 0.02 05/03/2019     CMP:    Lab Results   Component Value Date     05/03/2019    K 3.7 05/03/2019     05/03/2019    CO2 23 05/03/2019    BUN 5 05/03/2019    CREATININE 0.6 05/03/2019    GFRAA >60 05/03/2019    LABGLOM >60 05/03/2019    GLUCOSE 83 05/03/2019    PROT 6.2 05/03/2019    LABALBU 2.7 05/03/2019    CALCIUM 8.4 05/03/2019    BILITOT 0.5 50%.   L atrium enlarged   Normal mitral valve structure and function.   The aortic valve appears mildly sclerotic.   Trace aortic regurgitation.    Pulmonic valve is structurally normal.     Echo 3/26: Aortic valve opens well.   Ejection fraction biplane =49%.   Normal left ventricle size.   Normal sized left atrium.   Structurally normal mitral valve.   The aortic valve appears normal   No pericardial effusion. All labs and imaging studies reviewed independently today. Assessment:     Pt presented from NH with unresponsiveness and random movements to right arm and incontinent of urine x 3. Prior to being found unresponsive, he was A&O x 3. Pt had complete plegia to RUE after having irregular movements of RUE in ED. Pt also had automatism in mouth with chewing movements. Ativan 2mg was given but was helpful in stopping these movements so pt was started on Phenytoin 100 mg TID. Pt was recently d/c 3 weeks ago from bacteremia. CT Head was negative for acute pathology    Pt's BG was found to be >1000. MRI Brain shows very small acute infarct of the posterior R parietal region with extensive . Old infarcts to b/l cerebellar are also noted. Patient with focal seizures in the setting of hyperglycemia (BG 1087) and small infarct. Pt's neuro assessment limited as patient is obtunded and does not follow commands.       EEG found to have epilepsy from L central parietal area-- not being caused by acute stroke   Continue Keppra and Dilantin as ordered-- Phenytoin Total: 18.4 WNL     Hx of HTN, stroke, pancreatic cancer, BPH, HLD, GERD    Plan:     Continue current care  Repeat EEG today   Closely monitor BG and obtain better BG control   Pt loaded with Keppra on  to continue on Keppra 500 mg BID   Continue Phenytoin 100 mg TID  Phenytoin Free level is pending  A1C 10.0, FLP pending  Monitor H/H  PT/OT/ST  Stroke education prior to discharge        NAHUM Szymanski, CNP  8:48 AM  5/3/2019

## 2019-05-03 NOTE — PROCEDURES
Procedure: Insertion of TLC via  Right internal jugular vein    Indications:  Hemodynamic/CVP monitoring and IV access    Anesthesia: Local infiltration of 1% lidocaine. Consent:   Informed written consent obtained. Technique:  Procedure was done using strict aseptic technique. The patient's neck was prepped and draped in the usual sterile fashion. Ultrasound was used to identify the jugular vein. This area of the neck overlying the IJ was injected with 1% lidocaine. Then using the Seldinger technique, a large-bore needle was placed into the internal jugular vein with good backflow. A guidewire was placed. Then using an 11 blade, a small incision was made in the patient's skin. The large-bore needle was removed. A dilator was passed over the guidewire. Once completed, a triple lumen catheter was placed over the guidewire with the guidewire then subsequently removed. All three ports were drawn and flushed with good flow. Then the catheter was sutured in place, and a sterile dressing was applied. Number of sticks: 1. Number of Kits used: 1. Complications: No immediate complication. Estimated blood loss: About 5 ml. Comment: Patient tolerated the procedure well. Placement:  CXR was ordered to confirm placement and is in good position.     Alesia Ortiz MD   Resident - PGY1    Done under direct supervision of Dr. Nancy Xie    Attending: Dr. Stacey Adame

## 2019-05-03 NOTE — PROCEDURES
EEG Report  Lucy Hernández is a 76 y.o. male      Appointment Date 5/3/2019   Appointment Time 10:30 am   Facility Location [facility] EEG Number [id number]   Type of Study Portable routine Floor 4401     Technical Specifications  Technician [id number] of consciousness Confused/Sleep   Sleep deprived? No   Hyperventilation tested? No   Photic stim tested? No   EEG recording Standard 10-20 electrode placement    Duration of recording 30 mins   EEG complete?  Yes       Clinical History   Sz    Medications    Current Facility-Administered Medications:     insulin glargine (LANTUS) injection vial 5 Units, 5 Units, Subcutaneous, Nightly, Corey Saab MD    pantoprazole (PROTONIX) injection 40 mg, 40 mg, Intravenous, Daily, Corey Saab MD, 40 mg at 05/03/19 0921    dextrose 5 % and 0.9 % sodium chloride infusion, , Intravenous, Continuous, Corey Saab MD, Last Rate: 100 mL/hr at 05/03/19 0610    phenytoin (DILANTIN) 100 mg in sodium chloride 0.9 % 100 mL IVPB (maintenance dose), 100 mg, Intravenous, Q8H, Jose Guadalupe Rivera MD, Stopped at 05/03/19 0230    insulin lispro (HUMALOG) injection vial 0-6 Units, 0-6 Units, Subcutaneous, Q4H, Corey Saab MD, Stopped at 05/03/19 0419    levetiracetam (KEPPRA) 500 mg/100 mL IVPB, 500 mg, Intravenous, BID, 500 mg at 05/03/19 0644 **OR** levETIRAcetam (KEPPRA) tablet 500 mg, 500 mg, Oral, BID, Jose Guadalupe Rivera MD    glucose (GLUTOSE) 40 % oral gel 15 g, 15 g, Oral, PRN, Matt Fernanda, DO    glucagon (rDNA) injection 1 mg, 1 mg, Intramuscular, PRN, Matt Fernanda, DO    dextrose 5 % solution, 100 mL/hr, Intravenous, PRN, Matt Fernanda, DO    dextrose 50 % solution 12.5 g, 12.5 g, Intravenous, PRN, Matt Fernanda, DO, 12.5 g at 05/02/19 0650    potassium chloride 10 mEq/100 mL IVPB (Peripheral Line), 10 mEq, Intravenous, PRN, Matt Michael, DO, Last Rate: 100 mL/hr at 05/02/19 0944, 10 mEq at 05/02/19 0944    pravastatin (PRAVACHOL) tablet 80 mg, 80 mg, Oral, Nightly, Ward Zarco MD    tamsulosin [facility]) capsule 0.4 mg, 0.4 mg, Oral, Daily, Ward Zarco MD, Stopped at 05/03/19 9417    sodium chloride flush 0.9 % injection 10 mL, 10 mL, Intravenous, 2 times per day, Ward Zarco MD, 10 mL at 05/03/19 0930    sodium chloride flush 0.9 % injection 10 mL, 10 mL, Intravenous, PRN, Ward Zarco MD    magnesium hydroxide (MILK OF MAGNESIA) 400 MG/5ML suspension 30 mL, 30 mL, Oral, Daily PRN, Ward Zarco MD    ondansetron [facility]) injection 4 mg, 4 mg, Intravenous, Q6H PRN, Ward Zarco MD    heparin (porcine) injection 5,000 Units, 5,000 Units, Subcutaneous, 3 times per day, Ward Zarco MD, 5,000 Units at 05/03/19 0605    dextrose 50 % solution 12.5 g, 12.5 g, Intravenous, PRN, Ward Zarco MD, 12.5 g at 05/03/19 0202    potassium chloride 10 mEq/100 mL IVPB (Peripheral Line), 10 mEq, Intravenous, PRN, Ward Zarco MD, Last Rate: 100 mL/hr at 05/02/19 0830, 10 mEq at 05/02/19 0830    magnesium sulfate 1 g in dextrose 5% 100 mL IVPB, 1 g, Intravenous, PRN, Ward Zarco MD    dextrose 5 % and 0.45 % NaCl with KCl 20 mEq infusion, , Intravenous, Continuous PRN, Ward Zarco MD, Last Rate: 150 mL/hr at 05/02/19 0120        Physician Interpretation    General EEG Report        Epileptiform Discharge   Left central parietal sharp waves          Non-Epileptiform Abnormality  Continuous slow, generalized      Ictal  None    General Impression    This EEG shows epileptogenicity arising from left central parietal area. Also it shows a moderate diffuse encephalopathy. Compared to records from yesterday there is no clear seizure activity but the degree of encephalopathy has increased. Clinical correlation is recommended.     Spike Harris MD

## 2019-05-03 NOTE — PROGRESS NOTES
Nutrition Assessment    Type and Reason for Visit: Initial(LOS)    Nutrition Recommendations: Advance diet when medically appropriate. Recommend and start appropriate ONS TID when diet advances. Monitor pending swallow study results. Nutrition Assessment: Nursing in with pt at this time ; swallow study pending ; no discussion of enteral feedings at this time ; Pt meets critieria for moderate malnutrition AEB weight loss and muscle/fat wasting ; PT at further nutritional risk d/t NPO status x 3 days ; advance diet as tolerated    Malnutrition Assessment:  · Malnutrition Status: Meets the criteria for moderate malnutrition  · Context: Chronic illness  · Findings of the 6 clinical characteristics of malnutrition (Minimum of 2 out of 6 clinical characteristics is required to make the diagnosis of moderate or severe Protein Calorie Malnutrition based on AND/ASPEN Guidelines):  1. Energy Intake-Less than or equal to 50% of estimated energy requirement, (x 3 days NPO)    2. Weight Loss-10% loss or greater, (5 months)  3. Fat Loss-Mild subcutaneous fat loss, Orbital, Triceps  4. Muscle Loss-Mild muscle mass loss, Temples (temporalis muscle), Clavicles (pectoralis and deltoids)  5. Fluid Accumulation-No significant fluid accumulation,    6.  Strength-Not measured    Nutrition Risk Level: Moderate    Nutrient Needs:  · Estimated Daily Total Kcal: 2394-7287 (REE 1413 x 1.3 SF)  · Estimated Daily Protein (g): 85-95 (1.3-1.5g/kg CBW)  · Estimated Daily Total Fluid (ml/day): 8302-1685    Nutrition Diagnosis:   · Problem:  Moderate malnutrition, In context of chronic illness  · Etiology: related to Catabolic illness     Signs and symptoms:  as evidenced by NPO status due to medical condition, Mild muscle loss, Mild loss of subcutaneous fat, Weight loss    Objective Information:  · Nutrition-Focused Physical Findings: -I&Os (-2.5 L) ; no edema;  present BS ; soft abd ; missing teeth ; A&O x 1 ; dry/flaky skin ; weakness ; boggy heels ; muscle and fat wasting      · Wound Type: None     · Current Nutrition Therapies:  · Oral Diet Orders: NPO   · Oral Diet intake: NPO(NPO x 3 days)  · Oral Nutrition Supplement (ONS) Orders: None     · Anthropometric Measures:  · Ht: 5' 10\" (177.8 cm)   · Current Body Wt: 140 lb (63.5 kg)(5/3/19, bedscale per RD )  · Admission Body Wt: 132 lb (59.9 kg)(5/1/19, actual)  · Usual Body Wt: 161 lb (73 kg)(1/6/19, bedscale ; EMR RD hx shows family stated UBW of 230#)  ·  EMR shows weight loss of 21# in the past 5 months (161# bedscale on 1/6/19 to 140# bedscale on 5/3/19) (15%)  · Ideal Body Wt: 166 lb (75.3 kg), % Ideal Body 84%  · BMI Classification: BMI 18.5 - 24.9 Normal Weight    Nutrition Interventions:   Continue NPO  Continued Inpatient Monitoring, Coordination of Care, Speech Therapy, Swallow Evaluation    Nutrition Evaluation:   · Evaluation: Goals set   · Goals: Advance diet when medically appropriate     · Monitoring: Nutrition Progression, Monitor Bowel Function, Mental Status/Confusion, Weight, Pertinent Labs, Chewing/Swallowing, Monitor Hemodynamic Status, I&O, Skin Integrity      Electronically signed by Daniel Rose RD, LD on 5/3/19 at 2:54 PM    Contact Number: [phone]

## 2019-05-03 NOTE — PROGRESS NOTES
[facility]   Department of Internal Medicine   Internal Medicine Residency  MICU Progress Note    Patient:  Lucy Hernández 76 y.o. male   MRN: [id number]       Date of Service: 5/3/2019    Allergy: Patient has no known allergies. Subjective   The patient was seen and examined at bedside this morning.  24h change: mentation slightly improved    MRI: localized cortical acute ischemic insult, small vessel dx in both white matter, old insults encephamalacia, overall loss of brain paranchyma more than expected for age. ROS: unable to get ROS, patient very hard to arouse       Objective     Vitals:    05/03/19 1000 05/03/19 1100 05/03/19 1200 05/03/19 1235   BP: 135/82 133/82 (!) 152/89    Pulse: 63 74 68 69   Resp: 11 12 11 12   Temp:    97.8 °F (36.6 °C)   TempSrc:    Temporal   SpO2:  100% 99% 100%   Weight:       Height:           Physical Exam:  I & O - 24hr:I/O this shift:  In: -   · Out: 1425 [Urine:1425]       GENERAL: no distress, un-arousable. [place] to painful stimuli   HEENT: Normocephalic, atraumatic. conjunctiva/corneas clear no pallor or icterus. NECK: Supple, symmetrical, trachea midline, no cervical LAD. No carotid bruit or JVD  CHEST: No tenderness or deformity, full & symmetric excursion  LUNG: Clear to auscultation bilaterally,  respirations unlabored. No rales/wheezing/rubs  HEART: RRR, S1 and S2 normal, no murmur, rub or gallop. DP pulses 2/4  ABDOMEN: SNTND, no masses, no organomegaly, no guarding, rebound or rigidity. GENITOURINARY: Urinary cath present   EXTREMITIES:  Extremities normal, atraumatic, no cyanosis or edema. Distal pulses equal bilaterally  SKIN: Skin color, texture, turgor normal, no rashes or lesions  MUSCULOSKELETAL: No joint swelling, no muscle tenderness. Normal ROM in extremities.    LYMPH NODES: no lymph node enlargement appreciated  NEUROLOGIC: unable to assess    Medications     Continuous Infusions:   dextrose 5 % and 0.9 % NaCl 100 mL/hr at 05/03/19 0610    dextrose      dextrose 5% and 0.45% NaCl with KCl 20 mEq 150 mL/hr at 05/02/19 0120     Scheduled Meds:   insulin glargine  5 Units Subcutaneous Nightly    pantoprazole  40 mg Intravenous Daily    phenytoin (DILANTIN) maintenance dose IVPB  100 mg Intravenous Q8H    insulin lispro  0-6 Units Subcutaneous Q4H    levetiracetam  500 mg Intravenous BID    Or    levETIRAcetam  500 mg Oral BID    pravastatin  80 mg Oral Nightly    tamsulosin  0.4 mg Oral Daily    sodium chloride flush  10 mL Intravenous 2 times per day    heparin (porcine)  5,000 Units Subcutaneous 3 times per day     PRN Meds: glucose, glucagon (rDNA), dextrose, dextrose, potassium chloride, sodium chloride flush, magnesium hydroxide, ondansetron, dextrose, potassium chloride, magnesium sulfate, dextrose 5% and 0.45% NaCl with KCl 20 mEq  Nutrition:       Labs and Imaging Studies     CBC:   Recent Labs     05/01/19 1746 05/02/19  0510 05/03/19  0415   WBC 4.7 7.3 5.1   RBC 3.45* 3.16* 3.53*   HGB 9.6* 8.8* 9.7*   HCT 31.1* 28.1* 31.6*   MCV 90.1 88.9 89.5   MCH 27.8 27.8 27.5   MCHC 30.9* 31.3* 30.7*   RDW 15.9* 15.6* 15.9*   * 138 147   MPV 11.9 11.4 11.0       BMP:    Recent Labs     05/01/19  1746  05/02/19  0510 05/02/19  1303 05/03/19  0415   *   < > 138 135 136   K 4.5   < > 3.6 4.5 3.7   CL 91*   < > 103 103 103   CO2 20*   < > 23 26 23   BUN 18   < > 10 8 5*   CREATININE 1.4*   < > 0.8 0.7 0.6*   GLUCOSE 1,087*   < > 51*  48* 194* 83   CALCIUM 8.7   < > 8.5* 8.4* 8.4*   PROT 6.7  --  6.0*  --  6.2*   LABALBU 3.2*  --  3.0*  --  2.7*   BILITOT 0.6  --  0.5  --  0.5   ALKPHOS 86  --  69  --  73   AST 8  --  8  --  11   ALT 10  --  8  --  8    < > = values in this interval not displayed.        LIVER PROFILE:   Recent Labs     05/01/19  1746 05/02/19  0510 05/03/19  0415   AST 8 8 11   ALT 10 8 8   LIPASE 15  --   --    BILIDIR  --  0.3 0.3   BILITOT 0.6 0.5 0.5   ALKPHOS 86 69 73       PT/INR:   Recent Labs     05/01/19  1746   PROTIME 12.1   INR 1.0       APTT:   Recent Labs     05/01/19  1746   APTT 25.4       Fasting Lipid Panel:    Lab Results   Component Value Date    HDL 83 05/03/2019       Cardiac Enzymes:    Lab Results   Component Value Date    CKTOTAL 485 (H) 09/12/2015    CKTOTAL 556 (H) 09/12/2015    CKTOTAL 601 (H) 09/12/2015    CKMB 4.4 09/12/2015    CKMB 6.0 09/12/2015    CKMB 6.8 09/12/2015    TROPONINI <0.01 05/02/2019    TROPONINI <0.01 05/01/2019    TROPONINI 0.02 03/22/2019       Notable Cultures:      Blood cultures   Blood Culture, Routine   Date Value Ref Range Status   05/01/2019 (A)  Preliminary    24 Hours- no growth  Gram stain performed from blood culture bottle media  Yeast       Respiratory cultures No results found for: RESPCULTURE No results found for: LABGRAM  Urine   Urine Culture, Routine   Date Value Ref Range Status   05/02/2019 <10,000 CFU/mL  Gram positive organism    Preliminary     Legionella No results found for: LABLEGI  C Diff PCR No results found for: CDIFPCR  Wound culture/abscess: No results for input(s): WNDABS in the last 72 hours. Tip culture:No results for input(s): CXCATHTIP in the last 72 hours. Antibiotic  Days  Day started   None  none None                            Oxygen: none, RA     ABG     PH  7.368   PCO2  40.2   PO2  77.6   HCO3  22.6   Sat%  94.7   FIO2     DATES        Lines:  Site  Day  Date inserted     TLC   None          PICC              Arterial line              Peripheral line              HD cath              Imaging Studies:  MRI Brain W WO Contrast   Final Result   1. Very small localized peripheral cortical acute ischemic insult in   the posterior right parietal region. 2. Extensive the changes relate with small vessel disease throughout   the white matter of both cerebral hemispheres, basal ganglia region   and brainstem. 3. Additional old insults with encephalomalacia are also seen in both   cerebellar hemispheres. obstruction  Triphasic CT showed a large mass at the pancreatic head/distal extrahepatic common bile duct region measuring approximately 2.3 cm  EUS on 03/08/2019 Pancreatic head mass and portal lymph node without satifactory interpretation    Elevated IgG4 369 on 03/08/2019, was on prednisone 20 mg daily for 6-8 weeks  Lipase 15  Cortisol: WNL 4.2  INR: 1  Follow HFT, improving       Cardiovascular  Hx of CAD, HFrEF @ 50%  Pro-BNP at baseline 7211  Neg trop   Monitor for now     HTN  On lisinopril 20 mg, Toprol 50 mg, held   Monitor BP     Pulmonary  Normal sats   But risk of aspiration due to minimal arousability   Will monitor       Renal  CATHY, resolved   Baseline 0.9, presented with 1.4    Infectious  Possible UTI   UA 10-20 WBC with large leukocyte esterase   urine culture: <10,000 CFU, ongoing   BC: grew YEAST, no bacteria   Repeat BCs sent   ID consulted       Hem/Onc  Possible pancreatic Ca       DVT/GI prophylaxis: heparin and protonix   Disposition: MICU     Gregg Suazo MD     Resident -[id number]    Attending Physician: Dr. Abi Walsh     Seen and evaluated with HS, agree with above A/P  AMS, etiology? Seizures seen by EEG, started on Keppra  Metabolic encephalopathy. S/p  HONKC. MRI small CVA  Neuro consult, anticonvulsants. BC, with yeast +.  Will add antifungal. Will d/c PICC line and consult ID

## 2019-05-03 NOTE — CONSULTS
Sig Start Date End Date Taking? Authorizing Provider   pantoprazole (PROTONIX) 40 MG tablet Take 1 tablet by mouth every morning (before breakfast) 3/30/19   Carter Vanegas MD   pravastatin (PRAVACHOL) 80 MG tablet Take 1 tablet by mouth nightly 1/10/19   Daisy Bennett MD   lisinopril (PRINIVIL;ZESTRIL) 20 MG tablet Take 1 tablet by mouth daily 1/11/19   Daisy Bennett MD   metoprolol succinate (TOPROL XL) 50 MG extended release tablet Take 1 tablet by mouth daily 1/11/19   Daisy Bennett MD   tamsulosin (FLOMAX) 0.4 MG capsule Take 1 capsule by mouth daily 1/11/19   Daisy Bennett MD       No Known Allergies    Family History   Problem Relation Age of Onset    Cancer Mother 50        breast       Social History     Tobacco Use    Smoking status: Never Smoker    Smokeless tobacco: Never Used   Substance Use Topics    Alcohol use: Yes     Alcohol/week: 4.8 oz     Types: 8 Cans of beer per week     Comment: states drinks one 24 ounce beer daily    Drug use: No     Types: Marijuana         Review of Systems   General ROS: unable to be obtained      PHYSICAL EXAM:    Vitals:    05/03/19 1500   BP: (!) 156/91   Pulse: 64   Resp: 9   Temp:    SpO2: 97%       General Appearance:  Confused, lethargic  Lungs:  No increased work of breathing   Heart:  RR  Abdomen:  Soft, non tender, non distended      LABS:    CBC  Recent Labs     05/03/19 0415   WBC 5.1   HGB 9.7*   HCT 31.6*        BMP  Recent Labs     05/03/19  0415      K 3.7      CO2 23   BUN 5*   CREATININE 0.6*   CALCIUM 8.4*     Liver Function  Recent Labs     05/01/19 1746  05/03/19 0415   LIPASE 15  --   --    BILITOT 0.6   < > 0.5   BILIDIR  --    < > 0.3   AST 8   < > 11   ALT 10   < > 8   ALKPHOS 86   < > 73   PROT 6.7   < > 6.2*   LABALBU 3.2*   < > 2.7*    < > = values in this interval not displayed. No results for input(s): LACTATE in the last 72 hours.   Recent Labs     05/01/19 1746   INR 1.0       RADIOLOGY    Ct Head Wo Contrast    Result Date: 2019  Patient MRN:  [id number] : 1950 Age: 76 years Gender: Male Order Date:  2019 5:45 PM TECHNIQUE/NUMBER OF IMAGES/COMPARISON/CLINICAL HISTORY: CT brain Sequential axial images were obtained sagittal and coronal reconstructions. Comparison study of 2019. Images: [age]year-old male patient with history of altered mental status. FINDINGS: There is a discrete area of hypodensity in the right thalamic region which were seen previously related to an old insult related with small vessel disease. A discrete areas of hypodensity are also seen in the basal ganglia region bilaterally also relate with chronic small vessel disease. Evangelist Roof prominent hypodensities in the periventricular white matters of both cerebral hemispheres also correlate with small vessel disease. There is an old insult with encephalomalacia in the right parietal region which was seen previously. Prominence of peripheral CSF space and ventricular system are somewhat more than expected for the age group including in the mesial region of both temporal lobes. The ventricular system are also relatively prominent for the age group. Small area of hypodensity seen in the right side of the dora and also in the left-sided bones were seen previously related with previous discrete ischemic insults. An area of encephalomalacia is seen in the inferior aspect of the left cerebellar hemisphere in the area of the medial and inferior cerebellar peduncles relate with old insult. There is no indication for a sizable area of an acute or recent insult in progression to the brain parenchyma. Midline structures demonstrate no significant findings. There is no indication for an acute intracranial hemorrhagic event.  Images with bone window settings demonstrate no significant findings are.     1. Chronic ischemic changes in the brain parenchyma predominantly supratentorial areas with overall loss of volume of the brain parenchyma for the age group, as ganglia region and in the brainstem. In FLAIR sequence there are fairly extensive areas of hyperintensity represent a pattern of February ventricular leukoencephalomalacia. Areas of old infarcts are also seen in both cerebellar hemispheres surrounded by areas of hyperintensity more likely regions of demyelination as well. There is no indication for hemorrhagic event. Atherosclerotic changes are seen in the major intracranial arteries are.     1. Very small localized peripheral cortical acute ischemic insult in the posterior right parietal region. 2. Extensive the changes relate with small vessel disease throughout the white matter of both cerebral hemispheres, basal ganglia region and brainstem. 3. Additional old insults with encephalomalacia are also seen in both cerebellar hemispheres. 4. Overall loss of brain parenchyma more than expected for the age group in supra and infratentorial regions. Findings to be correlated clinically. ALERT:  ABNORMAL REPORT.         ASSESSMENT:  76 y.o. male with candy bacteremia and concerns for metastatic pancreatic adenocarcinoma    PLAN:  - doubtful this is biliary stent in origin secondary to normal LFT's and bilirubin, likely PICC related  - I have been concerned for locally advanced pancreatic adenocarcinoma given his original biopsies and my intraoperative findings  - operatively I could not develop a plane along his SMV and portal vein given the large desmoplastic reaction despite our numerous biopsies.  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 Alex Gómez to be re-consulted on Mr. Anel Reeves of which greater than 50% was spent counseling or coordinating his care.     Electronically signed by Clarence Carrington MD on 5/4/2019 at 10:11 AM

## 2019-05-03 NOTE — PLAN OF CARE
Problem: Falls - Risk of:  Goal: Will remain free from falls  Description  Will remain free from falls  5/2/2019 2211 by Jan Santana RN  Outcome: Met This Shift     Problem: Falls - Risk of:  Goal: Absence of physical injury  Description  Absence of physical injury  Outcome: Met This Shift     Problem: Risk for Impaired Skin Integrity  Goal: Tissue integrity - skin and mucous membranes  Description  Structural intactness and normal physiological function of skin and  mucous membranes.   5/2/2019 2211 by Jan Santana RN  Outcome: Met This Shift     Problem: Serum Glucose Level - Abnormal:  Goal: Ability to maintain appropriate glucose levels will improve to within specified parameters  Description  Ability to maintain appropriate glucose levels will improve to within specified parameters  5/2/2019 2211 by Jan Santana RN  Outcome: Met This Shift     Problem: Discharge Planning:  Goal: Participates in care planning  Description  Participates in care planning  Outcome: Not Met This Shift     Problem: Discharge Planning:  Goal: Discharged to appropriate level of care  Description  Discharged to appropriate level of care  Outcome: Not Met This Shift

## 2019-05-03 NOTE — PLAN OF CARE
Nutrition Problem:  Moderate malnutrition, In context of chronic illness  Intervention: Food and/or Nutrient Delivery: Continue NPO  Nutritional Goals: Advance diet when medically appropriate

## 2019-05-03 NOTE — CONSULTS
Full ID Consult to follow    Pt seen and examined    Yeast in blood - likely candidemia  Sources- PICC/ stenosed biliary stent (also on high dose steroids for long )    Encephalopathy from sepsis  Hx of autoimmune pancreatitis, biliary obstruction  On t/t for ascending cholangitis/ complicated E.avium bacteremia    Stat Eraxis  Van and zosyn pending bld cultures  D/C PICC, send tip for culture  Consult hepatobiliary- history of infected stent/ ERCP last month  Would recommend stent removal ?[facility] transfer  Pt overall prognosis and code status needs to be addressed as well  ZOË when stable  Consult opthalm for endopthalmitis/uveitis eval in presence of disseminated fungal infection

## 2019-05-03 NOTE — H&P
No obstruction,  polyp, or discharge noted. Mouth:  Mucosa without lesion. Pharynx:   Noninjected without exudate. NECK:  Supple. No JVD. No thyromegaly. No carotid bruits. HEART:  Regular rate and rhythm without murmur. LUNGS:  Clear to auscultation bilaterally. ABDOMEN:  Positive bowel sounds. Soft, nontender. No rebound or  guarding. No hepatosplenomegaly. No masses. BACK:  Intact without lesion. EXTREMITIES:  Without edema. LYMPH NODES:  No adenopathy noted. SKIN:  Without rash or lesion. IMPRESSION:  Acute shaking movements, rule out seizure. Acute  encephalopathy, history of possible pancreatic CA, history of biliary  obstruction, history of recent gram-negative bacteremia, hypertension,  new-onset uncontrolled diabetes mellitus, hyperosmolar hyperglycemia. PLAN:  Admit, IV hydration. Blood sugar control. Neurology, Pulmonary  Critical Care to see. DISCHARGE PLAN:  Home when stable.         Panfilo Newman DO    D: 05/02/2019 16:41:04       T: 05/02/2019 16:49:04     [id number]  Job#: [id number]     Doc#: [id number]    CC:

## 2019-05-03 NOTE — PROGRESS NOTES
SPEECH/LANGUAGE PATHOLOGY  BEDSIDE SWALLOWING EVALUATION    PATIENT NAME:  Olga Spencer      :  1950      TODAY'S DATE:  5/3/2019    SUMMARY OF EVALUATION     DYSPHAGIA DIAGNOSIS:  Marked oropharyngeal dysphagia      DIET RECOMMENDATIONS: NPO (nothing by mouth including oral meds)       FEEDING RECOMMENDATIONS:     Assistance level:  Not applicable      Compensatory strategies recommended: Not applicable    THERAPY RECOMMENDATIONS:      Dysphagia therapy is not recommended                  PROCEDURE     Consistencies Administered During the Evaluation   Liquids: thin liquid   Solids:  pureed foods      Method of Intake:   spoon  Fed by clinician      Position:   Seated, reclined                   RESULTS     Oral Stage:         Delayed A-P transit due to: cognitive function       Pharyngeal Stage:      Wet respirations were noted after presentation of thin liquid and pureed consistency solids                          [x]The admitting diagnosis and active problem list, as listed below have been reviewed prior to initiation of this evaluation.      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]     ACTIVE PROBLEM LIST:   Patient Active Problem List   Diagnosis    HTN (hypertension), benign    Other specified anemias    Obstructive jaundice    Hyperbilirubinemia    Palliative care encounter    Obstructive jaundice due to malignant neoplasm (HCC)    Moderate protein-calorie malnutrition (HCC)    Pancreatic mass    Pancreatic adenocarcinoma (HCC)    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HCC)    Bacteremia    Biliary obstruction    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HCC)    Thrombocytopathia (HCC)    Bacteremia due to Gram-negative bacteria    Endocarditis, suspected    Seizures ([address]    Seizure-like activity (HCC)    Altered mental status    Acute ischemic stroke ([address]    Hyperosmolar hyperglycemic coma due to diabetes mellitus without ketoacidosis ([address]

## 2019-05-03 NOTE — PROGRESS NOTES
P Quality Flow/Interdisciplinary Rounds Progress Note        Quality Flow Rounds held on May 3, 2019    Disciplines Attending:  Bedside Nurse, Nursing Unit Leadership and Physical Therapy    Mohamud Vinson was admitted on 5/1/2019  4:58 PM    Anticipated Discharge Date:  Expected Discharge Date: 05/09/19    Disposition:    Dylan Score:  Dylan Scale Score: 17    Readmission Risk              Risk of Unplanned Readmission:        30           Discussed patient goal for the day, patient clinical progression, and barriers to discharge. The following Goal(s) of the Day/Commitment(s) have been identified:  Possible transfer if ok with neuro. Monitor BG and temp.        Laly Zendejas  May 3, 2019

## 2019-05-04 LAB
ALBUMIN SERPL-MCNC: 2.8 G/DL (ref 3.5–5.2)
ALP BLD-CCNC: 77 U/L (ref 40–129)
ALT SERPL-CCNC: 8 U/L (ref 0–40)
ANION GAP SERPL CALCULATED.3IONS-SCNC: 8 MMOL/L (ref 7–16)
AST SERPL-CCNC: 9 U/L (ref 0–39)
BASOPHILS ABSOLUTE: 0.02 E9/L (ref 0–0.2)
BASOPHILS RELATIVE PERCENT: 0.5 % (ref 0–2)
BILIRUB SERPL-MCNC: 0.5 MG/DL (ref 0–1.2)
BILIRUBIN DIRECT: 0.3 MG/DL (ref 0–0.3)
BILIRUBIN, INDIRECT: 0.2 MG/DL (ref 0–1)
BUN BLDV-MCNC: 4 MG/DL (ref 8–23)
CALCIUM SERPL-MCNC: 8.2 MG/DL (ref 8.6–10.2)
CHLORIDE BLD-SCNC: 103 MMOL/L (ref 98–107)
CO2: 26 MMOL/L (ref 22–29)
CREAT SERPL-MCNC: 0.6 MG/DL (ref 0.7–1.2)
EOSINOPHILS ABSOLUTE: 0.1 E9/L (ref 0.05–0.5)
EOSINOPHILS RELATIVE PERCENT: 2.3 % (ref 0–6)
GFR AFRICAN AMERICAN: >60
GFR NON-AFRICAN AMERICAN: >60 ML/MIN/1.73
GLUCOSE BLD-MCNC: 97 MG/DL (ref 74–99)
HCT VFR BLD CALC: 34.5 % (ref 37–54)
HEMOGLOBIN: 10.7 G/DL (ref 12.5–16.5)
IMMATURE GRANULOCYTES #: 0.02 E9/L
IMMATURE GRANULOCYTES %: 0.5 % (ref 0–5)
INR BLD: 1
LYMPHOCYTES ABSOLUTE: 1.07 E9/L (ref 1.5–4)
LYMPHOCYTES RELATIVE PERCENT: 24.8 % (ref 20–42)
MAGNESIUM: 1.6 MG/DL (ref 1.6–2.6)
MCH RBC QN AUTO: 27.6 PG (ref 26–35)
MCHC RBC AUTO-ENTMCNC: 31 % (ref 32–34.5)
MCV RBC AUTO: 89.1 FL (ref 80–99.9)
METER GLUCOSE: 107 MG/DL (ref 74–99)
METER GLUCOSE: 142 MG/DL (ref 74–99)
METER GLUCOSE: 153 MG/DL (ref 74–99)
METER GLUCOSE: 174 MG/DL (ref 74–99)
METER GLUCOSE: 220 MG/DL (ref 74–99)
MONOCYTES ABSOLUTE: 0.25 E9/L (ref 0.1–0.95)
MONOCYTES RELATIVE PERCENT: 5.8 % (ref 2–12)
NEUTROPHILS ABSOLUTE: 2.86 E9/L (ref 1.8–7.3)
NEUTROPHILS RELATIVE PERCENT: 66.1 % (ref 43–80)
PDW BLD-RTO: 16 FL (ref 11.5–15)
PHOSPHORUS: 2.7 MG/DL (ref 2.5–4.5)
PLATELET # BLD: 183 E9/L (ref 130–450)
PMV BLD AUTO: 10.6 FL (ref 7–12)
POTASSIUM SERPL-SCNC: 3.5 MMOL/L (ref 3.5–5)
PROTHROMBIN TIME: 11.6 SEC (ref 9.3–12.4)
RBC # BLD: 3.87 E12/L (ref 3.8–5.8)
SODIUM BLD-SCNC: 137 MMOL/L (ref 132–146)
TOTAL PROTEIN: 6.1 G/DL (ref 6.4–8.3)
URINE CULTURE, ROUTINE: NORMAL
WBC # BLD: 4.3 E9/L (ref 4.5–11.5)

## 2019-05-04 PROCEDURE — 82962 GLUCOSE BLOOD TEST: CPT

## 2019-05-04 PROCEDURE — 2580000003 HC RX 258: Performed by: INTERNAL MEDICINE

## 2019-05-04 PROCEDURE — 6370000000 HC RX 637 (ALT 250 FOR IP): Performed by: INTERNAL MEDICINE

## 2019-05-04 PROCEDURE — 2580000003 HC RX 258: Performed by: PSYCHIATRY & NEUROLOGY

## 2019-05-04 PROCEDURE — 99222 1ST HOSP IP/OBS MODERATE 55: CPT | Performed by: INTERNAL MEDICINE

## 2019-05-04 PROCEDURE — 99233 SBSQ HOSP IP/OBS HIGH 50: CPT | Performed by: PHYSICIAN ASSISTANT

## 2019-05-04 PROCEDURE — 2500000003 HC RX 250 WO HCPCS: Performed by: INTERNAL MEDICINE

## 2019-05-04 PROCEDURE — 6360000002 HC RX W HCPCS: Performed by: INTERNAL MEDICINE

## 2019-05-04 PROCEDURE — 36415 COLL VENOUS BLD VENIPUNCTURE: CPT

## 2019-05-04 PROCEDURE — 82248 BILIRUBIN DIRECT: CPT

## 2019-05-04 PROCEDURE — 83735 ASSAY OF MAGNESIUM: CPT

## 2019-05-04 PROCEDURE — 85610 PROTHROMBIN TIME: CPT

## 2019-05-04 PROCEDURE — 80053 COMPREHEN METABOLIC PANEL: CPT

## 2019-05-04 PROCEDURE — 6360000002 HC RX W HCPCS: Performed by: PSYCHIATRY & NEUROLOGY

## 2019-05-04 PROCEDURE — 84100 ASSAY OF PHOSPHORUS: CPT

## 2019-05-04 PROCEDURE — 2140000000 HC CCU INTERMEDIATE R&B

## 2019-05-04 PROCEDURE — 85025 COMPLETE CBC W/AUTO DIFF WBC: CPT

## 2019-05-04 PROCEDURE — C9113 INJ PANTOPRAZOLE SODIUM, VIA: HCPCS | Performed by: INTERNAL MEDICINE

## 2019-05-04 RX ORDER — LISINOPRIL 10 MG/1
10 TABLET ORAL DAILY
Status: DISCONTINUED | OUTPATIENT
Start: 2019-05-04 | End: 2019-05-10 | Stop reason: HOSPADM

## 2019-05-04 RX ORDER — LEVETIRACETAM 15 MG/ML
750 INJECTION INTRAVASCULAR 2 TIMES DAILY
Status: DISCONTINUED | OUTPATIENT
Start: 2019-05-04 | End: 2019-05-07

## 2019-05-04 RX ORDER — PHENYTOIN SODIUM 50 MG/ML
INJECTION, SOLUTION INTRAMUSCULAR; INTRAVENOUS
Status: DISPENSED
Start: 2019-05-04 | End: 2019-05-04

## 2019-05-04 RX ORDER — HYDRALAZINE HYDROCHLORIDE 20 MG/ML
10 INJECTION INTRAMUSCULAR; INTRAVENOUS EVERY 6 HOURS PRN
Status: DISCONTINUED | OUTPATIENT
Start: 2019-05-04 | End: 2019-05-07

## 2019-05-04 RX ORDER — PHENYTOIN SODIUM 50 MG/ML
INJECTION, SOLUTION INTRAMUSCULAR; INTRAVENOUS
Status: DISPENSED
Start: 2019-05-04 | End: 2019-05-05

## 2019-05-04 RX ADMIN — INSULIN LISPRO 1 UNITS: 100 INJECTION, SOLUTION INTRAVENOUS; SUBCUTANEOUS at 11:32

## 2019-05-04 RX ADMIN — PANTOPRAZOLE SODIUM 40 MG: 40 INJECTION, POWDER, FOR SOLUTION INTRAVENOUS at 08:48

## 2019-05-04 RX ADMIN — HEPARIN SODIUM 5000 UNITS: 10000 INJECTION INTRAVENOUS; SUBCUTANEOUS at 13:49

## 2019-05-04 RX ADMIN — PHENYTOIN SODIUM 100 MG: 50 INJECTION INTRAMUSCULAR; INTRAVENOUS at 11:28

## 2019-05-04 RX ADMIN — Medication 10 ML: at 21:58

## 2019-05-04 RX ADMIN — PHENYTOIN SODIUM 100 MG: 50 INJECTION INTRAMUSCULAR; INTRAVENOUS at 03:50

## 2019-05-04 RX ADMIN — PRAVASTATIN SODIUM 80 MG: 20 TABLET ORAL at 21:59

## 2019-05-04 RX ADMIN — PHENYTOIN SODIUM 100 MG: 50 INJECTION INTRAMUSCULAR; INTRAVENOUS at 22:00

## 2019-05-04 RX ADMIN — INSULIN LISPRO 1 UNITS: 100 INJECTION, SOLUTION INTRAVENOUS; SUBCUTANEOUS at 04:11

## 2019-05-04 RX ADMIN — VANCOMYCIN HYDROCHLORIDE 1000 MG: 1 INJECTION, POWDER, LYOPHILIZED, FOR SOLUTION INTRAVENOUS at 21:58

## 2019-05-04 RX ADMIN — VANCOMYCIN HYDROCHLORIDE 1000 MG: 1 INJECTION, POWDER, LYOPHILIZED, FOR SOLUTION INTRAVENOUS at 08:48

## 2019-05-04 RX ADMIN — LEVETIRACETAM 500 MG: 5 INJECTION INTRAVENOUS at 06:47

## 2019-05-04 RX ADMIN — PIPERACILLIN AND TAZOBACTAM 3.38 G: 3; .375 INJECTION, POWDER, FOR SOLUTION INTRAVENOUS at 06:47

## 2019-05-04 RX ADMIN — INSULIN LISPRO 2 UNITS: 100 INJECTION, SOLUTION INTRAVENOUS; SUBCUTANEOUS at 21:56

## 2019-05-04 RX ADMIN — DEXTROSE AND SODIUM CHLORIDE: 5; 900 INJECTION, SOLUTION INTRAVENOUS at 22:10

## 2019-05-04 RX ADMIN — PIPERACILLIN AND TAZOBACTAM 3.38 G: 3; .375 INJECTION, POWDER, FOR SOLUTION INTRAVENOUS at 23:12

## 2019-05-04 RX ADMIN — Medication 10 ML: at 23:13

## 2019-05-04 RX ADMIN — HEPARIN SODIUM 5000 UNITS: 10000 INJECTION INTRAVENOUS; SUBCUTANEOUS at 06:47

## 2019-05-04 RX ADMIN — LEVETIRACETAM 750 MG: 250 TABLET, FILM COATED ORAL at 21:59

## 2019-05-04 RX ADMIN — HEPARIN SODIUM 5000 UNITS: 10000 INJECTION INTRAVENOUS; SUBCUTANEOUS at 22:00

## 2019-05-04 RX ADMIN — DEXTROSE MONOHYDRATE 100 MG: 50 INJECTION, SOLUTION INTRAVENOUS at 13:48

## 2019-05-04 RX ADMIN — PIPERACILLIN AND TAZOBACTAM 3.38 G: 3; .375 INJECTION, POWDER, FOR SOLUTION INTRAVENOUS at 13:48

## 2019-05-04 RX ADMIN — Medication 10 ML: at 08:48

## 2019-05-04 RX ADMIN — INSULIN GLARGINE 5 UNITS: 100 INJECTION, SOLUTION SUBCUTANEOUS at 21:57

## 2019-05-04 RX ADMIN — LISINOPRIL 10 MG: 10 TABLET ORAL at 10:28

## 2019-05-04 ASSESSMENT — PAIN SCALES - GENERAL
PAINLEVEL_OUTOF10: 0

## 2019-05-04 NOTE — CONSULTS
Inpatient consult to Ophthalmology  Consult performed by: Claudene Roles, MD  Consult ordered by: Daly Mcdonald MD  Reason for consult: fungemia - evaluate for chorioretinitis  Assessment/Recommendations: 76 y.o. male admitted for Principal Problem:    Hyperosmolar hyperglycemic coma due to diabetes mellitus without ketoacidosis ([address]  Active Problems:    Seizures ([address]    Seizure-like activity ([address]    Altered mental status    Acute ischemic stroke ([address]    Moderate protein-calorie malnutrition ([address]  Resolved Problems:    * No resolved hospital problems. *    Pt with fungemia, asked to evaluate for fungal chorioretinitis  Pt denies visual symptoms. Past ocular history unremarkable  Wears glasses for distance vision    Pt interviewed - poor historian - and chart reviewed    EXAM:    Vision near without correction  20/60- OD and 20/40- OS    Lids and adenexae normal  PERRL - no APD  EOM full  Conjunctiva white and quiet OU, corneas clear OU  Anterior chamber deep and quiet OU  Ocular media clear with minimal cataract OU  Pupils dilated pharmacologically and both direct and indirect ophthalmoscopy performed  Optic nerve heads normal with flat margins  C/D 0.25 OU  Macula normal OU  Retinal vasculature normal  No diabetic retinopathy OU  No findings of chorioretinitis OU  .   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

## 2019-05-04 NOTE — CONSULTS
Inpatient consult to Oncology  Consult performed by: Mini Pro MD  Consult ordered by: Liyah Tovar MD  Reason for consult: Liver lesions  Assessment/Recommendations:   Inpatient Medical Oncology Consult Note     Reason for Visit:  Liver lesions    Referring Physician: Michele Borrero MD    PCP:  Miky Todd DO    History of Present Illness:  69-year-old male who presented to the emergency room with altered mental status. There were concerns for seizures. The patient was seen by Neurology and admitted to the intensive care unit. Recent hospitalization in March 2019, for bacteremia; UTI; biliary obstruction; hyperbilirubinemia; hypertension. Hx of ? 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 On admission labs were remarkable for a blood sugar of over 1000. Hemoglobin A1c 10.0, lactic acid 6.0. Review of Systems;  CONSTITUTIONAL: No fever, chills. Fair appetite and energy level. ENMT: Eyes: No diplopia; Nose: No epistaxis. Mouth: No sore throat. RESPIRATORY: No hemoptysis, shortness of breath, cough. CARDIOVASCULAR: No chest pain, palpitations. GASTROINTESTINAL: No nausea/vomiting, abdominal pain, diarrhea/constipation. GENITOURINARY: No dysuria, urinary frequency, hematuria. NEURO: No syncope, presyncope, headache.   Remainder:  ROS NEGATIVE    Past Medical History:   No date: Cancer [facility])      Comment:  pancreatic  No date: Cerebral artery occlusion with cerebral infarction ([facility] [address]  No date: Hypertension  No date: Syncope    Past Surgical History:     3/25/2019: ERCP; N/A      Comment:  ERCP STENT INSERTION performed by Rogelio Schaffer MD at                [facility] ENDOSCOPY  No date: EYE SURGERY  3/27/2019: PICC LINE INSERTION NURSE      Comment:     3/8/2019: UPPER GASTROINTESTINAL ENDOSCOPY; N/A      Comment:  REDO ENDOSCOPIC EGD  ULTRASOUND performed by Valorie Bullock DO at [address] [place] [address]  3/8/2019: UPPER GASTROINTESTINAL ENDOSCOPY; N/A      Comment:  EGD DIAGNOSTIC ONLY performed by Amara Alejandra DO at                [address] [place] [address]  1/16/2019: WHIPPLE PROCEDURE; N/A      Comment:  EXPLORATORY LAPAROTOMY; ABORTED WHIPPLE PROCEDURE;                GASTROJEJUNOSTOMY; OPEN CHOLECYSTECTOMY performed by                Magaly Pulido MD at [facility] [place]    Family History:  Review of patient's family history indicates:  Problem: Cancer      Relation: Mother          Age of Onset: 50          Comment: breast    Medications:  Reviewed and reconciled. Social History:  Social History    Socioeconomic History      Marital status:       Spouse name: Not on file      Number of children: Not on file      Years of education: Not on file      Highest education level: Not on file    Occupational History      Not on file    Social Needs      Financial resource strain: Not on file      Food insecurity:        Worry: Not on file        Inability: Not on file      Transportation needs:        Medical: Not on file        Non-medical: Not on file    Tobacco Use      Smoking status: Never Smoker      Smokeless tobacco: Never Used    Substance and Sexual Activity      Alcohol use:  Yes        Alcohol/week: 4.8 oz        Types: 8 Cans of beer per week        Comment: states drinks one 24 ounce beer daily      Drug use: No        Types: Marijuana      Sexual activity: Not Currently    Lifestyle      Physical activity:        Days per week: Not on file        Minutes per session: Not on file      Stress: Not on file    Relationships      Social connections:        Talks on phone: Not on file        Gets together: Not on file        Attends Yarsani service: Not on file        Active member of club or organization: Not on file        Attends meetings of clubs or organizations: Not on file        Relationship status: Not on file      Intimate partner violence:        Fear of current or ex partner: Not on file        Emotionally

## 2019-05-04 NOTE — PROGRESS NOTES
Campos Mazariegos is a 76 y.o. male     Neurology following for seizures    Pt presented from NH with unresponsiveness and random movements to right arm and incontinent of urine x 3. Prior to being found unresponsive, he was A&O x 3. Pt had complete plegia to RUE after having irregular movements of RUE in ED. Pt also had automatism in mouth with chewing movements. Ativan 2mg was given but was helpful in stopping these movements so pt was started on Phenytoin 100 mg TID. Pt was recently d/c 3 weeks ago from bacteremia. He has passed his bedside swallow and eating breakfast upon my entering. ROS limited with lack of cooperation answering questions.  No new complaints     Glucose stable overnight     EEG yesterday with epileptogenicity arising from the L central parietal area and a moderate diffuse encephalopathy---no clear seizure activity but the degree of encephalopathy has increased    ID following--    Yeast in blood---likely cadidemia    On Eraxis    Vanco and zosyn pending blood cultures     Nurse at bedside and says he is doing better today compared to previous days     No family at bedside    Current Facility-Administered Medications   Medication Dose Route Frequency Provider Last Rate Last Dose    hydrALAZINE (APRESOLINE) injection 10 mg  10 mg Intravenous Q6H PRN Alon Linda MD        lisinopril (PRINIVIL;ZESTRIL) tablet 10 mg  10 mg Oral Daily Alon Linda MD   10 mg at 05/04/19 1028    levetiracetam (KEPPRA) 750 mg/50 mL IVPB  750 mg Intravenous BID ROSA Hendricks        Or    levETIRAcetam (KEPPRA) tablet 750 mg  750 mg Oral BID RSOA Hendricks        insulin glargine (LANTUS) injection vial 5 Units  5 Units Subcutaneous Nightly Corey Saab MD        anidulafungin (ERAXIS) 100 mg in dextrose 5 % 130 mL IVPB  100 mg Intravenous Q24H Yash Sánchez MD        vancomycin 1000 mg IVPB in 250 mL D5W addavial  1,000 mg Intravenous Q12H Yash Sánchez MD   Stopped at 05/04/19 6442    piperacillin-tazobactam (ZOSYN) 3.375 g in dextrose 5 % 100 mL IVPB extended infusion (mini-bag)  3.375 g Intravenous Q8H Desiree Matute MD 25 mL/hr at 05/04/19 0647 3.375 g at 05/04/19 0647    pantoprazole (PROTONIX) injection 40 mg  40 mg Intravenous Daily Corey Saab MD   40 mg at 05/04/19 0848    dextrose 5 % and 0.9 % sodium chloride infusion   Intravenous Continuous Mahreggie Keller Koppula 60 mL/hr at 05/04/19 0923      phenytoin (DILANTIN) 100 mg in sodium chloride 0.9 % 100 mL IVPB (maintenance dose)  100 mg Intravenous Q8H Christal Phalen, MD   Stopped at 05/04/19 0418    insulin lispro (HUMALOG) injection vial 0-6 Units  0-6 Units Subcutaneous Q4H Corey Saab MD   1 Units at 05/04/19 0411    glucose (GLUTOSE) 40 % oral gel 15 g  15 g Oral PRN Azalee Daren, DO        glucagon (rDNA) injection 1 mg  1 mg Intramuscular PRN Azalee Daren, DO        dextrose 5 % solution  100 mL/hr Intravenous PRN Azalee Daren, DO        dextrose 50 % solution 12.5 g  12.5 g Intravenous PRN Azalee Daren, DO   12.5 g at 05/02/19 0650    potassium chloride 10 mEq/100 mL IVPB (Peripheral Line)  10 mEq Intravenous PRN Azalee Daren,  mL/hr at 05/02/19 0944 10 mEq at 05/02/19 0944    pravastatin (PRAVACHOL) tablet 80 mg  80 mg Oral Nightly Jesse Wilder MD        tamsulosin [facility]) capsule 0.4 mg  0.4 mg Oral Daily Jesse Wilder MD   Stopped at 05/03/19 5995    sodium chloride flush 0.9 % injection 10 mL  10 mL Intravenous 2 times per day Jesse Wilder MD   10 mL at 05/04/19 0848    sodium chloride flush 0.9 % injection 10 mL  10 mL Intravenous PRN Jesse Wilder MD   10 mL at 05/03/19 1404    magnesium hydroxide (MILK OF MAGNESIA) 400 MG/5ML suspension 30 mL  30 mL Oral Daily PRN Jesse Wilder MD        ondansetron [facility]) injection 4 mg  4 mg Intravenous Q6H PRN Jesse Wilder MD        heparin (porcine) injection 5,000 Units  5,000 Units Subcutaneous 3 times per day Jesse Wilder MD   5,000 Units at 05/04/19 0647    dextrose 50 % solution 12.5 g  12.5 g Intravenous PRN Andry Mast MD   12.5 g at 05/03/19 1304    potassium chloride 10 mEq/100 mL IVPB (Peripheral Line)  10 mEq Intravenous PRN Andry Mast  mL/hr at 05/02/19 0830 10 mEq at 05/02/19 0830    magnesium sulfate 1 g in dextrose 5% 100 mL IVPB  1 g Intravenous PRN Andry Mast MD        dextrose 5 % and 0.45 % NaCl with KCl 20 mEq infusion   Intravenous Continuous PRN Andry Mast  mL/hr at 05/02/19 0120         Objective:     BP (!) 149/90   Pulse 68   Temp 96.3 °F (35.7 °C) (Bladder)   Resp 16   Ht 5' 10\" (1.778 m)   Wt 139 lb 9.6 oz (63.3 kg)   SpO2 99%   BMI 20.03 kg/m²     General: Patient lying bed in NAD. Appears frail and well developed. Head:  Normocephalic and atraumatic  Eyes: Sclera white, conjunctiva pink  Neck:  Trachea midline. Neck supple and normal ROM, no bruits, no lymphadenopathy  Abdomen: Soft with hyperactive bowel sounds in all quadrants   Extremities: No edema to BLE, +2 pulses bilaterally  Skin: No lesions or rashes     Mental Status: Awake. Oriented to self, place, year and president. Does not tell me his age. Slow to respond. Follow simple commands for me today. Difficulty with 2 step commands.      Decreased attention/concentration   Impaired fundus of knowledge  Impaired memories     Speech/Language: No dysarthria or aphasia     Exam limited due to lack of cooperation     Cranial Nerves:   I: smell NA   II: visual acuity  NA   II: visual fields Blink to threat    II: pupils CATALINA    III,VII: ptosis None   III,IV,VI: extraocular muscles  Tracks around room    V: mastication    V: facial light touch sensation  Normal    V,VII: corneal reflex     VII: facial muscle function - upper  Appears normal    VII: facial muscle function - lower Appears normal    VIII: hearing Appears normal    IX: soft palate elevation     IX,X: gag reflex    XI: trapezius strength     XI: sternocleidomastoid strength    XI: neck extension strength     XII: tongue strength  Normal      Motor:  Moves all extremities antigravity--limited cooperation for strength testing    Decreased muscle tone and bulk  No abnormal movements noted    Sensory:  LT appears intact  Appreciates PP     DTR:     +1 arms  Absent legs     No Babinskis  No Navas's    No pathological reflexes    Laboratory/Radiology:     Lab Results   Component Value Date    WBC 4.3 (L) 05/04/2019    HGB 10.7 (L) 05/04/2019    HCT 34.5 (L) 05/04/2019    MCV 89.1 05/04/2019     05/04/2019     Lab Results   Component Value Date     05/04/2019    K 3.5 05/04/2019     05/04/2019    CO2 26 05/04/2019    BUN 4 (L) 05/04/2019    CREATININE 0.6 (L) 05/04/2019    GLUCOSE 97 05/04/2019    CALCIUM 8.2 (L) 05/04/2019    PROT 6.1 (L) 05/04/2019    LABALBU 2.8 (L) 05/04/2019    BILITOT 0.5 05/04/2019    ALKPHOS 77 05/04/2019    AST 9 05/04/2019    ALT 8 05/04/2019    LABGLOM >60 05/04/2019    GFRAA >60 05/04/2019       Hepatic Function Panel:    Lab Results   Component Value Date    ALKPHOS 77 05/04/2019    ALT 8 05/04/2019    AST 9 05/04/2019    PROT 6.1 05/04/2019    BILITOT 0.5 05/04/2019    BILIDIR 0.3 05/04/2019    IBILI 0.2 05/04/2019    LABALBU 2.8 05/04/2019     ABG:    Lab Results   Component Value Date    PH 7.368 05/02/2019    PCO2 40.2 05/02/2019    PO2 77.6 05/02/2019    HCO3 22.6 05/02/2019    BE -2.5 05/02/2019    O2SAT 94.7 05/02/2019     HgBA1c:    Lab Results   Component Value Date    LABA1C 10.0 05/02/2019     Lab Results   Component Value Date    LDLCALC 91 05/03/2019     Phenytoin Free: pending    Phenytoin Total: 18.4 WNL     CTA TRIPHASIC LIVER   Final Result      XR CHEST PORTABLE   Final Result   Right internal jugular central venous catheter tip in SVC. No pneumothorax on the right on the left. Patient has a stent in the   projections for the biliary tract/common bile duct and there are signs   of pneumobilia in the liver.       IMPRESSION: No acute cardiopulmonary process. MRI Brain W WO Contrast   Final Result   1. Very small localized peripheral cortical acute ischemic insult in   the posterior right parietal region. 2. Extensive the changes relate with small vessel disease throughout   the white matter of both cerebral hemispheres, basal ganglia region   and brainstem. 3. Additional old insults with encephalomalacia are also seen in both   cerebellar hemispheres. 4. Overall loss of brain parenchyma more than expected for the age   group in supra and infratentorial regions. Findings to be correlated   clinically. ALERT:  ABNORMAL REPORT. XR CHEST PORTABLE   Final Result   No acute cardiopulmonary process. CT Head WO Contrast   Final Result   1. Chronic ischemic changes in the brain parenchyma predominantly   supratentorial areas with overall loss of volume of the brain   parenchyma for the age group, as above commented. 2. No indication for an acute intracranial process. CT NEEDLE BIOPSY LIVER PERCUTANEOUS    (Results Pending)     CTH: negative for acute findings    MRI brain: acute infarct of R posterior parietal and  with old infarcts also noted     EEG 5/3  This EEG shows epileptogenicity arising from left central parietal area. Also it shows a moderate diffuse encephalopathy. Compared to records from yesterday there is no clear seizure activity but the degree of encephalopathy has increased. Clinical correlation is recommended. EEG: This EEG shows epilepsy arising from left central parietal area. Also it shows a mild diffuse encephalopathy. ZOË 3/29: Aortic valve opens well.   Ejection fraction is visually estimated at 50%.   L atrium enlarged   Normal mitral valve structure and function.   The aortic valve appears mildly sclerotic.   Trace aortic regurgitation.    Pulmonic valve is structurally normal.     Echo 3/26:    Aortic valve opens well.   Ejection fraction biplane =49%.   Normal left ventricle size.   Normal sized left atrium.   Structurally normal mitral valve.   The aortic valve appears normal   No pericardial effusion. All labs and imaging studies reviewed independently today. Assessment:     Pt presented from NH with unresponsiveness and random movements to right arm and incontinent of urine x 3 in the setting of DKA    Pt's BG was found to be >1000. MRI Brain shows very small acute infarct of the posterior R parietal region with extensive . Old infarcts to b/l cerebellar are also noted. ---likely related to DKA     Patient with focal seizures in the setting of hyperglycemia (BG 1087) and small infarct.  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-- not being  caused by acute stroke   Continue Keppra and Dilantin-- Phenytoin Total: 18.4 WNL    Repeat EEG Monday--based on results the goal is to wean   Phenytoin      Encephalopathy    Degree of encephalopathy increased on most recent EEG--suspect this related to sepsis from candidemia    ID following for sepsis-- on Eraxis and van/zosyn pending blood cultuers    Hx of HTN, stroke, pancreatic cancer, BPH, HLD, GERD    Plan:     Continue current care  Increase Keppra to 750 mg BID   Continue Phenytoin 100 mg TID  Repeat EEG on Monday   Phenytoin Free level is pending  Monitor H/H  PT/OT/ST  Stroke education prior to discharge      Nimisha Cummings PA-C  11:10 AM  2019

## 2019-05-04 NOTE — PROGRESS NOTES
ID Progress Note                [address], [place], [address]            Phone [phone]     Fax [phone]      Chief complaint   AMS    Subjective:    More  alert and awake today  Denies abdo pain  PICC line removed and got rt IJ  den10 ROS otherwise negative unless otherwise specified above. Objective:    Vitals:    05/04/19 1100   BP: (!) 140/87   Pulse: 71   Resp: 16   Temp:    SpO2: 100%     GENERAL:  The patient is not alert and awake, minimally responsive. HEENT:  Atraumatic and normocephalic. PERRLA. EOMI. RESPIRATORY:  Air entry bilaterally equal.  No wheezes or crackles. CARDIOVASCULAR:  S1 and S2 normal.  No murmur, rubs, or gallop. ABDOMEN:  Soft, nontender, and nondistended. Bowel sounds present. EXTREMITIES:  No pedal edema. Right upper arm PICC line in place.   NEUROLOGIC:  Not following commands.     Rt IJ in place       Labs:  Recent Labs     05/02/19  0510 05/03/19  0415 05/04/19  0630   WBC 7.3 5.1 4.3*   RBC 3.16* 3.53* 3.87   HGB 8.8* 9.7* 10.7*   HCT 28.1* 31.6* 34.5*   MCV 88.9 89.5 89.1   MCH 27.8 27.5 27.6   MCHC 31.3* 30.7* 31.0*   RDW 15.6* 15.9* 16.0*    147 183   MPV 11.4 11.0 10.6     CMP:    Lab Results   Component Value Date     05/04/2019    K 3.5 05/04/2019    K 3.9 01/22/2019     05/04/2019    CO2 26 05/04/2019    BUN 4 05/04/2019    CREATININE 0.6 05/04/2019    GFRAA >60 05/04/2019    LABGLOM >60 05/04/2019    GLUCOSE 97 05/04/2019    PROT 6.1 05/04/2019    LABALBU 2.8 05/04/2019    CALCIUM 8.2 05/04/2019    BILITOT 0.5 05/04/2019    ALKPHOS 77 05/04/2019    AST 9 05/04/2019    ALT 8 05/04/2019          Microbiology :  Recent Labs     05/01/19  1745   BC 24 Hours- no growth  Gram stain performed from blood culture bottle media  Yeast  *  Identification to follow     Recent Labs     05/01/19  1740   BLOODCULT2 24 Hours- no growth     Recent Labs     05/02/19  0040   LABURIN <10,000 CFU/mL  Gram positive cocci       No results for input(s): CULTRESP in the last 72 hours. No results for input(s): WNDABS in the last 72 hours. Radiology :  CTA TRIPHASIC LIVER   Final Result      XR CHEST PORTABLE   Final Result   Right internal jugular central venous catheter tip in SVC. No pneumothorax on the right on the left. Patient has a stent in the   projections for the biliary tract/common bile duct and there are signs   of pneumobilia in the liver. IMPRESSION: No acute cardiopulmonary process. MRI Brain W WO Contrast   Final Result   1. Very small localized peripheral cortical acute ischemic insult in   the posterior right parietal region. 2. Extensive the changes relate with small vessel disease throughout   the white matter of both cerebral hemispheres, basal ganglia region   and brainstem. 3. Additional old insults with encephalomalacia are also seen in both   cerebellar hemispheres. 4. Overall loss of brain parenchyma more than expected for the age   group in supra and infratentorial regions. Findings to be correlated   clinically. ALERT:  ABNORMAL REPORT. XR CHEST PORTABLE   Final Result   No acute cardiopulmonary process. CT Head WO Contrast   Final Result   1. Chronic ischemic changes in the brain parenchyma predominantly   supratentorial areas with overall loss of volume of the brain   parenchyma for the age group, as above commented. 2. No indication for an acute intracranial process. CT NEEDLE BIOPSY LIVER PERCUTANEOUS    (Results Pending)         URINARY CATHETER OUTPUT (Freed):  Urethral Catheter-Output (mL): 70 mL    ASSESSMENT AND PLAN:  1.  Yeast in blood, likely candidemia, its sources including PICC line  versus biliary stent, has been also on high-dose of steroids for long  time. 2.  Encephalopathy from sepsis. 3.  History of autoimmune pancreatitis with biliary obstruction.   4.  History of recently treated Enterococcus avium, complicated  bacteremia with six weeks of ampicillin plus ceftriaxone ending on  04/30/2019, also had polymicrobial bacteremia including Klebsiella  pneumoniae, E. coli as well as Clostridium perfringens.     PLAN:  1.  continue  Eraxis. Continue vancomycin and Zosyn. 2.   PICC line discontinued, tip culture negative so far  3. ZOË when stable. 4.    PICC line catheter related bloodstream infection usually the blood cultures are persistently positive in his case with intermittent blood cultures I would rather consider intra-abdominal source and not ruled out this possibility, CT-guided biopsy has been ordered  -Added surgical cultures/pathology to the tissue sample  - Would request to be done over the weekend  - Hepatobiliary surgery has been consulted, recommendations noted as well as the results of CT triphasic- malignancy versus abscess          Electronically signed by Irma Hammond MD on 5/4/2019 at 12:19 PM

## 2019-05-04 NOTE — PROGRESS NOTES
Department of Internal Medicine  Division of Pulmonary, Critical Care and Sleep Medicine  ICU Attending Addendum    Attending Physician Statement  Jga Franco was seen, examined and discussed with the multi-disciplinary ICU team during rounds. I have personally seen and examined the patient and the key elements of the encounter were performed by me. The medications & laboratory data was discussed and adjusted where necessary. The radiographic images were reviewed either as a group or with radiologist if felt dis-concordant with the exam or history. The above findings were corroborated, plans confirmed and changes made if needed. Family is updated at the bedside if available. Key issues of the case were discussed among consultants. Critical Care time is documented if appropriate. Changes to the notes are place in italicized print. Briefly,   AMS, clinically better, more awake. Seizures, improved on Keppra and Phenytoin. EEG Monday  Candidemia. On eraxis by ID. PICC line removed  ZOË and Ophthalmology eval later. H/o Pancreatic mass and liver mets. S/p stent. ID would like to remove if possible. For liver mets Biopsy  To re-start diet after Liver Bx if will be done today  Ok to d/c ICU    All other information is noted in the above team note.

## 2019-05-04 NOTE — PROGRESS NOTES
Mack Diaz MD, [address]                   Patient Name: Campos Mazariegos                   Age:  76 y.o. Gender:   male     Hospital coverage initial encounter- hospital course reviewed:    HPI: reviewed hospital course   Found unresponsive in Nursing Facility  Seizure like movements to the RUE  EEG positive    Treated for:  · Encephalopathy   · Hyperosmolar  hyperglycemia  · Focal  Seizures  · Hx of autoimmune pancreatitis, biliary obstruction  · Hx of CAD, HFrEF @ 50%  · CATHY    He is awake somnolent and quite confused        Past Medical History:   Diagnosis Date    Cancer ([facility] [address]     pancreatic    Cerebral artery occlusion with cerebral infarction ([facility] [address]     Hypertension     Syncope        Past Surgical History:   Procedure Laterality Date    ERCP N/A 3/25/2019    ERCP STENT INSERTION performed by Jenifer Wang MD at [facility]  3/27/2019         UPPER GASTROINTESTINAL ENDOSCOPY N/A 3/8/2019    REDO ENDOSCOPIC EGD  ULTRASOUND performed by Ramon Lan DO at [facility] 3/8/2019    EGD DIAGNOSTIC ONLY performed by Ramon Lan DO at [address] [place] [address] N/A 2019    EXPLORATORY LAPAROTOMY; ABORTED WHIPPLE PROCEDURE; GASTROJEJUNOSTOMY; OPEN CHOLECYSTECTOMY performed by Adriane Sebastian MD at [address] Status   Relation Name Status    Mother          Prior to Admission medications    Medication Sig Start Date End Date Taking?  Authorizing Provider   pantoprazole (PROTONIX) 40 MG tablet Take 1 tablet by mouth every morning (before breakfast) 3/30/19   Marleni Valverde MD   pravastatin (PRAVACHOL) 80 MG tablet Take 1 tablet by mouth nightly 1/10/19   Brando Palmer MD   lisinopril (PRINIVIL;ZESTRIL) 20 MG tablet Take 1 tablet by  05/04/2019    CO2 26 05/04/2019    BUN 4 05/04/2019    CREATININE 0.6 05/04/2019    GLUCOSE 97 05/04/2019    CALCIUM 8.2 05/04/2019     Hepatic Function Panel:    Lab Results   Component Value Date    ALKPHOS 77 05/04/2019    AST 9 05/04/2019    ALT 8 05/04/2019    PROT 6.1 05/04/2019    LABALBU 2.8 05/04/2019    BILITOT 0.5 05/04/2019     Magnesium:    Lab Results   Component Value Date    MG 1.6 05/02/2019     Cardiac Enzymes:   Lab Results   Component Value Date    CKTOTAL 485 (H) 09/12/2015    CKTOTAL 556 (H) 09/12/2015    CKTOTAL 601 (H) 09/12/2015    CKMB 4.4 09/12/2015    CKMB 6.0 09/12/2015    CKMB 6.8 09/12/2015    TROPONINI <0.01 05/02/2019    TROPONINI <0.01 05/01/2019    TROPONINI 0.02 03/22/2019     LDH:  No results found for: LDH  PT/INR:    Lab Results   Component Value Date    PROTIME 12.1 05/01/2019    INR 1.0 05/01/2019     BNP: No results for input(s): BNP in the last 72 hours.    TSH:   Lab Results   Component Value Date    TSH 3.970 05/02/2019      Cardiac Injury Profile:   Recent Labs     05/02/19  2140   Gia Riser <0.01      Lipid Profile:   Lab Results   Component Value Date    HDL 83 05/03/2019    LDLCALC 91 05/03/2019      Hemoglobin A1C: No components found for: HGBA1C   U/A:   Lab Results   Component Value Date    LEUKOCYTESUR LARGE 05/02/2019    PHUR 6.0 05/02/2019    WBCUA 10-20 05/02/2019    RBCUA 10-20 05/02/2019    RBCUA 0-1 01/25/2013    BACTERIA NONE 05/02/2019    SPECGRAV 1.015 05/02/2019    BLOODU LARGE 05/02/2019    GLUCOSEU Negative 05/02/2019         ADMISSION SCHEDULED MEDS:   Current Facility-Administered Medications   Medication Dose Route Frequency Provider Last Rate Last Dose    phenytoin (DILANTIN) 50 MG/ML injection             hydrALAZINE (APRESOLINE) injection 10 mg  10 mg Intravenous Q6H PRN Dick Garay MD        lisinopril (PRINIVIL;ZESTRIL) tablet 10 mg  10 mg Oral Daily Dick Garay MD   10 mg at 05/04/19 1028    levetiracetam (KEPPRA) 750 mg/50 mL IVPB  750 mg Intravenous BID ROSA Alicia        Or    levETIRAcetam (KEPPRA) tablet 750 mg  750 mg Oral BID ROSA Alicia        insulin glargine (LANTUS) injection vial 5 Units  5 Units Subcutaneous Nightly Corey Saab MD        anidulafungin (ERAXIS) 100 mg in dextrose 5 % 130 mL IVPB  100 mg Intravenous Q24H Gregorio Huertas MD        vancomycin 1000 mg IVPB in 250 mL D5W addavial  1,000 mg Intravenous Q12H Gregorio Huertas MD   Stopped at 05/04/19 0948    piperacillin-tazobactam (ZOSYN) 3.375 g in dextrose 5 % 100 mL IVPB extended infusion (mini-bag)  3.375 g Intravenous Mike Mesa MD   Stopped at 05/04/19 1047    pantoprazole (PROTONIX) injection 40 mg  40 mg Intravenous Daily Corey Saab MD   40 mg at 05/04/19 0848    dextrose 5 % and 0.9 % sodium chloride infusion   Intravenous Continuous Maheswara Keller Koppula 60 mL/hr at 05/04/19 0923      phenytoin (DILANTIN) 100 mg in sodium chloride 0.9 % 100 mL IVPB (maintenance dose)  100 mg Intravenous Q8H Anthony Flanagan MD   Stopped at 05/04/19 0418    insulin lispro (HUMALOG) injection vial 0-6 Units  0-6 Units Subcutaneous Q4H Corey Saab MD   1 Units at 05/04/19 0411    glucose (GLUTOSE) 40 % oral gel 15 g  15 g Oral PRN Kate Oh, DO        glucagon (rDNA) injection 1 mg  1 mg Intramuscular PRN Kate Oh, DO        dextrose 5 % solution  100 mL/hr Intravenous PRN Kate Oh, DO        dextrose 50 % solution 12.5 g  12.5 g Intravenous PRN Kate Oh, DO   12.5 g at 05/02/19 0650    potassium chloride 10 mEq/100 mL IVPB (Peripheral Line)  10 mEq Intravenous PRN Kate Oh,  mL/hr at 05/02/19 0944 10 mEq at 05/02/19 0944    pravastatin (PRAVACHOL) tablet 80 mg  80 mg Oral Nightly Trish Hawley MD        tamsulosin [facility]) capsule 0.4 mg  0.4 mg Oral Daily Trish Hawley MD   Stopped at 05/03/19 0316    sodium chloride flush 0.9 % injection 10 mL  10 mL Intravenous 2 times per day Trish Hawley, MD   10 mL at 05/04/19 0848    sodium chloride flush 0.9 % injection 10 mL  10 mL Intravenous PRN Monie Winn MD   10 mL at 05/03/19 1404    magnesium hydroxide (MILK OF MAGNESIA) 400 MG/5ML suspension 30 mL  30 mL Oral Daily PRN Monie Winn MD        ondansetron [facility]) injection 4 mg  4 mg Intravenous Q6H PRN Monie Winn MD        heparin (porcine) injection 5,000 Units  5,000 Units Subcutaneous 3 times per day Monie Winn MD   5,000 Units at 05/04/19 0647    dextrose 50 % solution 12.5 g  12.5 g Intravenous PRN Monie Winn MD   12.5 g at 05/03/19 1304    potassium chloride 10 mEq/100 mL IVPB (Peripheral Line)  10 mEq Intravenous PRN Monie Winn  mL/hr at 05/02/19 0830 10 mEq at 05/02/19 0830    magnesium sulfate 1 g in dextrose 5% 100 mL IVPB  1 g Intravenous PRN Monie Winn MD        dextrose 5 % and 0.45 % NaCl with KCl 20 mEq infusion   Intravenous Continuous PRN Monie Winn  mL/hr at 05/02/19 0120         Current  Infusions   dextrose 5 % and 0.9 % NaCl 60 mL/hr at 05/04/19 0923    dextrose      dextrose 5% and 0.45% NaCl with KCl 20 mEq 150 mL/hr at 05/02/19 0120       Prn Meds  hydrALAZINE, glucose, glucagon (rDNA), dextrose, dextrose, potassium chloride, sodium chloride flush, magnesium hydroxide, ondansetron, dextrose, potassium chloride, magnesium sulfate, dextrose 5% and 0.45% NaCl with KCl 20 mEq    Radiology Review:  CTA TRIPHASIC LIVER   Final Result      XR CHEST PORTABLE   Final Result   Right internal jugular central venous catheter tip in SVC. No pneumothorax on the right on the left. Patient has a stent in the   projections for the biliary tract/common bile duct and there are signs   of pneumobilia in the liver. IMPRESSION: No acute cardiopulmonary process. MRI Brain W WO Contrast   Final Result   1. Very small localized peripheral cortical acute ischemic insult in   the posterior right parietal region.       2. Extensive the changes relate with small vessel disease throughout   the white matter of both cerebral hemispheres, basal ganglia region   and brainstem. 3. Additional old insults with encephalomalacia are also seen in both   cerebellar hemispheres. 4. Overall loss of brain parenchyma more than expected for the age   group in supra and infratentorial regions. Findings to be correlated   clinically. ALERT:  ABNORMAL REPORT. XR CHEST PORTABLE   Final Result   No acute cardiopulmonary process. CT Head WO Contrast   Final Result   1. Chronic ischemic changes in the brain parenchyma predominantly   supratentorial areas with overall loss of volume of the brain   parenchyma for the age group, as above commented. 2. No indication for an acute intracranial process.       CT NEEDLE BIOPSY LIVER PERCUTANEOUS    (Results Pending)         ASSESSMENT:  Patient Active Problem List   Diagnosis    HTN (hypertension), benign    Other specified anemias    Obstructive jaundice    Hyperbilirubinemia    Palliative care encounter    Obstructive jaundice due to malignant neoplasm (HCC)    Moderate protein-calorie malnutrition (HCC)    Pancreatic mass    Pancreatic adenocarcinoma (HCC)    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HCC)    Bacteremia    Biliary obstruction    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HCC)    Thrombocytopathia (HCC)    Bacteremia due to Gram-negative bacteria    Endocarditis, suspected    Seizures ([address]    Seizure-like activity (HCC)    Altered mental status    Acute ischemic stroke ([address]    Hyperosmolar hyperglycemic coma due to diabetes mellitus without ketoacidosis ([address]    Moderate protein-calorie malnutrition ([address]       PLAN:  Reviewed medication  Reviewed labs  Discussed with family in the room      See  Orders  Mariann Riddle MD, Gracia Lay, American Board of Internal Medicine  Diplomate, [facility] Board of Geriatric Medicine  11:26 AM  5/4/2019

## 2019-05-04 NOTE — CONSULTS
with  biliary obstruction, history of stroke, hypertension, and syncope. PAST SURGICAL HISTORY:  Includes ERCP with stent insertion, endoscopy,  ex-lap, aborted Whipple procedure before, gastrojejunostomy and open  cholecystectomy performed on 01/16/2019. MEDICATIONS:  Reviewed. ALLERGIES:  No known drug allergies. SOCIAL HISTORY:  Nonsmoker. Alcohol intake 4.8 ounce per week. No  illicit drug use. FAMILY HISTORY:  Noncontributory. REVIEW OF SYSTEMS:  Cannot be obtained as the patient is minimally  responsive. PHYSICAL EXAMINATION:  VITAL SIGNS:  Temperature 97.8, pulse rate 69, respiratory rate 12,  blood pressure 151/98, and oxygen saturation 100% on 2 L of oxygen. GENERAL:  The patient is not alert and awake, minimally responsive. HEENT:  Atraumatic and normocephalic. PERRLA. EOMI. RESPIRATORY:  Air entry bilaterally equal.  No wheezes or crackles. CARDIOVASCULAR:  S1 and S2 normal.  No murmur, rubs, or gallop. ABDOMEN:  Soft, nontender, and nondistended. Bowel sounds present. EXTREMITIES:  No pedal edema. Right upper arm PICC line in place. NEUROLOGIC:  Not following commands. LABS AND IMAGING:  Reviewed and mentioned in HPI. ASSESSMENT AND PLAN:  1.  Yeast in blood, likely candidemia, its sources including PICC line  versus biliary stent, has been also on high-dose of steroids for long  time. 2.  Encephalopathy from sepsis. 3.  History of autoimmune pancreatitis with biliary obstruction. 4.  History of recently treated Enterococcus avium, complicated  bacteremia with six weeks of ampicillin plus ceftriaxone ending on  04/30/2019, also had polymicrobial bacteremia including Klebsiella  pneumoniae, E. coli as well as Clostridium perfringens. PLAN:  1. Start Eraxis. Start vancomycin and Zosyn. Pending blood cultures. 2.  Discontinue PICC line. Send it for culture. 3.  ZOË when stable.   4.  Consult Hepatobiliary Surgery as he has history of infected stenosed  stent ERCP last month. I would recommend stent removal whether it could  be done at [facility] versus transfer to [facility] where he got  his original surgery done. The patient's overall prognosis and code  status need to be addressed. Consult Ophth for endophthalmitis uveitis  evaluation in presence of disseminated fungal infections. Await for the  final identification and sensitivity of the yeast in blood. Case has  been discussed with the ICU resident. Thank you for your consult. Please call for any questions.         Peyton Hicks MD    D: 05/03/2019 17:30:26       T: 05/03/2019 20:20:24     [email or web address]  Job#: [id number]     Doc#: [id number]    CC:

## 2019-05-04 NOTE — PLAN OF CARE
Problem: Falls - Risk of:  Goal: Will remain free from falls  Description  Will remain free from falls  5/4/2019 0904 by Yeni Ortiz  Outcome: Met This Shift   Plan of care discussed with patient / family.

## 2019-05-04 NOTE — PLAN OF CARE
Problem: Falls - Risk of:  Goal: Will remain free from falls  Description  Will remain free from falls  5/4/2019 0229 by Felicity Earl RN  Outcome: Met This Shift  5/3/2019 2108 by Felicity Earl RN  Outcome: Met This Shift  Goal: Absence of physical injury  Description  Absence of physical injury  5/4/2019 0229 by Felicity Earl RN  Outcome: Met This Shift  5/3/2019 2108 by Felicity Earl RN  Outcome: Met This Shift     Problem: Risk for Impaired Skin Integrity  Goal: Tissue integrity - skin and mucous membranes  Description  Structural intactness and normal physiological function of skin and  mucous membranes.   5/4/2019 0229 by Felicity Earl RN  Outcome: Met This Shift  5/3/2019 2108 by Felicity Earl RN  Outcome: Met This Shift

## 2019-05-04 NOTE — PROGRESS NOTES
cranial nerve deficit or sensory deficit. He exhibits normal muscle tone. Coordination normal.   Oriented to place and person; moves all extremties   Skin: Skin is warm. He is not diaphoretic. Psychiatric: He has a normal mood and affect.  His behavior is normal. Judgment and thought content normal.       Medications     Continuous Infusions:   dextrose 5 % and 0.9 % NaCl 60 mL/hr at 05/04/19 0923    dextrose      dextrose 5% and 0.45% NaCl with KCl 20 mEq 150 mL/hr at 05/02/19 0120     Scheduled Meds:   lisinopril  10 mg Oral Daily    levetiracetam  750 mg Intravenous BID    Or    levETIRAcetam  750 mg Oral BID    phenytoin        insulin glargine  5 Units Subcutaneous Nightly    anidulafungin  100 mg Intravenous Q24H    vancomycin  1,000 mg Intravenous Q12H    piperacillin-tazobactam  3.375 g Intravenous Q8H    pantoprazole  40 mg Intravenous Daily    phenytoin (DILANTIN) maintenance dose IVPB  100 mg Intravenous Q8H    insulin lispro  0-6 Units Subcutaneous Q4H    pravastatin  80 mg Oral Nightly    tamsulosin  0.4 mg Oral Daily    sodium chloride flush  10 mL Intravenous 2 times per day    heparin (porcine)  5,000 Units Subcutaneous 3 times per day     PRN Meds: hydrALAZINE, glucose, glucagon (rDNA), dextrose, dextrose, potassium chloride, sodium chloride flush, magnesium hydroxide, ondansetron, dextrose, potassium chloride, magnesium sulfate, dextrose 5% and 0.45% NaCl with KCl 20 mEq  Nutrition:       Labs and Imaging Studies     CBC:   Recent Labs     05/02/19  0510 05/03/19  0415 05/04/19  0630   WBC 7.3 5.1 4.3*   RBC 3.16* 3.53* 3.87   HGB 8.8* 9.7* 10.7*   HCT 28.1* 31.6* 34.5*   MCV 88.9 89.5 89.1   MCH 27.8 27.5 27.6   MCHC 31.3* 30.7* 31.0*   RDW 15.6* 15.9* 16.0*    147 183   MPV 11.4 11.0 10.6       BMP:    Recent Labs     05/02/19  0510 05/02/19  1303 05/03/19  0415 05/04/19  0630    135 136 137   K 3.6 4.5 3.7 3.5    103 103 103   CO2 23 26 23 26   BUN 10 8 5* 4*   CREATININE 0.8 0.7 0.6* 0.6*   GLUCOSE 51*  48* 194* 83 97   CALCIUM 8.5* 8.4* 8.4* 8.2*   PROT 6.0*  --  6.2* 6.1*   LABALBU 3.0*  --  2.7* 2.8*   BILITOT 0.5  --  0.5 0.5   ALKPHOS 69  --  73 77   AST 8  --  11 9   ALT 8  --  8 8       LIVER PROFILE:   Recent Labs     05/01/19  1746 05/02/19  0510 05/03/19  0415 05/04/19  0630   AST 8 8 11 9   ALT 10 8 8 8   LIPASE 15  --   --   --    BILIDIR  --  0.3 0.3 0.3   BILITOT 0.6 0.5 0.5 0.5   ALKPHOS 86 69 73 77       PT/INR:   Recent Labs     05/01/19  1746   PROTIME 12.1   INR 1.0       APTT:   Recent Labs     05/01/19  1746   APTT 25.4       Fasting Lipid Panel:    Lab Results   Component Value Date    HDL 83 05/03/2019       Cardiac Enzymes:    Lab Results   Component Value Date    CKTOTAL 485 (H) 09/12/2015    CKTOTAL 556 (H) 09/12/2015    CKTOTAL 601 (H) 09/12/2015    CKMB 4.4 09/12/2015    CKMB 6.0 09/12/2015    CKMB 6.8 09/12/2015    TROPONINI <0.01 05/02/2019    TROPONINI <0.01 05/01/2019    TROPONINI 0.02 03/22/2019       Notable Cultures:      Blood cultures   Blood Culture, Routine   Date Value Ref Range Status   05/01/2019 (A)  Preliminary    24 Hours- no growth  Gram stain performed from blood culture bottle media  Yeast     05/01/2019 Identification to follow  Preliminary     Respiratory cultures No results found for: RESPCULTURE No results found for: LABGRAM  Urine   Urine Culture, Routine   Date Value Ref Range Status   05/02/2019 <10,000 CFU/mL  Gram positive cocci    Final     Legionella No results found for: LABLEGI  C Diff PCR No results found for: CDIFPCR  Wound culture/abscess: No results for input(s): WNDABS in the last 72 hours.   Tip culture:  Recent Labs     05/03/19  1440   CXCATHTIP Growth not present, incubation continues        Antibiotic  Days  Day started   None  none None                            Oxygen: none, RA     ABG     PH  7.368   PCO2  40.2   PO2  77.6   HCO3  22.6   Sat%  94.7   FIO2     DATES        Lines:  Site  Day Date inserted     TLC    RIJ   05/03      PICC              Arterial line              Peripheral line              HD cath              Imaging Studies:  CTA TRIPHASIC LIVER   Final Result      XR CHEST PORTABLE   Final Result   Right internal jugular central venous catheter tip in SVC. No pneumothorax on the right on the left. Patient has a stent in the   projections for the biliary tract/common bile duct and there are signs   of pneumobilia in the liver. IMPRESSION: No acute cardiopulmonary process. MRI Brain W WO Contrast   Final Result   1. Very small localized peripheral cortical acute ischemic insult in   the posterior right parietal region. 2. Extensive the changes relate with small vessel disease throughout   the white matter of both cerebral hemispheres, basal ganglia region   and brainstem. 3. Additional old insults with encephalomalacia are also seen in both   cerebellar hemispheres. 4. Overall loss of brain parenchyma more than expected for the age   group in supra and infratentorial regions. Findings to be correlated   clinically. ALERT:  ABNORMAL REPORT. XR CHEST PORTABLE   Final Result   No acute cardiopulmonary process. CT Head WO Contrast   Final Result   1. Chronic ischemic changes in the brain parenchyma predominantly   supratentorial areas with overall loss of volume of the brain   parenchyma for the age group, as above commented. 2. No indication for an acute intracranial process. CT NEEDLE BIOPSY LIVER PERCUTANEOUS    (Results Pending)        EKG: normal sinus rhythm, RBBB, LAFB, Bifascicular block, these findings are new since last EKG. Resident's Assessment and PLan     Neurology  Acute encephalopathy   2/2 likely HHS  CT head negative for hemorrhage  MRI: localized cortical acute ischemic insult, small vessel dx in both white matter, old insults encephamalacia, overall loss of brain paranchyma more than expected for age. BP     Renal  CATHY, resolved   Baseline 0.9, presented with 1.4    Infectious  Fungal Bacteremia  - BC growing yeast - source - likely PICC vs ERCP stent; patient also on long term steroids  - ID following - On Eraxis and Vanc and zosyn  - IVF - N/S at 60cc/hr - D/c once diet fully established    Possible UTI   UA 10-20 WBC with large leukocyte esterase   urine culture: <10,000 CFU GPC  BC: grew YEAST, no bacteria   Repeat BCs sent   ID following    Hem/Onc  Possible pancreatic Ca with new mets  - Will follow oncology recommendations    GI  - Patient passed bedside swallow  - Per radiology - not on the list today  - Dysphagia diet resumed    DVT/GI prophylaxis: heparin and protonix   Disposition: Transfer to Severiano Loose, MD -PGY1    Attending Physician: Dr. Fuad Culver

## 2019-05-04 NOTE — PROGRESS NOTES
Nurse to nurse report called. Pt will transfer to [id number]. Left message on wife's voicemail to update.

## 2019-05-05 ENCOUNTER — ANESTHESIA EVENT (OUTPATIENT)
Dept: OPERATING ROOM | Age: 69
DRG: 871 | End: 2019-05-05
Payer: MEDICARE

## 2019-05-05 ENCOUNTER — ANESTHESIA (OUTPATIENT)
Dept: OPERATING ROOM | Age: 69
DRG: 871 | End: 2019-05-05
Payer: MEDICARE

## 2019-05-05 VITALS
RESPIRATION RATE: 3 BRPM | OXYGEN SATURATION: 100 % | DIASTOLIC BLOOD PRESSURE: 73 MMHG | SYSTOLIC BLOOD PRESSURE: 127 MMHG

## 2019-05-05 LAB
CULTURE CATHETER TIP: NORMAL
METER GLUCOSE: 111 MG/DL (ref 74–99)
METER GLUCOSE: 188 MG/DL (ref 74–99)
METER GLUCOSE: 78 MG/DL (ref 74–99)

## 2019-05-05 PROCEDURE — 3700000001 HC ADD 15 MINUTES (ANESTHESIA): Performed by: UROLOGY

## 2019-05-05 PROCEDURE — 0F9030Z DRAINAGE OF LIVER WITH DRAINAGE DEVICE, PERCUTANEOUS APPROACH: ICD-10-PCS | Performed by: RADIOLOGY

## 2019-05-05 PROCEDURE — 6360000002 HC RX W HCPCS: Performed by: UROLOGY

## 2019-05-05 PROCEDURE — 2580000003 HC RX 258: Performed by: UROLOGY

## 2019-05-05 PROCEDURE — 82962 GLUCOSE BLOOD TEST: CPT

## 2019-05-05 PROCEDURE — 6370000000 HC RX 637 (ALT 250 FOR IP): Performed by: INTERNAL MEDICINE

## 2019-05-05 PROCEDURE — C1894 INTRO/SHEATH, NON-LASER: HCPCS | Performed by: UROLOGY

## 2019-05-05 PROCEDURE — 0FB03ZX EXCISION OF LIVER, PERCUTANEOUS APPROACH, DIAGNOSTIC: ICD-10-PCS | Performed by: RADIOLOGY

## 2019-05-05 PROCEDURE — 0T7D8ZZ DILATION OF URETHRA, VIA NATURAL OR ARTIFICIAL OPENING ENDOSCOPIC: ICD-10-PCS | Performed by: UROLOGY

## 2019-05-05 PROCEDURE — 3700000000 HC ANESTHESIA ATTENDED CARE: Performed by: UROLOGY

## 2019-05-05 PROCEDURE — 6370000000 HC RX 637 (ALT 250 FOR IP): Performed by: UROLOGY

## 2019-05-05 PROCEDURE — 2580000003 HC RX 258: Performed by: INTERNAL MEDICINE

## 2019-05-05 PROCEDURE — 3600000003 HC SURGERY LEVEL 3 BASE: Performed by: UROLOGY

## 2019-05-05 PROCEDURE — 7100000001 HC PACU RECOVERY - ADDTL 15 MIN: Performed by: UROLOGY

## 2019-05-05 PROCEDURE — 2140000000 HC CCU INTERMEDIATE R&B

## 2019-05-05 PROCEDURE — 2580000003 HC RX 258

## 2019-05-05 PROCEDURE — 6360000002 HC RX W HCPCS: Performed by: INTERNAL MEDICINE

## 2019-05-05 PROCEDURE — 36556 INSERT NON-TUNNEL CV CATH: CPT

## 2019-05-05 PROCEDURE — 99232 SBSQ HOSP IP/OBS MODERATE 35: CPT | Performed by: TRANSPLANT SURGERY

## 2019-05-05 PROCEDURE — 7100000000 HC PACU RECOVERY - FIRST 15 MIN: Performed by: UROLOGY

## 2019-05-05 PROCEDURE — 6360000002 HC RX W HCPCS

## 2019-05-05 PROCEDURE — 3600000013 HC SURGERY LEVEL 3 ADDTL 15MIN: Performed by: UROLOGY

## 2019-05-05 PROCEDURE — 2709999900 HC NON-CHARGEABLE SUPPLY: Performed by: UROLOGY

## 2019-05-05 RX ORDER — PROPOFOL 10 MG/ML
INJECTION, EMULSION INTRAVENOUS CONTINUOUS PRN
Status: DISCONTINUED | OUTPATIENT
Start: 2019-05-05 | End: 2019-05-05 | Stop reason: SDUPTHER

## 2019-05-05 RX ORDER — PROMETHAZINE HYDROCHLORIDE 25 MG/ML
6.25 INJECTION, SOLUTION INTRAMUSCULAR; INTRAVENOUS EVERY 10 MIN PRN
Status: DISCONTINUED | OUTPATIENT
Start: 2019-05-05 | End: 2019-05-05 | Stop reason: HOSPADM

## 2019-05-05 RX ORDER — PHENYTOIN SODIUM 50 MG/ML
INJECTION, SOLUTION INTRAMUSCULAR; INTRAVENOUS
Status: DISPENSED
Start: 2019-05-05 | End: 2019-05-06

## 2019-05-05 RX ORDER — MEPERIDINE HYDROCHLORIDE 50 MG/ML
12.5 INJECTION INTRAMUSCULAR; INTRAVENOUS; SUBCUTANEOUS EVERY 5 MIN PRN
Status: DISCONTINUED | OUTPATIENT
Start: 2019-05-05 | End: 2019-05-05 | Stop reason: HOSPADM

## 2019-05-05 RX ORDER — MIDAZOLAM HYDROCHLORIDE 1 MG/ML
INJECTION INTRAMUSCULAR; INTRAVENOUS PRN
Status: DISCONTINUED | OUTPATIENT
Start: 2019-05-05 | End: 2019-05-05 | Stop reason: SDUPTHER

## 2019-05-05 RX ORDER — HYDROCODONE BITARTRATE AND ACETAMINOPHEN 5; 325 MG/1; MG/1
1 TABLET ORAL PRN
Status: DISCONTINUED | OUTPATIENT
Start: 2019-05-05 | End: 2019-05-05 | Stop reason: HOSPADM

## 2019-05-05 RX ORDER — MORPHINE SULFATE 2 MG/ML
2 INJECTION, SOLUTION INTRAMUSCULAR; INTRAVENOUS EVERY 5 MIN PRN
Status: DISCONTINUED | OUTPATIENT
Start: 2019-05-05 | End: 2019-05-05 | Stop reason: HOSPADM

## 2019-05-05 RX ORDER — MORPHINE SULFATE 2 MG/ML
1 INJECTION, SOLUTION INTRAMUSCULAR; INTRAVENOUS EVERY 5 MIN PRN
Status: DISCONTINUED | OUTPATIENT
Start: 2019-05-05 | End: 2019-05-05 | Stop reason: HOSPADM

## 2019-05-05 RX ORDER — SODIUM CHLORIDE 9 MG/ML
INJECTION, SOLUTION INTRAVENOUS CONTINUOUS PRN
Status: DISCONTINUED | OUTPATIENT
Start: 2019-05-05 | End: 2019-05-05 | Stop reason: SDUPTHER

## 2019-05-05 RX ORDER — FENTANYL CITRATE 50 UG/ML
INJECTION, SOLUTION INTRAMUSCULAR; INTRAVENOUS PRN
Status: DISCONTINUED | OUTPATIENT
Start: 2019-05-05 | End: 2019-05-05 | Stop reason: SDUPTHER

## 2019-05-05 RX ORDER — HYDROCODONE BITARTRATE AND ACETAMINOPHEN 5; 325 MG/1; MG/1
2 TABLET ORAL PRN
Status: DISCONTINUED | OUTPATIENT
Start: 2019-05-05 | End: 2019-05-05 | Stop reason: HOSPADM

## 2019-05-05 RX ORDER — PHENYTOIN SODIUM 50 MG/ML
INJECTION, SOLUTION INTRAMUSCULAR; INTRAVENOUS
Status: DISPENSED
Start: 2019-05-05 | End: 2019-05-05

## 2019-05-05 RX ADMIN — LEVETIRACETAM 750 MG: 250 TABLET, FILM COATED ORAL at 06:36

## 2019-05-05 RX ADMIN — Medication 10 ML: at 05:43

## 2019-05-05 RX ADMIN — VANCOMYCIN HYDROCHLORIDE 1000 MG: 1 INJECTION, POWDER, LYOPHILIZED, FOR SOLUTION INTRAVENOUS at 20:53

## 2019-05-05 RX ADMIN — HEPARIN SODIUM 5000 UNITS: 10000 INJECTION INTRAVENOUS; SUBCUTANEOUS at 20:54

## 2019-05-05 RX ADMIN — HYDRALAZINE HYDROCHLORIDE 10 MG: 20 INJECTION INTRAMUSCULAR; INTRAVENOUS at 16:55

## 2019-05-05 RX ADMIN — HEPARIN SODIUM 5000 UNITS: 10000 INJECTION INTRAVENOUS; SUBCUTANEOUS at 05:52

## 2019-05-05 RX ADMIN — Medication 10 ML: at 05:42

## 2019-05-05 RX ADMIN — PHENYTOIN SODIUM 100 MG: 50 INJECTION INTRAMUSCULAR; INTRAVENOUS at 05:52

## 2019-05-05 RX ADMIN — PRAVASTATIN SODIUM 80 MG: 20 TABLET ORAL at 20:52

## 2019-05-05 RX ADMIN — LEVETIRACETAM 750 MG: 250 TABLET, FILM COATED ORAL at 20:52

## 2019-05-05 RX ADMIN — FENTANYL CITRATE 50 MCG: 50 INJECTION, SOLUTION INTRAMUSCULAR; INTRAVENOUS at 11:35

## 2019-05-05 RX ADMIN — VANCOMYCIN HYDROCHLORIDE 1000 MG: 1 INJECTION, POWDER, LYOPHILIZED, FOR SOLUTION INTRAVENOUS at 16:44

## 2019-05-05 RX ADMIN — MIDAZOLAM HYDROCHLORIDE 2 MG: 1 INJECTION, SOLUTION INTRAMUSCULAR; INTRAVENOUS at 11:24

## 2019-05-05 RX ADMIN — INSULIN GLARGINE 5 UNITS: 100 INJECTION, SOLUTION SUBCUTANEOUS at 20:54

## 2019-05-05 RX ADMIN — DEXTROSE AND SODIUM CHLORIDE: 5; 900 INJECTION, SOLUTION INTRAVENOUS at 19:41

## 2019-05-05 RX ADMIN — Medication 10 ML: at 23:12

## 2019-05-05 RX ADMIN — PHENYTOIN SODIUM 100 MG: 50 INJECTION INTRAMUSCULAR; INTRAVENOUS at 23:10

## 2019-05-05 RX ADMIN — Medication 10 ML: at 05:15

## 2019-05-05 RX ADMIN — DEXTROSE MONOHYDRATE 100 MG: 50 INJECTION, SOLUTION INTRAVENOUS at 21:27

## 2019-05-05 RX ADMIN — FENTANYL CITRATE 50 MCG: 50 INJECTION, SOLUTION INTRAMUSCULAR; INTRAVENOUS at 11:24

## 2019-05-05 RX ADMIN — Medication 10 ML: at 00:02

## 2019-05-05 RX ADMIN — SODIUM CHLORIDE: 9 INJECTION, SOLUTION INTRAVENOUS at 11:16

## 2019-05-05 RX ADMIN — PROPOFOL 125 MCG/KG/MIN: 10 INJECTION, EMULSION INTRAVENOUS at 11:24

## 2019-05-05 RX ADMIN — PIPERACILLIN AND TAZOBACTAM 3.38 G: 3; .375 INJECTION, POWDER, FOR SOLUTION INTRAVENOUS at 16:45

## 2019-05-05 RX ADMIN — INSULIN LISPRO 1 UNITS: 100 INJECTION, SOLUTION INTRAVENOUS; SUBCUTANEOUS at 20:53

## 2019-05-05 ASSESSMENT — PAIN SCALES - GENERAL
PAINLEVEL_OUTOF10: 0

## 2019-05-05 ASSESSMENT — PULMONARY FUNCTION TESTS
PIF_VALUE: 0
PIF_VALUE: 1
PIF_VALUE: 0

## 2019-05-05 NOTE — PROGRESS NOTES
Pt. Alert to self only. Calm and quiet. Removed catheter with balloon inflated. Attempted to reinsert new talavera but unable to d/t trauma caused by patient. Physician notified.

## 2019-05-05 NOTE — ANESTHESIA POSTPROCEDURE EVALUATION
Department of Anesthesiology  Postprocedure Note    Patient: Clifford Millard  MRN: [id number]  YOB: 1950  Date of evaluation: 5/5/2019  Time:  11:28 AM     Procedure Summary     Date:  05/05/19 Room / Location:  [facility] OR  / [facility] OR    Anesthesia Start:  1116 Anesthesia Stop:      Procedure:  CYSTOSCOPY RETROGRADE PYELOGRAM STENT INSERTION (N/A ) Diagnosis:  (urinary retention)    Surgeon:  Suzy Montgomery MD Responsible Provider:  Criselda Mireles MD    Anesthesia Type:  MAC ASA Status:  3          Anesthesia Type: MAC    Mariano Phase I:      Mariano Phase II:      Last vitals: Reviewed and per EMR flowsheets.        Anesthesia Post Evaluation    Patient location during evaluation: PACU  Patient participation: complete - patient participated  Level of consciousness: awake  Pain score: 3  Airway patency: patent  Nausea & Vomiting: no nausea and no vomiting  Complications: no  Cardiovascular status: blood pressure returned to baseline  Respiratory status: acceptable  Hydration status: euvolemic

## 2019-05-05 NOTE — PROGRESS NOTES
On hourly rounds, patient was laying quietly in bed awake. I turned on the light to check on him and noticed a small amount of blood on his sheet. I then noticed that the patient had pulled out his triple lumen. As I was cleaning the patient and dressing the site, I began to lift the blankets that had been soiled in blood and discovered that the patient had also pulled out his talavera catheter. After cleaning patient, I attempted to re-insert another cateter, but could not advance and blood was returned in the catheter. I stopped and notified physician.

## 2019-05-05 NOTE — PROGRESS NOTES
Called and left a message to Dr. Lissa Perry. She is covering for Dr. Quyen Conn. Let her know that patient pulled out Triple lumen and talavera and will most likely need a urology consult. Also inquiring how they would like to proceed about iv access for patient. After several attempts and several nurses, we were only able to get one iv in patient. Patient now has a sitter for his safety.

## 2019-05-05 NOTE — ANESTHESIA PRE PROCEDURE
Department of Anesthesiology  Preprocedure Note       Name:  Tk Kilgore   Age:  76 y.o.  :  1950                                          MRN:  [id number]         Date:  2019      Surgeon: Xavier Salguero):  Sid Gallego MD    Procedure: CYSTOSCOPY RETROGRADE PYELOGRAM STENT INSERTION (N/A )    Medications prior to admission:   Prior to Admission medications    Medication Sig Start Date End Date Taking? Authorizing Provider   pantoprazole (PROTONIX) 40 MG tablet Take 1 tablet by mouth every morning (before breakfast) 3/30/19   Sheila Read MD   pravastatin (PRAVACHOL) 80 MG tablet Take 1 tablet by mouth nightly 1/10/19   Familia Lawson MD   lisinopril (PRINIVIL;ZESTRIL) 20 MG tablet Take 1 tablet by mouth daily 19   Familia Lawson MD   metoprolol succinate (TOPROL XL) 50 MG extended release tablet Take 1 tablet by mouth daily 19   Familia Lawson MD   tamsulosin (FLOMAX) 0.4 MG capsule Take 1 capsule by mouth daily 19   Familia Lawson MD       Current medications:    No current facility-administered medications for this visit. No current outpatient medications on file.      Facility-Administered Medications Ordered in Other Visits   Medication Dose Route Frequency Provider Last Rate Last Dose    phenytoin (DILANTIN) 50 MG/ML injection             hydrALAZINE (APRESOLINE) injection 10 mg  10 mg Intravenous Q6H PRN Leland Gonzales MD        lisinopril (PRINIVIL;ZESTRIL) tablet 10 mg  10 mg Oral Daily Leland Gonzales MD   10 mg at 19 1028    levetiracetam (KEPPRA) 750 mg/50 mL IVPB  750 mg Intravenous BID Leland Gonzales MD        Or    levETIRAcetam (KEPPRA) tablet 750 mg  750 mg Oral BID Leland Gonzales MD   750 mg at 19 0636    insulin glargine (LANTUS) injection vial 5 Units  5 Units Subcutaneous Nightly Leland Gonzales MD   5 Units at 19 2157    anidulafungin (ERAXIS) 100 mg in dextrose 5 % 130 mL IVPB  100 mg Intravenous Q24H Leland Gonzales MD   100 mg at 19 1348    vancomycin 1000 mg IVPB in 250 mL D5W addavial  1,000 mg Intravenous Q12H Alida Tee MD   Stopped at 05/05/19 0000    piperacillin-tazobactam (ZOSYN) 3.375 g in dextrose 5 % 100 mL IVPB extended infusion (mini-bag)  3.375 g Intravenous Q8H Alida Tee MD   Stopped at 05/05/19 0604    pantoprazole (PROTONIX) injection 40 mg  40 mg Intravenous Daily Alida Tee MD   40 mg at 05/04/19 0848    dextrose 5 % and 0.9 % sodium chloride infusion   Intravenous Continuous Alida Tee MD 60 mL/hr at 05/04/19 2210      phenytoin (DILANTIN) 100 mg in sodium chloride 0.9 % 100 mL IVPB (maintenance dose)  100 mg Intravenous Q8H Alida Tee MD   Stopped at 05/05/19 0709    insulin lispro (HUMALOG) injection vial 0-6 Units  0-6 Units Subcutaneous Q4H Alida Tee MD   2 Units at 05/04/19 2156    glucose (GLUTOSE) 40 % oral gel 15 g  15 g Oral PRN Alida Tee MD        glucagon (rDNA) injection 1 mg  1 mg Intramuscular PRN Alida Tee MD        dextrose 5 % solution  100 mL/hr Intravenous PRN Alida Tee MD        dextrose 50 % solution 12.5 g  12.5 g Intravenous PRN Alida Tee MD   12.5 g at 05/02/19 0650    potassium chloride 10 mEq/100 mL IVPB (Peripheral Line)  10 mEq Intravenous PRN Alida Tee  mL/hr at 05/02/19 0944 10 mEq at 05/02/19 0944    pravastatin (PRAVACHOL) tablet 80 mg  80 mg Oral Nightly Alida Tee MD   80 mg at 05/04/19 2159    tamsulosin (FLOMAX) capsule 0.4 mg  0.4 mg Oral Daily Alida Tee MD   Stopped at 05/03/19 0921    sodium chloride flush 0.9 % injection 10 mL  10 mL Intravenous 2 times per day Alida Tee MD   10 mL at 05/04/19 2158    sodium chloride flush 0.9 % injection 10 mL  10 mL Intravenous PRN Alida Tee MD   10 mL at 05/05/19 0543    magnesium hydroxide (MILK OF MAGNESIA) 400 MG/5ML suspension 30 mL  30 mL Oral Daily PRN Alida Tee MD        ondansetron GFRAA >60 05/04/2019    LABGLOM >60 05/04/2019    GLUCOSE 97 05/04/2019    PROT 6.1 05/04/2019    CALCIUM 8.2 05/04/2019    BILITOT 0.5 05/04/2019    ALKPHOS 77 05/04/2019    AST 9 05/04/2019    ALT 8 05/04/2019       POC Tests: No results for input(s): POCGLU, POCNA, POCK, POCCL, POCBUN, POCHEMO, POCHCT in the last 72 hours. Coags:   Lab Results   Component Value Date    PROTIME 11.6 05/04/2019    INR 1.0 05/04/2019    APTT 25.4 05/01/2019       HCG (If Applicable): No results found for: PREGTESTUR, PREGSERUM, HCG, HCGQUANT     ABGs:   Lab Results   Component Value Date    PO2ART 132.1 01/16/2019    OBA7XKJ 33.0 01/16/2019    TMR8LKN 19.7 01/16/2019        Type & Screen (If Applicable):  No results found for: LABABO, LABRH       BT POOR HISTORIAN, HISTORY TAKEN FROM NOTES    EKG 3/22/19  Sinus tachycardia  Left axis deviation  Right bundle branch block  Abnormal ECG  When compared with ECG of 21-MAR-2019 19:02,  Significant changes have occurred    Transthoracic Echo 1/10/2019   Left Ventricle   Mildly dilated left ventricle.   Mild concentric left ventricular hypertrophy.   There is lateral wall and basal inferior wall hypokinesis   Ejection fraction is visually estimated at 50-55%.   There is doppler evidence of stage I diastolic dysfunction.      Right Ventricle   Normal right ventricular size and function.      Left Atrium   Normal sized left atrium.   Interatrial septum appears intact.      Right Atrium   Normal right atrium size.      Mitral Valve   Focal calcification mitral valve leaflets.   Mild mitral annular calcification.   Mild mitral regurgitation is present.      Tricuspid Valve   Normal tricuspid valve structure and function.   Physiologic and/or trace tricuspid regurgitation.    RVSP could not be estimated      Aortic Valve   The aortic valve is trileaflet.   Aortic valve opens well.      Pulmonic Valve   Normal pulmonic valve structure and function.   Physiologic and/or trace pulmonic regurgitation present.      Pericardial Effusion   No evidence of pericardial effusion.      Aorta   Aortic root dimension within normal limits.      Conclusions      Summary   Mildly dilated left ventricle.   Mild concentric left ventricular hypertrophy.   There is lateral wall and basal inferior wall hypokinesis   Ejection fraction is visually estimated at 50-55%.   There is doppler evidence of stage I diastolic dysfunction.   Normal right ventricular size and function.   Focal calcification mitral valve leaflets. Mild mitral annular   calcification. Mild mitral regurgitation is present. MRI ABDOMEN 3/23/19  Biliary dilatation with abnormal signal at the level the   distal duct which could be compatible with a calculus a stent or an   occluded stent    Pancreatic duct dilatation   New cystic lesion in the right lobe of the liver measuring 4 cm in   size possibly a cyst.              Anesthesia Evaluation  Patient summary reviewed and Nursing notes reviewed no history of anesthetic complications:   Airway: Mallampati: II  TM distance: >3 FB   Neck ROM: full  Mouth opening: > = 3 FB Dental:      Comment: Poor dentition. Pt denies loose remaining teeth     Pulmonary: breath sounds clear to auscultation                            ROS comment: Former smoker    Cardiovascular:  Exercise tolerance: poor (<4 METS),   (+) hypertension:, past MI:, hyperlipidemia      ECG reviewed  Rhythm: regular  Rate: normal  Echocardiogram reviewed         Beta Blocker:  Order written         Neuro/Psych:   (+) CVA (Pt experiences some difficulty walking and weakness in the left leg following CVA January 2013 ): residual symptoms,             GI/Hepatic/Renal:   (+) PUD,          ROS comment: Pancreatic adenocarcinoma- S/P Whipple procedure at the [address] [place] [address]  Hx of obstructive jaundice due to malignant neoplasm  . Endo/Other:    (+) blood dyscrasia: anemia:., malignancy/cancer ( hx pancreatic).           Pt had no PAT visit        ROS comment: Hx ETOH abuse Abdominal:           Vascular:   + PVD, aortic or cerebral, . Anesthesia Plan      MAC     ASA 3       Induction: intravenous. Anesthetic plan and risks discussed with patient. Plan discussed with attending and CRNA. Dori Bhatia RN , [facility]  5/5/2019          Pt seen, examined, chart reviewed, plan discussed.   Ky Tierney  5/5/2019  11:28 AM

## 2019-05-05 NOTE — PROGRESS NOTES
Physical Therapy    Facility/Department: [facility]      NAME: Humza Duncan  : 1950  MRN: [id number]    Date of Service: 2019     Attempted to see pt for PT evaluation, transport present to take pt off unit.   Daphney STOCK [id number]

## 2019-05-05 NOTE — CONSULTS
[facility] INPATIENT CONSULTATION RECORD FOR  5/5/2019      [facility]  [address] [facility]. John Aguirre, [address] [facility]  [phone]        REASON FOR CONSULTATION:  Gross hematuria with self catheter removal and inability to place catheter      HISTORY OF PRESENT ILLNESS:      The patient is a 76 y.o. male patient who presents with confusion. He's been found to have fungemia and is being treated by infectious disease. Patient is also being evaluated for pancreatic mass. Early this morning the patient removed his cath with the balloon inflated. He was noted have significant hematuria. Attempt were made by the nursing staff this morning to place a catheter but were unsuccessful. Urology was then consult. Patient reports significant penile pain and hematuria. He states she's had no previous urologic problems.       Past Medical History:   Diagnosis Date    Cancer [facility])     pancreatic    Cerebral artery occlusion with cerebral infarction ([facility] [address]     Hypertension     Syncope          Past Surgical History:   Procedure Laterality Date    ERCP N/A 3/25/2019    ERCP STENT INSERTION performed by Jenifer Wang MD at [facility]  3/27/2019         UPPER GASTROINTESTINAL ENDOSCOPY N/A 3/8/2019    REDO ENDOSCOPIC EGD  ULTRASOUND performed by Ramon Lan DO at [address] [facility] 3/8/2019    EGD DIAGNOSTIC ONLY performed by Ramon aLn DO at [address] [place] [address] N/A 1/16/2019    EXPLORATORY LAPAROTOMY; ABORTED WHIPPLE PROCEDURE; GASTROJEJUNOSTOMY; OPEN CHOLECYSTECTOMY performed by Adriane Sebastian MD at [address] [place] [address]       Medications Prior to Admission:    Medications Prior to Admission: pantoprazole (PROTONIX) 40 MG tablet, Take 1 tablet by mouth every morning (before breakfast)  pravastatin (PRAVACHOL) 80 MG tablet, Take 1 tablet by mouth nightly  lisinopril (PRINIVIL;ZESTRIL) 20 MG tablet, Take 1 tablet by mouth daily  metoprolol succinate (TOPROL XL) 50 MG extended release tablet, Take 1 tablet by mouth daily  tamsulosin (FLOMAX) 0.4 MG capsule, Take 1 capsule by mouth daily    Allergies:    Patient has no known allergies. Social History:    reports that he has never smoked. He has never used smokeless tobacco. He reports that he drinks about 4.8 oz of alcohol per week. He reports that he does not use drugs. Family History:   Non-contributory to this Urological problem  family history includes Cancer (age of onset: 50) in his mother. REVIEW OF SYSTEMS:  Respiratory: negative for cough and hemoptysis  Cardiovascular: negative for chest pain and dyspnea  Gastrointestinal: negative for abdominal pain, diarrhea, nausea and vomiting  Derm: negative for rash and skin lesion(s)  Neurological: negative for seizures and tremors  Endocrine: negative for diabetic symptoms including polydipsia and polyuria  : As above in the HPI, otherwise negative  All other systems negative    PHYSICAL EXAM:    Vitals:  BP (!) 166/96   Pulse 80   Temp 98 °F (36.7 °C) (Temporal)   Resp 16   Ht 5' 10\" (1.778 m)   Wt 138 lb 8 oz (62.8 kg)   SpO2 96%   BMI 19.87 kg/m²     General:  Awake, alert, oriented very pleasant, mild distress. HEENT:  Normocephalic, atraumatic. Pupils equal, round. No scleral icterus. No conjunctival injection. Normal lips, teeth, and gums. No nasal discharge. Neck:  Supple, no masses. Heart:  RRR  Lungs:  No audible wheezing. Respirations symmetric and non-labored. Abdomen:  soft, nontender, no masses, no organomegaly, no peritoneal signs  Extremities:  No clubbing, cyanosis, or edema  Skin:  Warm and dry, no open lesions or rashes  Neuro:  Cranial nerves 2-12 intact, no focal deficits  Rectal: deferred  Genitalia:  Blood per meatus, uncircumcised phallus.  Very tender penile exam    LABS:    Lab Results   Component Value Date    WBC 4.3 (L) 05/04/2019    HGB 10.7 (L) 05/04/2019    HCT 34.5 (L) 05/04/2019    MCV 89.1 05/04/2019     05/04/2019       Lab Results   Component Value Date    CREATININE 0.6 (L) 05/04/2019       After sterile prep and drape I attempted to pass a 20-[place] coudé catheter but resistance was met. I then placed the flexible cystoscope per urethra identified a large urethral disruption. Anteriorly is able to identify the true urethra opening and was able to navigate the cystoscope passed this and in the bladder. Straight sensor Glidewire was passed in the bladder. Attempt at placement of a [address] [place] were met with resistance to the patient having significant pain. This was then aborted. ASSESSMENT / PLAN:    Gross hematuria secondarily to a traumatic catheter removal. Patient is having significant bleeding from large urethral disruption. Attempts at bedside cystoscopy have been unsuccessful due to patient's pain, bleeding and urethral trauma. Discussed with the patient and his wife the need to proceed to the OR for attempted cystoscopy with catheter insertion given his retention and bleeding. Risks and benefits are discussed and they wish to proceed.           Aime Sorensen  10:24 AM  5/5/2019

## 2019-05-05 NOTE — PROGRESS NOTES
CHEST PORTABLE   Final Result   Right internal jugular central venous catheter tip in SVC. No pneumothorax on the right on the left. Patient has a stent in the   projections for the biliary tract/common bile duct and there are signs   of pneumobilia in the liver. IMPRESSION: No acute cardiopulmonary process. MRI Brain W WO Contrast   Final Result   1. Very small localized peripheral cortical acute ischemic insult in   the posterior right parietal region. 2. Extensive the changes relate with small vessel disease throughout   the white matter of both cerebral hemispheres, basal ganglia region   and brainstem. 3. Additional old insults with encephalomalacia are also seen in both   cerebellar hemispheres. 4. Overall loss of brain parenchyma more than expected for the age   group in supra and infratentorial regions. Findings to be correlated   clinically. ALERT:  ABNORMAL REPORT. XR CHEST PORTABLE   Final Result   No acute cardiopulmonary process. CT Head WO Contrast   Final Result   1. Chronic ischemic changes in the brain parenchyma predominantly   supratentorial areas with overall loss of volume of the brain   parenchyma for the age group, as above commented. 2. No indication for an acute intracranial process. CT NEEDLE BIOPSY LIVER PERCUTANEOUS    (Results Pending)         URINARY CATHETER OUTPUT (Freed):  [REMOVED] Urethral Catheter-Output (mL): 650 mL    ASSESSMENT AND PLAN:  1. 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 2.  Encephalopathy from sepsis - resolving   3. History of autoimmune pancreatitis with biliary obstruction.   4.  History of recently treated Enterococcus avium, complicated  bacteremia with six weeks of ampicillin plus ceftriaxone ending on  04/30/2019, also had polymicrobial bacteremia including Klebsiella  pneumoniae, E. coli as well as Clostridium perfringens.     PLAN:      1.  continue  Eraxis. Continue vancomycin and Zosyn pending liver biopsy, check van trough today   2. PICC line discontinued, tip culture negative so far  3. ZOË today   4.    PICC line catheter related bloodstream infection usually the blood cultures are persistently positive in his case with intermittent blood cultures I would rather consider intra-abdominal source and not ruled out this possibility, CT-guided biopsy has been ordered  -Added surgical cultures/pathology to the tissue sample  - Hepatobiliary surgery has been consulted, recommendations noted as well as the results of CT triphasic- malignancy versus abscess    Hold for a new PICC      Electronically signed by Irma Hammond MD on 5/5/2019 at 11:27 AM

## 2019-05-05 NOTE — PROGRESS NOTES
Anthony Ingram MD, [address]                   Patient Name: Olga Spencer                   Age:  76 y.o. Gender:   male     Hospital coverage - hospital course reviewed:    HPI: reviewed hospital course   Found unresponsive in Nursing Facility  Seizure like movements to the RUE  EEG positive    Treated for:  · Encephalopathy   · Hyperosmolar  hyperglycemia  · Focal  Seizures  · Hx of autoimmune pancreatitis, biliary obstruction  · Hx of CAD, HFrEF @ 50%  · CATHY    He is awake somnolent and quite confused        Past Medical History:   Diagnosis Date    Cancer ([facility]     pancreatic    Cerebral artery occlusion with cerebral infarction ([facility]     Hypertension     Syncope        Past Surgical History:   Procedure Laterality Date    ERCP N/A 3/25/2019    ERCP STENT INSERTION performed by Jia Bowers MD at [facility]  3/27/2019         UPPER GASTROINTESTINAL ENDOSCOPY N/A 3/8/2019    REDO ENDOSCOPIC EGD  ULTRASOUND performed by Arnold Lay DO at [address] [place] [address] 3/8/2019    EGD DIAGNOSTIC ONLY performed by Arnold Lay DO at [address] [place] [address] N/A 2019    EXPLORATORY LAPAROTOMY; ABORTED WHIPPLE PROCEDURE; GASTROJEJUNOSTOMY; OPEN CHOLECYSTECTOMY performed by Chuck Le MD at [address] [place] [address] Status   Relation Name Status    Mother          Prior to Admission medications    Medication Sig Start Date End Date Taking?  Authorizing Provider   pantoprazole (PROTONIX) 40 MG tablet Take 1 tablet by mouth every morning (before breakfast) 3/30/19   Charlotte Jessica MD   pravastatin (PRAVACHOL) 80 MG tablet Take 1 tablet by mouth nightly 1/10/19   Brando Palmer MD   lisinopril (PRINIVIL;ZESTRIL) 20 MG tablet Take 1 tablet by mouth daily 1/11/19   Mague Shetty MD   metoprolol succinate (TOPROL XL) 50 MG extended release tablet Take 1 tablet by mouth daily 1/11/19   Mague Shetty MD   tamsulosin (FLOMAX) 0.4 MG capsule Take 1 capsule by mouth daily 1/11/19   Mague Shetty MD              Physical Examination:      Wt Readings from Last 3 Encounters:   05/05/19 138 lb 8 oz (62.8 kg)   03/27/19 158 lb 6 oz (71.8 kg)   03/22/19 124 lb 3.2 oz (56.3 kg)     Temp Readings from Last 3 Encounters:   05/04/19 98 °F (36.7 °C) (Temporal)   04/12/19 98.1 °F (36.7 °C)   03/29/19 97.8 °F (36.6 °C) (Oral)     BP Readings from Last 3 Encounters:   05/04/19 (!) 150/99   04/12/19 122/74   03/29/19 (!) 144/77     Pulse Readings from Last 3 Encounters:   05/04/19 72   04/12/19 55   03/29/19 65       General appearance: awake, sedated-poorly responsive  Skin: Color, texture, turgor normal. No rashes or lesions. Head: Normocephalic. No masses, lesions, tenderness or abnormalities   Face: Symmetric no visible lesions  Eyes: Conjunctivae/cornea clear. Sclera non icteric. Mouth: Lips and tongue appear normal.   Neck:  Symmetric. No adenopathy. Lungs: Clear to auscultation. No rhonchi, crackles or rales. Heart: S1 > S2. Regular rate and rhythm. No gallop rub or murmur. Abdomen: Soft, mildly protuberant, non-tender. BS normal. No masses, organomegaly. Anatomic contours appear normal.   Extremities: No deformities, edema, or skin discoloration. Peripheral perfusion assessed in all exremities. No cyanosis  Musculoskeletal: No unusual pain or swelling. Muscular strength intact. Neuro:   · Cranial nerves grossly intact.    · Cannot assess further    Gait & balance: not assessed:     Labs     CBC:   Lab Results   Component Value Date    WBC 4.3 05/04/2019    RBC 3.87 05/04/2019    HGB 10.7 05/04/2019    HCT 34.5 05/04/2019     05/04/2019    MCV 89.1 05/04/2019     BMP:    Lab Results   Component Value Date     05/04/2019    K 3.5 05/04/2019    K 3.9 01/22/2019     05/04/2019    CO2 26 05/04/2019    BUN 4 05/04/2019    CREATININE 0.6 05/04/2019    GLUCOSE 97 05/04/2019    CALCIUM 8.2 05/04/2019     Hepatic Function Panel:    Lab Results   Component Value Date    ALKPHOS 77 05/04/2019    AST 9 05/04/2019    ALT 8 05/04/2019    PROT 6.1 05/04/2019    LABALBU 2.8 05/04/2019    BILITOT 0.5 05/04/2019     Magnesium:    Lab Results   Component Value Date    MG 1.6 05/04/2019     Cardiac Enzymes:   Lab Results   Component Value Date    CKTOTAL 485 (H) 09/12/2015    CKTOTAL 556 (H) 09/12/2015    CKTOTAL 601 (H) 09/12/2015    CKMB 4.4 09/12/2015    CKMB 6.0 09/12/2015    CKMB 6.8 09/12/2015    TROPONINI <0.01 05/02/2019    TROPONINI <0.01 05/01/2019    TROPONINI 0.02 03/22/2019     LDH:  No results found for: LDH  PT/INR:    Lab Results   Component Value Date    PROTIME 11.6 05/04/2019    INR 1.0 05/04/2019     BNP: No results for input(s): BNP in the last 72 hours.    TSH:   Lab Results   Component Value Date    TSH 3.970 05/02/2019      Cardiac Injury Profile:   Recent Labs     05/02/19  2140   [place] Punt <0.01      Lipid Profile:   Lab Results   Component Value Date    HDL 83 05/03/2019    LDLCALC 91 05/03/2019      Hemoglobin A1C: No components found for: HGBA1C   U/A:   Lab Results   Component Value Date    LEUKOCYTESUR LARGE 05/02/2019    PHUR 6.0 05/02/2019    WBCUA 10-20 05/02/2019    RBCUA 10-20 05/02/2019    RBCUA 0-1 01/25/2013    BACTERIA NONE 05/02/2019    SPECGRAV 1.015 05/02/2019    BLOODU LARGE 05/02/2019    GLUCOSEU Negative 05/02/2019         ADMISSION SCHEDULED MEDS:   Current Facility-Administered Medications   Medication Dose Route Frequency Provider Last Rate Last Dose    phenytoin (DILANTIN) 50 MG/ML injection             hydrALAZINE (APRESOLINE) injection 10 mg  10 mg Intravenous Q6H PRN Lilly Lennox, MD        lisinopril (PRINIVIL;ZESTRIL) tablet 10 mg  10 mg Oral Daily Nelsy Lennox, MD   10 mg at 05/04/19 1028    levetiracetam (KEPPRA) 750 mg/50 mL IVPB  750 mg Intravenous BID Nellie Lucero MD        Or    levETIRAcetam (KEPPRA) tablet 750 mg  750 mg Oral BID Nellie Lucero MD   750 mg at 05/05/19 0636    phenytoin (DILANTIN) 50 MG/ML injection             insulin glargine (LANTUS) injection vial 5 Units  5 Units Subcutaneous Nightly Nellie Lucero MD   5 Units at 05/04/19 2157    anidulafungin (ERAXIS) 100 mg in dextrose 5 % 130 mL IVPB  100 mg Intravenous Q24H Nellie Lucero MD   100 mg at 05/04/19 1348    vancomycin 1000 mg IVPB in 250 mL D5W addavial  1,000 mg Intravenous Q12H Nellie Lucero MD   Stopped at 05/05/19 0000    piperacillin-tazobactam (ZOSYN) 3.375 g in dextrose 5 % 100 mL IVPB extended infusion (mini-bag)  3.375 g Intravenous Q8H Nellie Lucero MD   Stopped at 05/05/19 0604    pantoprazole (PROTONIX) injection 40 mg  40 mg Intravenous Daily Nellie Lucero MD   40 mg at 05/04/19 0848    dextrose 5 % and 0.9 % sodium chloride infusion   Intravenous Continuous Nellie Lucero MD 60 mL/hr at 05/04/19 2210      phenytoin (DILANTIN) 100 mg in sodium chloride 0.9 % 100 mL IVPB (maintenance dose)  100 mg Intravenous Q8H Nellie Lucero MD   Stopped at 05/05/19 0709    insulin lispro (HUMALOG) injection vial 0-6 Units  0-6 Units Subcutaneous Q4H Nellie Lucero MD   2 Units at 05/04/19 2156    glucose (GLUTOSE) 40 % oral gel 15 g  15 g Oral PRN Nellie Lucero MD        glucagon (rDNA) injection 1 mg  1 mg Intramuscular PRN Nellie Lucero MD        dextrose 5 % solution  100 mL/hr Intravenous PRN Nellie Lucero MD        dextrose 50 % solution 12.5 g  12.5 g Intravenous PRN Nellie Lucero MD   12.5 g at 05/02/19 0650    potassium chloride 10 mEq/100 mL IVPB (Peripheral Line)  10 mEq Intravenous PRN Nellie Lucero  mL/hr at 05/02/19 0944 10 mEq at 05/02/19 0944    pravastatin (PRAVACHOL) tablet 80 mg  80 mg Oral Nightly Nellie Lucero MD   80 mg at 05/04/19 6004    tamsulosin (FLOMAX) capsule 0.4 mg  0.4 mg Oral Daily Jose A Hill MD   Stopped at 05/03/19 0921    sodium chloride flush 0.9 % injection 10 mL  10 mL Intravenous 2 times per day Jose A Hill MD   10 mL at 05/04/19 2158    sodium chloride flush 0.9 % injection 10 mL  10 mL Intravenous PRN Jose A Hill MD   10 mL at 05/05/19 0543    magnesium hydroxide (MILK OF MAGNESIA) 400 MG/5ML suspension 30 mL  30 mL Oral Daily PRN Jose A Hill MD        ondansetron (ZOFRAN) injection 4 mg  4 mg Intravenous Q6H PRN Jose A Hill MD        heparin (porcine) injection 5,000 Units  5,000 Units Subcutaneous 3 times per day Jose A Hill MD   5,000 Units at 05/05/19 0552    dextrose 50 % solution 12.5 g  12.5 g Intravenous PRN Jose A Hill MD   12.5 g at 05/03/19 1304    potassium chloride 10 mEq/100 mL IVPB (Peripheral Line)  10 mEq Intravenous PRN Jose A Hill  mL/hr at 05/02/19 0830 10 mEq at 05/02/19 0830    magnesium sulfate 1 g in dextrose 5% 100 mL IVPB  1 g Intravenous PRN Jose A Hill MD        dextrose 5 % and 0.45 % NaCl with KCl 20 mEq infusion   Intravenous Continuous PRN Jose A Hill  mL/hr at 05/02/19 0120         Current  Infusions   dextrose 5 % and 0.9 % NaCl 60 mL/hr at 05/04/19 2210    dextrose      dextrose 5% and 0.45% NaCl with KCl 20 mEq 150 mL/hr at 05/02/19 0120       Prn Meds  hydrALAZINE, glucose, glucagon (rDNA), dextrose, dextrose, potassium chloride, sodium chloride flush, magnesium hydroxide, ondansetron, dextrose, potassium chloride, magnesium sulfate, dextrose 5% and 0.45% NaCl with KCl 20 mEq    Radiology Review:  CTA TRIPHASIC LIVER   Final Result      XR CHEST PORTABLE   Final Result   Right internal jugular central venous catheter tip in SVC. No pneumothorax on the right on the left. Patient has a stent in the   projections for the biliary tract/common bile duct and there are signs   of pneumobilia in the liver. IMPRESSION: No acute cardiopulmonary process. MRI Brain W WO Contrast   Final Result   1. Very small localized peripheral cortical acute ischemic insult in   the posterior right parietal region. 2. Extensive the changes relate with small vessel disease throughout   the white matter of both cerebral hemispheres, basal ganglia region   and brainstem. 3. Additional old insults with encephalomalacia are also seen in both   cerebellar hemispheres. 4. Overall loss of brain parenchyma more than expected for the age   group in supra and infratentorial regions. Findings to be correlated   clinically. ALERT:  ABNORMAL REPORT. XR CHEST PORTABLE   Final Result   No acute cardiopulmonary process. CT Head WO Contrast   Final Result   1. Chronic ischemic changes in the brain parenchyma predominantly   supratentorial areas with overall loss of volume of the brain   parenchyma for the age group, as above commented. 2. No indication for an acute intracranial process.       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[address]    Bacteremia    Biliary obstruction    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[address]    Thrombocytopathia ([address]    Bacteremia due to Gram-negative bacteria    Endocarditis, suspected    Seizures ([address]    Seizure-like activity ([address]    Altered mental status    Acute ischemic stroke ([address]    Hyperosmolar hyperglycemic coma due to diabetes mellitus without ketoacidosis (HCC)    Moderate protein-calorie malnutrition (HCC)       PLAN:  Reviewed medication  Reviewed labs  Patient has been transitioned to stepdown unit  He is on Eraxis by infectious disease for candidemia  Transesophageal echocardiogram is pending  He requires a PICC line and the documents have been signed      See  Ericka Green MD, Maulik Narayan, American Board of Internal Medicine  Diplomate, [address] [place] [address] Rd., [address] of Geriatric Medicine  8:47 AM  5/5/2019

## 2019-05-06 ENCOUNTER — ANESTHESIA EVENT (OUTPATIENT)
Dept: CARDIAC CATH/INVASIVE PROCEDURES | Age: 69
DRG: 871 | End: 2019-05-06
Payer: MEDICARE

## 2019-05-06 ENCOUNTER — APPOINTMENT (OUTPATIENT)
Dept: CT IMAGING | Age: 69
DRG: 871 | End: 2019-05-06
Payer: MEDICARE

## 2019-05-06 ENCOUNTER — APPOINTMENT (OUTPATIENT)
Dept: CARDIAC CATH/INVASIVE PROCEDURES | Age: 69
DRG: 871 | End: 2019-05-06
Payer: MEDICARE

## 2019-05-06 ENCOUNTER — ANESTHESIA (OUTPATIENT)
Dept: CARDIAC CATH/INVASIVE PROCEDURES | Age: 69
DRG: 871 | End: 2019-05-06
Payer: MEDICARE

## 2019-05-06 VITALS
SYSTOLIC BLOOD PRESSURE: 136 MMHG | DIASTOLIC BLOOD PRESSURE: 67 MMHG | OXYGEN SATURATION: 98 % | RESPIRATION RATE: 18 BRPM

## 2019-05-06 LAB
ALBUMIN SERPL-MCNC: 2.7 G/DL (ref 3.5–5.2)
ALP BLD-CCNC: 84 U/L (ref 40–129)
ALT SERPL-CCNC: 8 U/L (ref 0–40)
ANION GAP SERPL CALCULATED.3IONS-SCNC: 8 MMOL/L (ref 7–16)
APTT: 41.3 SEC (ref 24.5–35.1)
AST SERPL-CCNC: 11 U/L (ref 0–39)
BASOPHILS ABSOLUTE: 0.03 E9/L (ref 0–0.2)
BASOPHILS RELATIVE PERCENT: 0.6 % (ref 0–2)
BILIRUB SERPL-MCNC: 1 MG/DL (ref 0–1.2)
BILIRUBIN DIRECT: 0.3 MG/DL (ref 0–0.3)
BILIRUBIN, INDIRECT: 0.7 MG/DL (ref 0–1)
BUN BLDV-MCNC: 7 MG/DL (ref 8–23)
CALCIUM SERPL-MCNC: 8.4 MG/DL (ref 8.6–10.2)
CHLORIDE BLD-SCNC: 104 MMOL/L (ref 98–107)
CO2: 24 MMOL/L (ref 22–29)
CREAT SERPL-MCNC: 1 MG/DL (ref 0.7–1.2)
CULTURE, BLOOD 2: NORMAL
EOSINOPHILS ABSOLUTE: 0.13 E9/L (ref 0.05–0.5)
EOSINOPHILS RELATIVE PERCENT: 2.4 % (ref 0–6)
GFR AFRICAN AMERICAN: >60
GFR NON-AFRICAN AMERICAN: >60 ML/MIN/1.73
GLUCOSE BLD-MCNC: 76 MG/DL (ref 74–99)
HCT VFR BLD CALC: 32.7 % (ref 37–54)
HEMOGLOBIN: 10.1 G/DL (ref 12.5–16.5)
IMMATURE GRANULOCYTES #: 0.02 E9/L
IMMATURE GRANULOCYTES %: 0.4 % (ref 0–5)
INR BLD: 1.1
LV EF: 53 %
LVEF MODALITY: NORMAL
LYMPHOCYTES ABSOLUTE: 1.27 E9/L (ref 1.5–4)
LYMPHOCYTES RELATIVE PERCENT: 23.6 % (ref 20–42)
MCH RBC QN AUTO: 28 PG (ref 26–35)
MCHC RBC AUTO-ENTMCNC: 30.9 % (ref 32–34.5)
MCV RBC AUTO: 90.6 FL (ref 80–99.9)
METER GLUCOSE: 119 MG/DL (ref 74–99)
METER GLUCOSE: 279 MG/DL (ref 74–99)
METER GLUCOSE: 85 MG/DL (ref 74–99)
METER GLUCOSE: 99 MG/DL (ref 74–99)
MONOCYTES ABSOLUTE: 0.4 E9/L (ref 0.1–0.95)
MONOCYTES RELATIVE PERCENT: 7.4 % (ref 2–12)
NEUTROPHILS ABSOLUTE: 3.52 E9/L (ref 1.8–7.3)
NEUTROPHILS RELATIVE PERCENT: 65.6 % (ref 43–80)
PDW BLD-RTO: 16.2 FL (ref 11.5–15)
PLATELET # BLD: 227 E9/L (ref 130–450)
PMV BLD AUTO: 10.2 FL (ref 7–12)
POTASSIUM SERPL-SCNC: 3.7 MMOL/L (ref 3.5–5)
PROTHROMBIN TIME: 12.3 SEC (ref 9.3–12.4)
RBC # BLD: 3.61 E12/L (ref 3.8–5.8)
SODIUM BLD-SCNC: 136 MMOL/L (ref 132–146)
TOTAL PROTEIN: 6.3 G/DL (ref 6.4–8.3)
VANCOMYCIN TROUGH: 15.1 MCG/ML (ref 5–16)
WBC # BLD: 5.4 E9/L (ref 4.5–11.5)

## 2019-05-06 PROCEDURE — 87205 SMEAR GRAM STAIN: CPT

## 2019-05-06 PROCEDURE — 2700000000 HC OXYGEN THERAPY PER DAY

## 2019-05-06 PROCEDURE — 2580000003 HC RX 258: Performed by: UROLOGY

## 2019-05-06 PROCEDURE — 6360000002 HC RX W HCPCS: Performed by: UROLOGY

## 2019-05-06 PROCEDURE — 2709999900 CT ABSCESS DRAINAGE W CATH PLACEMENT S&I

## 2019-05-06 PROCEDURE — 97530 THERAPEUTIC ACTIVITIES: CPT

## 2019-05-06 PROCEDURE — 87075 CULTR BACTERIA EXCEPT BLOOD: CPT

## 2019-05-06 PROCEDURE — 85025 COMPLETE CBC W/AUTO DIFF WBC: CPT

## 2019-05-06 PROCEDURE — 2140000000 HC CCU INTERMEDIATE R&B

## 2019-05-06 PROCEDURE — 97162 PT EVAL MOD COMPLEX 30 MIN: CPT

## 2019-05-06 PROCEDURE — 82248 BILIRUBIN DIRECT: CPT

## 2019-05-06 PROCEDURE — 3700000001 HC ADD 15 MINUTES (ANESTHESIA)

## 2019-05-06 PROCEDURE — 82962 GLUCOSE BLOOD TEST: CPT

## 2019-05-06 PROCEDURE — 2500000003 HC RX 250 WO HCPCS: Performed by: RADIOLOGY

## 2019-05-06 PROCEDURE — 99233 SBSQ HOSP IP/OBS HIGH 50: CPT | Performed by: INTERNAL MEDICINE

## 2019-05-06 PROCEDURE — 85610 PROTHROMBIN TIME: CPT

## 2019-05-06 PROCEDURE — 80202 ASSAY OF VANCOMYCIN: CPT

## 2019-05-06 PROCEDURE — 2709999900 HC NON-CHARGEABLE SUPPLY

## 2019-05-06 PROCEDURE — 3700000000 HC ANESTHESIA ATTENDED CARE

## 2019-05-06 PROCEDURE — 36415 COLL VENOUS BLD VENIPUNCTURE: CPT

## 2019-05-06 PROCEDURE — 85730 THROMBOPLASTIN TIME PARTIAL: CPT

## 2019-05-06 PROCEDURE — 80053 COMPREHEN METABOLIC PANEL: CPT

## 2019-05-06 PROCEDURE — C9113 INJ PANTOPRAZOLE SODIUM, VIA: HCPCS | Performed by: UROLOGY

## 2019-05-06 PROCEDURE — 87070 CULTURE OTHR SPECIMN AEROBIC: CPT

## 2019-05-06 PROCEDURE — 97166 OT EVAL MOD COMPLEX 45 MIN: CPT

## 2019-05-06 PROCEDURE — 6370000000 HC RX 637 (ALT 250 FOR IP): Performed by: UROLOGY

## 2019-05-06 RX ORDER — PHENYTOIN SODIUM 50 MG/ML
100 INJECTION, SOLUTION INTRAMUSCULAR; INTRAVENOUS ONCE
Status: DISCONTINUED | OUTPATIENT
Start: 2019-05-06 | End: 2019-05-07

## 2019-05-06 RX ORDER — LIDOCAINE HYDROCHLORIDE 20 MG/ML
10 INJECTION, SOLUTION INFILTRATION; PERINEURAL ONCE
Status: COMPLETED | OUTPATIENT
Start: 2019-05-06 | End: 2019-05-06

## 2019-05-06 RX ORDER — PHENYTOIN SODIUM 50 MG/ML
INJECTION, SOLUTION INTRAMUSCULAR; INTRAVENOUS
Status: DISPENSED
Start: 2019-05-06 | End: 2019-05-06

## 2019-05-06 RX ADMIN — PHENYTOIN SODIUM 100 MG: 50 INJECTION INTRAMUSCULAR; INTRAVENOUS at 06:27

## 2019-05-06 RX ADMIN — PIPERACILLIN AND TAZOBACTAM 3.38 G: 3; .375 INJECTION, POWDER, FOR SOLUTION INTRAVENOUS at 10:48

## 2019-05-06 RX ADMIN — INSULIN GLARGINE 5 UNITS: 100 INJECTION, SOLUTION SUBCUTANEOUS at 22:11

## 2019-05-06 RX ADMIN — DEXTROSE AND SODIUM CHLORIDE: 5; 900 INJECTION, SOLUTION INTRAVENOUS at 10:47

## 2019-05-06 RX ADMIN — INSULIN LISPRO 3 UNITS: 100 INJECTION, SOLUTION INTRAVENOUS; SUBCUTANEOUS at 22:13

## 2019-05-06 RX ADMIN — DEXTROSE MONOHYDRATE 100 MG: 50 INJECTION, SOLUTION INTRAVENOUS at 22:15

## 2019-05-06 RX ADMIN — Medication 10 ML: at 22:16

## 2019-05-06 RX ADMIN — Medication 10 ML: at 20:05

## 2019-05-06 RX ADMIN — LEVETIRACETAM 750 MG: 250 TABLET, FILM COATED ORAL at 06:26

## 2019-05-06 RX ADMIN — LEVETIRACETAM 750 MG: 250 TABLET, FILM COATED ORAL at 18:15

## 2019-05-06 RX ADMIN — PRAVASTATIN SODIUM 80 MG: 20 TABLET ORAL at 22:16

## 2019-05-06 RX ADMIN — HEPARIN SODIUM 5000 UNITS: 10000 INJECTION INTRAVENOUS; SUBCUTANEOUS at 22:12

## 2019-05-06 RX ADMIN — LIDOCAINE HYDROCHLORIDE 10 ML: 20 INJECTION, SOLUTION INFILTRATION; PERINEURAL at 14:37

## 2019-05-06 RX ADMIN — VANCOMYCIN HYDROCHLORIDE 1000 MG: 1 INJECTION, POWDER, LYOPHILIZED, FOR SOLUTION INTRAVENOUS at 18:15

## 2019-05-06 RX ADMIN — PANTOPRAZOLE SODIUM 40 MG: 40 INJECTION, POWDER, FOR SOLUTION INTRAVENOUS at 11:02

## 2019-05-06 RX ADMIN — HEPARIN SODIUM 5000 UNITS: 10000 INJECTION INTRAVENOUS; SUBCUTANEOUS at 06:38

## 2019-05-06 RX ADMIN — PIPERACILLIN AND TAZOBACTAM 3.38 G: 3; .375 INJECTION, POWDER, FOR SOLUTION INTRAVENOUS at 18:27

## 2019-05-06 RX ADMIN — PHENYTOIN SODIUM 100 MG: 50 INJECTION INTRAMUSCULAR; INTRAVENOUS at 20:05

## 2019-05-06 RX ADMIN — Medication 10 ML: at 10:59

## 2019-05-06 RX ADMIN — PIPERACILLIN AND TAZOBACTAM 3.38 G: 3; .375 INJECTION, POWDER, FOR SOLUTION INTRAVENOUS at 02:44

## 2019-05-06 ASSESSMENT — PAIN SCALES - GENERAL
PAINLEVEL_OUTOF10: 0
PAINLEVEL_OUTOF10: 0

## 2019-05-06 NOTE — CONSULTS
pantoprazole (PROTONIX) 40 MG tablet, Take 1 tablet by mouth every morning (before breakfast)  pravastatin (PRAVACHOL) 80 MG tablet, Take 1 tablet by mouth nightly  lisinopril (PRINIVIL;ZESTRIL) 20 MG tablet, Take 1 tablet by mouth daily  metoprolol succinate (TOPROL XL) 50 MG extended release tablet, Take 1 tablet by mouth daily  tamsulosin (FLOMAX) 0.4 MG capsule, Take 1 capsule by mouth daily  Ocular meds: none    Allergies: Patient has no known allergies. PHYSICAL EXAM:      Vitals:  BP (!) 148/87   Pulse 82   Temp 99 °F (37.2 °C) (Oral)   Resp 16   Ht 5' 10\" (1.778 m)   Wt 125 lb 9.6 oz (57 kg)   SpO2 97%   BMI 18.02 kg/m²     Ophthalmic Examination:        Near Vision with near card: poor effort, but at least 20/100 at near with or without +2.50 readers    Pupils: round, reactive to light, no APD    Motility (EOM):  Full OU    Visual Field: unable    Intraocular Pressure: soft and symmetrical to palpation OU    Drops Given: 2.5% phenylephrine and 1% tropicamide    Anterior Segment Exam:   lids/lashes/lacrimal system: normal lids and lashes OU  Conjuctiva/sclera: white and quiet OU  Cornea: clear OU  anterior chamber:  Deep and grossly quiet OU  Iris: flat and round OU  Lens: mild nuclear sclerosis OU    Posterior Segment Exam:   Vitreous: quiet OU  nerve cup/disc ratio: 0.3 OU  nerve appearance: pink, sharp margins OU  Macula: grossly flat, no hemorrhages or exudates  Vessels: normal course and caliber OU  Periphery: no lesions, holes, tears, detachments OU    IMPRESSION/RECOMMENDATIONS:    75 yo M with history of pancreatic cancer admitted with new hepatic masses, biliary obstruction  1. Candida septicemia but no evidence of fungal endophthalmitis. Please call if he develops a change in vision. 2.  Mild cataracts both eyes  3. Likely refractive error - could have a refraction as an outpatient if desired. Provided my card to his wife.       Electronically signed by Mehdi Alvarez MD on 5/6/2019 at 4:50 PM

## 2019-05-06 NOTE — PROGRESS NOTES
Physical Therapy  Initial Assessment     Name: Tk Kilgore  : 1950  MRN: [id number]    Date of Service: 2019    Evaluating PT:  Oliver Carlton PT, DPT    Room #:  4983/2863-N  Diagnosis:  Seizures   Reason for admission: found unresponsive, seizure activity   Precautions:  Falls, seizures, sitter, O2  Procedures:  cystoscopy with catheter insertion  Equipment recommendations:  [employer]    Pt is a poor historian. Reports living alone, using a [employer] for ambulation. Unable to provide further clarity to PLOF. Initial Evaluation  Date:  Treatment Short Term/ Long Term   Goals   AM-PAC 6 Clicks 51/93     Was pt agreeable to Eval/treatment? Yes      Does pt have pain? Denies      Bed Mobility  Rolling: NT  Supine to sit: Yamil  Sit to supine: NT  Scooting: Yamil  independent   Transfers Sit to stand: Yamil  Stand to sit: Yamil  Stand pivot: MaxA [employer]  independent   Ambulation    3 feet with [employer] MaxA    >50 feet with [employer] SBA   Stair negotiation: ascended and descended  NT  NA   ROM BUE:  See OT eval  BLE:  WFL     Strength BUE:  See OT eval  RLE 3+/5 LLE 3-/5  4/5   Balance Sitting EOB:  Yamil  Dynamic Standing:  MaxA [employer]  Sitting EOB:  Independent  Dynamic Standing:  SBA [employer]     -Pt is A & O x 2 to self and place, not time.  -Sensation:  Pt denies numbness and tingling to extremities  -Edema:  Unremarkable     Patient education  Pt educated on safety, sequencing during transfers, use of [employer], and role of PT     Patient response to education:   Pt verbalized understanding Pt demonstrated skill Pt requires further education in this area   Yes  No  Yes      Assessment/Comments  ---Pt received supine in bed and was agreeable to session with OT collaboration. RN reported pt stable for participation prior to session. Pt mildly confused during conversation. Perseverating on the words \"[employer]\" and the number 19. Is able to actively participate in some simple conversation. LLE noted to be weaker than RLE.  Pt impulsive, requires redirection frequently. Did not require much assistance to stand but once upright, pt extremely unsteady with flexed posture, bent knees, ataxic gait, difficulty advancing LEs, and foot drop R. Unsafe for continued ambulation so transferred to chair. Seated in chair with sitter present. Pt with all needs met and call light within reach. Pt will benefit from further skilled PT at [place] in inpatient setting to address functional deficits described above. Pts/ family goals   1. None stated     Patient and or family understand(s) diagnosis, prognosis, and plan of care. yes    PLAN  --PT care will be provided in accordance with the objectives noted above. Whenever appropriate, clear delegation orders will be provided for nursing staff. Exercises and functional mobility practice will be used as well as appropriate assistive devices or modalities to obtain goals. Patient and family education will also be administered as needed. --Frequency of treatments: 2-5x/week x 7-10 days.     Time in  0740  Time out  [address] Odell, PT, [place]  [id number]

## 2019-05-06 NOTE — PROGRESS NOTES
Inpatient Medical Oncology Progress Note    Subjective:  Pain controlled. No fever. Objective:  BP (!) 148/87   Pulse 82   Temp 99 °F (37.2 °C) (Oral)   Resp 16   Ht 5' 10\" (1.778 m)   Wt 125 lb 9.6 oz (57 kg)   SpO2 97%   BMI 18.02 kg/m²   GENERAL:  Laying in bed, confused  LUNGS:  No increased work of breathing  CARDIOVASCULAR:  RR  ABDOMEN:  Soft, non-tender, non-distended  EXT: No edema, cyanosis or clubbing    Impression/Plan:  77 y/o with Hx of ? 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 Non-compliant with  appointments. Now candida bacteremia w/ biliary stent; Abx per ID   ZOË today. New masses in liver on triphasic; CEA 2.4, CA 19-9 pending; CT guided liver biopsy/liver abscess drainage today (added surgical cultures/pathology to the tissue sample). Will continue to follow.      05/06/2019  Guilherme Thompson MD

## 2019-05-06 NOTE — PROGRESS NOTES
1240 Received pt via cart to angio holding  1250 Dr Harmony Delarosa visits and explains procedure  1300 To CT via cart, placed on table. Monitor ( shows sinus rhythm), O2, NIBP, SpO2 and safety equipment  1306 Preliminary scan  1313 Charge tech notified of pt's readiness  [phone] Scan reviewed by Dr Harmony Delarosa  [phone] notified pf pt's readiness  [phone] Procedure started  [id number] [phone] Fr pigtail abscess tube inserted. 80 cc's of purulent green fluid drawn off and sent to lab.   1350 Procedure ended. Revolution applied around drain tube. Tube connected to gravity drainage.  Telemetry re applied - shows sinus rhythm  1405 Transport here to take pt to CVL lab  1425 Hand off report called to Nicolas Shin

## 2019-05-06 NOTE — PROGRESS NOTES
Consent for IR biopsy obtained via telephone by pt's wife Luda---spoke w/ IR & let them know that pt received sub q heparin @ 0630--they said they will be able to do procedure unsure of what time since it will be delayed because of it.  Also communicated with IR that pt is scheduled around [phone] to get ZOË done

## 2019-05-06 NOTE — PROGRESS NOTES
opthamology consult called to the answering service. Dr. Kelle Deal on call and spoke with Krystin. Face sheet faxed to [phone].      Juany Rubin

## 2019-05-06 NOTE — POST SEDATION
POST SEDATION NOTE:  Time: 1:40 PM    Cardiopulmonary: Vitals Signs Stable: yes    Level of Consciousness: alert    Reversal Agent Used: No    Complications: none    Follow-up/Observations: none    Pain Score: 1    Deloris Bond MD

## 2019-05-06 NOTE — PROGRESS NOTES
Hospital Medicine    Subjective:  Pt seen this am more alert / scheduled for IR liver bx today      Current Facility-Administered Medications:     phenytoin (DILANTIN) 50 MG/ML injection, , , ,     hydrALAZINE (APRESOLINE) injection 10 mg, 10 mg, Intravenous, Q6H PRN, Jeison Delarosa MD, 10 mg at 05/05/19 1655    lisinopril (PRINIVIL;ZESTRIL) tablet 10 mg, 10 mg, Oral, Daily, Jeison Delarosa MD, 10 mg at 05/04/19 1028    levetiracetam (KEPPRA) 750 mg/50 mL IVPB, 750 mg, Intravenous, BID **OR** levETIRAcetam (KEPPRA) tablet 750 mg, 750 mg, Oral, BID, Jeison Delarosa MD, 750 mg at 05/06/19 0626    insulin glargine (LANTUS) injection vial 5 Units, 5 Units, Subcutaneous, Nightly, Jeison Delarosa MD, 5 Units at 05/05/19 2054    [COMPLETED] anidulafungin (ERAXIS) 200 mg in dextrose 5 % 260 mL IVPB, 200 mg, Intravenous, Once, 200 mg at 05/03/19 1402 **AND** anidulafungin (ERAXIS) 100 mg in dextrose 5 % 130 mL IVPB, 100 mg, Intravenous, Q24H, Jeison Delarosa MD, 100 mg at 05/05/19 2127    vancomycin 1000 mg IVPB in 250 mL D5W addavial, 1,000 mg, Intravenous, Q12H, Jeison Delarosa MD, Stopped at 05/05/19 2313    piperacillin-tazobactam (ZOSYN) 3.375 g in dextrose 5 % 100 mL IVPB extended infusion (mini-bag), 3.375 g, Intravenous, Q8H, Jeison Delarosa MD, Stopped at 05/06/19 1512    pantoprazole (PROTONIX) injection 40 mg, 40 mg, Intravenous, Daily, Jeison Delarosa MD, 40 mg at 05/06/19 1102    dextrose 5 % and 0.9 % sodium chloride infusion, , Intravenous, Continuous, Jeison Delarosa MD, Last Rate: 60 mL/hr at 05/06/19 1047    phenytoin (DILANTIN) 100 mg in sodium chloride 0.9 % 100 mL IVPB (maintenance dose), 100 mg, Intravenous, Q8H, Jeison Delarosa MD, Stopped at 05/06/19 0803    insulin lispro (HUMALOG) injection vial 0-6 Units, 0-6 Units, Subcutaneous, Q4H, Jeison Delarosa MD, 1 Units at 05/05/19 2053    glucose (GLUTOSE) 40 % oral gel 15 g, 15 g, Oral, PRN, Jeison Delarosa MD    glucagon (rDNA) injection 1 mg, 1 mg, Intramuscular, PRN, Boby Munoz MD    dextrose 5 % solution, 100 mL/hr, Intravenous, PRN, Boby Munoz MD    dextrose 50 % solution 12.5 g, 12.5 g, Intravenous, PRN, Boby Munoz MD, 12.5 g at 05/02/19 0650    potassium chloride 10 mEq/100 mL IVPB (Peripheral Line), 10 mEq, Intravenous, PRN, Boby Munoz MD, Last Rate: 100 mL/hr at 05/02/19 0944, 10 mEq at 05/02/19 0944    pravastatin (PRAVACHOL) tablet 80 mg, 80 mg, Oral, Nightly, Boby Munoz MD, 80 mg at 05/05/19 2052    tamsulosin (FLOMAX) capsule 0.4 mg, 0.4 mg, Oral, Daily, Boby Munoz MD, Stopped at 05/03/19 7992    sodium chloride flush 0.9 % injection 10 mL, 10 mL, Intravenous, 2 times per day, Boby Munoz MD, 10 mL at 05/06/19 1059    sodium chloride flush 0.9 % injection 10 mL, 10 mL, Intravenous, PRN, Boby Munoz MD, 10 mL at 05/05/19 0543    magnesium hydroxide (MILK OF MAGNESIA) 400 MG/5ML suspension 30 mL, 30 mL, Oral, Daily PRN, Boby Munoz MD    ondansetron [facility]) injection 4 mg, 4 mg, Intravenous, Q6H PRN, Boby Munoz MD    heparin (porcine) injection 5,000 Units, 5,000 Units, Subcutaneous, 3 times per day, Boby Munoz MD, 5,000 Units at 05/06/19 4231    dextrose 50 % solution 12.5 g, 12.5 g, Intravenous, PRN, Boby Munoz MD, 12.5 g at 05/03/19 1304    potassium chloride 10 mEq/100 mL IVPB (Peripheral Line), 10 mEq, Intravenous, PRN, Boby Munoz MD, Last Rate: 100 mL/hr at 05/02/19 0830, 10 mEq at 05/02/19 0830    magnesium sulfate 1 g in dextrose 5% 100 mL IVPB, 1 g, Intravenous, PRN, Boby Munoz MD    dextrose 5 % and 0.45 % NaCl with KCl 20 mEq infusion, , Intravenous, Continuous PRN, Bboy Munoz MD, Last Rate: 150 mL/hr at 05/02/19 0120    Objective:    BP (!) 148/87   Pulse 82   Temp 99 °F (37.2 °C) (Oral)   Resp 16   Ht 5' 10\" (1.778 m)   Wt 125 lb 9.6 oz (57 kg)   SpO2 97%   BMI 18.02 kg/m²     Heart:  Reg  Lungs:  CTA

## 2019-05-06 NOTE — PROGRESS NOTES
Occupational Therapy  OCCUPATIONAL THERAPY INITIAL EVALUATION      Date:2019  Patient Name: aDin Wilson  MRN: [id number]  : 1950  Room: [address] [place], [place] [address]    Evaluating OT: Elsa Nichols OTR/L #[id number]     AM-PAC Daily Activity Raw Score:     Recommended Adaptive Equipment: TBD     Diagnosis: seizures   Patient presented to ED from nursing facility following being found unresponsive     Procedure: 19 cystoscopy with catheter insertion    Pertinent Medical History:    Past Medical History:   Diagnosis Date    Cancer [facility])     pancreatic    Cerebral artery occlusion with cerebral infarction ([facility] [address]     Hypertension     Syncope       Precautions:  Falls, confusion      Home Living: Pt is a poor historian:  Per hospital records, pt admitted from nursing facility and living alone in an apartment prior to previous hospitalization. Bathroom setup: ?   Equipment owned: w/w    Prior Level of Function: Pt is a poor historian: ? with ADLs , ? with IADLs; ambulated with w/w  Driving: ?  Occupation: ?    Pain Level: Pt denies pain this session  Cognition: A&O: grossly 2/4 (self and hospital);  Follows 1 step directions   Pt perseverating on \"[employer]\" and \"\"   Memory:  poor   Sequencing:  poor   Problem solving:  poor   Judgement/safety:  poor     Functional Assessment:   Initial Eval Status  Date: 19 Treatment Status  Date: Short Term Goals  Treatment frequency: PRN   Feeding NPO  Supervision     Grooming Minimal Assist   Supervision    UB Dressing Moderate Assist   Stand by Assist    LB Dressing Dependent   Moderate Assist    Bathing Maximal Assist  Moderate Assist    Toileting Dependent   Moderate Assist    Bed Mobility  Supine to sit: Minimal Assist   Sit to supine: NT   Supine to sit: Stand by Assist   Sit to supine: Stand by Assist    Functional Transfers Maximal Assist  stand pivot transfer  (min A for sit to stand)   Minimal Assist    Functional Mobility Maximal Assist   Several steps with w/w (+ L knee buckling,  Minimal Assist    Balance Sitting:     Static:  SBA    Dynamic:min A  Standing: min-max A     Activity Tolerance P+  F   Visual/  Perceptual wfl                  Hand dominance: right     Strength ROM Additional Info:    RUE   3+/5 wfl  fair+  and wfl FMC/dexterity noted during ADL tasks     LUE 3+/5 wfl Fair+  and wfl FMC/dexterity noted during ADL tasks     Hearing: wfl  Sensation:wfl (unable to fully assess)  Tone: wfl  Edema:none noted                             Comments/Treatment: Upon arrival, patient supine in bed and agreeable to OT Session with PT collaboration . Therapist facilitated bed mobility, functional transfers (various surfaces; bed,chair), standing tolerance tasks (addressing posture, balance and activity tolerance) and several steps with w/w  (+ L knee buckling and ataxia; cuing on sequencing, posture, w/w management and safety) - skilled cuing on sequencing, hand placement, posture, body mechanics and safety. Therapist facilitated self-care retraining: UB/LB self-care tasks (gown/socks) and grooming task while educating pt on sequencing, modified techniques, posture, safety. Skilled monitoring of HR, O2 sats and pts response to treatment. Pt demonstrating fair understanding of education/techniques, requiring additional training / education. At end of session, patient seated in chair with call light and phone within reach, all lines intact (staff present). Pt would benefit from continued skilled OT to increase functional independence and quality of life.     Eval Complexity: mod  Profile and History- med (extensive chart review)  Assessment of Occupational Performance and Identification of Deficits- med  Clinical Decision Making- med     (Evaluation time includes thorough review of current medical information, gathering information on past medical history/social history and prior level of function, completion of standardized testing/informal observation of tasks, assessment of data, and development of POC/Goals.)      Assessment of current deficits   Functional mobility [x]  ADLs [x] Strength [x]  Cognition [x]  Functional transfers  [x] IADLs [x] Safety Awareness [x]  Endurance [x]  Fine Motor Coordination [] Balance [x] Vision/perception [] Sensation []   Gross Motor Coordination [] ROM [] Delirium []                  Motor Control []    Plan of Care:   ADL retraining [x]   Equipment needs [x]   Neuromuscular re-education [x] Energy Conservation Techniques [x]  Functional Transfer training [x] Patient and/or Family Education [x]  Functional Mobility training [x]  Environmental Modifications [x]  Cognitive re-training []   Compensatory techniques for ADLs [x]  Splinting Needs []   Positioning to improve overall function [x]   Therapeutic Activity [x]  Therapeutic Exercise  [x]  Visual/Perceptual: []    Delirium prevention/treatment  []   Other:  []    Rehab Potential: Good for established goals     Patient / Family Goal:  Not stated     Patient and/or family were instructed diagnosis, prognosis/goals and plan of care. Demonstrated fair understanding. [] Malnutrition indicators have been identified and nursing has been notified to ensure a dietitian consult is ordered.        AM-PAC Daily Activity Inpatient   How much help for putting on and taking off regular lower body clothing?: Total  How much help for Bathing?: A Lot  How much help for Toileting?: Total  How much help for putting on and taking off regular upper body clothing?: A Lot  How much help for taking care of personal grooming?: A Little  How much help for eating meals?: A Little  AM-PAC Inpatient Daily Activity Raw Score: 12  AM-PAC Inpatient ADL T-Scale Score : 30.6  ADL Inpatient CMS 0-100% Score: 66.57  ADL Inpatient CMS G-Code Modifier : CL       mod Evaluation + 11 timed treatment minutes  Tx Time in: 749  Tx Time out: Eddie OTR/L #[id number]

## 2019-05-06 NOTE — PROGRESS NOTES
Wound care: patient off unit at testing,consult unable to be completed at this time.  [facility]

## 2019-05-06 NOTE — PROGRESS NOTES
5/6/2019 8:46 AM  Service: Urology  Group: [facility] (Aidan/Michael/Mak)    Marcia Challenger  [id number]    Subjective:   Patient confused at baseline   Sitter at bedside   No fever  No abdominal pain   Tolerating talavera  No hematuria noted          Review of Systems  Patient confused at baseline. Scheduled Meds:   phenytoin        phenytoin        lisinopril  10 mg Oral Daily    levetiracetam  750 mg Intravenous BID    Or    levETIRAcetam  750 mg Oral BID    insulin glargine  5 Units Subcutaneous Nightly    anidulafungin  100 mg Intravenous Q24H    vancomycin  1,000 mg Intravenous Q12H    piperacillin-tazobactam  3.375 g Intravenous Q8H    pantoprazole  40 mg Intravenous Daily    phenytoin (DILANTIN) maintenance dose IVPB  100 mg Intravenous Q8H    insulin lispro  0-6 Units Subcutaneous Q4H    pravastatin  80 mg Oral Nightly    tamsulosin  0.4 mg Oral Daily    sodium chloride flush  10 mL Intravenous 2 times per day    heparin (porcine)  5,000 Units Subcutaneous 3 times per day       Objective:  Vitals:    05/06/19 0822   BP: 118/69   Pulse: 77   Resp: 12   Temp: 98.1 °F (36.7 °C)   SpO2: 95%         Allergies: Patient has no known allergies. General Appearance: Awake and Alert, No distress   Skin: no rash or erythema  Head: normocephalic and atraumatic  Pulmonary/Chest:normal air movement, no respiratory distress   Abdomen: soft, non-tender, non-distended,adenopathy  Genitalia: No penile or scrotal swelling or masses. Extremities: no cyanosis, clubbing or edema       Talavera well positioned and draining clear urine.  No blood around meatus or in talavera     Labs:     Recent Labs     05/06/19  0805      K 3.7      CO2 24   BUN 7*   CREATININE 1.0   GLUCOSE 76   CALCIUM 8.4*       Lab Results   Component Value Date    HGB 10.1 05/06/2019    HCT 32.7 05/06/2019         Assessment/Plan:    Urinary retention, Urethral disruption, gross hematuria    -Continue talavera catheter.  -Continue sitter and leg strap to minimize possibility or   repeat catheter trauma.   -Hand irrigate as needed for gross hematuria          Stefany Ware APRN - CNP   [facility]

## 2019-05-06 NOTE — ANESTHESIA PRE PROCEDURE
Department of Anesthesiology  Preprocedure Note       Name:  Contreras Palafox   Age:  76 y.o.  :  1950                                          MRN:  [id number]         Date:  2019      Surgeon: * No surgeons listed *LANCASTER    Procedure: SEY ZOË    Medications prior to admission:   Prior to Admission medications    Medication Sig Start Date End Date Taking?  Authorizing Provider   pantoprazole (PROTONIX) 40 MG tablet Take 1 tablet by mouth every morning (before breakfast) 3/30/19   Dellis Cowden, MD   pravastatin (PRAVACHOL) 80 MG tablet Take 1 tablet by mouth nightly 1/10/19   Aline Fonseca MD   lisinopril (PRINIVIL;ZESTRIL) 20 MG tablet Take 1 tablet by mouth daily 19   Aline Fonseca MD   metoprolol succinate (TOPROL XL) 50 MG extended release tablet Take 1 tablet by mouth daily 19   Aline Fonseca MD   tamsulosin (FLOMAX) 0.4 MG capsule Take 1 capsule by mouth daily 19   Aline Fonseca MD       Current medications:    Current Facility-Administered Medications   Medication Dose Route Frequency Provider Last Rate Last Dose    phenytoin (DILANTIN) 50 MG/ML injection             hydrALAZINE (APRESOLINE) injection 10 mg  10 mg Intravenous Q6H PRN Chio Bardales MD   10 mg at 19 1655    lisinopril (PRINIVIL;ZESTRIL) tablet 10 mg  10 mg Oral Daily Chio Bardales MD   10 mg at 19 1028    levetiracetam (KEPPRA) 750 mg/50 mL IVPB  750 mg Intravenous BID Chio Bardales MD        Or    levETIRAcetam (KEPPRA) tablet 750 mg  750 mg Oral BID Chio Bardales MD   750 mg at 19 0626    insulin glargine (LANTUS) injection vial 5 Units  5 Units Subcutaneous Nightly Chio Bardales MD   5 Units at 19    anidulafungin (ERAXIS) 100 mg in dextrose 5 % 130 mL IVPB  100 mg Intravenous Q24H Chio Bardales MD   100 mg at 19    vancomycin 1000 mg IVPB in 250 mL D5W addavial  1,000 mg Intravenous Q12H Chio Bardales MD   Stopped at 19 2313    piperacillin-tazobactam (ZOSYN) 3.375 g in dextrose 5 % 100 mL IVPB extended infusion (mini-bag)  3.375 g Intravenous Q8H Jenifer Lloyd MD 25 mL/hr at 05/06/19 1048 3.375 g at 05/06/19 1048    pantoprazole (PROTONIX) injection 40 mg  40 mg Intravenous Daily Jenifer Lloyd MD   40 mg at 05/06/19 1102    dextrose 5 % and 0.9 % sodium chloride infusion   Intravenous Continuous Jenifer Lloyd MD 60 mL/hr at 05/06/19 1047      phenytoin (DILANTIN) 100 mg in sodium chloride 0.9 % 100 mL IVPB (maintenance dose)  100 mg Intravenous Q8H Jenifer Lloyd MD   Stopped at 05/06/19 0803    insulin lispro (HUMALOG) injection vial 0-6 Units  0-6 Units Subcutaneous Q4H Jenifer Lloyd MD   1 Units at 05/05/19 2053    glucose (GLUTOSE) 40 % oral gel 15 g  15 g Oral PRN Jenifer Lloyd MD        glucagon (rDNA) injection 1 mg  1 mg Intramuscular PRN Jenifer Lloyd MD        dextrose 5 % solution  100 mL/hr Intravenous PRN Jenifer Lloyd MD        dextrose 50 % solution 12.5 g  12.5 g Intravenous PRN Jenifer Lloyd MD   12.5 g at 05/02/19 0650    potassium chloride 10 mEq/100 mL IVPB (Peripheral Line)  10 mEq Intravenous PRN Jenifer Lloyd  mL/hr at 05/02/19 0944 10 mEq at 05/02/19 0944    pravastatin (PRAVACHOL) tablet 80 mg  80 mg Oral Nightly Jenifer Lloyd MD   80 mg at 05/05/19 2052    tamsulosin (FLOMAX) capsule 0.4 mg  0.4 mg Oral Daily Jenifer Lloyd MD   Stopped at 05/03/19 4683    sodium chloride flush 0.9 % injection 10 mL  10 mL Intravenous 2 times per day Jenifer Lloyd MD   10 mL at 05/06/19 1059    sodium chloride flush 0.9 % injection 10 mL  10 mL Intravenous PRN Jenifer Lloyd MD   10 mL at 05/05/19 0543    magnesium hydroxide (MILK OF MAGNESIA) 400 MG/5ML suspension 30 mL  30 mL Oral Daily PRSORIN Lloyd MD        ondansetron [facility]) injection 4 mg  4 mg Intravenous Q6H PRN Jenifer Lloyd MD        heparin (porcine) injection 5,000 Units  5,000 Units Subcutaneous 3 times per day Jenifer Lloyd MD 5,000 Units at 05/06/19 0638    dextrose 50 % solution 12.5 g  12.5 g Intravenous PRN Zeferino Moore MD   12.5 g at 05/03/19 1304    potassium chloride 10 mEq/100 mL IVPB (Peripheral Line)  10 mEq Intravenous PRN Zeferino Moore  mL/hr at 05/02/19 0830 10 mEq at 05/02/19 0830    magnesium sulfate 1 g in dextrose 5% 100 mL IVPB  1 g Intravenous PRN Zeferino Moore MD        dextrose 5 % and 0.45 % NaCl with KCl 20 mEq infusion   Intravenous Continuous PRN Zeferino Moore  mL/hr at 05/02/19 0120         Allergies:  No Known Allergies    Problem List:    Patient Active Problem List   Diagnosis Code    HTN (hypertension), benign I10    Other specified anemias D64.89    Obstructive jaundice K83.8    Hyperbilirubinemia E80.6    Palliative care encounter Z51.5    Obstructive jaundice due to malignant neoplasm (HCC) K83.1, C80.1    Moderate protein-calorie malnutrition (HCC) E44.0    Pancreatic mass K86.9    Pancreatic adenocarcinoma (HCC) C25.9    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    Bacteremia R78.81    Biliary obstruction K83.1    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    Thrombocytopathia ([address] D69.1    Bacteremia due to Gram-negative bacteria R78.81    Endocarditis, suspected R09.89    Seizures (HCC) R56.9    Seizure-like activity (HCC) R56.9    Altered mental status R41.82    Acute ischemic stroke ([address] I63.9    Hyperosmolar hyperglycemic coma due to diabetes mellitus without ketoacidosis (HCC) E11.01, E11.65    Moderate protein-calorie malnutrition (HCC) E44.0    Liver mass R16.0       Past Medical History:        Diagnosis Date    Cancer ([address]     pancreatic    Cerebral artery occlusion with cerebral infarction ([address]     Hypertension     Syncope        Past Surgical History:        Procedure Laterality Date    CYSTOSCOPY INSERTION / REMOVAL STENT / STONE N/A 5/5/2019    CYSTOSCOPY RETROGRADE PYELOGRAM STENT INSERTION performed by Zeferino Moore [facility] OR    ERCP N/A 3/25/2019    ERCP STENT INSERTION performed by Tabitha Wyatt MD at [facility]  3/27/2019         UPPER GASTROINTESTINAL ENDOSCOPY N/A 3/8/2019    REDO ENDOSCOPIC EGD  ULTRASOUND performed by Erica Beavers DO at [facility] 3/8/2019    EGD DIAGNOSTIC ONLY performed by Erica Beavers DO at [address] [place] [address] N/A 1/16/2019    EXPLORATORY LAPAROTOMY; ABORTED WHIPPLE PROCEDURE; GASTROJEJUNOSTOMY; OPEN CHOLECYSTECTOMY performed by Anne Callejas MD at [address] [place] [address] History:    Social History     Tobacco Use    Smoking status: Never Smoker    Smokeless tobacco: Never Used   Substance Use Topics    Alcohol use: Yes     Alcohol/week: 4.8 oz     Types: 8 Cans of beer per week     Comment: states drinks one 24 ounce beer daily                                Counseling given: Not Answered      Vital Signs (Current):   Vitals:    05/06/19 1350 05/06/19 1355 05/06/19 1400 05/06/19 1405   BP: 131/73 (!) 141/79 139/76 (!) 148/87   Pulse: 81 80 75 82   Resp: 18 16 16 16   Temp:       TempSrc:       SpO2: 100% 100% 100% 97%   Weight:       Height:                                                  BP Readings from Last 3 Encounters:   05/06/19 (!) 148/87   05/05/19 127/73   04/12/19 122/74       NPO Status:  >8 hrs                                                                               BMI:   Wt Readings from Last 3 Encounters:   05/06/19 125 lb 9.6 oz (57 kg)   03/27/19 158 lb 6 oz (71.8 kg)   03/22/19 124 lb 3.2 oz (56.3 kg)     Body mass index is 18.02 kg/m².     CBC:   Lab Results   Component Value Date    WBC 5.4 05/06/2019    RBC 3.61 05/06/2019    HGB 10.1 05/06/2019    HCT 32.7 05/06/2019    MCV 90.6 05/06/2019    RDW 16.2 05/06/2019     05/06/2019       CMP:   Lab Results   Component Value Date     05/06/2019    K 3.7 05/06/2019    K 3.9 01/22/2019     05/06/2019 CO2 24 05/06/2019    BUN 7 05/06/2019    CREATININE 1.0 05/06/2019    GFRAA >60 05/06/2019    LABGLOM >60 05/06/2019    GLUCOSE 76 05/06/2019    PROT 6.3 05/06/2019    CALCIUM 8.4 05/06/2019    BILITOT 1.0 05/06/2019    ALKPHOS 84 05/06/2019    AST 11 05/06/2019    ALT 8 05/06/2019       POC Tests: No results for input(s): POCGLU, POCNA, POCK, POCCL, POCBUN, POCHEMO, POCHCT in the last 72 hours. Coags:   Lab Results   Component Value Date    PROTIME 12.3 05/06/2019    INR 1.1 05/06/2019    APTT 41.3 05/06/2019       HCG (If Applicable): No results found for: PREGTESTUR, PREGSERUM, HCG, HCGQUANT     ABGs:   Lab Results   Component Value Date    PO2ART 132.1 01/16/2019    ZPZ2VZN 33.0 01/16/2019    FMW1SXL 19.7 01/16/2019        Type & Screen (If Applicable):  No results found for: LABABO, [address]    Anesthesia Evaluation  Patient summary reviewed and Nursing notes reviewed no history of anesthetic complications:   Airway: Mallampati: II  TM distance: >3 FB   Neck ROM: full  Mouth opening: > = 3 FB Dental:          Pulmonary:normal exam  breath sounds clear to auscultation                             Cardiovascular:  Exercise tolerance: poor (<4 METS),   (+) hypertension:,       ECG reviewed  Rhythm: regular  Rate: normal  Echocardiogram reviewed    Cleared by cardiology           ROS comment: Suspected endocarditis     Neuro/Psych:   (+) seizures:, CVA (acute ischemic):,              ROS comment: Altered mental status GI/Hepatic/Renal:            ROS comment: Obstructive jaundice d/t malignant neoplasm, hyperbilirubinemia, liver mass. Endo/Other:    (+) Diabetes, blood dyscrasia: anemia and thrombocytopenia:., malignancy/cancer (pancreatic mass adenocarcinoma, ampullary carcinoma).                   ROS comment: bacteremia Abdominal:           Vascular:                                        Narrative     Transesophageal Echocardiography Report (ZOË)      Demographics      Patient Name       Claudean Founds Gender               Male                      E      Medical Record     [id number]        Room Number          4604   Number      Account #          [de-identified]       Procedure Date       03/29/2019      Corporate ID                       Ordering Physician   King Chuckie VIDAL      Accession Number   [id number]       Referring Physician  Stephan Nobles      Date of Birth      1950      Sonographer          Bjorn Medina [facility]      Age                76 year(s)      Interpreting         Joanne Wells MD                                      Physician                                         Any Other     Procedure    Type of Study      ZOË procedure:Transesophageal Echo (ZOË), Color Flow Velocity Mapping. Procedure Date  Date: 03/29/2019 Start: 09:35 AM    Study Location: OR  Technical Quality: Adequate visualization    Patient Status: Routine    Height: 70 inches Weight: 125 pounds BSA: 1.71 m^2 BMI: 17.94 kg/m^2    BP: 135/77 mmHg    ZOË Performed By: the attending and the sonographer     Type of Anesthesia: Moderate sedation      Findings      Left Ventricle   Normal left ventricle size. Mild concentric left ventricular hypertrophy. No wall motion abnormalities. Ejection fraction is visually estimated at 50%. Right Ventricle   Normal right ventricular size and function. Left Atrium   L atrial appendage normal   L atrium enlarged   Atrial septum normal      Right Atrium   Normal right atrium size. Mitral Valve   Normal mitral valve structure and function. Tricuspid Valve   Normal tricuspid valve structure and function. Aortic Valve   Aortic valve opens well. The aortic valve is trileaflet. The aortic valve appears mildly sclerotic. Trace aortic regurgitation. Pulmonic Valve   Pulmonic valve is structurally normal.      Pericardial Effusion   Trivial pericardial effusion. Aorta   The aorta is within normal limits.       Conclusions      Summary   Aortic valve opens Multifocal areas of old lacunar insults are   seen throughout the basal ganglia region and in the brainstem. 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 Areas of old infarcts are also seen in both cerebellar hemispheres   surrounded by areas of hyperintensity more likely regions of   demyelination as well. There is no indication for hemorrhagic event. Atherosclerotic changes are seen in the major intracranial arteries   are. Impression   1. Very small localized peripheral cortical acute ischemic insult in   the posterior right parietal region. 2. Extensive the changes relate with small vessel disease throughout   the white matter of both cerebral hemispheres, basal ganglia region   and brainstem. 3. Additional old insults with encephalomalacia are also seen in both   cerebellar hemispheres. 4. Overall loss of brain parenchyma more than expected for the age   group in supra and infratentorial regions. Findings to be correlated   clinically. ALERT:  ABNORMAL REPORT. Patient MRN:  [id number]   : 1950   Age: 76 years   Gender: Male       Order Date:  5/3/2019 5:00 PM               TECHNIQUE/NUMBER OF IMAGES/COMPARISON/CLINICAL HISTORY:       Chest AP       1 image one view. Clinical history right internal jugular central venous catheter   placement. FINDINGS: There is a right internal jugular central venous catheter   tip in SVC. No pneumothorax on the right on the left. Heart has normal size, mediastinum appears unremarkable. There is   space are free. Lungs are clear, no infiltrates, consolidations or   pleural effusions           Impression   Right internal jugular central venous catheter tip in SVC. No pneumothorax on the right on the left.  Patient has a stent in the   projections for the biliary tract/common bile duct and there are signs ventricular system are somewhat   more than expected for the age group including in the mesial region of   both temporal lobes. The ventricular system are also relatively   prominent for the age group. Small area of hypodensity seen in the right side of the dora and also   in the left-sided bones were seen previously related with previous   discrete ischemic insults. An area of encephalomalacia is seen in the inferior aspect of the left   cerebellar hemisphere in the area of the medial and inferior   cerebellar peduncles relate with old insult. There is no indication for a sizable area of an acute or recent insult   in progression to the brain parenchyma. Midline structures demonstrate no significant findings. There is no indication for an acute intracranial hemorrhagic event. Images with bone window settings demonstrate no significant findings   are. Impression   1. Chronic ischemic changes in the brain parenchyma predominantly   supratentorial areas with overall loss of volume of the brain   parenchyma for the age group, as above commented. 2. No indication for an acute intracranial process. Anesthesia Plan      MAC     ASA 3       Induction: intravenous. Anesthetic plan and risks discussed with patient. Use of blood products discussed with patient whom. Plan discussed with attending.                   NAHUM Francois - TIFFANI   5/6/2019

## 2019-05-06 NOTE — PROGRESS NOTES
Spoke with Dr. Guilherme Mittal to give vanc trough since one has not been done yet and pt has gotten multiple doses vanc

## 2019-05-07 ENCOUNTER — APPOINTMENT (OUTPATIENT)
Dept: NEUROLOGY | Age: 69
DRG: 871 | End: 2019-05-07
Payer: MEDICARE

## 2019-05-07 PROBLEM — E43 SEVERE PROTEIN-CALORIE MALNUTRITION (HCC): Chronic | Status: ACTIVE | Noted: 2019-05-03

## 2019-05-07 LAB
ALBUMIN SERPL-MCNC: 2.5 G/DL (ref 3.5–5.2)
ALP BLD-CCNC: 79 U/L (ref 40–129)
ALT SERPL-CCNC: 8 U/L (ref 0–40)
ANION GAP SERPL CALCULATED.3IONS-SCNC: 8 MMOL/L (ref 7–16)
AST SERPL-CCNC: 12 U/L (ref 0–39)
BASOPHILS ABSOLUTE: 0.02 E9/L (ref 0–0.2)
BASOPHILS RELATIVE PERCENT: 0.3 % (ref 0–2)
BILIRUB SERPL-MCNC: 0.5 MG/DL (ref 0–1.2)
BILIRUBIN DIRECT: 0.3 MG/DL (ref 0–0.3)
BILIRUBIN, INDIRECT: 0.2 MG/DL (ref 0–1)
BLOOD CULTURE, ROUTINE: ABNORMAL
BLOOD CULTURE, ROUTINE: ABNORMAL
BUN BLDV-MCNC: 8 MG/DL (ref 8–23)
CALCIUM SERPL-MCNC: 7.9 MG/DL (ref 8.6–10.2)
CHLORIDE BLD-SCNC: 104 MMOL/L (ref 98–107)
CO2: 23 MMOL/L (ref 22–29)
CREAT SERPL-MCNC: 1.4 MG/DL (ref 0.7–1.2)
EOSINOPHILS ABSOLUTE: 0.13 E9/L (ref 0.05–0.5)
EOSINOPHILS RELATIVE PERCENT: 2.2 % (ref 0–6)
GFR AFRICAN AMERICAN: >60
GFR NON-AFRICAN AMERICAN: >60 ML/MIN/1.73
GLUCOSE BLD-MCNC: 103 MG/DL (ref 74–99)
GRAM STAIN ORDERABLE: NORMAL
HCT VFR BLD CALC: 29.9 % (ref 37–54)
HEMOGLOBIN: 9.1 G/DL (ref 12.5–16.5)
IMMATURE GRANULOCYTES #: 0.02 E9/L
IMMATURE GRANULOCYTES %: 0.3 % (ref 0–5)
LYMPHOCYTES ABSOLUTE: 0.66 E9/L (ref 1.5–4)
LYMPHOCYTES RELATIVE PERCENT: 11.2 % (ref 20–42)
MCH RBC QN AUTO: 27.6 PG (ref 26–35)
MCHC RBC AUTO-ENTMCNC: 30.4 % (ref 32–34.5)
MCV RBC AUTO: 90.6 FL (ref 80–99.9)
METER GLUCOSE: 110 MG/DL (ref 74–99)
METER GLUCOSE: 118 MG/DL (ref 74–99)
METER GLUCOSE: 204 MG/DL (ref 74–99)
METER GLUCOSE: 268 MG/DL (ref 74–99)
METER GLUCOSE: 268 MG/DL (ref 74–99)
MONOCYTES ABSOLUTE: 0.4 E9/L (ref 0.1–0.95)
MONOCYTES RELATIVE PERCENT: 6.8 % (ref 2–12)
NEUTROPHILS ABSOLUTE: 4.68 E9/L (ref 1.8–7.3)
NEUTROPHILS RELATIVE PERCENT: 79.2 % (ref 43–80)
ORGANISM: ABNORMAL
PDW BLD-RTO: 16 FL (ref 11.5–15)
PLATELET # BLD: 202 E9/L (ref 130–450)
PMV BLD AUTO: 10.2 FL (ref 7–12)
POTASSIUM SERPL-SCNC: 3.4 MMOL/L (ref 3.5–5)
RBC # BLD: 3.3 E12/L (ref 3.8–5.8)
SODIUM BLD-SCNC: 135 MMOL/L (ref 132–146)
TOTAL PROTEIN: 5.6 G/DL (ref 6.4–8.3)
WBC # BLD: 5.9 E9/L (ref 4.5–11.5)

## 2019-05-07 PROCEDURE — 36569 INSJ PICC 5 YR+ W/O IMAGING: CPT

## 2019-05-07 PROCEDURE — 2580000003 HC RX 258: Performed by: UROLOGY

## 2019-05-07 PROCEDURE — 82962 GLUCOSE BLOOD TEST: CPT

## 2019-05-07 PROCEDURE — 82248 BILIRUBIN DIRECT: CPT

## 2019-05-07 PROCEDURE — 99232 SBSQ HOSP IP/OBS MODERATE 35: CPT | Performed by: PHYSICIAN ASSISTANT

## 2019-05-07 PROCEDURE — 1200000000 HC SEMI PRIVATE

## 2019-05-07 PROCEDURE — 6370000000 HC RX 637 (ALT 250 FOR IP): Performed by: UROLOGY

## 2019-05-07 PROCEDURE — 36415 COLL VENOUS BLD VENIPUNCTURE: CPT

## 2019-05-07 PROCEDURE — 6360000002 HC RX W HCPCS: Performed by: UROLOGY

## 2019-05-07 PROCEDURE — 6370000000 HC RX 637 (ALT 250 FOR IP): Performed by: INTERNAL MEDICINE

## 2019-05-07 PROCEDURE — 76937 US GUIDE VASCULAR ACCESS: CPT

## 2019-05-07 PROCEDURE — C1751 CATH, INF, PER/CENT/MIDLINE: HCPCS

## 2019-05-07 PROCEDURE — 99232 SBSQ HOSP IP/OBS MODERATE 35: CPT | Performed by: TRANSPLANT SURGERY

## 2019-05-07 PROCEDURE — 95816 EEG AWAKE AND DROWSY: CPT | Performed by: PSYCHIATRY & NEUROLOGY

## 2019-05-07 PROCEDURE — 99233 SBSQ HOSP IP/OBS HIGH 50: CPT | Performed by: INTERNAL MEDICINE

## 2019-05-07 PROCEDURE — 85025 COMPLETE CBC W/AUTO DIFF WBC: CPT

## 2019-05-07 PROCEDURE — 80053 COMPREHEN METABOLIC PANEL: CPT

## 2019-05-07 PROCEDURE — 6370000000 HC RX 637 (ALT 250 FOR IP): Performed by: PHYSICIAN ASSISTANT

## 2019-05-07 PROCEDURE — 6360000002 HC RX W HCPCS: Performed by: INTERNAL MEDICINE

## 2019-05-07 RX ORDER — PETROLATUM 42 G/100G
OINTMENT TOPICAL 2 TIMES DAILY
Status: DISCONTINUED | OUTPATIENT
Start: 2019-05-07 | End: 2019-05-10 | Stop reason: HOSPADM

## 2019-05-07 RX ORDER — PETROLATUM 42 G/100G
OINTMENT TOPICAL 3 TIMES DAILY PRN
Status: DISCONTINUED | OUTPATIENT
Start: 2019-05-07 | End: 2019-05-10 | Stop reason: HOSPADM

## 2019-05-07 RX ORDER — HEPARIN SODIUM (PORCINE) LOCK FLUSH IV SOLN 100 UNIT/ML 100 UNIT/ML
3 SOLUTION INTRAVENOUS PRN
Status: DISCONTINUED | OUTPATIENT
Start: 2019-05-07 | End: 2019-05-10 | Stop reason: HOSPADM

## 2019-05-07 RX ORDER — HEPARIN SODIUM (PORCINE) LOCK FLUSH IV SOLN 100 UNIT/ML 100 UNIT/ML
3 SOLUTION INTRAVENOUS EVERY 12 HOURS SCHEDULED
Status: DISCONTINUED | OUTPATIENT
Start: 2019-05-07 | End: 2019-05-10 | Stop reason: HOSPADM

## 2019-05-07 RX ORDER — PHENYTOIN 50 MG/1
50 TABLET, CHEWABLE ORAL 3 TIMES DAILY
Status: DISCONTINUED | OUTPATIENT
Start: 2019-05-07 | End: 2019-05-10 | Stop reason: HOSPADM

## 2019-05-07 RX ORDER — LIDOCAINE HYDROCHLORIDE 10 MG/ML
5 INJECTION, SOLUTION EPIDURAL; INFILTRATION; INTRACAUDAL; PERINEURAL ONCE
Status: DISCONTINUED | OUTPATIENT
Start: 2019-05-07 | End: 2019-05-10 | Stop reason: HOSPADM

## 2019-05-07 RX ORDER — PANTOPRAZOLE SODIUM 40 MG/1
40 TABLET, DELAYED RELEASE ORAL
Status: DISCONTINUED | OUTPATIENT
Start: 2019-05-07 | End: 2019-05-10 | Stop reason: HOSPADM

## 2019-05-07 RX ORDER — SODIUM CHLORIDE 0.9 % (FLUSH) 0.9 %
10 SYRINGE (ML) INJECTION PRN
Status: DISCONTINUED | OUTPATIENT
Start: 2019-05-07 | End: 2019-05-10 | Stop reason: HOSPADM

## 2019-05-07 RX ADMIN — DEXTROSE MONOHYDRATE 100 MG: 50 INJECTION, SOLUTION INTRAVENOUS at 21:41

## 2019-05-07 RX ADMIN — PHENYTOIN SODIUM 100 MG: 50 INJECTION INTRAMUSCULAR; INTRAVENOUS at 04:32

## 2019-05-07 RX ADMIN — INSULIN LISPRO 2 UNITS: 100 INJECTION, SOLUTION INTRAVENOUS; SUBCUTANEOUS at 12:20

## 2019-05-07 RX ADMIN — PETROLATUM: 42 OINTMENT TOPICAL at 12:20

## 2019-05-07 RX ADMIN — VANCOMYCIN HYDROCHLORIDE 1000 MG: 1 INJECTION, POWDER, LYOPHILIZED, FOR SOLUTION INTRAVENOUS at 18:00

## 2019-05-07 RX ADMIN — PIPERACILLIN AND TAZOBACTAM 3.38 G: 3; .375 INJECTION, POWDER, FOR SOLUTION INTRAVENOUS at 19:43

## 2019-05-07 RX ADMIN — HEPARIN SODIUM 5000 UNITS: 10000 INJECTION INTRAVENOUS; SUBCUTANEOUS at 21:43

## 2019-05-07 RX ADMIN — HEPARIN SODIUM 5000 UNITS: 10000 INJECTION INTRAVENOUS; SUBCUTANEOUS at 14:36

## 2019-05-07 RX ADMIN — LEVETIRACETAM 750 MG: 250 TABLET, FILM COATED ORAL at 19:43

## 2019-05-07 RX ADMIN — INSULIN LISPRO 3 UNITS: 100 INJECTION, SOLUTION INTRAVENOUS; SUBCUTANEOUS at 00:58

## 2019-05-07 RX ADMIN — Medication 10 ML: at 12:07

## 2019-05-07 RX ADMIN — HEPARIN 300 UNITS: 100 SYRINGE at 21:42

## 2019-05-07 RX ADMIN — PHENYTOIN 50 MG: 50 TABLET, CHEWABLE ORAL at 21:41

## 2019-05-07 RX ADMIN — HEPARIN SODIUM 5000 UNITS: 10000 INJECTION INTRAVENOUS; SUBCUTANEOUS at 06:39

## 2019-05-07 RX ADMIN — Medication 10 ML: at 06:33

## 2019-05-07 RX ADMIN — Medication 10 ML: at 04:38

## 2019-05-07 RX ADMIN — LISINOPRIL 10 MG: 10 TABLET ORAL at 12:05

## 2019-05-07 RX ADMIN — PETROLATUM: 42 OINTMENT TOPICAL at 18:02

## 2019-05-07 RX ADMIN — PHENYTOIN 50 MG: 50 TABLET, CHEWABLE ORAL at 19:44

## 2019-05-07 RX ADMIN — Medication 10 ML: at 21:43

## 2019-05-07 RX ADMIN — VANCOMYCIN HYDROCHLORIDE 1000 MG: 1 INJECTION, POWDER, LYOPHILIZED, FOR SOLUTION INTRAVENOUS at 06:33

## 2019-05-07 RX ADMIN — DEXTROSE AND SODIUM CHLORIDE: 5; 900 INJECTION, SOLUTION INTRAVENOUS at 05:15

## 2019-05-07 RX ADMIN — PIPERACILLIN AND TAZOBACTAM 3.38 G: 3; .375 INJECTION, POWDER, FOR SOLUTION INTRAVENOUS at 01:56

## 2019-05-07 RX ADMIN — INSULIN LISPRO 3 UNITS: 100 INJECTION, SOLUTION INTRAVENOUS; SUBCUTANEOUS at 18:03

## 2019-05-07 RX ADMIN — PIPERACILLIN AND TAZOBACTAM 3.38 G: 3; .375 INJECTION, POWDER, FOR SOLUTION INTRAVENOUS at 12:06

## 2019-05-07 RX ADMIN — PANTOPRAZOLE SODIUM 40 MG: 40 TABLET, DELAYED RELEASE ORAL at 12:05

## 2019-05-07 RX ADMIN — INSULIN GLARGINE 5 UNITS: 100 INJECTION, SOLUTION SUBCUTANEOUS at 21:43

## 2019-05-07 RX ADMIN — LEVETIRACETAM 750 MG: 250 TABLET, FILM COATED ORAL at 06:34

## 2019-05-07 RX ADMIN — TAMSULOSIN HYDROCHLORIDE 0.4 MG: 0.4 CAPSULE ORAL at 12:05

## 2019-05-07 RX ADMIN — PRAVASTATIN SODIUM 80 MG: 20 TABLET ORAL at 19:44

## 2019-05-07 ASSESSMENT — PAIN SCALES - PAIN ASSESSMENT IN ADVANCED DEMENTIA (PAINAD)
TOTALSCORE: 0
FACIALEXPRESSION: 0
CONSOLABILITY: 0
BREATHING: 0
NEGVOCALIZATION: 0
BODYLANGUAGE: 0

## 2019-05-07 ASSESSMENT — PAIN SCALES - GENERAL
PAINLEVEL_OUTOF10: 0

## 2019-05-07 NOTE — PROGRESS NOTES
Inpatient Medical Oncology Progress Note    Subjective:  Pain controlled. He has no complaints. Tamx 99.1. Objective:  BP (!) 157/88   Pulse 82   Temp 98.2 °F (36.8 °C) (Temporal)   Resp 20   Ht 5' 10\" (1.778 m)   Wt 128 lb 8 oz (58.3 kg)   SpO2 97%   BMI 18.44 kg/m²   General: Awake and Alert, not in acute distress. LUNGS:  No increased work of breathing  CARDIOVASCULAR:  RR  ABDOMEN:  Soft, non-tender, non-distended  EXT: No edema, cyanosis or clubbing    Impression/Plan:  77 y/o with Hx of ? 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 Non-compliant with  appointments. Now candidemia w/ biliary stent; on Eraxis, also on Vanc/Zosyn, ID following. ZOË neg for vegetations. New masses in liver on triphasic; CEA 2.4, CA 19-9 1199; CT guided liver biopsy/liver abscess drainage on 5/6, no organism seen on GS, await path results. Will continue to follow.      Penni Hashimoto, MD   HEMATOLOGY/MEDICAL [address] [facility],  [address]  [address] [facility] MED ONCOLOGY  [facility] [address]  Dept: [phone]

## 2019-05-07 NOTE — PROGRESS NOTES
Dr. Randhawa Masters on floor and states patient will need long term antibiotics. States she is awaiting culture results. PICC permit printed.      Jose Moss

## 2019-05-07 NOTE — PLAN OF CARE
Problem: Malnutrition  (NI-5.2)  Goal: Food and/or Nutrient Delivery  Add Diabetic ONS TID  Description  Individualized approach for food/nutrient provision.   Outcome: Met This Shift

## 2019-05-07 NOTE — PROGRESS NOTES
[place], patient's wife, gave permission for myself and another nurse to sign picc line permit.

## 2019-05-07 NOTE — PROGRESS NOTES
Hospital Medicine    Subjective:  Pt alert responsive      Current Facility-Administered Medications:     pantoprazole (PROTONIX) tablet 40 mg, 40 mg, Oral, Pramod LIVE DO    lisinopril (PRINIVIL;ZESTRIL) tablet 10 mg, 10 mg, Oral, Daily, Dillan Chowdary MD, 10 mg at 05/04/19 1028    [DISCONTINUED] levetiracetam (KEPPRA) 750 mg/50 mL IVPB, 750 mg, Intravenous, BID **OR** levETIRAcetam (KEPPRA) tablet 750 mg, 750 mg, Oral, BID, Dillan Chowdary MD, 750 mg at 05/07/19 0634    insulin glargine (LANTUS) injection vial 5 Units, 5 Units, Subcutaneous, Nightly, Dillan Chowdary MD, 5 Units at 05/06/19 2211    [COMPLETED] anidulafungin (ERAXIS) 200 mg in dextrose 5 % 260 mL IVPB, 200 mg, Intravenous, Once, 200 mg at 05/03/19 1402 **AND** anidulafungin (ERAXIS) 100 mg in dextrose 5 % 130 mL IVPB, 100 mg, Intravenous, Q24H, Dillan Chowdary MD, 100 mg at 05/06/19 2215    vancomycin 1000 mg IVPB in 250 mL D5W addavial, 1,000 mg, Intravenous, Q12H, Dillan Chowdary MD, Stopped at 05/07/19 0753    piperacillin-tazobactam (ZOSYN) 3.375 g in dextrose 5 % 100 mL IVPB extended infusion (mini-bag), 3.375 g, Intravenous, Q8H, Dillan Chowdary MD, Stopped at 05/07/19 0656    phenytoin (DILANTIN) 100 mg in sodium chloride 0.9 % 100 mL IVPB (maintenance dose), 100 mg, Intravenous, Q8H, Dillan Chowdary MD, Stopped at 05/07/19 0512    insulin lispro (HUMALOG) injection vial 0-6 Units, 0-6 Units, Subcutaneous, Q4H, Dillan Chowdary MD, 3 Units at 05/07/19 0058    glucose (GLUTOSE) 40 % oral gel 15 g, 15 g, Oral, PRN, Dillan Chowdary MD    glucagon (rDNA) injection 1 mg, 1 mg, Intramuscular, PRN, Dillan Chowdary MD    dextrose 5 % solution, 100 mL/hr, Intravenous, PRN, Dillan Chowdary MD    dextrose 50 % solution 12.5 g, 12.5 g, Intravenous, PRN, Dillan Chowdary MD, 12.5 g at 05/02/19 0650    potassium chloride 10 mEq/100 mL IVPB (Peripheral Line), 10 mEq, Intravenous, PRN, Dillan Chowdary MD, Last Rate: 05/07/2019    MYELOPCT 0.9 01/06/2019    BASOPCT 0.3 05/07/2019    MONOSABS 0.40 05/07/2019    LYMPHSABS 0.66 05/07/2019    EOSABS 0.13 05/07/2019    BASOSABS 0.02 05/07/2019     CMP:    Lab Results   Component Value Date     05/07/2019    K 3.4 05/07/2019    K 3.9 01/22/2019     05/07/2019    CO2 23 05/07/2019    BUN 8 05/07/2019    CREATININE 1.4 05/07/2019    GFRAA >60 05/07/2019    LABGLOM >60 05/07/2019    GLUCOSE 103 05/07/2019    PROT 5.6 05/07/2019    LABALBU 2.5 05/07/2019    CALCIUM 7.9 05/07/2019    BILITOT 0.5 05/07/2019    ALKPHOS 79 05/07/2019    AST 12 05/07/2019    ALT 8 05/07/2019     Warfarin PT/INR:    Lab Results   Component Value Date    INR 1.1 05/06/2019    INR 1.0 05/04/2019    INR 1.0 05/01/2019    PROTIME 12.3 05/06/2019    PROTIME 11.6 05/04/2019    PROTIME 12.1 05/01/2019       Assessment:    Principal Problem:    Hyperosmolar hyperglycemic coma due to diabetes mellitus without ketoacidosis (HCC)  Active Problems:    Seizures (HCC)    Seizure-like activity (HCC)    Altered mental status    Acute ischemic stroke (HCC)    Moderate protein-calorie malnutrition (HCC)    Liver mass  Resolved Problems:    * No resolved hospital problems.  *      Plan:  Replace k cont meds per id check with neurology re streamlining antiseizure meds        Sanjeev Calderon  8:11 AM  5/7/2019

## 2019-05-07 NOTE — PROGRESS NOTES
Nutrition Assessment    Type and Reason for Visit: Reassess    Nutrition Recommendations: Continue current diet, Start ONS    Nutrition Assessment: Pt improving from a nutritional standpoint d/t now tolerating a PO diet, however now meets criteria for severe malnutrition. Pt at further compromise d/t unstageable pressure ulcer w/ increased nutrient needs for healing, noted candida bacteremia w/ likely pancreatic adenocarcinoma, liver abscess, and newly dx DM. Will add Diabetic ONS TID. Malnutrition Assessment:  · Malnutrition Status: Meets the criteria for severe malnutrition  · Context: Chronic illness  · Findings of the 6 clinical characteristics of malnutrition (Minimum of 2 out of 6 clinical characteristics is required to make the diagnosis of moderate or severe Protein Calorie Malnutrition based on AND/ASPEN Guidelines):  1. Energy Intake-Less than or equal to 50% of estimated energy requirement, Greater than or equal to 5 days    2. Weight Loss-20% loss or greater, (5 months)  3. Fat Loss-Moderate subcutaneous fat loss, Orbital, Triceps  4. Muscle Loss-Severe muscle mass loss, Temples (temporalis muscle), Clavicles (pectoralis and deltoids)  5. Fluid Accumulation-No significant fluid accumulation,    6.  Strength-Not measured    Nutrition Risk Level:  Moderate    Nutrient Needs:  · Estimated Daily Total Kcal: 2310-2201(MSJ 1363 x 1.3 SF)  · Estimated Daily Protein (g): (1.5-1.8 gm/kg CBW)  · Estimated Daily Total Fluid (ml/day): 8553-4482(1 ml/kcal)    Nutrition Diagnosis:   · Problem: Severe malnutrition, In context of chronic illness  · Etiology: related to Cognitive or neurological impairment     Signs and symptoms:  as evidenced by Intake 25-50%, Diet history of poor intake, Presence of wounds, Weight loss, Moderate loss of subcutaneous fat, Severe muscle loss    Objective Information:  · Nutrition-Focused Physical Findings: pt alert, oriented x2, active BS, soft abd, closed suction drain RUQ, fluids WDL, no edema  · Wound Type: Pressure Ulcer, Unstageable(L outer ankle)  · Current Nutrition Therapies:  · Oral Diet Orders: Carb Control 4 Carbs/Meal   · Oral Diet intake: 26-50%(average w/ variable intakes)  · Oral Nutrition Supplement (ONS) Orders: None  · Anthropometric Measures:  · Ht: 5' 10\" (177.8 cm)   · Current Body Wt: 128 lb (58.1 kg)(5/7 bed scale)  · Admission Body Wt: 132 lb (59.9 kg)(5/1/19, actual)  · Usual Body Wt: 161 lb (73 kg)(1/6/19, bedscale ; EMR RD hx shows family stated UBW of 230#)  · % Weight Change:  ,  noted 3% wt loss during admit x 1 week.  20% wt loss x 4 months  · Ideal Body Wt: 166 lb (75.3 kg), % Ideal Body 77%  · BMI Classification: BMI 18.5 - 24.9 Normal Weight    Nutrition Interventions:   Continue current diet, Start ONS  Continued Inpatient Monitoring, Education Initiated, Coordination of Care(Discussed ONS options/preferences)    Nutrition Evaluation:   · Evaluation: Goals set   · Goals: Pt to consume >50% most meals and ONS    · Monitoring: Meal Intake, Supplement Intake, Diet Tolerance, Skin Integrity, Wound Healing, I&O, Mental Status/Confusion, Weight, Pertinent Labs, Chewing/Swallowing, Monitor Hemodynamic Status, Monitor Bowel Function      Electronically signed by Jez Mckeon, MS, RD, LD on 5/7/19 at 2:46 PM    Contact Number: [phone]

## 2019-05-07 NOTE — FLOWSHEET NOTE
Inpatient Wound Care    Admit Date: 5/1/2019  4:58 PM    Reason for consult:  AMS   bacteremia; UTI; biliary  obstruction; hyperbilirubinemia; hypertension with history of possible  pancreatic CA,    Findings:       05/07/19 1044   Skin Integrity Site 2   Skin Integrity Location 2 Other (Comment)  (dry flaky skin)   Location 2 BLE   Wound 05/03/19 Ankle Left; Outer   Date First Assessed/Time First Assessed: 05/03/19 0830   Present on Hospital Admission: Yes  Wound Approximate Age at First Assessment (Weeks): (c)   Location: Ankle  Wound Location Orientation: Left; Outer   Wound Image    Wound Pressure Unstageable   Wound Length (cm) 1 cm   Wound Width (cm) 2 cm   Wound Depth (cm)   (obscured)   Wound Surface Area (cm^2) 2 cm^2   Change in Wound Size % (l*w) 11.11   Wound Assessment Dry;Black; Other (Comment)  (stable eschar)   Ruby-wound Assessment Intact   Red%Wound Bed 100     **Informed Consent**    The patient has given verbal consent to have photos taken of left outer ankle and inserted into their chart as part of their permanent medical record for purposes of documentation, treatment management and/or medical review. All Images taken on 5/7/19 of patient name: Jace Melgoza were transmitted and stored on secured "[employer] located within [employer] by a registered [employer] Mobile Application Device. Impression:  unstageable left outer ankle    Plan:no local wound care required at this time. Aquaphor for the dry skin to BLE  Continued preventive care.  Kaley Lamb 5/7/2019 10:46 AM

## 2019-05-07 NOTE — PROGRESS NOTES
Pretty Maldonado is a 76 y.o. male     Neurology following for seizures    Pt presented from NH with unresponsiveness and random movements to right arm and incontinent of urine x 3. Prior to being found unresponsive, he was A&O x 3. Pt had complete plegia to RUE after having irregular movements of RUE in ED. Pt also had automatism in mouth with chewing movements. Ativan 2mg was given but was helpful in stopping these movements so pt was started on Phenytoin 100 mg TID. Pt was recently d/c 3 weeks ago from bacteremia. ROS limited with lack of cooperation answering questions. No new complaints     EEG 5/3 with epileptogenicity arising from the L central parietal area and a moderate diffuse encephalopathy---no clear seizure activity but the degree of encephalopathy has increased    Repeat EEG today normal. No epileptogenic or lateralizing signs seen    Phenytoin will be decreased to 50 mg TID x 1 week, then stopped    No further seizure activity reported     Sitter at bedside states that he does seem confused when he wakes up, but then becomes more alert. No seizure activity witnessed     He is sleeping upon my entering. When woken up, he repeatedly was picking at the blanket with his R hand and smacking his lips.  Afterwards---when asking him to name objects, perseveration present saying \"19\" and \"flashlight\" repeatedly      ID following--    Yeast in blood---likely cadidemia    On Eraxis    Vanco and zosyn pending liver bx results     No family at bedside    Current Facility-Administered Medications   Medication Dose Route Frequency Provider Last Rate Last Dose    pantoprazole (PROTONIX) tablet 40 mg  40 mg Oral QAM AC Baljeet Ybarramer, DO   40 mg at 05/07/19 1205    mineral oil-hydrophilic petrolatum (HYDROPHOR) ointment   Topical BID Glean Pastel Malmer, DO        And    mineral oil-hydrophilic petrolatum (HYDROPHOR) ointment   Topical TID PRN Glean Pastel Malmer, DO        phenytoin (DILANTIN) chewable tablet 50 mg  50 mg Oral TID ROSA Mercado        lisinopril (PRINIVIL;ZESTRIL) tablet 10 mg  10 mg Oral Daily Lex Cooks, MD   10 mg at 05/07/19 1205    levETIRAcetam (KEPPRA) tablet 750 mg  750 mg Oral BID Lex Cooks, MD   750 mg at 05/07/19 3579    insulin glargine (LANTUS) injection vial 5 Units  5 Units Subcutaneous Nightly Lex Cooks, MD   5 Units at 05/06/19 2211    anidulafungin (ERAXIS) 100 mg in dextrose 5 % 130 mL IVPB  100 mg Intravenous Q24H Lex Cooks, MD   100 mg at 05/06/19 2215    vancomycin 1000 mg IVPB in 250 mL D5W addavial  1,000 mg Intravenous Q12H Lex Cooks, MD   Stopped at 05/07/19 0753    piperacillin-tazobactam (ZOSYN) 3.375 g in dextrose 5 % 100 mL IVPB extended infusion (mini-bag)  3.375 g Intravenous Q8H Lex Cooks, MD 25 mL/hr at 05/07/19 1206 3.375 g at 05/07/19 1206    insulin lispro (HUMALOG) injection vial 0-6 Units  0-6 Units Subcutaneous Q4H Lex Cooks, MD   2 Units at 05/07/19 1220    glucose (GLUTOSE) 40 % oral gel 15 g  15 g Oral PRN Lex Cooks, MD        glucagon (rDNA) injection 1 mg  1 mg Intramuscular PRN Lex Cooks, MD        dextrose 5 % solution  100 mL/hr Intravenous PRN Lex Cooks, MD        dextrose 50 % solution 12.5 g  12.5 g Intravenous PRN Lex Cooks, MD   12.5 g at 05/02/19 0650    potassium chloride 10 mEq/100 mL IVPB (Peripheral Line)  10 mEq Intravenous PRN Lex Cooks,  mL/hr at 05/02/19 0944 10 mEq at 05/02/19 0944    pravastatin (PRAVACHOL) tablet 80 mg  80 mg Oral Nightly Lex Cooks, MD   80 mg at 05/06/19 2216    tamsulosin (FLOMAX) capsule 0.4 mg  0.4 mg Oral Daily Lex Cooks, MD   0.4 mg at 05/07/19 1205    sodium chloride flush 0.9 % injection 10 mL  10 mL Intravenous 2 times per day Lex Cooks, MD   10 mL at 05/07/19 1207    sodium chloride flush 0.9 % injection 10 mL  10 mL Intravenous PRN Lex Cooks, MD   10 mL at 05/07/19 0699    magnesium hydroxide (MILK OF MAGNESIA) 400 MG/5ML suspension 30 mL  30 mL Oral Daily PRN Jeison Delarosa MD        ondansetron [facility]) injection 4 mg  4 mg Intravenous Q6H PRN Jeison Delarosa MD        heparin (porcine) injection 5,000 Units  5,000 Units Subcutaneous 3 times per day Jeison Delarosa MD   5,000 Units at 05/07/19 0639    dextrose 50 % solution 12.5 g  12.5 g Intravenous PRN Jeison Delarosa MD   12.5 g at 05/03/19 1304    potassium chloride 10 mEq/100 mL IVPB (Peripheral Line)  10 mEq Intravenous PRN Jeison Delarosa  mL/hr at 05/02/19 0830 10 mEq at 05/02/19 0830    magnesium sulfate 1 g in dextrose 5% 100 mL IVPB  1 g Intravenous PRN Jeison Delarosa MD           Objective:     BP (!) 167/94   Pulse 79   Temp 99.1 °F (37.3 °C) (Temporal)   Resp 20   Ht 5' 10\" (1.778 m)   Wt 128 lb 8 oz (58.3 kg)   SpO2 99%   BMI 18.44 kg/m²     General: Patient lying bed in NAD. Appears frail and well developed. Head:  Normocephalic and atraumatic  Eyes: Sclera white, conjunctiva pink  Neck:  Trachea midline. Neck supple and normal ROM, no bruits, no lymphadenopathy  Lungs: CTAB. Effort normal  Heart: RRR. No murmur   Extremities: No edema to BLE, +2 pulses bilaterally  Skin: No lesions or rashes     Mental Status: Sleeping. Rouses to loud voice. Oriented to self and place. Unable to tell me the year or president.  Perseveration present with object identification and perseverated on \"19\" after he corrected himself with the year    Decreased attention/concentration   Impaired fundus of knowledge  Impaired memories     Speech/Language: No dysarthria or aphasia     Exam limited due to lack of cooperation     Cranial Nerves:   I: smell    II: visual acuity     II: visual fields Blink to threat    II: pupils CATALINA    III,VII: ptosis None   III,IV,VI: extraocular muscles  EOMI     V: mastication    V: facial light touch sensation  Normal    V,VII: corneal reflex     VII: facial muscle function - upper  Normal    VII: facial muscle function - lower Normal    VIII: hearing Normal    IX: soft palate elevation  Normal    IX,X: gag reflex    XI: trapezius strength     XI: sternocleidomastoid strength    XI: neck extension strength     XII: tongue strength  Normal      Motor:  Moves all extremities antigravity--limited cooperation for strength testing    Decreased muscle tone and bulk  Patient with continuous repetitive picking of blanket with R hand and lip smacking upon wakening      Sensory:  LT appears intact  Appreciates PP     DTR:   +1 arms  Absent legs     No Babinskis  No Navas's    No pathological reflexes    Laboratory/Radiology:     Lab Results   Component Value Date    WBC 5.9 05/07/2019    HGB 9.1 (L) 05/07/2019    HCT 29.9 (L) 05/07/2019    MCV 90.6 05/07/2019     05/07/2019     Lab Results   Component Value Date     05/07/2019    K 3.4 (L) 05/07/2019     05/07/2019    CO2 23 05/07/2019    BUN 8 05/07/2019    CREATININE 1.4 (H) 05/07/2019    GLUCOSE 103 (H) 05/07/2019    CALCIUM 7.9 (L) 05/07/2019    PROT 5.6 (L) 05/07/2019    LABALBU 2.5 (L) 05/07/2019    BILITOT 0.5 05/07/2019    ALKPHOS 79 05/07/2019    AST 12 05/07/2019    ALT 8 05/07/2019    LABGLOM >60 05/07/2019    GFRAA >60 05/07/2019       Hepatic Function Panel:    Lab Results   Component Value Date    ALKPHOS 79 05/07/2019    ALT 8 05/07/2019    AST 12 05/07/2019    PROT 5.6 05/07/2019    BILITOT 0.5 05/07/2019    BILIDIR 0.3 05/07/2019    IBILI 0.2 05/07/2019    LABALBU 2.5 05/07/2019     ABG:    Lab Results   Component Value Date    PH 7.368 05/02/2019    PCO2 40.2 05/02/2019    PO2 77.6 05/02/2019    HCO3 22.6 05/02/2019    BE -2.5 05/02/2019    O2SAT 94.7 05/02/2019     HgBA1c:    Lab Results   Component Value Date    LABA1C 10.0 05/02/2019     Lab Results   Component Value Date    LDLCALC 91 05/03/2019     Phenytoin Total: 18.4 WNL     CT ABSCESS DRAINAGE W CATH PLACEMENT S&I   Final Result   Successful uncomplicated  liver abscess drainage                CTA TRIPHASIC LIVER   Final Result      XR CHEST PORTABLE   Final Result   Right internal jugular central venous catheter tip in SVC. No pneumothorax on the right on the left. Patient has a stent in the   projections for the biliary tract/common bile duct and there are signs   of pneumobilia in the liver. IMPRESSION: No acute cardiopulmonary process. MRI Brain W WO Contrast   Final Result   1. Very small localized peripheral cortical acute ischemic insult in   the posterior right parietal region. 2. Extensive the changes relate with small vessel disease throughout   the white matter of both cerebral hemispheres, basal ganglia region   and brainstem. 3. Additional old insults with encephalomalacia are also seen in both   cerebellar hemispheres. 4. Overall loss of brain parenchyma more than expected for the age   group in supra and infratentorial regions. Findings to be correlated   clinically. ALERT:  ABNORMAL REPORT. XR CHEST PORTABLE   Final Result   No acute cardiopulmonary process. CT Head WO Contrast   Final Result   1. Chronic ischemic changes in the brain parenchyma predominantly   supratentorial areas with overall loss of volume of the brain   parenchyma for the age group, as above commented. 2. No indication for an acute intracranial process. CT ABSCESS CATHETER FOLLOW UP    (Results Pending)     CTH: negative for acute findings    MRI brain: acute infarct of R posterior parietal and  with old infarcts also noted     EEG 5/  This is a normal EEG. No epileptiform activity or lateralizing signs are seen. EEG 5/3  This EEG shows epileptogenicity arising from left central parietal area. Also it shows a moderate diffuse encephalopathy. Compared to records from yesterday there is no clear seizure activity but the degree of encephalopathy has increased. Clinical correlation is recommended.     EEG: This EEG shows epilepsy Latosha Sykes PA-C  1:30 PM  5/7/2019

## 2019-05-07 NOTE — PROGRESS NOTES
ID Progress Note                [address] [facility] [address], [place], [address]            Phone [phone]     Fax [phone]      Chief complaint   AMS    Subjective:  Awake and oriented  Working with physical therapy   CA 19-9 very high  Denies abdo pain  Rt IJ got accidentally removed  10 ROS otherwise negative unless otherwise specified above. Objective:    Vitals:    05/07/19 1113   BP: (!) 167/94   Pulse: 79   Resp: 20   Temp: 99.1 °F (37.3 °C)   SpO2: 99%     GENERAL:  The patient is not alert and awake, minimally responsive. HEENT:  Atraumatic and normocephalic. PERRLA. EOMI. RESPIRATORY:  Air entry bilaterally equal.  No wheezes or crackles. CARDIOVASCULAR:  S1 and S2 normal.  No murmur, rubs, or gallop. ABDOMEN:  Soft, nontender, and nondistended. Bowel sounds present. EXTREMITIES:  No pedal edema. NEUROLOGIC:  Not following commands.     Rt IJ in place - ACCIDENTALLY removed by pt      Labs:  Recent Labs     05/06/19  0804 05/07/19  0644   WBC 5.4 5.9   RBC 3.61* 3.30*   HGB 10.1* 9.1*   HCT 32.7* 29.9*   MCV 90.6 90.6   MCH 28.0 27.6   MCHC 30.9* 30.4*   RDW 16.2* 16.0*    202   MPV 10.2 10.2     CMP:    Lab Results   Component Value Date     05/07/2019    K 3.4 05/07/2019    K 3.9 01/22/2019     05/07/2019    CO2 23 05/07/2019    BUN 8 05/07/2019    CREATININE 1.4 05/07/2019    GFRAA >60 05/07/2019    LABGLOM >60 05/07/2019    GLUCOSE 103 05/07/2019    PROT 5.6 05/07/2019    LABALBU 2.5 05/07/2019    CALCIUM 7.9 05/07/2019    BILITOT 0.5 05/07/2019    ALKPHOS 79 05/07/2019    AST 12 05/07/2019    ALT 8 05/07/2019          Microbiology :  No results for input(s): BC in the last 72 hours. No results for input(s): Piero Jeremiah in the last 72 hours. No results for input(s): LABURIN in the last 72 hours. No results for input(s): CULTRESP in the last 72 hours. No results for input(s): WNDABS in the last 72 hours.     Radiology :  CT ABSCESS DRAINAGE W complicated  bacteremia with six weeks of ampicillin plus ceftriaxone ending on  04/30/2019, also had polymicrobial bacteremia including Klebsiella  pneumoniae, E. coli as well as Clostridium perfringens. 5 hematuria/traumatic talavera     PLAN:      1.  continue  Eraxis. Continue vancomycin and Zosyn pending liver biopsy, check van trough - 15.1 watch renal function (Cr 1.4)  2. PICC line discontinued, tip culture negative so far  3.   ZOË  - no vegetations  4.  s/p CT guided biopsy on 5/6- follow results    Ordered  PICC today    very high, oncology/hepatobiliary on board      Electronically signed by Hawa Rae MD on 5/7/2019 at 12:40 PM

## 2019-05-07 NOTE — PROGRESS NOTES
[facility] PROGRESS NOTE                                                                       5/7/2019        CHIEF UROLOGIC COMPLAINT: Freed catheter trauma     HISTORY OF PRESENT ILLNESS:  Patient without new complaints. No fever, chills, nausea, hematuria. Sitter at bedside. Patient unaware of type of building that he is in but knows the year and name of president. REVIEW OF SYSTEMS:   CONSTITUTIONAL: negative  HEENT: negative  HEMATOLOGIC: negative  ENDOCRINE: negative  RESPIRATORY: negative  CV: negative  GI: negative  NEURO: negative  ORTHOPEDICS: negative  PSYCHIATRIC: some confusion  : as above    PAST FAMILY HISTORY:    Family History   Problem Relation Age of Onset    Cancer Mother 50        breast     PAST SOCIAL HISTORY:    Social History     Socioeconomic History    Marital status:      Spouse name: None    Number of children: None    Years of education: None    Highest education level: None   Occupational History    None   Social Needs    Financial resource strain: None    Food insecurity:     Worry: None     Inability: None    Transportation needs:     Medical: None     Non-medical: None   Tobacco Use    Smoking status: Never Smoker    Smokeless tobacco: Never Used   Substance and Sexual Activity    Alcohol use:  Yes     Alcohol/week: 4.8 oz     Types: 8 Cans of beer per week     Comment: states drinks one 24 ounce beer daily    Drug use: No     Types: Marijuana    Sexual activity: Not Currently   Lifestyle    Physical activity:     Days per week: None     Minutes per session: None    Stress: None   Relationships    Social connections:     Talks on phone: None     Gets together: None     Attends Mandaen service: None     Active member of club or organization: None     Attends meetings of clubs or organizations: None     Relationship status: None    Intimate partner violence:     Fear of current or ex partner: None     Emotionally abused: None     Physically abused: None     Forced sexual activity: None   Other Topics Concern    None   Social History Narrative    None       Scheduled Meds:   pantoprazole  40 mg Oral QAM AC    lisinopril  10 mg Oral Daily    levETIRAcetam  750 mg Oral BID    insulin glargine  5 Units Subcutaneous Nightly    anidulafungin  100 mg Intravenous Q24H    vancomycin  1,000 mg Intravenous Q12H    piperacillin-tazobactam  3.375 g Intravenous Q8H    phenytoin (DILANTIN) maintenance dose IVPB  100 mg Intravenous Q8H    insulin lispro  0-6 Units Subcutaneous Q4H    pravastatin  80 mg Oral Nightly    tamsulosin  0.4 mg Oral Daily    sodium chloride flush  10 mL Intravenous 2 times per day    heparin (porcine)  5,000 Units Subcutaneous 3 times per day     Continuous Infusions:   dextrose       PRN Meds:.glucose, glucagon (rDNA), dextrose, dextrose, potassium chloride, sodium chloride flush, magnesium hydroxide, ondansetron, dextrose, potassium chloride, magnesium sulfate    BP (!) 162/69   Pulse 91   Temp 98.3 °F (36.8 °C) (Oral)   Resp 16   Ht 5' 10\" (1.778 m)   Wt 128 lb 8 oz (58.3 kg)   SpO2 97%   BMI 18.44 kg/m²     Lab Results   Component Value Date    WBC 5.9 05/07/2019    HGB 9.1 (L) 05/07/2019    HCT 29.9 (L) 05/07/2019    MCV 90.6 05/07/2019     05/07/2019       Lab Results   Component Value Date    CREATININE 1.4 (H) 05/07/2019       PHYSICAL EXAMINATION:  Skin dry, without rashes  Respirations non-labored, intact  Abdomen soft, non-tender, non-distended  Alert and oriented x3  Talavera draining clear urine      ASSESSMENT AND PLAN:  1.   Urinary retention, Urethral disruption, gross hematuria:   -Continue talavera catheter for 7 days  -urine clear  -will follow    Sangeeta Díaz M.D.  5/7/2019  10:44 AM

## 2019-05-07 NOTE — PLAN OF CARE
Problem: Falls - Risk of:  Goal: Will remain free from falls  Description  Will remain free from falls  5/7/2019 0449 by Aris Lucero RN  Outcome: Met This Shift  5/6/2019 2343 by Arianna West RN  Outcome: Met This Shift  Goal: Absence of physical injury  Description  Absence of physical injury  5/7/2019 0449 by Aris Lucero RN  Outcome: Met This Shift  5/6/2019 2343 by Arianna West RN  Outcome: Met This Shift     Problem: Risk for Impaired Skin Integrity  Goal: Tissue integrity - skin and mucous membranes  Description  Structural intactness and normal physiological function of skin and  mucous membranes.   Outcome: Met This Shift     Problem: ACTIVITY INTOLERANCE/IMPAIRED MOBILITY  Goal: Mobility/activity is maintained at optimum level for patient  Outcome: Met This Shift     Problem: COMMUNICATION IMPAIRMENT  Goal: Ability to express needs and understand communication  Outcome: Met This Shift     Problem: Nutrition  Goal: Optimal nutrition therapy  Outcome: Met This Shift     Problem: Neurological  Goal: Maximum potential motor/sensory/cognitive function  Outcome: Met This Shift

## 2019-05-07 NOTE — PROGRESS NOTES
PICC Placement 5/7/2019    Product number: [id number]   Lot Number: [id number]      Ultrasound: yes   L Basilic      Upper Arm Circumference: 24cm    Size: 5f    Exposed Length: 3cm    Internal Length: 40cm   Cut: 7cm   Vein Measurement: .55cm               YOSSI Garcia  5/7/2019  4:36 PM

## 2019-05-07 NOTE — PROCEDURES
EEG Report  Hilario Núñez is a 76 y.o. male      Appointment Date 5/7/2019   Appointment Time 8:30am   Facility Location [facility] EEG Number [id number]   Type of Study routine Floor 6421-B     Technical Specifications  Technician [address] [place] [address] of [facility] Awake/sleep   Sleep deprived? No   Hyperventilation tested? No   Photic stim tested?  No   EEG recording Standard 10-20 electrode placement    Duration of recording 27.46 mins   EEG complete? yes       Clinical History  seizures    Medications    Current Facility-Administered Medications:     pantoprazole (PROTONIX) tablet 40 mg, 40 mg, Oral, QAM Susy EPPERSON DO    lisinopril (PRINIVIL;ZESTRIL) tablet 10 mg, 10 mg, Oral, Daily, Laura Burns MD, 10 mg at 05/04/19 1028    [DISCONTINUED] levetiracetam (KEPPRA) 750 mg/50 mL IVPB, 750 mg, Intravenous, BID **OR** levETIRAcetam (KEPPRA) tablet 750 mg, 750 mg, Oral, BID, Laura Burns MD, 750 mg at 05/07/19 0634    insulin glargine (LANTUS) injection vial 5 Units, 5 Units, Subcutaneous, Nightly, Laura Burns MD, 5 Units at 05/06/19 2211    [COMPLETED] anidulafungin (ERAXIS) 200 mg in dextrose 5 % 260 mL IVPB, 200 mg, Intravenous, Once, 200 mg at 05/03/19 1402 **AND** anidulafungin (ERAXIS) 100 mg in dextrose 5 % 130 mL IVPB, 100 mg, Intravenous, Q24H, Laura Burns MD, 100 mg at 05/06/19 2215    vancomycin 1000 mg IVPB in 250 mL D5W addavial, 1,000 mg, Intravenous, Q12H, Laura Burns MD, Stopped at 05/07/19 0753    piperacillin-tazobactam (ZOSYN) 3.375 g in dextrose 5 % 100 mL IVPB extended infusion (mini-bag), 3.375 g, Intravenous, Q8H, Laura Burns MD, Stopped at 05/07/19 0656    phenytoin (DILANTIN) 100 mg in sodium chloride 0.9 % 100 mL IVPB (maintenance dose), 100 mg, Intravenous, Q8H, Laura Burns MD, Stopped at 05/07/19 0512    insulin lispro (HUMALOG) injection vial 0-6 Units, 0-6 Units, Subcutaneous, Q4H, Laura Burns MD, 3 Units at 05/07/19 0058    glucose (GLUTOSE) 40 % oral gel 15 g, 15 g, Oral, PRN, Aubrey Yanez MD    glucagon (rDNA) injection 1 mg, 1 mg, Intramuscular, PRN, Aubrey Yanez MD    dextrose 5 % solution, 100 mL/hr, Intravenous, PRN, Aubrey Yanez MD    dextrose 50 % solution 12.5 g, 12.5 g, Intravenous, PRN, Aubrey Yanez MD, 12.5 g at 05/02/19 0650    potassium chloride 10 mEq/100 mL IVPB (Peripheral Line), 10 mEq, Intravenous, PRN, Aubrey Yanez MD, Last Rate: 100 mL/hr at 05/02/19 0944, 10 mEq at 05/02/19 0944    pravastatin (PRAVACHOL) tablet 80 mg, 80 mg, Oral, Nightly, Aubrey Yanez MD, 80 mg at 05/06/19 2216    tamsulosin (FLOMAX) capsule 0.4 mg, 0.4 mg, Oral, Daily, Aubrey Yanez MD, Stopped at 05/03/19 8041    sodium chloride flush 0.9 % injection 10 mL, 10 mL, Intravenous, 2 times per day, Aubrey Yanez MD, 10 mL at 05/06/19 2216    sodium chloride flush 0.9 % injection 10 mL, 10 mL, Intravenous, PRN, Aubrey Yanez MD, 10 mL at 05/07/19 5934    magnesium hydroxide (MILK OF MAGNESIA) 400 MG/5ML suspension 30 mL, 30 mL, Oral, Daily PRN, Aubrey Yanez MD    [facility]etron [facility]) injection 4 mg, 4 mg, Intravenous, Q6H PRN, Aubrey Yanez MD    heparin (porcine) injection 5,000 Units, 5,000 Units, Subcutaneous, 3 times per day, Aubrey Yanez MD, 5,000 Units at 05/07/19 0639    dextrose 50 % solution 12.5 g, 12.5 g, Intravenous, PRN, Aubrey Yanez MD, 12.5 g at 05/03/19 1304    potassium chloride 10 mEq/100 mL IVPB (Peripheral Line), 10 mEq, Intravenous, PRN, Aubrey Yanez MD, Last Rate: 100 mL/hr at 05/02/19 0830, 10 mEq at 05/02/19 0830    magnesium sulfate 1 g in dextrose 5% 100 mL IVPB, 1 g, Intravenous, PRN, Aubrey Yanez MD        Physician Interpretation    General EEG Report  10 Hz PDR. Normal transition to drowsiness. Type of EEG >>  Routine, normal          General Impression  This is a normal EEG. No epileptiform activity or lateralizing signs are seen.    Ellie Redmond, MD Eaw Martínez

## 2019-05-08 ENCOUNTER — APPOINTMENT (OUTPATIENT)
Dept: CT IMAGING | Age: 69
DRG: 871 | End: 2019-05-08
Payer: MEDICARE

## 2019-05-08 LAB
ALBUMIN SERPL-MCNC: 3 G/DL (ref 3.5–5.2)
ALP BLD-CCNC: 89 U/L (ref 40–129)
ALT SERPL-CCNC: 8 U/L (ref 0–40)
ANION GAP SERPL CALCULATED.3IONS-SCNC: 10 MMOL/L (ref 7–16)
AST SERPL-CCNC: 13 U/L (ref 0–39)
BASOPHILS ABSOLUTE: 0.02 E9/L (ref 0–0.2)
BASOPHILS RELATIVE PERCENT: 0.3 % (ref 0–2)
BILIRUB SERPL-MCNC: 0.5 MG/DL (ref 0–1.2)
BILIRUBIN DIRECT: 0.3 MG/DL (ref 0–0.3)
BILIRUBIN, INDIRECT: 0.2 MG/DL (ref 0–1)
BLOOD CULTURE, ROUTINE: NORMAL
BUN BLDV-MCNC: 12 MG/DL (ref 8–23)
CALCIUM SERPL-MCNC: 8.7 MG/DL (ref 8.6–10.2)
CHLORIDE BLD-SCNC: 101 MMOL/L (ref 98–107)
CO2: 26 MMOL/L (ref 22–29)
CREAT SERPL-MCNC: 1.4 MG/DL (ref 0.7–1.2)
CULTURE SURGICAL: NORMAL
CULTURE, BLOOD 2: NORMAL
EOSINOPHILS ABSOLUTE: 0.11 E9/L (ref 0.05–0.5)
EOSINOPHILS RELATIVE PERCENT: 1.9 % (ref 0–6)
GFR AFRICAN AMERICAN: >60
GFR NON-AFRICAN AMERICAN: >60 ML/MIN/1.73
GLUCOSE BLD-MCNC: 65 MG/DL (ref 74–99)
GRAM STAIN RESULT: NORMAL
HCT VFR BLD CALC: 34.5 % (ref 37–54)
HEMOGLOBIN: 10.3 G/DL (ref 12.5–16.5)
IMMATURE GRANULOCYTES #: 0.02 E9/L
IMMATURE GRANULOCYTES %: 0.3 % (ref 0–5)
LYMPHOCYTES ABSOLUTE: 0.81 E9/L (ref 1.5–4)
LYMPHOCYTES RELATIVE PERCENT: 13.6 % (ref 20–42)
MCH RBC QN AUTO: 27.3 PG (ref 26–35)
MCHC RBC AUTO-ENTMCNC: 29.9 % (ref 32–34.5)
MCV RBC AUTO: 91.5 FL (ref 80–99.9)
METER GLUCOSE: 117 MG/DL (ref 74–99)
METER GLUCOSE: 147 MG/DL (ref 74–99)
METER GLUCOSE: 149 MG/DL (ref 74–99)
METER GLUCOSE: 214 MG/DL (ref 74–99)
METER GLUCOSE: 237 MG/DL (ref 74–99)
METER GLUCOSE: 56 MG/DL (ref 74–99)
METER GLUCOSE: 58 MG/DL (ref 74–99)
METER GLUCOSE: 74 MG/DL (ref 74–99)
MONOCYTES ABSOLUTE: 0.37 E9/L (ref 0.1–0.95)
MONOCYTES RELATIVE PERCENT: 6.2 % (ref 2–12)
NEUTROPHILS ABSOLUTE: 4.61 E9/L (ref 1.8–7.3)
NEUTROPHILS RELATIVE PERCENT: 77.7 % (ref 43–80)
PDW BLD-RTO: 15.8 FL (ref 11.5–15)
PLATELET # BLD: 250 E9/L (ref 130–450)
PMV BLD AUTO: 10.2 FL (ref 7–12)
POTASSIUM SERPL-SCNC: 4 MMOL/L (ref 3.5–5)
RBC # BLD: 3.77 E12/L (ref 3.8–5.8)
SODIUM BLD-SCNC: 137 MMOL/L (ref 132–146)
TOTAL PROTEIN: 6.7 G/DL (ref 6.4–8.3)
WBC # BLD: 5.9 E9/L (ref 4.5–11.5)

## 2019-05-08 PROCEDURE — 6370000000 HC RX 637 (ALT 250 FOR IP): Performed by: UROLOGY

## 2019-05-08 PROCEDURE — 6360000002 HC RX W HCPCS: Performed by: UROLOGY

## 2019-05-08 PROCEDURE — 99232 SBSQ HOSP IP/OBS MODERATE 35: CPT | Performed by: PHYSICIAN ASSISTANT

## 2019-05-08 PROCEDURE — 1200000000 HC SEMI PRIVATE

## 2019-05-08 PROCEDURE — 2580000003 HC RX 258: Performed by: UROLOGY

## 2019-05-08 PROCEDURE — 82962 GLUCOSE BLOOD TEST: CPT

## 2019-05-08 PROCEDURE — 85025 COMPLETE CBC W/AUTO DIFF WBC: CPT

## 2019-05-08 PROCEDURE — 2709999900 CT NEEDLE BIOPSY LIVER PERCUTANEOUS

## 2019-05-08 PROCEDURE — 6370000000 HC RX 637 (ALT 250 FOR IP): Performed by: PHYSICIAN ASSISTANT

## 2019-05-08 PROCEDURE — 88307 TISSUE EXAM BY PATHOLOGIST: CPT

## 2019-05-08 PROCEDURE — 2500000003 HC RX 250 WO HCPCS: Performed by: RADIOLOGY

## 2019-05-08 PROCEDURE — 88341 IMHCHEM/IMCYTCHM EA ADD ANTB: CPT

## 2019-05-08 PROCEDURE — 99233 SBSQ HOSP IP/OBS HIGH 50: CPT | Performed by: INTERNAL MEDICINE

## 2019-05-08 PROCEDURE — 82248 BILIRUBIN DIRECT: CPT

## 2019-05-08 PROCEDURE — 2580000003 HC RX 258: Performed by: INTERNAL MEDICINE

## 2019-05-08 PROCEDURE — 6370000000 HC RX 637 (ALT 250 FOR IP): Performed by: INTERNAL MEDICINE

## 2019-05-08 PROCEDURE — 6360000002 HC RX W HCPCS: Performed by: INTERNAL MEDICINE

## 2019-05-08 PROCEDURE — 36415 COLL VENOUS BLD VENIPUNCTURE: CPT

## 2019-05-08 PROCEDURE — 6370000000 HC RX 637 (ALT 250 FOR IP): Performed by: RADIOLOGY

## 2019-05-08 PROCEDURE — 6360000004 HC RX CONTRAST MEDICATION: Performed by: RADIOLOGY

## 2019-05-08 PROCEDURE — 77012 CT SCAN FOR NEEDLE BIOPSY: CPT

## 2019-05-08 PROCEDURE — 88342 IMHCHEM/IMCYTCHM 1ST ANTB: CPT

## 2019-05-08 PROCEDURE — 80053 COMPREHEN METABOLIC PANEL: CPT

## 2019-05-08 RX ORDER — ACETAMINOPHEN 325 MG/1
650 TABLET ORAL EVERY 4 HOURS PRN
Status: DISCONTINUED | OUTPATIENT
Start: 2019-05-08 | End: 2019-05-10 | Stop reason: HOSPADM

## 2019-05-08 RX ORDER — LIDOCAINE HYDROCHLORIDE 20 MG/ML
11 INJECTION, SOLUTION INFILTRATION; PERINEURAL ONCE
Status: COMPLETED | OUTPATIENT
Start: 2019-05-08 | End: 2019-05-08

## 2019-05-08 RX ADMIN — IOVERSOL 3 ML: 678 INJECTION INTRA-ARTERIAL; INTRAVENOUS at 14:50

## 2019-05-08 RX ADMIN — Medication 10 ML: at 21:46

## 2019-05-08 RX ADMIN — HEPARIN SODIUM 5000 UNITS: 10000 INJECTION INTRAVENOUS; SUBCUTANEOUS at 06:19

## 2019-05-08 RX ADMIN — VANCOMYCIN HYDROCHLORIDE 1000 MG: 1 INJECTION, POWDER, LYOPHILIZED, FOR SOLUTION INTRAVENOUS at 18:09

## 2019-05-08 RX ADMIN — PETROLATUM: 42 OINTMENT TOPICAL at 17:07

## 2019-05-08 RX ADMIN — PIPERACILLIN AND TAZOBACTAM 3.38 G: 3; .375 INJECTION, POWDER, FOR SOLUTION INTRAVENOUS at 19:48

## 2019-05-08 RX ADMIN — INSULIN LISPRO 1 UNITS: 100 INJECTION, SOLUTION INTRAVENOUS; SUBCUTANEOUS at 17:07

## 2019-05-08 RX ADMIN — DEXTROSE MONOHYDRATE 12.5 G: 500 INJECTION PARENTERAL at 04:08

## 2019-05-08 RX ADMIN — Medication 10 ML: at 18:11

## 2019-05-08 RX ADMIN — HEPARIN 300 UNITS: 100 SYRINGE at 21:46

## 2019-05-08 RX ADMIN — PIPERACILLIN AND TAZOBACTAM 3.38 G: 3; .375 INJECTION, POWDER, FOR SOLUTION INTRAVENOUS at 02:14

## 2019-05-08 RX ADMIN — VANCOMYCIN HYDROCHLORIDE 1000 MG: 1 INJECTION, POWDER, LYOPHILIZED, FOR SOLUTION INTRAVENOUS at 06:18

## 2019-05-08 RX ADMIN — LEVETIRACETAM 750 MG: 250 TABLET, FILM COATED ORAL at 21:45

## 2019-05-08 RX ADMIN — PANTOPRAZOLE SODIUM 40 MG: 40 TABLET, DELAYED RELEASE ORAL at 06:19

## 2019-05-08 RX ADMIN — PRAVASTATIN SODIUM 80 MG: 20 TABLET ORAL at 21:44

## 2019-05-08 RX ADMIN — PHENYTOIN 50 MG: 50 TABLET, CHEWABLE ORAL at 15:54

## 2019-05-08 RX ADMIN — LEVETIRACETAM 750 MG: 250 TABLET, FILM COATED ORAL at 06:19

## 2019-05-08 RX ADMIN — INSULIN LISPRO 2 UNITS: 100 INJECTION, SOLUTION INTRAVENOUS; SUBCUTANEOUS at 21:46

## 2019-05-08 RX ADMIN — HEPARIN SODIUM 5000 UNITS: 10000 INJECTION INTRAVENOUS; SUBCUTANEOUS at 21:45

## 2019-05-08 RX ADMIN — LIDOCAINE HYDROCHLORIDE 11 ML: 20 INJECTION, SOLUTION EPIDURAL; INFILTRATION; INTRACAUDAL; PERINEURAL at 14:50

## 2019-05-08 RX ADMIN — INSULIN GLARGINE 5 UNITS: 100 INJECTION, SOLUTION SUBCUTANEOUS at 21:46

## 2019-05-08 RX ADMIN — DEXTROSE MONOHYDRATE 100 MG: 50 INJECTION, SOLUTION INTRAVENOUS at 21:42

## 2019-05-08 RX ADMIN — Medication: at 14:51

## 2019-05-08 RX ADMIN — PHENYTOIN 50 MG: 50 TABLET, CHEWABLE ORAL at 21:44

## 2019-05-08 ASSESSMENT — PAIN SCALES - GENERAL
PAINLEVEL_OUTOF10: 0

## 2019-05-08 ASSESSMENT — PAIN - FUNCTIONAL ASSESSMENT: PAIN_FUNCTIONAL_ASSESSMENT: ACTIVITIES ARE NOT PREVENTED

## 2019-05-08 NOTE — PROGRESS NOTES
1320 Received pt via bed to angio holding. Vital signs obtained  1345 To CT via cart. [phone] on [address] table. Monitor (shows sinus rhythm), NIBP, SpO2 and safety equipment applied  1355 Preliminary scan done  1359 Scan reviewed by Dr Kadeem Goodwin  [facility] notified of pt's readiness  1414Procedure started  [id number] Core biopsies x 3 obtained and sent to lab  1419 Post biopsy scan done and reviewed by Dr Kadeem Goodwin  [id number] Procedure ended   revolution applied around liver abscess drain.  DSD applied to liver biopsy site  1440 Hand off report given to [address] [place] [address] Road [facility]wait[facility] transport [facility]

## 2019-05-08 NOTE — PROGRESS NOTES
Inpatient Medical Oncology Progress Note    Subjective:  Eating lunch. No pain. No fever. Objective:  BP (!) 104/56   Pulse 60   Temp 97.6 °F (36.4 °C) (Temporal)   Resp 16   Ht 5' 10\" (1.778 m)   Wt 128 lb 8 oz (58.3 kg)   SpO2 98%   BMI 18.44 kg/m²    GENERAL: Alert, oriented x 3, not in acute distress. Neck: Supple. No LN  LUNGS:  No increased work of breathing  CARDIOVASCULAR:  RR  ABDOMEN:  Soft, non-tender, non-distended  EXT: No edema, cyanosis or clubbing  ECOG 1-2     Impression/Plan:  75 y/o with ? 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 Non-compliant with  appointments. Now candidemia w/ biliary stent; on Eraxis, also on Vanc/Zosyn, ID following. ZOË neg for vegetations. New masses in liver on triphasic; CEA 2.4, CA 19-9 1199; CT guided liver abscess drainage on 5/6, no organism seen on GS. CT guided liver mass biopsy ordered and pending. Images, elevated  more compatible with metastatic disease. Awaiting pathology. Will continue to follow.     05/08/2019  Carmen Pérez MD

## 2019-05-08 NOTE — PROGRESS NOTES
ID Progress Note                [address] [facility], [place], [address]            Phone [phone]     Fax [phone]      Chief complaint   AMS    Subjective:  Awake and oriented   CA 19-9 very high  Denies abdo pain  Rt IJ got accidentally removed  10 ROS otherwise negative unless otherwise specified above. Objective:    Vitals:    05/08/19 1435   BP: (!) 160/85   Pulse: 69   Resp: 16   Temp:    SpO2: 98%     GENERAL:  The patient is not alert and awake, minimally responsive. HEENT:  Atraumatic and normocephalic. PERRLA. EOMI. RESPIRATORY:  Air entry bilaterally equal.  No wheezes or crackles. CARDIOVASCULAR:  S1 and S2 normal.  No murmur, rubs, or gallop. ABDOMEN:  Soft, nontender, and nondistended. Bowel sounds present. EXTREMITIES:  No pedal edema. NEUROLOGIC:  Not following commands.     Rt IJ in place - ACCIDENTALLY removed by pt      Labs:  Recent Labs     05/06/19  0804 05/07/19  0644 05/08/19  0637   WBC 5.4 5.9 5.9   RBC 3.61* 3.30* 3.77*   HGB 10.1* 9.1* 10.3*   HCT 32.7* 29.9* 34.5*   MCV 90.6 90.6 91.5   MCH 28.0 27.6 27.3   MCHC 30.9* 30.4* 29.9*   RDW 16.2* 16.0* 15.8*    202 250   MPV 10.2 10.2 10.2     CMP:    Lab Results   Component Value Date     05/08/2019    K 4.0 05/08/2019    K 3.9 01/22/2019     05/08/2019    CO2 26 05/08/2019    BUN 12 05/08/2019    CREATININE 1.4 05/08/2019    GFRAA >60 05/08/2019    LABGLOM >60 05/08/2019    GLUCOSE 65 05/08/2019    PROT 6.7 05/08/2019    LABALBU 3.0 05/08/2019    CALCIUM 8.7 05/08/2019    BILITOT 0.5 05/08/2019    ALKPHOS 89 05/08/2019    AST 13 05/08/2019    ALT 8 05/08/2019          Microbiology :  No results for input(s): BC in the last 72 hours. No results for input(s): Madan Coventry in the last 72 hours. No results for input(s): LABURIN in the last 72 hours. No results for input(s): CULTRESP in the last 72 hours. No results for input(s): WNDABS in the last 72 hours.     Radiology :  CT ABSCESS DRAINAGE W CATH PLACEMENT S&I   Final Result   Successful uncomplicated  liver abscess drainage                CTA TRIPHASIC LIVER   Final Result      XR CHEST PORTABLE   Final Result   Right internal jugular central venous catheter tip in SVC. No pneumothorax on the right on the left. Patient has a stent in the   projections for the biliary tract/common bile duct and there are signs   of pneumobilia in the liver. IMPRESSION: No acute cardiopulmonary process. MRI Brain W WO Contrast   Final Result   1. Very small localized peripheral cortical acute ischemic insult in   the posterior right parietal region. 2. Extensive the changes relate with small vessel disease throughout   the white matter of both cerebral hemispheres, basal ganglia region   and brainstem. 3. Additional old insults with encephalomalacia are also seen in both   cerebellar hemispheres. 4. Overall loss of brain parenchyma more than expected for the age   group in supra and infratentorial regions. Findings to be correlated   clinically. ALERT:  ABNORMAL REPORT. XR CHEST PORTABLE   Final Result   No acute cardiopulmonary process. CT Head WO Contrast   Final Result   1. Chronic ischemic changes in the brain parenchyma predominantly   supratentorial areas with overall loss of volume of the brain   parenchyma for the age group, as above commented. 2. No indication for an acute intracranial process. CT ABSCESS CATHETER FOLLOW UP    (Results Pending)   CT NEEDLE BIOPSY LIVER PERCUTANEOUS    (Results Pending)   CT GUIDED NEEDLE PLACEMENT    (Results Pending)         URINARY CATHETER OUTPUT (Freed):  [REMOVED] Urethral Catheter-Output (mL): 650 mL  Urethral Catheter  22 fr-Output (mL): 1500 mL    ASSESSMENT AND PLAN:  1. 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   2.  Encephalopathy from sepsis - resolving   3. History of autoimmune pancreatitis with biliary obstruction. 4.  History of recently treated Enterococcus avium, complicated  bacteremia with six weeks of ampicillin plus ceftriaxone ending on  04/30/2019, also had polymicrobial bacteremia including Klebsiella  pneumoniae, E. coli as well as Clostridium perfringens. 5 hematuria/traumatic talavera     PLAN:      1.  continue  Eraxis. Continue vancomycin and Zosyn pending liver biopsy, check van trough - 15.1 watch renal function (Cr 1.4)  2. PICC line discontinued, tip culture negative so far  3.   ZOË  - no vegetations  4.  s/p CT guided biopsy on 5/6- follow results      PICC    very high, oncology/hepatobiliary on board  Underwent CT guided liver biopsy      Electronically signed by Kam Dubon MD on 5/8/2019 at 3:40 PM

## 2019-05-08 NOTE — BRIEF OP NOTE
Brief Postoperative Note    Dain Wilson  YOB: 1950  [id number]    Pre-operative Diagnosis and Procedure: liver bx    Post-operative Diagnosis: Same    Anesthesia: Local    Estimated Blood Loss: < 10 cc    Surgeon: John ALMARAZ     Complications: none    Specimen obtained: none     Findings: none     Afua Seals II   5/8/2019 2:21 PM

## 2019-05-08 NOTE — PROGRESS NOTES
[facility] SURGERY  DAILY PROGRESS NOTE  5/8/2019    CC: liver abscess    Subjective:  No complaints. No fevers, chills, abdominal pain. Remains afebrile with no leukocytosis  CA 19-9 resulted: 1199    Objective:  BP (!) 104/56   Pulse 70   Temp 97.9 °F (36.6 °C) (Temporal)   Resp 16   Ht 5' 10\" (1.778 m)   Wt 128 lb 8 oz (58.3 kg)   SpO2 98%   BMI 18.44 kg/m²     GENERAL:  Laying in bed, confused  LUNGS:  No increased work of breathing  CARDIOVASCULAR:  RR  ABDOMEN:  Soft, non-tender, non-distended. IR drain w some purulent drainage    Assessment/Plan:  76 y.o. male w/ candida bacteremia w/ likely metastatic pancreatic adenocarcinoma and new liver \"abscess\" s/p IR drainage    Necrotic pancreatic head mass -- not typical with autoimmune pancreatitis  Ca19-9: 1199 -- significantly elevated since March 2019 at 5  Abx per primary for liver \"abscess\" -- however abscess w/o growth in cultures  Doubt liver abscess -- imaging and lab evaluation including Ca19-9 still suggestive of metastatic disease    Electronically signed by Raul Robert DO on 5/8/2019 at 7:35 AM     Patient has a second lesion in his liver close to the abscess which is likely necrotic  Tumor  Ca 19-9 markedly elevated in the setting of a normal bilirubin  I do not believe this is autoimmune pancreatitis - more like metastatic disease  Will defer treatment to Oncology  Ca 19-9 should NOT be this elevated with normal LFT's without obstructive jaundice and markedly higher than 360.     Electronically signed by Panfilo Yanez MD on 5/8/2019 at 8:41 AM

## 2019-05-08 NOTE — PROGRESS NOTES
5/8/2019 9:42 AM  Service: Urology  Group: [facility] (Aidan/Michael/Mak)    Marcia Challenger  [id number]    Subjective:    Family and sitter at bedside  Pt remains baseline confusion  No new complaints  No fever or chills  No hematuria  Tolerating talavera    Review of Systems  Constitutional: no fever or chills   Respiratory: negative for cough and shortness of breath  Cardiovascular: negative for chest pressure/discomfort  Gastrointestinal: negative for abdominal pain, nausea and vomiting  Dermatologic:   Hematologic/lymphatic: negative  Musculoskeletal:negative  Neurological: confusion      Endocrine: negative  Psychiatric: negative     Scheduled Meds:   pantoprazole  40 mg Oral QAM AC    mineral oil-hydrophilic petrolatum   Topical BID    phenytoin  50 mg Oral TID    lidocaine PF  5 mL Intradermal Once    heparin flush  3 mL Intravenous 2 times per day    lisinopril  10 mg Oral Daily    levETIRAcetam  750 mg Oral BID    insulin glargine  5 Units Subcutaneous Nightly    anidulafungin  100 mg Intravenous Q24H    vancomycin  1,000 mg Intravenous Q12H    piperacillin-tazobactam  3.375 g Intravenous Q8H    insulin lispro  0-6 Units Subcutaneous Q4H    pravastatin  80 mg Oral Nightly    tamsulosin  0.4 mg Oral Daily    sodium chloride flush  10 mL Intravenous 2 times per day    heparin (porcine)  5,000 Units Subcutaneous 3 times per day       Objective:  Vitals:    05/08/19 0743   BP:    Pulse: 60   Resp: 16   Temp: 97.6 °F (36.4 °C)   SpO2:          Allergies: Patient has no known allergies. General Appearance: alert and awake. No apparent distress  Skin: no rash or erythema  Head: normocephalic and atraumatic  Pulmonary/Chest:  normal air movement, no respiratory distress   Abdomen: soft, non-tender, non-distended  Genitalia: No penile or scrotal swelling or masses. Extremities: no cyanosis, clubbing or edema      Talavera well positioned and draining clear urine.     Labs:     Recent Labs 05/08/19  0637      K 4.0      CO2 26   BUN 12   CREATININE 1.4*   GLUCOSE 65*   CALCIUM 8.7       Lab Results   Component Value Date    HGB 10.3 05/08/2019    HCT 34.5 05/08/2019         Assessment/Plan:  Urinary retention/ Urethral disruption/Gross Hematuria    Tolerating talavera  No hematuria. Draining yellow urine      -Continue talavera catheter for one week. If he is still in the hospital we will allow him to undergo a voiding trial on 5/14/19 beginning around 5am. If the patient is to be discharged prior to that he will need to follow up with Dr. Charlotte Martini in the office in one week to get the catheter removed. -There will need to be catheter care education for the patient's family if he is to go home with the cathter and the importance of making sure the catheter is secure and not pulling to prevent repeat urethral disruption.     -No further  interventions needed at this time. NAHUM Ying - CNP   [facility]  Urology    The patient was seen and examined by me today. I agree with the assessment and plan of Luis Fernando Calvert CNP.       Parveen Rodriguez  5/8/2019  3:36 PM

## 2019-05-08 NOTE — PROGRESS NOTES
Isaiah Gan is a 76 y.o. male     Neurology following for seizures    Pt presented from NH with unresponsiveness and random movements to right arm and incontinent of urine x 3. Prior to being found unresponsive, he was A&O x 3. Pt had complete plegia to RUE after having irregular movements of RUE in ED. Pt also had automatism in mouth with chewing movements. Ativan 2mg was given but was helpful in stopping these movements so pt was started on Phenytoin 100 mg TID. Pt was recently d/c 3 weeks ago from bacteremia. ROS limited with lack of cooperation answering questions. No new complaints     EEG 5/3 with epileptogenicity arising from the L central parietal area and a moderate diffuse encephalopathy---no clear seizure activity but the degree of encephalopathy has increased    Repeat EEG today normal. No epileptogenic or lateralizing signs seen    Phenytoin will be decreased to 50 mg TID x 1 week, then stopped    No further seizure activity reported     Sitter at bedside states that he does seem confused when he wakes up, but then becomes more alert. No seizure activity witnessed     He is sleeping upon my entering. When woken up he appears confused. Perseverated on \"1900\"     ID following--    Yeast in blood---likely cadidemia    On Eraxis    Vanco and zosyn pending liver bx results     Heme/onc and gen surg following    ?  Metastatic pancreatic adenocarcinoma      Wife at bedside    Current Facility-Administered Medications   Medication Dose Route Frequency Provider Last Rate Last Dose    pantoprazole (PROTONIX) tablet 40 mg  40 mg Oral QAM AC Janeal High Malmer, DO   40 mg at 05/08/19 5638    mineral oil-hydrophilic petrolatum (HYDROPHOR) ointment   Topical BID Janeal High Malmer, DO        And    mineral oil-hydrophilic petrolatum (HYDROPHOR) ointment   Topical TID PRN Janeal High Malmer, DO        phenytoin (DILANTIN) chewable tablet 50 mg  50 mg Oral TID ROSA Bowie   50 mg at 05/07/19 2103    lidocaine PF 1 % injection 5 mL  5 mL Intradermal Once Kam Dubon MD        sodium chloride flush 0.9 % injection 10 mL  10 mL Intravenous PRN Kam Dubon MD        heparin flush 100 UNIT/ML injection 300 Units  3 mL Intravenous 2 times per day Kam Dubon MD   300 Units at 05/07/19 2142    heparin flush 100 UNIT/ML injection 300 Units  3 mL Intracatheter PRN Kam Dubon MD        lisinopril (PRINIVIL;ZESTRIL) tablet 10 mg  10 mg Oral Daily Nate Phelan MD   10 mg at 05/07/19 1205    levETIRAcetam (KEPPRA) tablet 750 mg  750 mg Oral BID Nate Phelan MD   750 mg at 05/08/19 3477    insulin glargine (LANTUS) injection vial 5 Units  5 Units Subcutaneous Nightly Nate Phelan MD   5 Units at 05/07/19 2143    anidulafungin (ERAXIS) 100 mg in dextrose 5 % 130 mL IVPB  100 mg Intravenous Q24H Nate Phelan MD   100 mg at 05/07/19 2141    vancomycin 1000 mg IVPB in 250 mL D5W addavial  1,000 mg Intravenous Q12H Nate Phelan MD   Stopped at 05/08/19 0852    piperacillin-tazobactam (ZOSYN) 3.375 g in dextrose 5 % 100 mL IVPB extended infusion (mini-bag)  3.375 g Intravenous Q8H Nate Phelan MD   Stopped at 05/08/19 0852    insulin lispro (HUMALOG) injection vial 0-6 Units  0-6 Units Subcutaneous Q4H Nate Phelan MD   3 Units at 05/07/19 1803    glucose (GLUTOSE) 40 % oral gel 15 g  15 g Oral PRN Nate Phelan MD        glucagon (rDNA) injection 1 mg  1 mg Intramuscular PRN Nate Phelan MD        dextrose 5 % solution  100 mL/hr Intravenous PRN Nate Phelan MD        dextrose 50 % solution 12.5 g  12.5 g Intravenous PRN Nate Phelan MD   12.5 g at 05/02/19 0650    potassium chloride 10 mEq/100 mL IVPB (Peripheral Line)  10 mEq Intravenous PRN Nate Phelan  mL/hr at 05/02/19 0944 10 mEq at 05/02/19 0944    pravastatin (PRAVACHOL) tablet 80 mg  80 mg Oral Nightly Nate Phelan MD   80 mg at 05/07/19 1944    tamsulosin (FLOMAX) capsule 0.4 mg acuity     II: visual fields Blink to threat    II: pupils CATALINA    III,VII: ptosis None   III,IV,VI: extraocular muscles  EOMI     V: mastication    V: facial light touch sensation  Normal    V,VII: corneal reflex     VII: facial muscle function - upper  Normal    VII: facial muscle function - lower Normal    VIII: hearing Normal    IX: soft palate elevation  Normal    IX,X: gag reflex    XI: trapezius strength  5/5   XI: sternocleidomastoid strength    XI: neck extension strength     XII: tongue strength  Normal      Motor:  Moves all extremities antigravity--limited cooperation for strength testing    Decreased muscle tone and bulk  No abnormal movements seen     Sensory:  LT appears intact  Appreciates PP     DTR:   +1 arms  Absent legs     No Babinskis  No Navas's    No pathological reflexes    Laboratory/Radiology:     Lab Results   Component Value Date    WBC 5.9 05/08/2019    HGB 10.3 (L) 05/08/2019    HCT 34.5 (L) 05/08/2019    MCV 91.5 05/08/2019     05/08/2019     Lab Results   Component Value Date     05/08/2019    K 4.0 05/08/2019     05/08/2019    CO2 26 05/08/2019    BUN 12 05/08/2019    CREATININE 1.4 (H) 05/08/2019    GLUCOSE 65 (L) 05/08/2019    CALCIUM 8.7 05/08/2019    PROT 6.7 05/08/2019    LABALBU 3.0 (L) 05/08/2019    BILITOT 0.5 05/08/2019    ALKPHOS 89 05/08/2019    AST 13 05/08/2019    ALT 8 05/08/2019    LABGLOM >60 05/08/2019    GFRAA >60 05/08/2019       Hepatic Function Panel:    Lab Results   Component Value Date    ALKPHOS 89 05/08/2019    ALT 8 05/08/2019    AST 13 05/08/2019    PROT 6.7 05/08/2019    BILITOT 0.5 05/08/2019    BILIDIR 0.3 05/08/2019    IBILI 0.2 05/08/2019    LABALBU 3.0 05/08/2019     ABG:    Lab Results   Component Value Date    PH 7.368 05/02/2019    PCO2 40.2 05/02/2019    PO2 77.6 05/02/2019    HCO3 22.6 05/02/2019    BE -2.5 05/02/2019    O2SAT 94.7 05/02/2019     HgBA1c:    Lab Results   Component Value Date    LABA1C 10.0 05/02/2019 encephalopathy. Compared to records from yesterday there is no clear seizure activity but the degree of encephalopathy has increased. Clinical correlation is recommended. EEG: This EEG shows epilepsy arising from left central parietal area. Also it shows a mild diffuse encephalopathy. ZOË 3/29: Aortic valve opens well.   Ejection fraction is visually estimated at 50%.   L atrium enlarged   Normal mitral valve structure and function.   The aortic valve appears mildly sclerotic.   Trace aortic regurgitation.    Pulmonic valve is structurally normal.     Echo 3/26: Aortic valve opens well.   Ejection fraction biplane =49%.   Normal left ventricle size.   Normal sized left atrium.   Structurally normal mitral valve.   The aortic valve appears normal   No pericardial effusion. All labs and imaging studies reviewed independently today. Assessment:     Pt presented from NH with unresponsiveness and random movements to right arm and incontinent of urine x 3 in the setting of DKA    Pt's BG was found to be >1000. MRI Brain shows very small acute infarct of the posterior R parietal region with extensive . Old infarcts to b/l cerebellar are also noted. ---likely related to DKA     Patient with focal seizures in the setting of hyperglycemia (BG 1087) and small infarct. Suspect seizures to be from DKA    No further seizure activity       EEG found to have epilepsy from L central parietal area-- not being  caused by acute stroke   Repeat EEG today is normal- will wean Phenytoin-- 50 mg TID x 1 week and then stop. Continue Keppra 750 mg BID for now until OP neuro follow up. He is still confused. Encephalopathy 2/2 sepsis    ID following for sepsis-- on Eraxis and van/zosyn     Heme/onc and gen surg following    ?  Metastatic pancreatic adenocarcinoma     Hx of HTN, stroke, pancreatic cancer, BPH, HLD, GERD    Spoke with wife at bedside---all questions answered     Plan:     Continue Keppra to 750 mg BID until OP neuro f/u   Wean phenytoin as above   PT/OT/ST  Stroke education prior to discharge    Follow up with OP neuro after discharge     Neuro signing off, please call with new issues or questions     Caity Huber PA-C  11:37 AM  5/8/2019

## 2019-05-08 NOTE — PROGRESS NOTES
[facility] Medicine    Subjective:  Pt seen this am wife at bedside      Current Facility-Administered Medications:     acetaminophen (TYLENOL) tablet 650 mg, 650 mg, Oral, Q4H PRN, Kenia Jordan, DO    pantoprazole (PROTONIX) tablet 40 mg, 40 mg, Oral, QAM AC, Kenia Jordan, DO, 40 mg at 05/08/19 4975    mineral oil-hydrophilic petrolatum (HYDROPHOR) ointment, , Topical, BID **AND** mineral oil-hydrophilic petrolatum (HYDROPHOR) ointment, , Topical, TID PRN, Kenia Jordan, DO    phenytoin (DILANTIN) chewable tablet 50 mg, 50 mg, Oral, TID, ROSA Perez, 50 mg at 05/07/19 2141    lidocaine PF 1 % injection 5 mL, 5 mL, Intradermal, Once, Amol Tucker MD    sodium chloride flush 0.9 % injection 10 mL, 10 mL, Intravenous, PRN, Amol Tucker MD    heparin flush 100 UNIT/ML injection 300 Units, 3 mL, Intravenous, 2 times per day, Amol Tucker MD, 300 Units at 05/07/19 2142    heparin flush 100 UNIT/ML injection 300 Units, 3 mL, Intracatheter, PRN, Amol Tucker MD    lisinopril (PRINIVIL;ZESTRIL) tablet 10 mg, 10 mg, Oral, Daily, Patricio Hayes MD, 10 mg at 05/07/19 1205    [DISCONTINUED] levetiracetam (KEPPRA) 750 mg/50 mL IVPB, 750 mg, Intravenous, BID **OR** levETIRAcetam (KEPPRA) tablet 750 mg, 750 mg, Oral, BID, Patricio Hayes MD, 750 mg at 05/08/19 3880    insulin glargine (LANTUS) injection vial 5 Units, 5 Units, Subcutaneous, Nightly, Patricio Hayes MD, 5 Units at 05/07/19 2143    [COMPLETED] anidulafungin (ERAXIS) 200 mg in dextrose 5 % 260 mL IVPB, 200 mg, Intravenous, Once, 200 mg at 05/03/19 1402 **AND** anidulafungin (ERAXIS) 100 mg in dextrose 5 % 130 mL IVPB, 100 mg, Intravenous, Q24H, Patricio Hayes MD, 100 mg at 05/07/19 2141    vancomycin 1000 mg IVPB in 250 mL D5W addavial, 1,000 mg, Intravenous, Q12H, Patricio Hayes MD, Stopped at 05/08/19 0852    piperacillin-tazobactam (ZOSYN) 3.375 g in dextrose 5 % 100 mL IVPB extended infusion (mini-bag), 3.375 g, Intravenous, Q8H, Gem Rodrigez MD, Stopped at 05/08/19 9540    insulin lispro (HUMALOG) injection vial 0-6 Units, 0-6 Units, Subcutaneous, Q4H, Gem Rodrigez MD, 3 Units at 05/07/19 1803    glucose (GLUTOSE) 40 % oral gel 15 g, 15 g, Oral, PRN, Gem Rodrigez MD    glucagon (rDNA) injection 1 mg, 1 mg, Intramuscular, PRN, Gem Rodrigez MD    dextrose 5 % solution, 100 mL/hr, Intravenous, PRN, Gem Rodrigez MD    dextrose 50 % solution 12.5 g, 12.5 g, Intravenous, PRN, Gem Rodrigez MD, 12.5 g at 05/02/19 0650    potassium chloride 10 mEq/100 mL IVPB (Peripheral Line), 10 mEq, Intravenous, PRN, Gem Rodrigez MD, Last Rate: 100 mL/hr at 05/02/19 0944, 10 mEq at 05/02/19 0944    pravastatin (PRAVACHOL) tablet 80 mg, 80 mg, Oral, Nightly, eGm Rodrigez MD, 80 mg at 05/07/19 1944    tamsulosin (FLOMAX) capsule 0.4 mg, 0.4 mg, Oral, Daily, Gem Rodrigez MD, 0.4 mg at 05/07/19 1205    sodium chloride flush 0.9 % injection 10 mL, 10 mL, Intravenous, 2 times per day, Gem Rodrigez MD, 10 mL at 05/07/19 2143    sodium chloride flush 0.9 % injection 10 mL, 10 mL, Intravenous, PRN, Gem Rodrigez MD, 10 mL at 05/07/19 1029    magnesium hydroxide (MILK OF MAGNESIA) 400 MG/5ML suspension 30 mL, 30 mL, Oral, Daily PRN, Gem Rodrigez MD    ondansetron [facility]) injection 4 mg, 4 mg, Intravenous, Q6H PRN, Gem Rodrigez MD    heparin (porcine) injection 5,000 Units, 5,000 Units, Subcutaneous, 3 times per day, Gem Rodrigez MD, 5,000 Units at 05/08/19 0619    dextrose 50 % solution 12.5 g, 12.5 g, Intravenous, PRN, Gem Rodrigez MD, 12.5 g at 05/08/19 0408    potassium chloride 10 mEq/100 mL IVPB (Peripheral Line), 10 mEq, Intravenous, PRN, Gem Rodrigez MD, Last Rate: 100 mL/hr at 05/02/19 0830, 10 mEq at 05/02/19 0830    magnesium sulfate 1 g in dextrose 5% 100 mL IVPB, 1 g, Intravenous, PRN, Gem Rodrigez MD    Objective:    BP (!) 104/56   Pulse 60   Temp 97.6 °F (36.4 °C) (Temporal)   Resp 16   Ht 5' 10\" (1.778 m)   Wt 128 lb 8 oz (58.3 kg)   SpO2 98%   BMI 18.44 kg/m²     Heart:  Reg  Lungs:  CTA bilaterally, no wheeze, rales or rhonchi  Abd: bowel sounds present, min tender to palp ruq  Extrem:  No clubbing, cyanosis, or edema    CBC with Differential:    Lab Results   Component Value Date    WBC 5.9 05/08/2019    RBC 3.77 05/08/2019    HGB 10.3 05/08/2019    HCT 34.5 05/08/2019     05/08/2019    MCV 91.5 05/08/2019    MCH 27.3 05/08/2019    MCHC 29.9 05/08/2019    RDW 15.8 05/08/2019    NRBC 0.0 03/29/2019    SEGSPCT 68 01/11/2013    LYMPHOPCT 13.6 05/08/2019    MONOPCT 6.2 05/08/2019    MYELOPCT 0.9 01/06/2019    BASOPCT 0.3 05/08/2019    MONOSABS 0.37 05/08/2019    LYMPHSABS 0.81 05/08/2019    EOSABS 0.11 05/08/2019    BASOSABS 0.02 05/08/2019     CMP:    Lab Results   Component Value Date     05/08/2019    K 4.0 05/08/2019    K 3.9 01/22/2019     05/08/2019    CO2 26 05/08/2019    BUN 12 05/08/2019    CREATININE 1.4 05/08/2019    GFRAA >60 05/08/2019    LABGLOM >60 05/08/2019    GLUCOSE 65 05/08/2019    PROT 6.7 05/08/2019    LABALBU 3.0 05/08/2019    CALCIUM 8.7 05/08/2019    BILITOT 0.5 05/08/2019    ALKPHOS 89 05/08/2019    AST 13 05/08/2019    ALT 8 05/08/2019     Warfarin PT/INR:    Lab Results   Component Value Date    INR 1.1 05/06/2019    INR 1.0 05/04/2019    INR 1.0 05/01/2019    PROTIME 12.3 05/06/2019    PROTIME 11.6 05/04/2019    PROTIME 12.1 05/01/2019       Assessment:    Principal Problem:    Hyperosmolar hyperglycemic coma due to diabetes mellitus without ketoacidosis (HCC)  Active Problems:    Seizure ([address]    Seizure-like activity (HCC)    Altered mental status    Acute ischemic stroke (HCC)    Severe protein-calorie malnutrition ([address]    Liver mass  Resolved Problems:    * No resolved hospital problems.  *  fungemia    Plan:  Increase activity dc planning cont meds per id discussed plan with wife at bedside        [facility] Nikki  1:24 PM  5/8/2019

## 2019-05-09 LAB
ALBUMIN SERPL-MCNC: 2.4 G/DL (ref 3.5–5.2)
ALP BLD-CCNC: 78 U/L (ref 40–129)
ALT SERPL-CCNC: 6 U/L (ref 0–40)
ANION GAP SERPL CALCULATED.3IONS-SCNC: 12 MMOL/L (ref 7–16)
AST SERPL-CCNC: 10 U/L (ref 0–39)
BASOPHILS ABSOLUTE: 0.02 E9/L (ref 0–0.2)
BASOPHILS RELATIVE PERCENT: 0.4 % (ref 0–2)
BILIRUB SERPL-MCNC: 0.5 MG/DL (ref 0–1.2)
BILIRUBIN DIRECT: 0.3 MG/DL (ref 0–0.3)
BILIRUBIN, INDIRECT: 0.2 MG/DL (ref 0–1)
BUN BLDV-MCNC: 11 MG/DL (ref 8–23)
CALCIUM SERPL-MCNC: 8.1 MG/DL (ref 8.6–10.2)
CHLORIDE BLD-SCNC: 100 MMOL/L (ref 98–107)
CO2: 23 MMOL/L (ref 22–29)
CREAT SERPL-MCNC: 1.1 MG/DL (ref 0.7–1.2)
EOSINOPHILS ABSOLUTE: 0.16 E9/L (ref 0.05–0.5)
EOSINOPHILS RELATIVE PERCENT: 3.2 % (ref 0–6)
GFR AFRICAN AMERICAN: >60
GFR NON-AFRICAN AMERICAN: >60 ML/MIN/1.73
GLUCOSE BLD-MCNC: 73 MG/DL (ref 74–99)
HCT VFR BLD CALC: 31 % (ref 37–54)
HEMOGLOBIN: 9.5 G/DL (ref 12.5–16.5)
IMMATURE GRANULOCYTES #: 0.02 E9/L
IMMATURE GRANULOCYTES %: 0.4 % (ref 0–5)
LYMPHOCYTES ABSOLUTE: 0.75 E9/L (ref 1.5–4)
LYMPHOCYTES RELATIVE PERCENT: 15.1 % (ref 20–42)
MCH RBC QN AUTO: 27.4 PG (ref 26–35)
MCHC RBC AUTO-ENTMCNC: 30.6 % (ref 32–34.5)
MCV RBC AUTO: 89.3 FL (ref 80–99.9)
METER GLUCOSE: 104 MG/DL (ref 74–99)
METER GLUCOSE: 110 MG/DL (ref 74–99)
METER GLUCOSE: 115 MG/DL (ref 74–99)
METER GLUCOSE: 125 MG/DL (ref 74–99)
METER GLUCOSE: 218 MG/DL (ref 74–99)
METER GLUCOSE: 57 MG/DL (ref 74–99)
METER GLUCOSE: 82 MG/DL (ref 74–99)
MONOCYTES ABSOLUTE: 0.32 E9/L (ref 0.1–0.95)
MONOCYTES RELATIVE PERCENT: 6.5 % (ref 2–12)
NEUTROPHILS ABSOLUTE: 3.69 E9/L (ref 1.8–7.3)
NEUTROPHILS RELATIVE PERCENT: 74.4 % (ref 43–80)
PDW BLD-RTO: 15.7 FL (ref 11.5–15)
PLATELET # BLD: 226 E9/L (ref 130–450)
PMV BLD AUTO: 10.3 FL (ref 7–12)
POTASSIUM SERPL-SCNC: 3.8 MMOL/L (ref 3.5–5)
RBC # BLD: 3.47 E12/L (ref 3.8–5.8)
SODIUM BLD-SCNC: 135 MMOL/L (ref 132–146)
TOTAL PROTEIN: 6 G/DL (ref 6.4–8.3)
WBC # BLD: 5 E9/L (ref 4.5–11.5)

## 2019-05-09 PROCEDURE — 80053 COMPREHEN METABOLIC PANEL: CPT

## 2019-05-09 PROCEDURE — 6370000000 HC RX 637 (ALT 250 FOR IP): Performed by: INTERNAL MEDICINE

## 2019-05-09 PROCEDURE — 82248 BILIRUBIN DIRECT: CPT

## 2019-05-09 PROCEDURE — 2580000003 HC RX 258: Performed by: UROLOGY

## 2019-05-09 PROCEDURE — 6370000000 HC RX 637 (ALT 250 FOR IP): Performed by: UROLOGY

## 2019-05-09 PROCEDURE — 36415 COLL VENOUS BLD VENIPUNCTURE: CPT

## 2019-05-09 PROCEDURE — 82962 GLUCOSE BLOOD TEST: CPT

## 2019-05-09 PROCEDURE — 6370000000 HC RX 637 (ALT 250 FOR IP): Performed by: PHYSICIAN ASSISTANT

## 2019-05-09 PROCEDURE — 1200000000 HC SEMI PRIVATE

## 2019-05-09 PROCEDURE — 85025 COMPLETE CBC W/AUTO DIFF WBC: CPT

## 2019-05-09 PROCEDURE — 97535 SELF CARE MNGMENT TRAINING: CPT

## 2019-05-09 PROCEDURE — 99233 SBSQ HOSP IP/OBS HIGH 50: CPT | Performed by: INTERNAL MEDICINE

## 2019-05-09 PROCEDURE — 6360000002 HC RX W HCPCS: Performed by: UROLOGY

## 2019-05-09 PROCEDURE — 97530 THERAPEUTIC ACTIVITIES: CPT

## 2019-05-09 PROCEDURE — 6360000002 HC RX W HCPCS: Performed by: INTERNAL MEDICINE

## 2019-05-09 RX ADMIN — HEPARIN 300 UNITS: 100 SYRINGE at 08:25

## 2019-05-09 RX ADMIN — DEXTROSE MONOHYDRATE 100 MG: 50 INJECTION, SOLUTION INTRAVENOUS at 21:37

## 2019-05-09 RX ADMIN — HEPARIN SODIUM 5000 UNITS: 10000 INJECTION INTRAVENOUS; SUBCUTANEOUS at 06:04

## 2019-05-09 RX ADMIN — PRAVASTATIN SODIUM 80 MG: 20 TABLET ORAL at 20:14

## 2019-05-09 RX ADMIN — VANCOMYCIN HYDROCHLORIDE 1000 MG: 1 INJECTION, POWDER, LYOPHILIZED, FOR SOLUTION INTRAVENOUS at 17:29

## 2019-05-09 RX ADMIN — LEVETIRACETAM 750 MG: 250 TABLET, FILM COATED ORAL at 19:01

## 2019-05-09 RX ADMIN — HEPARIN 300 UNITS: 100 SYRINGE at 06:03

## 2019-05-09 RX ADMIN — HEPARIN SODIUM 5000 UNITS: 10000 INJECTION INTRAVENOUS; SUBCUTANEOUS at 14:28

## 2019-05-09 RX ADMIN — HEPARIN SODIUM 5000 UNITS: 10000 INJECTION INTRAVENOUS; SUBCUTANEOUS at 21:40

## 2019-05-09 RX ADMIN — HEPARIN 300 UNITS: 100 SYRINGE at 20:13

## 2019-05-09 RX ADMIN — DEXTROSE MONOHYDRATE 12.5 G: 500 INJECTION PARENTERAL at 04:19

## 2019-05-09 RX ADMIN — DEXTROSE MONOHYDRATE 12.5 G: 500 INJECTION PARENTERAL at 05:08

## 2019-05-09 RX ADMIN — PETROLATUM: 42 OINTMENT TOPICAL at 17:32

## 2019-05-09 RX ADMIN — LEVETIRACETAM 750 MG: 250 TABLET, FILM COATED ORAL at 06:03

## 2019-05-09 RX ADMIN — Medication 10 ML: at 08:26

## 2019-05-09 RX ADMIN — Medication 10 ML: at 06:03

## 2019-05-09 RX ADMIN — PHENYTOIN 50 MG: 50 TABLET, CHEWABLE ORAL at 20:15

## 2019-05-09 RX ADMIN — PIPERACILLIN AND TAZOBACTAM 3.38 G: 3; .375 INJECTION, POWDER, FOR SOLUTION INTRAVENOUS at 11:08

## 2019-05-09 RX ADMIN — VANCOMYCIN HYDROCHLORIDE 1000 MG: 1 INJECTION, POWDER, LYOPHILIZED, FOR SOLUTION INTRAVENOUS at 06:03

## 2019-05-09 RX ADMIN — PIPERACILLIN AND TAZOBACTAM 3.38 G: 3; .375 INJECTION, POWDER, FOR SOLUTION INTRAVENOUS at 17:29

## 2019-05-09 RX ADMIN — LISINOPRIL 10 MG: 10 TABLET ORAL at 08:26

## 2019-05-09 RX ADMIN — PHENYTOIN 50 MG: 50 TABLET, CHEWABLE ORAL at 14:28

## 2019-05-09 RX ADMIN — PANTOPRAZOLE SODIUM 40 MG: 40 TABLET, DELAYED RELEASE ORAL at 06:03

## 2019-05-09 RX ADMIN — PHENYTOIN 50 MG: 50 TABLET, CHEWABLE ORAL at 08:26

## 2019-05-09 RX ADMIN — PETROLATUM: 42 OINTMENT TOPICAL at 08:25

## 2019-05-09 RX ADMIN — PIPERACILLIN AND TAZOBACTAM 3.38 G: 3; .375 INJECTION, POWDER, FOR SOLUTION INTRAVENOUS at 02:19

## 2019-05-09 RX ADMIN — TAMSULOSIN HYDROCHLORIDE 0.4 MG: 0.4 CAPSULE ORAL at 08:25

## 2019-05-09 RX ADMIN — Medication 10 ML: at 20:14

## 2019-05-09 ASSESSMENT — PAIN SCALES - GENERAL
PAINLEVEL_OUTOF10: 0

## 2019-05-09 NOTE — CARE COORDINATION
Referral made to [facility], markus [place]. per liaison, insurance precertification is unlikely for LTAC. [facility] to initiate insurance precert  When OT notes are documented.

## 2019-05-09 NOTE — PROGRESS NOTES
Physical Therapy  Facility/Department: [facility]  Daily Treatment Note  NAME: Mouna Mir  : 1950  MRN: [id number]    Date of Service: 2019  Evaluating PT:  Germain Yang PT, DPT     Room #:  6953R  Diagnosis:  Seizures   Reason for admission: found unresponsive, seizure activity   Precautions:  Falls, seizures, sitter, O2  Procedures:  cystoscopy with catheter insertion  Equipment recommendations:  [employer]     Pt is a poor historian. Reports living alone, using a [employer] for ambulation. Unable to provide further clarity to PLOF.                 Initial Evaluation  Date:  Treatment   Short Term/ Long Term   Goals   AM-PAC 6 Clicks 60/02       Was pt agreeable to Eval/treatment? Yes   yes     Does pt have pain? Denies   no     Bed Mobility  Rolling: NT  Supine to sit: Yamil  Sit to supine: NT  Scooting: Yamil Rolling:NT  Supine to sit:min a  Sit to supine:NT  Scooting:sba  independent   Transfers Sit to stand: Yamil  Stand to sit: Yamil  Stand pivot: MaxA [employer]  sit<.stand:min a  Stand pivot:mod a independent   Ambulation    3 feet with [employer] MaxA    10ft x2 with ww mod a  >50 feet with [employer] SBA   Stair negotiation: ascended and descended  NT  NT NA   ROM BUE:  See OT eval  BLE:  WFL       Strength BUE:  See OT eval  RLE 3+/5 LLE 3-/5   4/5   Balance Sitting EOB:  Yamil  Dynamic Standing:  MaxA [employer]   Sitting EOB:  Independent  Dynamic Standing:  SBA [employer]      Patient education  Pt was educated on importance of OOB activity    Patient response to education:   Pt verbalized understanding Pt demonstrated skill Pt requires further education in this area   x x x     Additional Comments: Pt supine upon entering room. Pt demo lethargy throughout session. Pt unsteady on feet during amb with verbal/physical cues for ww approximation and leaning to L side. Pt performed toilet transfer and then returned to bedside chair with all needs met. Pt call light left by patient.     Time in: 1050  Time out: [address]    Pt is making fair progress toward established Physical Therapy goals. Continue with physical therapy current plan of care.     Debora Box PTA  License Number: [id number]

## 2019-05-09 NOTE — PROGRESS NOTES
Inpatient Medical Oncology Progress Note    Subjective:   Denies pain. No fever or chills. Objective:  BP (!) 174/92   Pulse 74   Temp 97.7 °F (36.5 °C) (Temporal)   Resp 16   Ht 5' 10\" (1.778 m)   Wt 128 lb 8 oz (58.3 kg)   SpO2 98%   BMI 18.44 kg/m²   GENERAL: Alert, oriented x 3, not in acute distress. Neck: Supple. No LN  LUNGS:  No increased work of breathing  CARDIOVASCULAR:  RR  ABDOMEN:  Soft, non-tender, non-distended  EXT: No edema, cyanosis or clubbing  ECOG 1-2     Diagnostics:  Lab Results   Component Value Date    WBC 5.0 05/09/2019    HGB 9.5 (L) 05/09/2019    HCT 31.0 (L) 05/09/2019    MCV 89.3 05/09/2019     05/09/2019     Lab Results   Component Value Date     05/09/2019    K 3.8 05/09/2019     05/09/2019    CO2 23 05/09/2019    BUN 11 05/09/2019    CREATININE 1.1 05/09/2019    GLUCOSE 73 (L) 05/09/2019    CALCIUM 8.1 (L) 05/09/2019    PROT 6.0 (L) 05/09/2019    LABALBU 2.4 (L) 05/09/2019    BILITOT 0.5 05/09/2019    ALKPHOS 78 05/09/2019    AST 10 05/09/2019    ALT 6 05/09/2019    LABGLOM >60 05/09/2019    GFRAA >60 05/09/2019     Impression/Plan:  75 y/o with ? 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 Non-compliant with  appointments. Now candidemia w/ biliary stent; on Eraxis, also on Vanc/Zosyn, ID following. ZOË negative for vegetations. New masses in liver on triphasic; CEA 2.4, CA 92-4 7994;   CT guided liver abscess drainage on 5/6, no organism seen on GS. CT guided liver mass biopsy done yesterday; Path pending. Imaging, elevated  more compatible with metastatic disease. Awaiting pathology.  Will continue to follow.     05/09/2019  Patience rAteaga MD

## 2019-05-09 NOTE — PROGRESS NOTES
Occupational Therapy  OT BEDSIDE TREATMENT NOTE      Date:2019  Patient Name: Horacio Mustafa  MRN: [id number]  : 1950  Room: [address] [place] [address]     Evaluating OT: Óscar Prather OTR/L #[id number]      AM-PAC Daily Activity Raw Score:      Recommended Adaptive Equipment: TBD      Diagnosis: seizures   Patient presented to ED from nursing facility following being found unresponsive      Procedure: 19 cystoscopy with catheter insertion     Pertinent Medical History:    Past Medical History        Past Medical History:   Diagnosis Date    Cancer [facility])       pancreatic    Cerebral artery occlusion with cerebral infarction ([facility]      Hypertension      Syncope           Precautions:  Falls, confusion      Home Living: Pt is a poor historian:  Per hospital records, pt admitted from nursing facility and living alone in an apartment prior to previous hospitalization. Bathroom setup: ?   Equipment owned: w/w     Prior Level of Function: Pt is a poor historian: ? with ADLs , ? with IADLs; ambulated with w/w  Driving: ?  Occupation: ?     Pain Level: Pt denies pain this session  Cognition: A&O: grossly 2/4 (self and hospital);  Follows 1 step directions              Pt perseverating on \"[employer]\" and \"19\"              Memory:  poor              Sequencing:  poor              Problem solving:  poor              Judgement/safety:  poor                Functional Assessment:    Initial Eval Status  Date: 19 Treatment Status  Date: 19 Short Term Goals  Treatment frequency: PRN   Feeding NPO N/T  Supervision     Grooming Minimal Assist  Set up  To brush teeth while seated in bedside chair.  Supervision    UB Dressing Moderate Assist  Min A  While seated  Stand by Assist    LB Dressing Dependent  Max A  To don/doff socks while seated EOB.  Moderate Assist    Bathing Maximal Assist Max A  Per eval  Moderate Assist    Toileting Dependent  Min A- toilet transfer  Using w/w, cuing for safety.  Moderate Assist    Bed Mobility  Supine to sit: Minimal Assist   Sit to supine: NT  Min A- supine to sit  Cuing for safety  Supine to sit: Stand by Assist   Sit to supine: Stand by Assist    Functional Transfers Maximal Assist  stand pivot transfer  (min A for sit to stand)   Min A- sit<->stand  Cuing on hand placement Minimal Assist    Functional Mobility Maximal Assist   Several steps with w/w (+ L knee buckling,  Min A  Short distance using w/w. Cuing for safety. Minimal Assist    Balance Sitting:     Static:  SBA    Dynamic:min A  Standing: min-max A Sitting:     Static:  SBA    Dynamic:Min A  Standing: Min A     Activity Tolerance P+ Fair+  F   Visual/  Perceptual wfl                        Comments: Upon arrival pt supine in bed. Pt educated on techniques to increase independence and safety during ADL's, bed mobility, and functional transfers. At end of session pt left seated in bedside chair, call light within reach, sitter present. · Pt has made fair+ progress towards set goals.      · Continue with current plan of care    Time in: 10:50  Time out: 11:15  Total Tx Time: 25 mins    Aron Gurrola

## 2019-05-09 NOTE — PROGRESS NOTES
Hospital Medicine    Subjective:  Pt seen this am pt alert responsive in no distress      Current Facility-Administered Medications:     acetaminophen (TYLENOL) tablet 650 mg, 650 mg, Oral, Q4H PRN, Mara Medal Malmer, DO    pantoprazole (PROTONIX) tablet 40 mg, 40 mg, Oral, QAM AC, Mara Medal Malmer, DO, 40 mg at 05/09/19 0603    mineral oil-hydrophilic petrolatum (HYDROPHOR) ointment, , Topical, BID **AND** mineral oil-hydrophilic petrolatum (HYDROPHOR) ointment, , Topical, TID PRN, Mara Medal Malmer, DO    phenytoin (DILANTIN) chewable tablet 50 mg, 50 mg, Oral, TID, ROSA Latif, 50 mg at 05/09/19 1428    lidocaine PF 1 % injection 5 mL, 5 mL, Intradermal, Once, Jackson Trujillo MD    sodium chloride flush 0.9 % injection 10 mL, 10 mL, Intravenous, PRN, Jackson Trujillo MD, 10 mL at 05/08/19 1811    heparin flush 100 UNIT/ML injection 300 Units, 3 mL, Intravenous, 2 times per day, Jackson Trujillo MD, 300 Units at 05/09/19 0825    heparin flush 100 UNIT/ML injection 300 Units, 3 mL, Intracatheter, PRN, Jackson Trujillo MD, 300 Units at 05/09/19 0603    lisinopril (PRINIVIL;ZESTRIL) tablet 10 mg, 10 mg, Oral, Daily, Aime Sorensen MD, 10 mg at 05/09/19 0826    [DISCONTINUED] levetiracetam (KEPPRA) 750 mg/50 mL IVPB, 750 mg, Intravenous, BID **OR** levETIRAcetam (KEPPRA) tablet 750 mg, 750 mg, Oral, BID, Aime Sorensen MD, 750 mg at 05/09/19 0603    insulin glargine (LANTUS) injection vial 5 Units, 5 Units, Subcutaneous, Nightly, Aime Sorensen MD, 5 Units at 05/08/19 2146    [COMPLETED] anidulafungin (ERAXIS) 200 mg in dextrose 5 % 260 mL IVPB, 200 mg, Intravenous, Once, 200 mg at 05/03/19 1402 **AND** anidulafungin (ERAXIS) 100 mg in dextrose 5 % 130 mL IVPB, 100 mg, Intravenous, Q24H, Aime Sorensen MD, 100 mg at 05/08/19 2142    vancomycin 1000 mg IVPB in 250 mL D5W addavial, 1,000 mg, Intravenous, Q12H, Aime Sorensen MD, Stopped at 05/09/19 0703    piperacillin-tazobactam (ZOSYN) 3.375 g in dextrose 5 % 100 mL IVPB extended infusion (mini-bag), 3.375 g, Intravenous, Q8H, Justin Robledo MD, Stopped at 05/09/19 1600    insulin lispro (HUMALOG) injection vial 0-6 Units, 0-6 Units, Subcutaneous, Q4H, Justin Robledo MD, 2 Units at 05/08/19 2146    glucose (GLUTOSE) 40 % oral gel 15 g, 15 g, Oral, PRN, Justin Robledo MD    glucagon (rDNA) injection 1 mg, 1 mg, Intramuscular, PRN, Justin Robledo MD    dextrose 5 % solution, 100 mL/hr, Intravenous, PRN, Justin Robledo MD    dextrose 50 % solution 12.5 g, 12.5 g, Intravenous, PRN, Justin Robledo MD, 12.5 g at 05/02/19 0650    potassium chloride 10 mEq/100 mL IVPB (Peripheral Line), 10 mEq, Intravenous, PRN, Justin Robledo MD, Last Rate: 100 mL/hr at 05/02/19 0944, 10 mEq at 05/02/19 0944    pravastatin (PRAVACHOL) tablet 80 mg, 80 mg, Oral, Nightly, Justin Robledo MD, 80 mg at 05/08/19 2144    tamsulosin (FLOMAX) capsule 0.4 mg, 0.4 mg, Oral, Daily, Justin Robledo MD, 0.4 mg at 05/09/19 0825    sodium chloride flush 0.9 % injection 10 mL, 10 mL, Intravenous, 2 times per day, Justin Robledo MD, 10 mL at 05/09/19 0826    sodium chloride flush 0.9 % injection 10 mL, 10 mL, Intravenous, PRN, Justin Robledo MD, 10 mL at 05/09/19 0603    magnesium hydroxide (MILK OF MAGNESIA) 400 MG/5ML suspension 30 mL, 30 mL, Oral, Daily PRN, Justin Robledo MD    ondansetron [facility]) injection 4 mg, 4 mg, Intravenous, Q6H PRN, Justin Robledo MD    heparin (porcine) injection 5,000 Units, 5,000 Units, Subcutaneous, 3 times per day, Justin Robledo MD, 5,000 Units at 05/09/19 1428    dextrose 50 % solution 12.5 g, 12.5 g, Intravenous, PRN, Justin Robledo MD, 12.5 g at 05/09/19 0508    potassium chloride 10 mEq/100 mL IVPB (Peripheral Line), 10 mEq, Intravenous, PRN, Justin Robledo MD, Last Rate: 100 mL/hr at 05/02/19 0830, 10 mEq at 05/02/19 0830    magnesium sulfate 1 g in dextrose 5% 100 mL IVPB, 1 g, Intravenous, PRN, Yadira Carcamo Rupali Marks MD    Objective:    BP (!) 153/90   Pulse 67   Temp 99.4 °F (37.4 °C) (Temporal)   Resp 16   Ht 5' 10\" (1.778 m)   Wt 128 lb 8 oz (58.3 kg)   SpO2 98%   BMI 18.44 kg/m²     Heart:  Reg  Lungs:  CTA bilaterally, no wheeze, rales or rhonchi  Abd: bowel sounds present, nontender, nondistended  Extrem:  No clubbing, cyanosis, or edema    CBC with Differential:    Lab Results   Component Value Date    WBC 5.0 05/09/2019    RBC 3.47 05/09/2019    HGB 9.5 05/09/2019    HCT 31.0 05/09/2019     05/09/2019    MCV 89.3 05/09/2019    MCH 27.4 05/09/2019    MCHC 30.6 05/09/2019    RDW 15.7 05/09/2019    NRBC 0.0 03/29/2019    SEGSPCT 68 01/11/2013    LYMPHOPCT 15.1 05/09/2019    MONOPCT 6.5 05/09/2019    MYELOPCT 0.9 01/06/2019    BASOPCT 0.4 05/09/2019    MONOSABS 0.32 05/09/2019    LYMPHSABS 0.75 05/09/2019    EOSABS 0.16 05/09/2019    BASOSABS 0.02 05/09/2019     CMP:    Lab Results   Component Value Date     05/09/2019    K 3.8 05/09/2019    K 3.9 01/22/2019     05/09/2019    CO2 23 05/09/2019    BUN 11 05/09/2019    CREATININE 1.1 05/09/2019    GFRAA >60 05/09/2019    LABGLOM >60 05/09/2019    GLUCOSE 73 05/09/2019    PROT 6.0 05/09/2019    LABALBU 2.4 05/09/2019    CALCIUM 8.1 05/09/2019    BILITOT 0.5 05/09/2019    ALKPHOS 78 05/09/2019    AST 10 05/09/2019    ALT 6 05/09/2019     Warfarin PT/INR:    Lab Results   Component Value Date    INR 1.1 05/06/2019    INR 1.0 05/04/2019    INR 1.0 05/01/2019    PROTIME 12.3 05/06/2019    PROTIME 11.6 05/04/2019    PROTIME 12.1 05/01/2019       Assessment:    Principal Problem:    Hyperosmolar hyperglycemic coma due to diabetes mellitus without ketoacidosis (HCC)  Active Problems:    Seizure ([address]    Seizure-like activity (HCC)    Altered mental status    Acute ischemic stroke (HCC)    Severe protein-calorie malnutrition ([address]    Liver mass  Resolved Problems:    * No resolved hospital problems.  *  fungemia    Plan:  Cont meds per id dc planning await liver Joon Jordan  4:22 PM  5/9/2019

## 2019-05-09 NOTE — PROGRESS NOTES
Went to floor to see patient. Patient doing well. States no pain. Site clean and dry, no drainage on bandage. Liver abscess drain dressing clean and dry. No leaking around tube noted. Draining into bag. Any questions or concerns call ordering doctor.

## 2019-05-09 NOTE — PROGRESS NOTES
ID Progress Note                [address] [facility] [address], [place], [address]            Phone [phone]     Fax [phone]      Chief complaint   AMS    Subjective:  Awake and oriented   CA 19-9 very high  Denies abdo pain  Rt IJ got accidentally removed  10 ROS otherwise negative unless otherwise specified above. Objective:    Vitals:    05/09/19 0800   BP: (!) 174/92   Pulse: 74   Resp: 16   Temp: 97.7 °F (36.5 °C)   SpO2:      GENERAL:  The patient is not alert and awake, minimally responsive. HEENT:  Atraumatic and normocephalic. PERRLA. EOMI. RESPIRATORY:  Air entry bilaterally equal.  No wheezes or crackles. CARDIOVASCULAR:  S1 and S2 normal.  No murmur, rubs, or gallop. ABDOMEN:  Soft, nontender, and nondistended. Bowel sounds present. Rt sided catheter in place minimally draining   EXTREMITIES:  No pedal edema. NEUROLOGIC:  Not following commands.     Rt IJ in place - ACCIDENTALLY removed by pt      Labs:  Recent Labs     05/07/19  0644 05/08/19  0637 05/09/19  0527   WBC 5.9 5.9 5.0   RBC 3.30* 3.77* 3.47*   HGB 9.1* 10.3* 9.5*   HCT 29.9* 34.5* 31.0*   MCV 90.6 91.5 89.3   MCH 27.6 27.3 27.4   MCHC 30.4* 29.9* 30.6*   RDW 16.0* 15.8* 15.7*    250 226   MPV 10.2 10.2 10.3     CMP:    Lab Results   Component Value Date     05/09/2019    K 3.8 05/09/2019    K 3.9 01/22/2019     05/09/2019    CO2 23 05/09/2019    BUN 11 05/09/2019    CREATININE 1.1 05/09/2019    GFRAA >60 05/09/2019    LABGLOM >60 05/09/2019    GLUCOSE 73 05/09/2019    PROT 6.0 05/09/2019    LABALBU 2.4 05/09/2019    CALCIUM 8.1 05/09/2019    BILITOT 0.5 05/09/2019    ALKPHOS 78 05/09/2019    AST 10 05/09/2019    ALT 6 05/09/2019          Microbiology :  No results for input(s): BC in the last 72 hours. No results for input(s): Georgann Favia in the last 72 hours. No results for input(s): LABURIN in the last 72 hours. No results for input(s): CULTRESP in the last 72 hours.   No results for input(s): WNDABS in the last 72 hours. Radiology :  CT NEEDLE BIOPSY LIVER PERCUTANEOUS   Final Result   Successful uncomplicated CT and fluoroscopic guided liver   biopsy. If there are any physician concerns regarding this report, a   Radiologist can be reached by calling the following number [phone]. CT GUIDED NEEDLE PLACEMENT   Final Result   Successful uncomplicated CT and fluoroscopic guided liver   biopsy. If there are any physician concerns regarding this report, a   Radiologist can be reached by calling the following number [phone]. CT ABSCESS DRAINAGE W CATH PLACEMENT S&I   Final Result   Successful uncomplicated  liver abscess drainage                CTA TRIPHASIC LIVER   Final Result      XR CHEST PORTABLE   Final Result   Right internal jugular central venous catheter tip in SVC. No pneumothorax on the right on the left. Patient has a stent in the   projections for the biliary tract/common bile duct and there are signs   of pneumobilia in the liver. IMPRESSION: No acute cardiopulmonary process. MRI Brain W WO Contrast   Final Result   1. Very small localized peripheral cortical acute ischemic insult in   the posterior right parietal region. 2. Extensive the changes relate with small vessel disease throughout   the white matter of both cerebral hemispheres, basal ganglia region   and brainstem. 3. Additional old insults with encephalomalacia are also seen in both   cerebellar hemispheres. 4. Overall loss of brain parenchyma more than expected for the age   group in supra and infratentorial regions. Findings to be correlated   clinically. ALERT:  ABNORMAL REPORT. XR CHEST PORTABLE   Final Result   No acute cardiopulmonary process. CT Head WO Contrast   Final Result   1.  Chronic ischemic changes in the brain parenchyma predominantly   supratentorial areas with overall loss of volume of the brain   parenchyma for the age group, as above commented. 2. No indication for an acute intracranial process. CT ABSCESS CATHETER FOLLOW UP    (Results Pending)         URINARY CATHETER OUTPUT (Talavera):  [REMOVED] Urethral Catheter-Output (mL): 650 mL  Urethral Catheter  22 fr-Output (mL): 1000 mL    ASSESSMENT AND PLAN:  1. 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 2.  Encephalopathy from sepsis - resolving   3. History of autoimmune pancreatitis with biliary obstruction. 4.  History of recently treated Enterococcus avium, complicated  bacteremia with six weeks of ampicillin plus ceftriaxone ending on  04/30/2019, also had polymicrobial bacteremia including Klebsiella  pneumoniae, E. coli as well as Clostridium perfringens. 5 hematuria/traumatic talavera     PLAN:      1.  eraxis for 14 days from 5/3 - 5/16/2019. Zosyn for 4 weeks  2. PICC line discontinued, tip culture negative so far  3.   ZOË  - no vegetations  4.  s/p CT guided biopsy on 5/6- follow results      PICC    very high, oncology/hepatobiliary on board  Underwent CT guided liver biopsy      Electronically signed by Amol Tucker MD on 5/9/2019 at 11:42 AM

## 2019-05-10 VITALS
WEIGHT: 130 LBS | RESPIRATION RATE: 18 BRPM | HEART RATE: 81 BPM | TEMPERATURE: 98.4 F | DIASTOLIC BLOOD PRESSURE: 72 MMHG | SYSTOLIC BLOOD PRESSURE: 136 MMHG | BODY MASS INDEX: 18.61 KG/M2 | OXYGEN SATURATION: 7 % | HEIGHT: 70 IN

## 2019-05-10 LAB
ALBUMIN SERPL-MCNC: 2.5 G/DL (ref 3.5–5.2)
ALP BLD-CCNC: 73 U/L (ref 40–129)
ALT SERPL-CCNC: 6 U/L (ref 0–40)
ANION GAP SERPL CALCULATED.3IONS-SCNC: 11 MMOL/L (ref 7–16)
AST SERPL-CCNC: 8 U/L (ref 0–39)
BASOPHILS ABSOLUTE: 0.02 E9/L (ref 0–0.2)
BASOPHILS RELATIVE PERCENT: 0.5 % (ref 0–2)
BILIRUB SERPL-MCNC: 0.5 MG/DL (ref 0–1.2)
BILIRUBIN DIRECT: 0.2 MG/DL (ref 0–0.3)
BILIRUBIN, INDIRECT: 0.3 MG/DL (ref 0–1)
BUN BLDV-MCNC: 10 MG/DL (ref 8–23)
CALCIUM SERPL-MCNC: 8.2 MG/DL (ref 8.6–10.2)
CHLORIDE BLD-SCNC: 98 MMOL/L (ref 98–107)
CO2: 24 MMOL/L (ref 22–29)
CREAT SERPL-MCNC: 1.2 MG/DL (ref 0.7–1.2)
EOSINOPHILS ABSOLUTE: 0.16 E9/L (ref 0.05–0.5)
EOSINOPHILS RELATIVE PERCENT: 3.6 % (ref 0–6)
GFR AFRICAN AMERICAN: >60
GFR NON-AFRICAN AMERICAN: >60 ML/MIN/1.73
GLUCOSE BLD-MCNC: 143 MG/DL (ref 74–99)
HCT VFR BLD CALC: 28 % (ref 37–54)
HEMOGLOBIN: 8.6 G/DL (ref 12.5–16.5)
IMMATURE GRANULOCYTES #: 0.01 E9/L
IMMATURE GRANULOCYTES %: 0.2 % (ref 0–5)
LYMPHOCYTES ABSOLUTE: 0.89 E9/L (ref 1.5–4)
LYMPHOCYTES RELATIVE PERCENT: 20.1 % (ref 20–42)
Lab: NORMAL
MCH RBC QN AUTO: 27.4 PG (ref 26–35)
MCHC RBC AUTO-ENTMCNC: 30.7 % (ref 32–34.5)
MCV RBC AUTO: 89.2 FL (ref 80–99.9)
METER GLUCOSE: 146 MG/DL (ref 74–99)
METER GLUCOSE: 177 MG/DL (ref 74–99)
METER GLUCOSE: 181 MG/DL (ref 74–99)
METER GLUCOSE: 251 MG/DL (ref 74–99)
METER GLUCOSE: 276 MG/DL (ref 74–99)
MONOCYTES ABSOLUTE: 0.3 E9/L (ref 0.1–0.95)
MONOCYTES RELATIVE PERCENT: 6.8 % (ref 2–12)
NEUTROPHILS ABSOLUTE: 3.04 E9/L (ref 1.8–7.3)
NEUTROPHILS RELATIVE PERCENT: 68.8 % (ref 43–80)
PDW BLD-RTO: 15.7 FL (ref 11.5–15)
PLATELET # BLD: 220 E9/L (ref 130–450)
PMV BLD AUTO: 9.6 FL (ref 7–12)
POTASSIUM SERPL-SCNC: 3.9 MMOL/L (ref 3.5–5)
RBC # BLD: 3.14 E12/L (ref 3.8–5.8)
REPORT: NORMAL
SODIUM BLD-SCNC: 133 MMOL/L (ref 132–146)
THIS TEST SENT TO: NORMAL
TOTAL PROTEIN: 5.9 G/DL (ref 6.4–8.3)
WBC # BLD: 4.4 E9/L (ref 4.5–11.5)

## 2019-05-10 PROCEDURE — 6360000002 HC RX W HCPCS: Performed by: UROLOGY

## 2019-05-10 PROCEDURE — 99233 SBSQ HOSP IP/OBS HIGH 50: CPT | Performed by: INTERNAL MEDICINE

## 2019-05-10 PROCEDURE — 2580000003 HC RX 258: Performed by: UROLOGY

## 2019-05-10 PROCEDURE — 82248 BILIRUBIN DIRECT: CPT

## 2019-05-10 PROCEDURE — 6370000000 HC RX 637 (ALT 250 FOR IP): Performed by: UROLOGY

## 2019-05-10 PROCEDURE — 82962 GLUCOSE BLOOD TEST: CPT

## 2019-05-10 PROCEDURE — 36415 COLL VENOUS BLD VENIPUNCTURE: CPT

## 2019-05-10 PROCEDURE — 6370000000 HC RX 637 (ALT 250 FOR IP): Performed by: INTERNAL MEDICINE

## 2019-05-10 PROCEDURE — 6360000002 HC RX W HCPCS: Performed by: INTERNAL MEDICINE

## 2019-05-10 PROCEDURE — 85025 COMPLETE CBC W/AUTO DIFF WBC: CPT

## 2019-05-10 PROCEDURE — 80053 COMPREHEN METABOLIC PANEL: CPT

## 2019-05-10 PROCEDURE — 6370000000 HC RX 637 (ALT 250 FOR IP): Performed by: PHYSICIAN ASSISTANT

## 2019-05-10 RX ORDER — FLUCONAZOLE 100 MG/1
400 TABLET ORAL DAILY
Qty: 14 TABLET | Refills: 0 | Status: SHIPPED | OUTPATIENT
Start: 2019-05-10 | End: 2019-05-16

## 2019-05-10 RX ORDER — LEVETIRACETAM 750 MG/1
750 TABLET ORAL 2 TIMES DAILY
Qty: 60 TABLET | Refills: 3
Start: 2019-05-10

## 2019-05-10 RX ORDER — PHENYTOIN 50 MG/1
50 TABLET, CHEWABLE ORAL 3 TIMES DAILY
Qty: 12 TABLET | Refills: 0 | Status: ON HOLD
Start: 2019-05-10 | End: 2019-05-28 | Stop reason: HOSPADM

## 2019-05-10 RX ORDER — PIPERACILLIN SODIUM, TAZOBACTAM SODIUM 3; .375 G/15ML; G/15ML
3.38 INJECTION, POWDER, LYOPHILIZED, FOR SOLUTION INTRAVENOUS EVERY 8 HOURS
Qty: 202.5 G | Refills: 0 | Status: ON HOLD | OUTPATIENT
Start: 2019-05-10 | End: 2019-05-28 | Stop reason: SDUPTHER

## 2019-05-10 RX ORDER — FLUCONAZOLE 100 MG/1
400 TABLET ORAL DAILY
Status: DISCONTINUED | OUTPATIENT
Start: 2019-05-10 | End: 2019-05-10 | Stop reason: HOSPADM

## 2019-05-10 RX ORDER — INSULIN GLARGINE 100 [IU]/ML
5 INJECTION, SOLUTION SUBCUTANEOUS NIGHTLY
Qty: 1 VIAL | Refills: 0 | Status: ON HOLD
Start: 2019-05-10 | End: 2019-05-28 | Stop reason: HOSPADM

## 2019-05-10 RX ORDER — LISINOPRIL 10 MG/1
10 TABLET ORAL DAILY
Qty: 30 TABLET | Refills: 3 | Status: ON HOLD
Start: 2019-05-10 | End: 2019-05-28 | Stop reason: HOSPADM

## 2019-05-10 RX ADMIN — VANCOMYCIN HYDROCHLORIDE 1000 MG: 1 INJECTION, POWDER, LYOPHILIZED, FOR SOLUTION INTRAVENOUS at 06:32

## 2019-05-10 RX ADMIN — Medication 10 ML: at 13:17

## 2019-05-10 RX ADMIN — PIPERACILLIN AND TAZOBACTAM 3.38 G: 3; .375 INJECTION, POWDER, FOR SOLUTION INTRAVENOUS at 13:15

## 2019-05-10 RX ADMIN — Medication 10 ML: at 08:05

## 2019-05-10 RX ADMIN — INSULIN LISPRO 1 UNITS: 100 INJECTION, SOLUTION INTRAVENOUS; SUBCUTANEOUS at 12:19

## 2019-05-10 RX ADMIN — HEPARIN 300 UNITS: 100 SYRINGE at 08:05

## 2019-05-10 RX ADMIN — INSULIN LISPRO 3 UNITS: 100 INJECTION, SOLUTION INTRAVENOUS; SUBCUTANEOUS at 16:25

## 2019-05-10 RX ADMIN — LEVETIRACETAM 750 MG: 250 TABLET, FILM COATED ORAL at 06:33

## 2019-05-10 RX ADMIN — VANCOMYCIN HYDROCHLORIDE 1000 MG: 1 INJECTION, POWDER, LYOPHILIZED, FOR SOLUTION INTRAVENOUS at 18:02

## 2019-05-10 RX ADMIN — HEPARIN SODIUM 5000 UNITS: 10000 INJECTION INTRAVENOUS; SUBCUTANEOUS at 06:33

## 2019-05-10 RX ADMIN — PANTOPRAZOLE SODIUM 40 MG: 40 TABLET, DELAYED RELEASE ORAL at 06:33

## 2019-05-10 RX ADMIN — INSULIN LISPRO 1 UNITS: 100 INJECTION, SOLUTION INTRAVENOUS; SUBCUTANEOUS at 08:16

## 2019-05-10 RX ADMIN — PHENYTOIN 50 MG: 50 TABLET, CHEWABLE ORAL at 13:45

## 2019-05-10 RX ADMIN — PETROLATUM: 42 OINTMENT TOPICAL at 13:17

## 2019-05-10 RX ADMIN — PIPERACILLIN AND TAZOBACTAM 3.38 G: 3; .375 INJECTION, POWDER, FOR SOLUTION INTRAVENOUS at 03:31

## 2019-05-10 RX ADMIN — HEPARIN SODIUM 5000 UNITS: 10000 INJECTION INTRAVENOUS; SUBCUTANEOUS at 13:45

## 2019-05-10 RX ADMIN — PETROLATUM: 42 OINTMENT TOPICAL at 16:26

## 2019-05-10 RX ADMIN — TAMSULOSIN HYDROCHLORIDE 0.4 MG: 0.4 CAPSULE ORAL at 08:06

## 2019-05-10 RX ADMIN — PHENYTOIN 50 MG: 50 TABLET, CHEWABLE ORAL at 08:05

## 2019-05-10 RX ADMIN — PETROLATUM: 42 OINTMENT TOPICAL at 09:00

## 2019-05-10 RX ADMIN — LISINOPRIL 10 MG: 10 TABLET ORAL at 08:06

## 2019-05-10 ASSESSMENT — PAIN SCALES - GENERAL: PAINLEVEL_OUTOF10: 0

## 2019-05-10 NOTE — CARE COORDINATION
5/10/2019 social work transition of care  Sw notified that [address] received for pt, facility liaison requesting plan for drain. Fernie relayed info to charge nurse. Fernie requested discharge order.   Electronically signed by ABBE Delgado on 5/10/2019 at 12:55 PM

## 2019-05-10 NOTE — PLAN OF CARE
Problem: Falls - Risk of:  Goal: Will remain free from falls  Description  Will remain free from falls  Outcome: Met This Shift  Goal: Absence of physical injury  Description  Absence of physical injury  Outcome: Met This Shift     Problem: Risk for Impaired Skin Integrity  Goal: Tissue integrity - skin and mucous membranes  Description  Structural intactness and normal physiological function of skin and  mucous membranes.   Outcome: Met This Shift     Problem: Discharge Planning:  Goal: Participates in care planning  Description  Participates in care planning  Outcome: Met This Shift  Goal: Discharged to appropriate level of care  Description  Discharged to appropriate level of care  Outcome: Met This Shift     Problem: Serum Glucose Level - Abnormal:  Goal: Ability to maintain appropriate glucose levels will improve to within specified parameters  Description  Ability to maintain appropriate glucose levels will improve to within specified parameters  Outcome: Met This Shift     Problem: HEMODYNAMIC STATUS  Goal: Patient has stable vital signs and fluid balance  Outcome: Met This Shift     Problem: ACTIVITY INTOLERANCE/IMPAIRED MOBILITY  Goal: Mobility/activity is maintained at optimum level for patient  Outcome: Met This Shift     Problem: COMMUNICATION IMPAIRMENT  Goal: Ability to express needs and understand communication  Outcome: Met This Shift     Problem: Nutrition  Goal: Optimal nutrition therapy  Outcome: Met This Shift     Problem: Neurological  Goal: Maximum potential motor/sensory/cognitive function  Outcome: Met This Shift     Problem: Skin Integrity:  Goal: Will show no infection signs and symptoms  Description  Will show no infection signs and symptoms  Outcome: Met This Shift  Goal: Absence of new skin breakdown  Description  Absence of new skin breakdown  Outcome: Met This Shift

## 2019-05-10 NOTE — CARE COORDINATION
5/10/2019 social work transition of care  Sw notified that pt set up for transport at 7 pm with [employer]. Sw notified nursing, cm notified pt's spouse.   Electronically signed by ABBE Delgado on 5/10/2019 at 3:57 PM

## 2019-05-10 NOTE — PROGRESS NOTES
Hospital Medicine    Subjective:  Pt alert conversive mynor diet      Current Facility-Administered Medications:     acetaminophen (TYLENOL) tablet 650 mg, 650 mg, Oral, Q4H PRN, Cloteal Law Malmer, DO    pantoprazole (PROTONIX) tablet 40 mg, 40 mg, Oral, QAM AC, Cloteal Law Malmer, DO, 40 mg at 05/10/19 4356    mineral oil-hydrophilic petrolatum (HYDROPHOR) ointment, , Topical, BID **AND** mineral oil-hydrophilic petrolatum (HYDROPHOR) ointment, , Topical, TID PRN, Cloteal Law Malmer, DO    phenytoin (DILANTIN) chewable tablet 50 mg, 50 mg, Oral, TID, ROSA Menendez, 50 mg at 05/10/19 0805    lidocaine PF 1 % injection 5 mL, 5 mL, Intradermal, Once, Siva Brown MD    sodium chloride flush 0.9 % injection 10 mL, 10 mL, Intravenous, PRN, Siva Brown MD, 10 mL at 05/08/19 1811    heparin flush 100 UNIT/ML injection 300 Units, 3 mL, Intravenous, 2 times per day, Siva Brown MD, 300 Units at 05/10/19 0805    heparin flush 100 UNIT/ML injection 300 Units, 3 mL, Intracatheter, PRN, Siva Brown MD, 300 Units at 05/09/19 0603    lisinopril (PRINIVIL;ZESTRIL) tablet 10 mg, 10 mg, Oral, Daily, Sid Gallego MD, 10 mg at 05/10/19 0806    [DISCONTINUED] levetiracetam (KEPPRA) 750 mg/50 mL IVPB, 750 mg, Intravenous, BID **OR** levETIRAcetam (KEPPRA) tablet 750 mg, 750 mg, Oral, BID, Sid Gallego MD, 750 mg at 05/10/19 8416    insulin glargine (LANTUS) injection vial 5 Units, 5 Units, Subcutaneous, Nightly, Sid Gallego MD, 5 Units at 05/08/19 2826    [COMPLETED] anidulafungin (ERAXIS) 200 mg in dextrose 5 % 260 mL IVPB, 200 mg, Intravenous, Once, 200 mg at 05/03/19 1402 **AND** anidulafungin (ERAXIS) 100 mg in dextrose 5 % 130 mL IVPB, 100 mg, Intravenous, Q24H, Sid Gallego MD, 100 mg at 05/09/19 2137    vancomycin 1000 mg IVPB in 250 mL D5W addavial, 1,000 mg, Intravenous, Q12H, Sid Gallego MD, Stopped at 05/10/19 0730    piperacillin-tazobactam (ZOSYN) 3.375 g in dextrose 5 % 100 Clara Lemos  8:23 AM  5/10/2019

## 2019-05-10 NOTE — PROGRESS NOTES
Inpatient Medical Oncology Progress Note    Subjective:   Denies abdominal pain. Tmax overnight 99.4. Objective:  /72   Pulse 81   Temp 98.4 °F (36.9 °C) (Temporal)   Resp 18   Ht 5' 10\" (1.778 m)   Wt 130 lb (59 kg)   SpO2 (!) 7%   BMI 18.65 kg/m²   General: awake and alert, not in acute distress. Neck: Supple. No LN  LUNGS:  No increased work of breathing  CARDIOVASCULAR:  RR  ABDOMEN:  Soft, non-tender, non-distended  EXT: No edema, cyanosis or clubbing  ECOG 1-2     Diagnostics:  Lab Results   Component Value Date    WBC 4.4 (L) 05/10/2019    HGB 8.6 (L) 05/10/2019    HCT 28.0 (L) 05/10/2019    MCV 89.2 05/10/2019     05/10/2019     Lab Results   Component Value Date     05/10/2019    K 3.9 05/10/2019    CL 98 05/10/2019    CO2 24 05/10/2019    BUN 10 05/10/2019    CREATININE 1.2 05/10/2019    GLUCOSE 143 (H) 05/10/2019    CALCIUM 8.2 (L) 05/10/2019    PROT 5.9 (L) 05/10/2019    LABALBU 2.5 (L) 05/10/2019    BILITOT 0.5 05/10/2019    ALKPHOS 73 05/10/2019    AST 8 05/10/2019    ALT 6 05/10/2019    LABGLOM >60 05/10/2019    GFRAA >60 05/10/2019     Impression/Plan:  77 y/o with ? 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 Non-compliant with  appointments. Now candidemia w/ biliary stent; on Eraxis, also on Vanc/Zosyn, ID following. ZËO negative for vegetations. New masses in liver on triphasic; CEA 2.4, CA 01-9 1565;   CT guided liver abscess drainage on 5/6, no organism seen on GS. CT guided liver mass biopsy done yesterday; Path pending. Imaging, elevated  more compatible with metastatic disease. Awaiting pathology.  Will continue to follow.     Emmy Gamino MD   HEMATOLOGY/MEDICAL [address] [facility], [address] [facility]  [facility] [address]  Dept: [phone]

## 2019-05-10 NOTE — PROGRESS NOTES
ID Progress Note                [address] [facility] [address], [place], [address]            Phone [phone]     Fax [phone]      Chief complaint   AMS    Subjective:  Awake and oriented   CA 19-9 very high  Denies abdo pain  Rt IJ got accidentally removed  10 ROS otherwise negative unless otherwise specified above. Objective:    Vitals:    05/10/19 1615   BP: 136/72   Pulse: 81   Resp: 18   Temp: 98.4 °F (36.9 °C)   SpO2: (!) 7%     GENERAL:  The patient is not alert and awake, minimally responsive. HEENT:  Atraumatic and normocephalic. PERRLA. EOMI. RESPIRATORY:  Air entry bilaterally equal.  No wheezes or crackles. CARDIOVASCULAR:  S1 and S2 normal.  No murmur, rubs, or gallop. ABDOMEN:  Soft, nontender, and nondistended. Bowel sounds present. Rt sided catheter in place minimally draining   EXTREMITIES:  No pedal edema. NEUROLOGIC:  Not following commands.     Rt IJ in place - ACCIDENTALLY removed by pt      Labs:  Recent Labs     05/08/19  0637 05/09/19  0527 05/10/19  0535   WBC 5.9 5.0 4.4*   RBC 3.77* 3.47* 3.14*   HGB 10.3* 9.5* 8.6*   HCT 34.5* 31.0* 28.0*   MCV 91.5 89.3 89.2   MCH 27.3 27.4 27.4   MCHC 29.9* 30.6* 30.7*   RDW 15.8* 15.7* 15.7*    226 220   MPV 10.2 10.3 9.6     CMP:    Lab Results   Component Value Date     05/10/2019    K 3.9 05/10/2019    K 3.9 01/22/2019    CL 98 05/10/2019    CO2 24 05/10/2019    BUN 10 05/10/2019    CREATININE 1.2 05/10/2019    GFRAA >60 05/10/2019    LABGLOM >60 05/10/2019    GLUCOSE 143 05/10/2019    PROT 5.9 05/10/2019    LABALBU 2.5 05/10/2019    CALCIUM 8.2 05/10/2019    BILITOT 0.5 05/10/2019    ALKPHOS 73 05/10/2019    AST 8 05/10/2019    ALT 6 05/10/2019          Microbiology :  No results for input(s): BC in the last 72 hours. No results for input(s): Madan Coventry in the last 72 hours. No results for input(s): LABURIN in the last 72 hours. No results for input(s): CULTRESP in the last 72 hours.   No results for the age group, as above commented. 2. No indication for an acute intracranial process. CT ABSCESS CATHETER FOLLOW UP    (Results Pending)         URINARY CATHETER OUTPUT (Talavera):  [REMOVED] Urethral Catheter-Output (mL): 650 mL  Urethral Catheter  22 fr-Output (mL): 1000 mL    ASSESSMENT AND PLAN:  1. 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 2.  Encephalopathy from sepsis - resolving   3. History of autoimmune pancreatitis with biliary obstruction. 4.  History of recently treated Enterococcus avium, complicated  bacteremia with six weeks of ampicillin plus ceftriaxone ending on  04/30/2019, also had polymicrobial bacteremia including Klebsiella  pneumoniae, E. coli as well as Clostridium perfringens. 5 hematuria/traumatic talavera     PLAN:      1.  D/C Eraxis, c.paraspilosis S to fluconazole, total  14 days from 5/3 - 5/16/2019. Zosyn for 4 weeks  2. PICC line discontinued, tip culture negative so far  3.   ZOË  - no vegetations  4.  s/p CT guided biopsy on 5/6- follow results      PICC    very high, oncology/hepatobiliary on board  Underwent CT guided liver biopsy  Med rec done      Electronically signed by Patrick Billingsley MD on 5/10/2019 at 6:22 PM

## 2019-05-10 NOTE — FLOWSHEET NOTE
Dr. Donald Berman will have discharge antibiotics by 7:00 when patient is discharged.  Darin Cartagena  5/10/2019  3:09 PM

## 2019-05-11 LAB — ANAEROBIC CULTURE: NORMAL

## 2019-05-11 NOTE — DISCHARGE SUMMARY
[address]                  [address] [facility],  [facility]                               DISCHARGE SUMMARY    PATIENT NAME: Norm Ku                   :        1950  MED REC NO:   [id number]                            ROOM:       [id number]  ACCOUNT NO:   [de-identified]                           ADMIT DATE: 2019  PROVIDER:     Alessandro Castillo DO                  DISCHARGE DATE:  05/10/2019    DISCHARGE DIAGNOSES:  1. Hyperosmolar hyperglycemia. 2.  New-onset diabetes mellitus, uncontrolled. 3.  Metabolic encephalopathy. 4.  Acute CVA. 5.  Hematuria, traumatic. 6.  Candida sepsis. 7.  Probable pancreatic CA, status post biliary stent. 8.  History of bacteremia related to biliary stent. HOSPITAL COURSE:  The patient is a 71-year-old black male who presented  to the emergency room with altered mental status. He had shaking  movements consistent with seizures. He was admitted to the intensive  care unit. He was seen by Neurology and was started on antiseizure  medication. Diagnostic neurologic evaluation revealed findings consistent with CVA. His blood cultures grew candida. He was seen by Infectious Disease. He  had traumatic catheter removal.  He was seen by Urology for hematuria. He was seen by Ophthalmology to rule out candida involvement of the  eyes. He was seen by Oncology and Surgical Oncology. Surgical  Oncology's impression was that of probable pancreatic CA in light of his  elevated . He underwent CT-guided liver biopsy during the  hospital stay, the results of which were pending at the time of  discharge. He was discharged to skilled nursing facility on 05/10/2019  in stable condition with recommendations to follow up with Oncology. Call office for appointment. Follow up with IR for CT abscess drainage  catheter followup. Also follow up with Urology and Dr. Felice Velez.  Call  office for appointment. DISCHARGE MEDICATIONS:  As per discharge med rec which include -  1. Lantus 5 units daily. 2.  Keppra 750 mg b.i.d.  3.  Dilantin 50 mg t.i.d. for four more days as per Neurology. 4.  Lisinopril 10 mg daily. 5.  Protonix 40 mg daily. 6.  Pravachol 80 mg daily. 7.  Flomax 0.4 mg daily.         Lexii Oseguera DO    D: 05/10/2019 14:14:01       T: 05/10/2019 14:23:33     [email or web address]  Job#: [id number]     Doc#: [id number]    CC:  Tamar Herrera DO

## 2019-05-13 LAB — CA 19-9: 1199 U/ML (ref 0–37)

## 2019-05-17 ENCOUNTER — TELEPHONE (OUTPATIENT)
Dept: ONCOLOGY | Age: 69
End: 2019-05-17

## 2019-05-17 NOTE — TELEPHONE ENCOUNTER
Per Jazz Prater, Dr Ranjit Laboy requested to see pt on either Thursday or Monday (5-16 or 5-20). I left two separate messages for pt, on 5-15 and 5-17, stating reason for call. As of Friday, 5-17, 3 pm, pt has not returned call.

## 2019-05-21 ENCOUNTER — TELEPHONE (OUTPATIENT)
Dept: ONCOLOGY | Age: 69
End: 2019-05-21

## 2019-05-21 NOTE — TELEPHONE ENCOUNTER
Spoke to [facility] who states pt is in facility in [place] since hospital discharge. Requested pt to come in, but states pt can't come in tomorrow. [facility] is calling facility & requesting pt transferred to see doctor, but not sure when she can arrange. for him to come in. She assured me that she will call back today with a plan for appt. I explained the importance of him coming in to see doctor.

## 2019-05-22 ENCOUNTER — HOSPITAL ENCOUNTER (INPATIENT)
Age: 69
LOS: 6 days | Discharge: NURSING, REHAB OR LONG-TERM CARE | DRG: 175 | End: 2019-05-28
Attending: EMERGENCY MEDICINE | Admitting: INTERNAL MEDICINE
Payer: MEDICARE

## 2019-05-22 ENCOUNTER — APPOINTMENT (OUTPATIENT)
Dept: GENERAL RADIOLOGY | Age: 69
DRG: 175 | End: 2019-05-22
Payer: MEDICARE

## 2019-05-22 ENCOUNTER — APPOINTMENT (OUTPATIENT)
Dept: CT IMAGING | Age: 69
DRG: 175 | End: 2019-05-22
Payer: MEDICARE

## 2019-05-22 DIAGNOSIS — I26.99 BILATERAL PULMONARY EMBOLISM (HCC): Primary | ICD-10-CM

## 2019-05-22 LAB
AADO2: 180.1 MMHG
ALBUMIN SERPL-MCNC: 3.2 G/DL (ref 3.5–5.2)
ALP BLD-CCNC: 118 U/L (ref 40–129)
ALT SERPL-CCNC: 14 U/L (ref 0–40)
ANION GAP SERPL CALCULATED.3IONS-SCNC: 12 MMOL/L (ref 7–16)
ANISOCYTOSIS: ABNORMAL
AST SERPL-CCNC: 21 U/L (ref 0–39)
B.E.: -3.4 MMOL/L (ref -3–3)
BASOPHILS ABSOLUTE: 0.16 E9/L (ref 0–0.2)
BASOPHILS RELATIVE PERCENT: 2.6 % (ref 0–2)
BILIRUB SERPL-MCNC: 0.5 MG/DL (ref 0–1.2)
BUN BLDV-MCNC: 12 MG/DL (ref 8–23)
BURR CELLS: ABNORMAL
CALCIUM SERPL-MCNC: 8.8 MG/DL (ref 8.6–10.2)
CHLORIDE BLD-SCNC: 101 MMOL/L (ref 98–107)
CO2: 24 MMOL/L (ref 22–29)
COHB: 0.3 % (ref 0–1.5)
COMMENT: ABNORMAL
CREAT SERPL-MCNC: 1.1 MG/DL (ref 0.7–1.2)
CRITICAL: ABNORMAL
DATE ANALYZED: ABNORMAL
DATE OF COLLECTION: ABNORMAL
EOSINOPHILS ABSOLUTE: 0.16 E9/L (ref 0.05–0.5)
EOSINOPHILS RELATIVE PERCENT: 2.6 % (ref 0–6)
FIO2: 40 %
GFR AFRICAN AMERICAN: >60
GFR NON-AFRICAN AMERICAN: >60 ML/MIN/1.73
GLUCOSE BLD-MCNC: 175 MG/DL (ref 74–99)
HCO3: 19.9 MMOL/L (ref 22–26)
HCT VFR BLD CALC: 26.7 % (ref 37–54)
HCT VFR BLD CALC: 30.6 % (ref 37–54)
HEMOGLOBIN: 8.3 G/DL (ref 12.5–16.5)
HEMOGLOBIN: 9.4 G/DL (ref 12.5–16.5)
HHB: 10.4 % (ref 0–5)
INR BLD: 1.2
LAB: ABNORMAL
LACTIC ACID: 1.6 MMOL/L (ref 0.5–2.2)
LYMPHOCYTES ABSOLUTE: 0.76 E9/L (ref 1.5–4)
LYMPHOCYTES RELATIVE PERCENT: 12.3 % (ref 20–42)
Lab: ABNORMAL
MCH RBC QN AUTO: 27.4 PG (ref 26–35)
MCH RBC QN AUTO: 27.6 PG (ref 26–35)
MCHC RBC AUTO-ENTMCNC: 30.7 % (ref 32–34.5)
MCHC RBC AUTO-ENTMCNC: 31.1 % (ref 32–34.5)
MCV RBC AUTO: 88.1 FL (ref 80–99.9)
MCV RBC AUTO: 90 FL (ref 80–99.9)
METHB: 0.4 % (ref 0–1.5)
MODE: ABNORMAL
MONOCYTES ABSOLUTE: 0.25 E9/L (ref 0.1–0.95)
MONOCYTES RELATIVE PERCENT: 3.5 % (ref 2–12)
NEUTROPHILS ABSOLUTE: 4.98 E9/L (ref 1.8–7.3)
NEUTROPHILS RELATIVE PERCENT: 78.9 % (ref 43–80)
O2 CONTENT: 11.8 ML/DL
O2 SATURATION: 89.5 % (ref 92–98.5)
O2HB: 88.9 % (ref 94–97)
OPERATOR ID: 7278
PATIENT TEMP: 37 C
PCO2: 29.9 MMHG (ref 35–45)
PDW BLD-RTO: 15.8 FL (ref 11.5–15)
PDW BLD-RTO: 15.8 FL (ref 11.5–15)
PFO2: 1.52 MMHG/%
PH BLOOD GAS: 7.44 (ref 7.35–7.45)
PHENYTOIN DOSE AMOUNT: ABNORMAL
PHENYTOIN LEVEL: <0.8 UG/ML (ref 10–20)
PLATELET # BLD: 189 E9/L (ref 130–450)
PLATELET # BLD: 221 E9/L (ref 130–450)
PMV BLD AUTO: 10.1 FL (ref 7–12)
PMV BLD AUTO: 10.3 FL (ref 7–12)
PO2: 60.7 MMHG (ref 60–100)
POIKILOCYTES: ABNORMAL
POTASSIUM SERPL-SCNC: 3.9 MMOL/L (ref 3.5–5)
PRO-BNP: ABNORMAL PG/ML (ref 0–125)
PROTHROMBIN TIME: 14.2 SEC (ref 9.3–12.4)
RBC # BLD: 3.03 E12/L (ref 3.8–5.8)
RBC # BLD: 3.4 E12/L (ref 3.8–5.8)
RI(T): 297 %
SODIUM BLD-SCNC: 137 MMOL/L (ref 132–146)
SOURCE, BLOOD GAS: ABNORMAL
THB: 9.4 G/DL (ref 11.5–16.5)
TIME ANALYZED: 2305
TOTAL PROTEIN: 7.8 G/DL (ref 6.4–8.3)
TROPONIN: 0.04 NG/ML (ref 0–0.03)
WBC # BLD: 5.6 E9/L (ref 4.5–11.5)
WBC # BLD: 6.3 E9/L (ref 4.5–11.5)

## 2019-05-22 PROCEDURE — 2000000000 HC ICU R&B

## 2019-05-22 PROCEDURE — 70450 CT HEAD/BRAIN W/O DYE: CPT

## 2019-05-22 PROCEDURE — 85610 PROTHROMBIN TIME: CPT

## 2019-05-22 PROCEDURE — 84484 ASSAY OF TROPONIN QUANT: CPT

## 2019-05-22 PROCEDURE — 85025 COMPLETE CBC W/AUTO DIFF WBC: CPT

## 2019-05-22 PROCEDURE — 36415 COLL VENOUS BLD VENIPUNCTURE: CPT

## 2019-05-22 PROCEDURE — 74176 CT ABD & PELVIS W/O CONTRAST: CPT

## 2019-05-22 PROCEDURE — 82550 ASSAY OF CK (CPK): CPT

## 2019-05-22 PROCEDURE — 83880 ASSAY OF NATRIURETIC PEPTIDE: CPT

## 2019-05-22 PROCEDURE — 99291 CRITICAL CARE FIRST HOUR: CPT

## 2019-05-22 PROCEDURE — 93005 ELECTROCARDIOGRAM TRACING: CPT | Performed by: EMERGENCY MEDICINE

## 2019-05-22 PROCEDURE — 94761 N-INVAS EAR/PLS OXIMETRY MLT: CPT

## 2019-05-22 PROCEDURE — 6360000002 HC RX W HCPCS: Performed by: EMERGENCY MEDICINE

## 2019-05-22 PROCEDURE — 80185 ASSAY OF PHENYTOIN TOTAL: CPT

## 2019-05-22 PROCEDURE — 83605 ASSAY OF LACTIC ACID: CPT

## 2019-05-22 PROCEDURE — 80053 COMPREHEN METABOLIC PANEL: CPT

## 2019-05-22 PROCEDURE — 82805 BLOOD GASES W/O2 SATURATION: CPT

## 2019-05-22 PROCEDURE — 94664 DEMO&/EVAL PT USE INHALER: CPT

## 2019-05-22 PROCEDURE — 87040 BLOOD CULTURE FOR BACTERIA: CPT

## 2019-05-22 PROCEDURE — 85730 THROMBOPLASTIN TIME PARTIAL: CPT

## 2019-05-22 PROCEDURE — 94640 AIRWAY INHALATION TREATMENT: CPT

## 2019-05-22 PROCEDURE — 94660 CPAP INITIATION&MGMT: CPT

## 2019-05-22 PROCEDURE — 80177 DRUG SCRN QUAN LEVETIRACETAM: CPT

## 2019-05-22 PROCEDURE — 85027 COMPLETE CBC AUTOMATED: CPT

## 2019-05-22 PROCEDURE — 2580000003 HC RX 258: Performed by: EMERGENCY MEDICINE

## 2019-05-22 PROCEDURE — 71045 X-RAY EXAM CHEST 1 VIEW: CPT

## 2019-05-22 PROCEDURE — 71275 CT ANGIOGRAPHY CHEST: CPT

## 2019-05-22 PROCEDURE — 6360000004 HC RX CONTRAST MEDICATION: Performed by: RADIOLOGY

## 2019-05-22 RX ORDER — HEPARIN SODIUM 1000 [USP'U]/ML
80 INJECTION, SOLUTION INTRAVENOUS; SUBCUTANEOUS ONCE
Status: COMPLETED | OUTPATIENT
Start: 2019-05-22 | End: 2019-05-22

## 2019-05-22 RX ORDER — 0.9 % SODIUM CHLORIDE 0.9 %
30 INTRAVENOUS SOLUTION INTRAVENOUS ONCE
Status: COMPLETED | OUTPATIENT
Start: 2019-05-22 | End: 2019-05-22

## 2019-05-22 RX ORDER — SODIUM CHLORIDE 0.9 % (FLUSH) 0.9 %
10 SYRINGE (ML) INJECTION ONCE
Status: DISCONTINUED | OUTPATIENT
Start: 2019-05-22 | End: 2019-05-28 | Stop reason: HOSPADM

## 2019-05-22 RX ORDER — HEPARIN SODIUM 1000 [USP'U]/ML
80 INJECTION, SOLUTION INTRAVENOUS; SUBCUTANEOUS PRN
Status: DISCONTINUED | OUTPATIENT
Start: 2019-05-22 | End: 2019-05-23 | Stop reason: ALTCHOICE

## 2019-05-22 RX ORDER — HEPARIN SODIUM 10000 [USP'U]/100ML
18 INJECTION, SOLUTION INTRAVENOUS CONTINUOUS
Status: DISCONTINUED | OUTPATIENT
Start: 2019-05-22 | End: 2019-05-23

## 2019-05-22 RX ORDER — HEPARIN SODIUM 1000 [USP'U]/ML
40 INJECTION, SOLUTION INTRAVENOUS; SUBCUTANEOUS PRN
Status: DISCONTINUED | OUTPATIENT
Start: 2019-05-22 | End: 2019-05-23 | Stop reason: ALTCHOICE

## 2019-05-22 RX ORDER — ALBUTEROL SULFATE 2.5 MG/3ML
2.5 SOLUTION RESPIRATORY (INHALATION) ONCE
Status: COMPLETED | OUTPATIENT
Start: 2019-05-22 | End: 2019-05-22

## 2019-05-22 RX ADMIN — IOPAMIDOL 60 ML: 755 INJECTION, SOLUTION INTRAVENOUS at 21:43

## 2019-05-22 RX ADMIN — HEPARIN SODIUM 4720 UNITS: 1000 INJECTION INTRAVENOUS; SUBCUTANEOUS at 23:30

## 2019-05-22 RX ADMIN — ALBUTEROL SULFATE 2.5 MG: 2.5 SOLUTION RESPIRATORY (INHALATION) at 19:44

## 2019-05-22 RX ADMIN — HEPARIN SODIUM 4720 UNITS: 1000 INJECTION INTRAVENOUS; SUBCUTANEOUS at 23:26

## 2019-05-22 RX ADMIN — SODIUM CHLORIDE 1770 ML: 9 INJECTION, SOLUTION INTRAVENOUS at 22:00

## 2019-05-22 RX ADMIN — HEPARIN SODIUM AND DEXTROSE 18 UNITS/KG/HR: 10000; 5 INJECTION INTRAVENOUS at 23:27

## 2019-05-22 NOTE — ED NOTES
Bed: 22  Expected date:   Expected time:   Means of arrival:   Comments:  [address], [address], RN  05/22/19 1931

## 2019-05-23 ENCOUNTER — HOSPITAL ENCOUNTER (OUTPATIENT)
Dept: INFUSION THERAPY | Age: 69
End: 2019-05-23

## 2019-05-23 PROBLEM — E11.9 TYPE 2 DIABETES MELLITUS, WITH LONG-TERM CURRENT USE OF INSULIN (HCC): Chronic | Status: ACTIVE | Noted: 2019-05-23

## 2019-05-23 PROBLEM — K83.1 BILIARY OBSTRUCTION: Chronic | Status: ACTIVE | Noted: 2019-03-25

## 2019-05-23 PROBLEM — Z79.4 TYPE 2 DIABETES MELLITUS, WITH LONG-TERM CURRENT USE OF INSULIN (HCC): Chronic | Status: ACTIVE | Noted: 2019-05-23

## 2019-05-23 PROBLEM — R78.81 BACTEREMIA: Status: RESOLVED | Noted: 2019-03-22 | Resolved: 2019-05-23

## 2019-05-23 PROBLEM — E11.01 HYPEROSMOLAR HYPERGLYCEMIC COMA DUE TO DIABETES MELLITUS WITHOUT KETOACIDOSIS (HCC): Status: RESOLVED | Noted: 2019-05-03 | Resolved: 2019-05-23

## 2019-05-23 LAB
APTT: 28.7 SEC (ref 24.5–35.1)
APTT: 84.9 SEC (ref 24.5–35.1)
BACTERIA: ABNORMAL /HPF
BILIRUBIN URINE: NEGATIVE
BLOOD, URINE: ABNORMAL
CLARITY: CLEAR
COLOR: YELLOW
EKG ATRIAL RATE: 111 BPM
EKG ATRIAL RATE: 97 BPM
EKG P AXIS: 49 DEGREES
EKG P AXIS: 60 DEGREES
EKG P-R INTERVAL: 138 MS
EKG P-R INTERVAL: 152 MS
EKG Q-T INTERVAL: 394 MS
EKG Q-T INTERVAL: 436 MS
EKG QRS DURATION: 138 MS
EKG QRS DURATION: 146 MS
EKG QTC CALCULATION (BAZETT): 535 MS
EKG QTC CALCULATION (BAZETT): 553 MS
EKG R AXIS: -63 DEGREES
EKG R AXIS: -71 DEGREES
EKG T AXIS: 21 DEGREES
EKG T AXIS: 29 DEGREES
EKG VENTRICULAR RATE: 111 BPM
EKG VENTRICULAR RATE: 97 BPM
EPITHELIAL CELLS, UA: ABNORMAL /HPF
GLUCOSE URINE: NEGATIVE MG/DL
KETONES, URINE: NEGATIVE MG/DL
LEUKOCYTE ESTERASE, URINE: ABNORMAL
METER GLUCOSE: 123 MG/DL (ref 74–99)
METER GLUCOSE: 126 MG/DL (ref 74–99)
METER GLUCOSE: 169 MG/DL (ref 74–99)
METER GLUCOSE: 88 MG/DL (ref 74–99)
NITRITE, URINE: NEGATIVE
PH UA: 6 (ref 5–9)
PROTEIN UA: NEGATIVE MG/DL
RBC UA: ABNORMAL /HPF (ref 0–2)
SPECIFIC GRAVITY UA: <=1.005 (ref 1–1.03)
TOTAL CK: 41 U/L (ref 20–200)
TROPONIN: 0.11 NG/ML (ref 0–0.03)
UROBILINOGEN, URINE: 0.2 E.U./DL
WBC UA: >20 /HPF (ref 0–5)
YEAST: ABNORMAL

## 2019-05-23 PROCEDURE — 51702 INSERT TEMP BLADDER CATH: CPT

## 2019-05-23 PROCEDURE — 6360000002 HC RX W HCPCS: Performed by: INTERNAL MEDICINE

## 2019-05-23 PROCEDURE — 93010 ELECTROCARDIOGRAM REPORT: CPT | Performed by: INTERNAL MEDICINE

## 2019-05-23 PROCEDURE — 82962 GLUCOSE BLOOD TEST: CPT

## 2019-05-23 PROCEDURE — 84484 ASSAY OF TROPONIN QUANT: CPT

## 2019-05-23 PROCEDURE — 81001 URINALYSIS AUTO W/SCOPE: CPT

## 2019-05-23 PROCEDURE — 97162 PT EVAL MOD COMPLEX 30 MIN: CPT

## 2019-05-23 PROCEDURE — 6370000000 HC RX 637 (ALT 250 FOR IP): Performed by: INTERNAL MEDICINE

## 2019-05-23 PROCEDURE — 2580000003 HC RX 258: Performed by: INTERNAL MEDICINE

## 2019-05-23 PROCEDURE — 2140000000 HC CCU INTERMEDIATE R&B

## 2019-05-23 PROCEDURE — 85730 THROMBOPLASTIN TIME PARTIAL: CPT

## 2019-05-23 PROCEDURE — 97530 THERAPEUTIC ACTIVITIES: CPT

## 2019-05-23 PROCEDURE — 36415 COLL VENOUS BLD VENIPUNCTURE: CPT

## 2019-05-23 PROCEDURE — 93005 ELECTROCARDIOGRAM TRACING: CPT | Performed by: INTERNAL MEDICINE

## 2019-05-23 PROCEDURE — 99223 1ST HOSP IP/OBS HIGH 75: CPT | Performed by: INTERNAL MEDICINE

## 2019-05-23 PROCEDURE — 94660 CPAP INITIATION&MGMT: CPT

## 2019-05-23 RX ORDER — PRAVASTATIN SODIUM 20 MG
80 TABLET ORAL NIGHTLY
Status: DISCONTINUED | OUTPATIENT
Start: 2019-05-23 | End: 2019-05-28 | Stop reason: HOSPADM

## 2019-05-23 RX ORDER — NICOTINE POLACRILEX 4 MG
15 LOZENGE BUCCAL PRN
Status: DISCONTINUED | OUTPATIENT
Start: 2019-05-23 | End: 2019-05-28 | Stop reason: HOSPADM

## 2019-05-23 RX ORDER — MAGNESIUM SULFATE 1 G/100ML
1 INJECTION INTRAVENOUS PRN
Status: DISCONTINUED | OUTPATIENT
Start: 2019-05-23 | End: 2019-05-28 | Stop reason: HOSPADM

## 2019-05-23 RX ORDER — DEXTROSE MONOHYDRATE 25 G/50ML
12.5 INJECTION, SOLUTION INTRAVENOUS PRN
Status: DISCONTINUED | OUTPATIENT
Start: 2019-05-23 | End: 2019-05-28 | Stop reason: HOSPADM

## 2019-05-23 RX ORDER — LISINOPRIL 10 MG/1
10 TABLET ORAL DAILY
Status: DISCONTINUED | OUTPATIENT
Start: 2019-05-23 | End: 2019-05-28

## 2019-05-23 RX ORDER — POTASSIUM CHLORIDE 20 MEQ/1
40 TABLET, EXTENDED RELEASE ORAL PRN
Status: DISCONTINUED | OUTPATIENT
Start: 2019-05-23 | End: 2019-05-28 | Stop reason: HOSPADM

## 2019-05-23 RX ORDER — PHENYTOIN 50 MG/1
50 TABLET, CHEWABLE ORAL 3 TIMES DAILY
Status: DISCONTINUED | OUTPATIENT
Start: 2019-05-23 | End: 2019-05-23

## 2019-05-23 RX ORDER — SODIUM CHLORIDE 0.9 % (FLUSH) 0.9 %
10 SYRINGE (ML) INJECTION EVERY 12 HOURS SCHEDULED
Status: DISCONTINUED | OUTPATIENT
Start: 2019-05-23 | End: 2019-05-28 | Stop reason: HOSPADM

## 2019-05-23 RX ORDER — POTASSIUM CHLORIDE 7.45 MG/ML
10 INJECTION INTRAVENOUS PRN
Status: DISCONTINUED | OUTPATIENT
Start: 2019-05-23 | End: 2019-05-28 | Stop reason: HOSPADM

## 2019-05-23 RX ORDER — TAMSULOSIN HYDROCHLORIDE 0.4 MG/1
0.4 CAPSULE ORAL DAILY
Status: DISCONTINUED | OUTPATIENT
Start: 2019-05-23 | End: 2019-05-28 | Stop reason: HOSPADM

## 2019-05-23 RX ORDER — HEPARIN SODIUM (PORCINE) LOCK FLUSH IV SOLN 100 UNIT/ML 100 UNIT/ML
100 SOLUTION INTRAVENOUS 2 TIMES DAILY
Status: DISCONTINUED | OUTPATIENT
Start: 2019-05-23 | End: 2019-05-28 | Stop reason: HOSPADM

## 2019-05-23 RX ORDER — DEXTROSE MONOHYDRATE 50 MG/ML
100 INJECTION, SOLUTION INTRAVENOUS PRN
Status: DISCONTINUED | OUTPATIENT
Start: 2019-05-23 | End: 2019-05-28 | Stop reason: HOSPADM

## 2019-05-23 RX ORDER — ACETAMINOPHEN 325 MG/1
650 TABLET ORAL EVERY 4 HOURS PRN
Status: DISCONTINUED | OUTPATIENT
Start: 2019-05-23 | End: 2019-05-28 | Stop reason: HOSPADM

## 2019-05-23 RX ORDER — INSULIN GLARGINE 100 [IU]/ML
5 INJECTION, SOLUTION SUBCUTANEOUS DAILY
Status: DISCONTINUED | OUTPATIENT
Start: 2019-05-23 | End: 2019-05-25

## 2019-05-23 RX ORDER — SODIUM CHLORIDE 0.9 % (FLUSH) 0.9 %
10 SYRINGE (ML) INJECTION PRN
Status: DISCONTINUED | OUTPATIENT
Start: 2019-05-23 | End: 2019-05-28 | Stop reason: HOSPADM

## 2019-05-23 RX ORDER — ATORVASTATIN CALCIUM 20 MG/1
20 TABLET, FILM COATED ORAL NIGHTLY
Status: ON HOLD | COMMUNITY
End: 2019-05-28 | Stop reason: HOSPADM

## 2019-05-23 RX ORDER — PIPERACILLIN SODIUM, TAZOBACTAM SODIUM 3; .375 G/15ML; G/15ML
3.38 INJECTION, POWDER, LYOPHILIZED, FOR SOLUTION INTRAVENOUS EVERY 8 HOURS
Status: DISCONTINUED | OUTPATIENT
Start: 2019-05-23 | End: 2019-05-23 | Stop reason: CLARIF

## 2019-05-23 RX ORDER — PANTOPRAZOLE SODIUM 40 MG/1
40 TABLET, DELAYED RELEASE ORAL
Status: DISCONTINUED | OUTPATIENT
Start: 2019-05-23 | End: 2019-05-28 | Stop reason: HOSPADM

## 2019-05-23 RX ADMIN — PHENYTOIN 50 MG: 50 TABLET, CHEWABLE ORAL at 13:36

## 2019-05-23 RX ADMIN — PIPERACILLIN AND TAZOBACTAM 3.38 G: 3; .375 INJECTION, POWDER, FOR SOLUTION INTRAVENOUS at 16:48

## 2019-05-23 RX ADMIN — INSULIN GLARGINE 5 UNITS: 100 INJECTION, SOLUTION SUBCUTANEOUS at 10:18

## 2019-05-23 RX ADMIN — ENOXAPARIN SODIUM 60 MG: 60 INJECTION SUBCUTANEOUS at 13:04

## 2019-05-23 RX ADMIN — TAMSULOSIN HYDROCHLORIDE 0.4 MG: 0.4 CAPSULE ORAL at 09:50

## 2019-05-23 RX ADMIN — PIPERACILLIN AND TAZOBACTAM 3.38 G: 3; .375 INJECTION, POWDER, FOR SOLUTION INTRAVENOUS at 09:50

## 2019-05-23 RX ADMIN — Medication 10 ML: at 09:49

## 2019-05-23 RX ADMIN — Medication 10 ML: at 20:39

## 2019-05-23 RX ADMIN — PANTOPRAZOLE SODIUM 40 MG: 40 TABLET, DELAYED RELEASE ORAL at 09:50

## 2019-05-23 RX ADMIN — LISINOPRIL 10 MG: 10 TABLET ORAL at 09:50

## 2019-05-23 RX ADMIN — PHENYTOIN 50 MG: 50 TABLET, CHEWABLE ORAL at 09:49

## 2019-05-23 RX ADMIN — LEVETIRACETAM 750 MG: 250 TABLET, FILM COATED ORAL at 20:39

## 2019-05-23 RX ADMIN — LEVETIRACETAM 750 MG: 250 TABLET, FILM COATED ORAL at 09:49

## 2019-05-23 RX ADMIN — INSULIN LISPRO 1 UNITS: 100 INJECTION, SOLUTION INTRAVENOUS; SUBCUTANEOUS at 16:48

## 2019-05-23 RX ADMIN — PRAVASTATIN SODIUM 80 MG: 20 TABLET ORAL at 20:39

## 2019-05-23 RX ADMIN — HEPARIN 100 UNITS: 100 SYRINGE at 20:39

## 2019-05-23 ASSESSMENT — PAIN SCALES - GENERAL
PAINLEVEL_OUTOF10: 0

## 2019-05-23 NOTE — PLAN OF CARE
Problem: Falls - Risk of:  Goal: Will remain free from falls  Description  Will remain free from falls  Outcome: Met This Shift     Problem: Falls - Risk of:  Goal: Absence of physical injury  Description  Absence of physical injury  Outcome: Met This Shift     Problem: Risk for Impaired Skin Integrity  Goal: Tissue integrity - skin and mucous membranes  Description  Structural intactness and normal physiological function of skin and  mucous membranes.   Outcome: Met This Shift

## 2019-05-23 NOTE — ED NOTES
Blood culture specimen obtained, per policy, from [place] [facility] by Alfonso Archibald RN. Specimen two of two collected at this time.      Jasbir Lau RN  05/22/19 [id number]

## 2019-05-23 NOTE — PLAN OF CARE
Problem: Gas Exchange - Impaired:  Goal: Levels of oxygenation will improve  Description  Levels of oxygenation will improve  Outcome: Met This Shift     Problem: Bleeding:  Goal: Will show no signs and symptoms of excessive bleeding  Description  Will show no signs and symptoms of excessive bleeding  Outcome: Met This Shift

## 2019-05-23 NOTE — CONSULTS
Upper gastrointestinal endoscopy (N/A, 3/8/2019); Upper gastrointestinal endoscopy (N/A, 3/8/2019); ERCP (N/A, 3/25/2019); picc line insertion nurse (3/27/2019); CYSTOSCOPY INSERTION / REMOVAL STENT / STONE (N/A, 5/5/2019); and transesophageal echocardiogram (05/06/2019). Home Medications:    Prior to Admission medications    Medication Sig Start Date End Date Taking? Authorizing Provider   atorvastatin (LIPITOR) 20 MG tablet Take 20 mg by mouth nightly   Yes Historical Provider, MD   levETIRAcetam (KEPPRA) 750 MG tablet Take 1 tablet by mouth 2 times daily 5/10/19  Yes Jesús Caballero DO   lisinopril (PRINIVIL;ZESTRIL) 10 MG tablet Take 1 tablet by mouth daily 5/10/19  Yes Jesús Cabalelro DO   piperacillin-tazobactam (ZOSYN) 3.375 (3-0.375) g injection Inject 3.375 g into the muscle every 8 hours for 20 days 5/10/19 5/30/19 Yes Ji Dutton MD   pantoprazole (PROTONIX) 40 MG tablet Take 1 tablet by mouth every morning (before breakfast) 3/30/19  Yes Eyal Newman MD   tamsulosin (FLOMAX) 0.4 MG capsule Take 1 capsule by mouth daily 1/11/19  Yes Brando Palmer MD   phenytoin (DILANTIN) 50 MG tablet Take 1 tablet by mouth 3 times daily for 4 days 5/10/19 5/14/19  Quiana Jordan DO   insulin glargine (LANTUS) 100 UNIT/ML injection vial Inject 5 Units into the skin nightly 5/10/19   Jesús Caballero DO   pravastatin (PRAVACHOL) 80 MG tablet Take 1 tablet by mouth nightly 1/10/19   Duglas Short MD       Allergies:  Patient has no known allergies. Social History:   reports that he has never smoked. He has never used smokeless tobacco. He reports that he drinks about 4.8 oz of alcohol per week. He reports that he does not use drugs. Family History: family history includes Cancer (age of onset: 50) in his mother.      REVIEW OF SYSTEMS:    12 point ROS has been done and pertinent neg and positive has been included in HPI and rest are non contributory      PHYSICAL EXAM:    BP (!) 154/103   Pulse 98   Temp 98.7 °F (37.1 °C) (Axillary)   Resp 19   Wt 130 lb (59 kg)   SpO2 92%   BMI 18.65 kg/m²    VENT SETTINGS:   Vent Information  FiO2 : 40 %  I Time/ I Time %: 0.8 s    GENERAL:  The patient is not alert and awake, minimally responsive. HEENT:  Atraumatic and normocephalic.  PERRLA.  EOMI. RESPIRATORY:  Air entry bilaterally equal.  No wheezes or crackles. CARDIOVASCULAR:  S1 and S2 normal.  No murmur, rubs, or gallop. ABDOMEN:  Soft, nontender, and nondistended.  Bowel sounds present. Rt sided catheter in place minimally draining   EXTREMITIES:  No pedal edema. Left basilic midline    DATA:    Labs:     Last 3 CBC:  Recent Labs     05/22/19 1948 05/22/19  2305   WBC 6.3 5.6   RBC 3.40* 3.03*   HGB 9.4* 8.3*    189   MPV 10.3 10.1       Last 3 BMP  Recent Labs     05/22/19 1948      K 3.9      CO2 24   BUN 12   CREATININE 1.1   GLUCOSE 175*   CALCIUM 8.8       LIVER PROFILE:  Recent Labs     05/22/19 1948   AST 21   ALT 14   LABALBU 3.2*       URINARY CATHETER OUTPUT (Freed):  Urethral Catheter Non-latex 16 fr-Output (mL): 25 mL    DRAIN/TUBE OUTPUT:     Microbiology :  No results for input(s): BC in the last 72 hours. No results for input(s): [place] in the last 72 hours. No results for input(s): LABURIN in the last 72 hours. No results for input(s): CULTRESP in the last 72 hours. No results for input(s): WNDABS in the last 72 hours. Radiology :  CT Head WO Contrast   Final Result      No significant interval change in the appearance of the brain. No   acute process. Advanced white matter changes and sequela of remote infarcts. CT ABDOMEN PELVIS WO CONTRAST Additional Contrast? None   Final Result      Severe distention of the urinary bladder with moderate left   hydronephrosis. Pneumobilia with bare-metal stent in place and severe dilation of the   pancreatic parenchyma. Moderate distention of the stomach with debris.       Pigtail catheter present in the right hepatic parenchyma. No evidence   for recurrent fluid collection.   ==========               CTA CHEST W CONTRAST   Final Result   Extensive occlusive thrombus in the right and left main pulmonary   arteries extending to upper and lower lobes consistent with bilateral   pulmonary embolism with cardiac strain/septal deviation. Partial visualization of the pancreatic head mass with a biliary stent   and drain with pneumobilia. Please correlate with the CT of the   abdomen and pelvis. The findings were discussed with the Dr. Jayne Ortiz at 10:10 PM on   5/22/2019. ALERT:  CRITICAL RESULT            XR CHEST PORTABLE   Final Result      Lines, tubes, and devices:  None. Lungs and pleura:  No consolidation. No lung mass. No pleural   effusion. Cardiomediastinal silhouette:  Unchanged cardiomediastinal silhouette. Other:  Pigtail catheter in the right upper quadrant. Assessment and Plan:        1.  Candida parapsilosis fungemia- treated completed antifungals on 5/16/2019  2.  History of autoimmune pancreatitis with biliary obstruction. 3.  History of recently treated Enterococcus avium, complicated  bacteremia with six weeks of ampicillin plus ceftriaxone ending on  04/30/2019, also had polymicrobial bacteremia including Klebsiella  pneumoniae, E. coli as well as Clostridium perfringens.   4 pancreatic adenocarcinoma  5 pulmonary embolism     PLAN:       continue Zosyn till  5/30/2019                  Amol Tucker MD

## 2019-05-23 NOTE — ED PROVIDER NOTES
Patient is a 70-year-old gentleman who presents to ER after being found unresponsive at his nursing home. Per EMS nursing staff saw him sitting up and not responding in the nursing home and began doing CPR for 2 minutes and the patient did become responsive. Patient was currently satting at 84% on room air. At this time patient has a nonrebreather on and is not speaking although he is awake and shaking his head no nodding yes. The history is provided by the patient and the EMS personnel. The history is limited by the condition of the patient. Review of Systems   Unable to perform ROS: Acuity of condition       Physical Exam   Constitutional: He is oriented to person, place, and time. He appears well-developed and well-nourished. He appears distressed. HENT:   Head: Normocephalic and atraumatic. Right Ear: Hearing and external ear normal.   Left Ear: Hearing and external ear normal.   Nose: Nose normal.   Mouth/Throat: Uvula is midline and oropharynx is clear and moist.   Eyes: Conjunctivae and lids are normal.   Neck: Trachea normal and phonation normal.   Cardiovascular: Regular rhythm and normal heart sounds. Tachycardia present. Pulmonary/Chest: He is in respiratory distress. He has decreased breath sounds in the right middle field and the right lower field. Abdominal: Soft. Normal appearance. There is no tenderness. Neurological: He is alert and oriented to person, place, and time. GCS eye subscore is 4. GCS verbal subscore is 5. GCS motor subscore is 6. Skin: Skin is warm, dry and intact. Procedures    MDM  Number of Diagnoses or Management Options  Diagnosis management comments: Patient's labwork was remarkable for an elevated troponin of 0.04. Otherwise labwork was unremarkable. CT did show extensive bilateral pulmonary emboli. Discussion was had with intensivist and it was decided to give patient high-dose heparin as he is not a TPA candidate at this time.  High-dose heparin was started and patient will be admitted to the ICU. I spoken to Dr. Libby Shah and he verbalizes understanding and is agreeable to admission at this time. Patient is agreeable as well. ED Course as of May 23 0013   Wed May 22, 2019   2232 Spoke to the intensivist, discussion was had that while patient was having a massive PE his blood pressure is holding stable therefore rather than tPA will be administrating high-dose heparin. [DS]      ED Course User Index  [DS] Conner OwensvingDO     ED Course as of May 23 0013   Wed May 22, 2019   2232 Spoke to the intensivist, discussion was had that while patient was having a massive PE his blood pressure is holding stable therefore rather than tPA will be administrating high-dose heparin. [DS]      ED Course User Index  [DS] Rashad Sahra        --------------------------------------------- PAST HISTORY ---------------------------------------------  Past Medical History:  [facility] Hypertension, and Syncope. Past Surgical History:  has a past surgical history that includes whipple procedure (N/A, 1/16/2019); eye surgery; Upper gastrointestinal endoscopy (N/A, 3/8/2019); Upper gastrointestinal endoscopy (N/A, 3/8/2019); ERCP (N/A, 3/25/2019); picc line insertion nurse (3/27/2019); CYSTOSCOPY INSERTION / REMOVAL STENT / STONE (N/A, 5/5/2019); and transesophageal echocardiogram (05/06/2019). Social History:  reports that he has never smoked. He has never used smokeless tobacco. He reports that he drinks about 4.8 oz of alcohol per week. He reports that he does not use drugs. Family History: family history includes Cancer (age of onset: 50) in his mother. The patients home medications have been reviewed. Allergies: Patient has no known allergies.     -------------------------------------------------- RESULTS -------------------------------------------------    LABS:  Results for orders placed or performed during the hospital encounter of 05/22/19   Troponin   Result Value Ref Range    Troponin 0.04 (H) 0.00 - 0.03 ng/mL   CBC Auto Differential   Result Value Ref Range    WBC 6.3 4.5 - 11.5 E9/L    RBC 3.40 (L) 3.80 - 5.80 E12/L    Hemoglobin 9.4 (L) 12.5 - 16.5 g/dL    Hematocrit 30.6 (L) 37.0 - 54.0 %    MCV 90.0 80.0 - 99.9 fL    MCH 27.6 26.0 - 35.0 pg    MCHC 30.7 (L) 32.0 - 34.5 %    RDW 15.8 (H) 11.5 - 15.0 fL    Platelets 895 546 - 823 E9/L    MPV 10.3 7.0 - 12.0 fL    Neutrophils % 78.9 43.0 - 80.0 %    Lymphocytes % 12.3 (L) 20.0 - 42.0 %    Monocytes % 3.5 2.0 - 12.0 %    Eosinophils % 2.6 0.0 - 6.0 %    Basophils % 2.6 (H) 0.0 - 2.0 %    Neutrophils # 4.98 1.80 - 7.30 E9/L    Lymphocytes # 0.76 (L) 1.50 - 4.00 E9/L    Monocytes # 0.25 0.10 - 0.95 E9/L    Eosinophils # 0.16 0.05 - 0.50 E9/L    Basophils # 0.16 0.00 - 0.20 E9/L    Anisocytosis 1+     Poikilocytes 1+     Isai Cells 1+    Comprehensive Metabolic Panel   Result Value Ref Range    Sodium 137 132 - 146 mmol/L    Potassium 3.9 3.5 - 5.0 mmol/L    Chloride 101 98 - 107 mmol/L    CO2 24 22 - 29 mmol/L    Anion Gap 12 7 - 16 mmol/L    Glucose 175 (H) 74 - 99 mg/dL    BUN 12 8 - 23 mg/dL    CREATININE 1.1 0.7 - 1.2 mg/dL    GFR Non-African American >60 >=60 mL/min/1.73    GFR African American >60     Calcium 8.8 8.6 - 10.2 mg/dL    Total Protein 7.8 6.4 - 8.3 g/dL    Alb 3.2 (L) 3.5 - 5.2 g/dL    Total Bilirubin 0.5 0.0 - 1.2 mg/dL    Alkaline Phosphatase 118 40 - 129 U/L    ALT 14 0 - 40 U/L    AST 21 0 - 39 U/L   Brain Natriuretic Peptide   Result Value Ref Range    Pro-BNP 13,415 (H) 0 - 125 pg/mL   Lactic Acid, Plasma   Result Value Ref Range    Lactic Acid 1.6 0.5 - 2.2 mmol/L   Phenytoin level, total   Result Value Ref Range    Phenytoin Lvl <0.8 (L) 10.0 - 20.0 ug/mL    Phenytoin Dose Amount Unknown    Protime-INR   Result Value Ref Range Protime 14.2 (H) 9.3 - 12.4 sec    INR 1.2    CBC   Result Value Ref Range    WBC 5.6 4.5 - 11.5 E9/L    RBC 3.03 (L) 3.80 - 5.80 E12/L    Hemoglobin 8.3 (L) 12.5 - 16.5 g/dL    Hematocrit 26.7 (L) 37.0 - 54.0 %    MCV 88.1 80.0 - 99.9 fL    MCH 27.4 26.0 - 35.0 pg    MCHC 31.1 (L) 32.0 - 34.5 %    RDW 15.8 (H) 11.5 - 15.0 fL    Platelets 540 883 - 246 E9/L    MPV 10.1 7.0 - 12.0 fL   Blood Gas, Arterial   Result Value Ref Range    Date Analyzed [id number]     Time Analyzed 2305     Source: Blood Arterial     pH, Blood Gas 7.442 7.350 - 7.450    PCO2 29.9 (L) 35.0 - 45.0 mmHg    PO2 60.7 60.0 - 100.0 mmHg    HCO3 19.9 (L) 22.0 - 26.0 mmol/L    B.E. -3.4 (L) -3.0 - 3.0 mmol/L    O2 Sat 89.5 (L) 92.0 - 98.5 %    PO2/FIO2 1.52 mmHg/%    AaDO2 180.1 mmHg    RI(T) 297 %    O2Hb 88.9 (L) 94.0 - 97.0 %    COHb 0.3 0.0 - 1.5 %    MetHb 0.4 0.0 - 1.5 %    O2 Content 11.8 mL/dL    HHb 10.4 (H) 0.0 - 5.0 %    tHb (est) 9.4 (L) 11.5 - 16.5 g/dL    Mode BIPAP 12/6     FIO2 40.0 %    Comment bipap 12/6, 40%      Date Of Collection      Time Collected      Pt Temp 37.0 C     ID [id number]     Lab [id number]     Critical(s) Notified . No Critical Values    EKG 12 Lead   Result Value Ref Range    Ventricular Rate 111 BPM    Atrial Rate 111 BPM    P-R Interval 152 ms    QRS Duration 146 ms    Q-T Interval 394 ms    QTc Calculation (Bazett) 535 ms    P Axis 49 degrees    R Axis -63 degrees    T Axis 21 degrees       RADIOLOGY:  CT Head WO Contrast   Final Result      No significant interval change in the appearance of the brain. No   acute process. Advanced white matter changes and sequela of remote infarcts. CT ABDOMEN PELVIS WO CONTRAST Additional Contrast? None   Final Result      Severe distention of the urinary bladder with moderate left   hydronephrosis. Pneumobilia with bare-metal stent in place and severe dilation of the   pancreatic parenchyma. Moderate distention of the stomach with debris.       Pigtail catheter present in the right hepatic parenchyma. No evidence   for recurrent fluid collection.   ==========               CTA CHEST W CONTRAST   Final Result   Extensive occlusive thrombus in the right and left main pulmonary   arteries extending to upper and lower lobes consistent with bilateral   pulmonary embolism with cardiac strain/septal deviation. Partial visualization of the pancreatic head mass with a biliary stent   and drain with pneumobilia. Please correlate with the CT of the   abdomen and pelvis. The findings were discussed with the Dr. Da Cam at 10:10 PM on   5/22/2019. ALERT:  CRITICAL RESULT            XR CHEST PORTABLE   Final Result      Lines, tubes, and devices:  None. Lungs and pleura:  No consolidation. No lung mass. No pleural   effusion. Cardiomediastinal silhouette:  Unchanged cardiomediastinal silhouette. Other:  Pigtail catheter in the right upper quadrant. EKG:  This EKG is signed and interpreted by me. Rate: 111  Rhythm: Sinus  Interpretation: Sinus tachycardia with right bundle-branch block and left anterior fascicular block. There are nonspecific ST changes relative to previous EKG. Comparison: stable as compared to patient's most recent EKG and no previous EKG available      ------------------------- NURSING NOTES AND VITALS REVIEWED ---------------------------  Date / Time Roomed:  5/22/2019  7:31 PM  ED Bed Assignment:  22/22    The nursing notes within the ED encounter and vital signs as below have been reviewed.      Patient Vitals for the past 24 hrs:   BP Temp Temp src Pulse Resp SpO2 Weight   05/23/19 0000 (!) 169/110 -- -- 101 15 92 % --   05/22/19 2345 (!) 156/103 -- -- 103 19 92 % --   05/22/19 2330 (!) 155/105 -- -- 105 22 91 % --   05/22/19 2230 130/88 -- -- 106 23 92 % --   05/22/19 2200 (!) 137/93 -- -- 104 20 93 % --   05/22/19 1927 134/78 98 °F (36.7 °C) Axillary 111 24 (!) 74 % 130 lb (59 kg)       Oxygen Saturation Interpretation: Normal    ------------------------------------------ PROGRESS NOTES ------------------------------------------  Re-evaluation(s):  Time: 0010  Patients symptoms are improving  Repeat physical examination is improved    Counseling:  I have spoken with the patient and discussed todays results, in addition to providing specific details for the plan of care and counseling regarding the diagnosis and prognosis. Their questions are answered at this time and they are agreeable with the plan of admission.    --------------------------------- ADDITIONAL PROVIDER NOTES ---------------------------------  Consultations:  Time: 2330. Spoke with Dr. Chelsea Ingram. Discussed case. They will admit the patient. This patient's ED course included: a personal history and physicial examination, re-evaluation prior to disposition, multiple bedside re-evaluations, cardiac monitoring, continuous pulse oximetry and complex medical decision making and emergency management    This patient has remained hemodynamically stable during their ED course. Please note that the withdrawal or failure to initiate urgent interventions for this patient would likely result in a life threatening deterioration or permanent disability. Systems at risk for deterioration include: pulmonary, cardiovascular    Accordingly this patient received 30 minutes of critical care time, excluding separately billable procedures. Diagnosis:  1. Bilateral pulmonary embolism (HCC)        Disposition:  Patient's disposition: Admit to CCU/ICU  Patient's condition is serious.          Briana Kitchen DO  Resident  05/23/19 9934

## 2019-05-23 NOTE — ED NOTES
Multiple attempts by 3 licensed nurses to obtain required blood specimen.      Arvin Sheridan RN  05/23/19 0001

## 2019-05-23 NOTE — ED NOTES
Heads up report called to MICU, spoke with [facility], will transport once RT is available as pt is on bipap     Tex Flaherty RN  05/23/19 0322

## 2019-05-23 NOTE — CONSULTS
Inpatient consult to Hematology  Consult performed by: Alicja Ashby MD  Consult ordered by: Monico Foster MD  Reason for consult: Omar PE  Assessment/Recommendations:    Inpatient Hematology/Oncology Consult Note    Reason for Visit: Bilateral PE. Referring Physician:  Monico Foster MD    PCP:  Devonte Montaño DO    History of Present Illness:  77 y/o male with pancreatic lesion and Biliary obstruction-IGG elevated; was on prednisone for ? autoimmune pancreatitis per GI/hepatology recommendations. Non-compliant with  appointments. 3 prior attempts (2 EUS and aborted whipple; no path showing invasive adenoca). Recent candidemia w/ biliary stent; on Eraxis, also on Vanc/Zosyn, ID following. ZOË negative for vegetations. New masses in liver on triphasic; CEA 2.4, CA 93-9 2576; CT guided liver abscess drainage on 5/6, no organism seen on GS. CT guided liver mass biopsy proved metastatic adenocarcinoma likely of pancreatic origin. Multiple attempts to have him see us in clinic. Patient non-compliant. He was found unresponsive at his nursing home. Per EMS nursing staff saw him sitting up and not responding in the nursing home and began doing CPR for 2 minutes and the patient did become responsive. CTA chest showed extensive occlusive thrombus in the right and left main pulmonary arteries extending to upper and lower lobes consistent with bilateral pulmonary embolism with cardiac strain/septal deviation. CT abdomen/pelvis Pneumobilia with bare-metal stent in place and severe dilation of the  pancreatic parenchyma. CT head No significant interval change in the appearance of the brain. No acute process. Review of Systems;  CONSTITUTIONAL: No fever, chills. Fair appetite. Tired. ENMT: Eyes: No diplopia; Nose: No epistaxis. Mouth: No sore throat. RESPIRATORY: No hemoptysis. Shortness of breath on exertion. CARDIOVASCULAR: No chest pain, palpitations.   GASTROINTESTINAL: No nausea/vomiting, abdominal pain.  GENITOURINARY: No dysuria, urinary frequency, hematuria. NEURO: No syncope, presyncope, headache. Remainder:  ROS NEGATIVE    Past Medical History:  No date: Cancer [facility])      Comment:  pancreatic  No date: Cerebral artery occlusion with cerebral infarction ([facility] [address]  No date: Hypertension  No date: Syncope    Past Surgical History:     5/5/2019: Jonathan Love / REMOVAL STENT / STONE; N/A      Comment:  CYSTOSCOPY RETROGRADE PYELOGRAM STENT INSERTION                performed by Justin Robledo MD at [address]  3/25/2019: ERCP; N/A      Comment:  ERCP STENT INSERTION performed by Vel Laughlin MD at                [facility] ENDOSCOPY  No date: EYE SURGERY  3/27/2019: PICC LINE INSERTION NURSE      Comment:     05/06/2019: TRANSESOPHAGEAL ECHOCARDIOGRAM      Comment:  dr Effie Hoskins  3/8/2019: UPPER GASTROINTESTINAL ENDOSCOPY; N/A      Comment:  REDO ENDOSCOPIC EGD  ULTRASOUND performed by Iglesia Lloyd DO at [address]  3/8/2019: UPPER GASTROINTESTINAL ENDOSCOPY; N/A      Comment:  EGD DIAGNOSTIC ONLY performed by Katherine Garcia DO at                [address]  1/16/2019: [facility]; N/A      Comment:  EXPLORATORY LAPAROTOMY; ABORTED WHIPPLE PROCEDURE;                GASTROJEJUNOSTOMY; OPEN CHOLECYSTECTOMY performed by                Kush Toledo MD at [facility] OR    Family History:  Review of patient's family history indicates:  Problem: Cancer      Relation: Mother          Age of Onset: 50          Comment: breast      Medications:  Reviewed and reconciled.     Social History:  Social History    Socioeconomic History      Marital status:       Spouse name: Not on file      Number of children: Not on file      Years of education: Not on file      Highest education level: Not on file    Occupational History      Not on file    Social Needs      Financial resource strain: Not on file      Food insecurity:        Worry: Not on file        Inability: Not on file Transportation needs:        Medical: Not on file        Non-medical: Not on file    Tobacco Use      Smoking status: Never Smoker      Smokeless tobacco: Never Used    Substance and Sexual Activity      Alcohol use: Yes        Alcohol/week: 4.8 oz        Types: 8 Cans of beer per week        Comment: states drinks one 24 ounce beer daily      Drug use: No        Types: Marijuana      Sexual activity: Not Currently    Lifestyle      Physical activity:        Days per week: Not on file        Minutes per session: Not on file      Stress: Not on file    Relationships      Social connections:        Talks on phone: Not on file        Gets together: Not on file        Attends Yazdanism service: Not on file        Active member of club or organization: Not on file        Attends meetings of clubs or organizations: Not on file        Relationship status: Not on file      Intimate partner violence:        Fear of current or ex partner: Not on file        Emotionally abused: Not on file        Physically abused: Not on file        Forced sexual activity: Not on file    Other Topics      Concerns:        Not on file    Social History Narrative      Not on file    Allergies:  No Known Allergies    Physical Exam:  BP (!) 154/82   Pulse 104   Temp 98.4 °F (36.9 °C) (Temporal)   Resp 18   Ht 5' 10\" (1.778 m)   Wt 132 lb 14.4 oz (60.3 kg)   SpO2 91%   BMI 19.07 kg/m²   GENERAL:alert, oriented x3, not in acute process. HEENT: PERRLA; EOMI. Oropharynx dry  NECK: Supple. Without lymphadenopathy. LUNGS: Good air entry bilaterally. No wheezing, crackles or ronchi. CARDIOVASCULAR: Regular rate. No murmurs, rubs or gallops. ABDOMEN: Soft. Non-tender, non-distended. Positive bowel sounds. EXTREMITIES: Without clubbing, cyanosis, or edema. NEUROLOGIC: No focal deficits.    ECOG PS 1-2    Diagnostics:  Lab Results       Component                Value               Date                       WBC                      5.6 for vegetations. New masses in liver on triphasic; CEA 2.4, CA 20-0 7597; CT guided liver abscess drainage on 5/6, no organism seen on GS. CT guided liver mass biopsy proved metastatic adenocarcinoma likely of pancreatic origin. Multiple attempts to have him see us in clinic. Patient non-compliant. He was found unresponsive at his nursing home. Per EMS nursing staff saw him sitting up and not responding in the nursing home and began doing CPR for 2 minutes and the patient did become responsive. CTA chest showed extensive occlusive thrombus in the right and left main pulmonary arteries extending to upper and lower lobes consistent with bilateral pulmonary embolism with cardiac strain/septal deviation. CT abdomen/pelvis Pneumobilia with bare-metal stent in place and severe dilation of the  pancreatic parenchyma. CT head No significant interval change in the appearance of the brain. No acute process. Continue Lovenox therapeutic for pilar PE. Pathology, prognosis discussed extensively with patient. Once discharged, he agreed to see us in clinic to discuss and consider palliative systemic chemotherapy (Gemcitabine/Abraxane) for metastatic adenocarcinoma of pancreatic origin if his ECOG performance status allows. Patient was appreciative of my visit and had all his questions answered to his satisfaction.     Thank you for allowing us to participate in the care of . Andrew Sommer MD   1/54/2486  Board Certified Medical Oncologist

## 2019-05-23 NOTE — PROGRESS NOTES
[facility] P[facility]U 1  Physical Therapy Initial Evaluation  Name: Citlaly Olmos  : 1950  MRN: [id number]    Date of Service: 2019    Evaluating Therapist: Shelton Denny PT, DPMARCO A  [id number]    Room #: 4670/9464-J  DIAGNOSIS: Bilateral PE  PRECAUTIONS: Falls, O2, R abdominal drain    PMH: pancreatic Ca, R parietal CVA, HTN, Syncope, Cystoscopy with stent insertion, ERCP stent insertion, EGD, Ex Lap open cholecystectiomy      Social:  Pt was admitted from SNF. Pt reports that he was not active with therapy services. Pt reports using a WC for mobility. Initial Evaluation  Date:  Treatment  Short Term/ Long Term   Goals   Was pt agreeable to Eval/treatment? Yes      Does pt have pain? no     Bed Mobility  Rolling: SBA  Supine to sit: Min A  Sit to supine: Min A  Scooting: Min A  SBA   Transfers Sit to stand: Min A  Stand to sit: Mod A  Stand pivot: Mod A using [employer]  SBA   Ambulation   3 feet using [employer] with Min A  >50 feet using [employer] with SBA   Stair negotiation:  NT     ROM RLE: WFL  LLE: WFL     Strength RLE: 4-/5  LLE: 4-/5     Balance   Sitting: min A EOB   Standing: Min A using [employer]     AM-PAC Basic Mobility Inpatient Short Form Raw Score:  13/24       Patient is A&Ox2. Sensation: No numbness/tingling   Coordination: NT  Edema: None noted     ASSESSMENT  Pt displays functional ability as noted in the objective portion of this evaluation. Comments/Treatment:  Pt was cleared by RN prior to PT evaluation. Pt was received supine and was agreeable to PT evaluation. Pt sat EOB and performed MMT/ROM with assist for sitting balance. Pt's SaO2 was 90% on 3.5L O2. STS and transfer to bedside chair performed. Pt appeared to be SOB despite denying any symptoms. Pt instructed in and led through pursed lip breathing. Pt tends to be a mouth breather and required VCs to close mouth with inhale. SaO2 was difficult to attain, but was in low 80's on 3.5L. Physician at bedside and RN notified of pt's SaO2.   O2

## 2019-05-23 NOTE — PROGRESS NOTES
Date: 5/22/2019    Time: 8:02 PM    Patient Placed On BIPAP/CPAP/ Non-Invasive Ventilation? Yes    If no must comment. Facial area red/color change? No           If YES are Blister/Lesion present? No   If yes must notify nursing staff  BIPAP/CPAP skin barrier?   Yes    Skin barrier type:Liquicel       Comments:    Applied in [address]

## 2019-05-23 NOTE — ED NOTES
Blood culture specimen obtained, per policy, from Left wrist by Caesar Lara RN. Specimen one of two collected at this time.      Michelle Salmeron RN  05/22/19 2168

## 2019-05-23 NOTE — H&P
Noninjected without exudate. NECK:  Supple. No JVD. No thyromegaly. No carotid bruits. HEART:  Regular, tachycardic, without murmur. LUNGS:  Clear to auscultation bilaterally. ABDOMEN:  Positive bowel sounds, soft, nontender. No rebound or  guarding. No hepatosplenomegaly. No masses. There is a pigtail  catheter in the right upper quadrant noted attached to a drainage bag. BACK:  Intact without lesion. EXTREMITIES:  Without edema. LYMPH NODES:  No adenopathy noted. SKIN:  Without rash or lesion. IMPRESSION:  Bilateral PE; acute respiratory failure with hypoxia;  probable pancreatic CA; history of biliary obstruction, status post  biliary drainage catheter; history of Gram-negative bacteremia; history  of Candida sepsis; hypertension; diabetes mellitus; recent CVA. PLAN:  Anticoagulate. ID to see regarding continuation of previously  ordered Zosyn and regarding yeast in urine with history of Candida  sepsis. Serial lab. PT, OT eval.   for discharge  planning.         Claudia Saldivar DO    D: 05/23/2019 15:20:21       T: 05/23/2019 15:28:27     [email or web address]  Job#: [id number]     Doc#: [id number]    CC:

## 2019-05-23 NOTE — CONSULTS
Temp src Pulse Resp SpO2   05/23/19 0857 -- -- -- 92 -- --   05/23/19 0815 -- -- -- -- 21 --   05/23/19 0800 (!) 163/105 98.7 °F (37.1 °C) Axillary 81 16 97 %   05/23/19 0700 (!) 158/101 -- -- 92 16 94 %   05/23/19 0637 (!) 158/101 -- -- 95 17 98 %         Intake/Output Summary (Last 24 hours) at 5/23/2019 0911  Last data filed at 5/23/2019 0800  Gross per 24 hour   Intake --   Output 50 ml   Net -50 ml     Wt Readings from Last 2 Encounters:   05/22/19 130 lb (59 kg)   05/09/19 130 lb (59 kg)     Body mass index is 18.65 kg/m².       PHYSICAL EXAMINATION:  General appearance - alert, well appearing, respiratory distress, and Jaundice   Mental status - alert, oriented to person, place, and time  Eyes - pupils equal and reactive, extraocular eye movements intact, jaundice   Ears - bilateral TM's and external ear canals normal  Nose - normal and patent, no erythema, discharge or polyps  Mouth - mucous membranes moist, pharynx normal without lesions  Neck - supple, no significant adenopathy  Chest - clear to auscultation, no wheezes, rales or rhonchi, symmetric air entry  Heart - normal rate, regular rhythm, normal S1, S2, no murmurs, rubs, clicks or gallops  Abdomen - soft, nontender, nondistended, no masses or organomegaly  Neurological - alert, oriented, normal speech, no focal findings or movement disorder noted}  Extremities - peripheral pulses normal, no pedal edema, no clubbing or cyanosis  Skin - normal coloration and turgor, no rashes, no suspicious skin lesions noted      Any additional physical findings:    MEDICATIONS:  Scheduled Meds:   sodium chloride flush  10 mL Intravenous 2 times per day    insulin glargine  5 Units Subcutaneous Daily    levETIRAcetam  750 mg Oral BID    lisinopril  10 mg Oral Daily    pantoprazole  40 mg Oral QAM AC    phenytoin  50 mg Oral TID    pravastatin  80 mg Oral Nightly    tamsulosin  0.4 mg Oral Daily    insulin lispro  0-6 Units Subcutaneous TID WC    insulin lispro  0-3 Units Subcutaneous Nightly    piperacillin-tazobactam  3.375 g Intravenous Q8H    sodium chloride flush  10 mL Intravenous Once     Continuous Infusions:   dextrose      heparin (porcine) 16 Units/kg/hr (05/23/19 0747)     PRN Meds:     sodium chloride flush 10 mL PRN   potassium chloride 40 mEq PRN   Or     potassium alternative oral replacement 40 mEq PRN   Or     potassium chloride 10 mEq PRN   magnesium sulfate 1 g PRN   magnesium hydroxide 30 mL Daily PRN   acetaminophen 650 mg Q4H PRN   glucose 15 g PRN   dextrose 12.5 g PRN   glucagon (rDNA) 1 mg PRN   dextrose 100 mL/hr PRN   heparin (porcine) 80 Units/kg PRN   heparin (porcine) 40 Units/kg PRN       VENT SETTINGS (Comprehensive) (if applicable):  Vent Information  FiO2 : 40 %  I Time/ I Time %: 0.8 s  Additional Respiratory  Assessments  Pulse: 92  Resp: 21  SpO2: 97 %  Oral Care: Mouth swabbed    ABGs:   Recent Labs     05/22/19 2305   PH 7.442   PCO2 29.9*   PO2 60.7   HCO3 19.9*   BE -3.4*   O2SAT 89.5*       Laboratory findings:  Complete Blood Count: Recent Labs     05/22/19 1948 05/22/19 2305   WBC 6.3 5.6   HGB 9.4* 8.3*   HCT 30.6* 26.7*    189        Last 3 Blood Glucose:   Recent Labs     05/22/19 1948   GLUCOSE 175*        PT/INR:    Lab Results   Component Value Date    PROTIME 14.2 05/22/2019    INR 1.2 05/22/2019     PTT:    Lab Results   Component Value Date    APTT 84.9 05/23/2019       Comprehensive Metabolic Profile:   Recent Labs     05/22/19 1948      K 3.9      CO2 24   BUN 12   CREATININE 1.1   GLUCOSE 175*   CALCIUM 8.8   PROT 7.8   LABALBU 3.2*   BILITOT 0.5   ALKPHOS 118   AST 21   ALT 14      Magnesium:   Lab Results   Component Value Date    MG 1.6 05/04/2019     Phosphorus:   Lab Results   Component Value Date    PHOS 2.7 05/04/2019     Ionized Calcium: No results found for: CAION   Troponin:   Recent Labs     05/22/19 1948   TROPONINI 0.04*     Urinalysis: Urine dipstick shows positive for [facility]. Micro exam: >20 WBC's per HPF. Microbiology:  Cultures during this admission:     Blood cultures:                 [] None drawn      [] Negative             []  Positive (Details:  )  Urine Culture:                   [] None drawn      [] Negative             []  Positive (Details:  )  Sputum Culture:               [] None drawn       [] Negative             []  Positive (Details:  )   Endotracheal aspirate:     [] None drawn       [] Negative             []  Positive (Details:  )     Other pertinent Labs:     Radiology/Imaging:   CTA chest 5/22/19   Extensive occlusive thrombus in the right and left main pulmonary   arteries extending to upper and lower lobes consistent with bilateral   pulmonary embolism with cardiac strain/septal deviation.       Partial visualization of the pancreatic head mass with a biliary stent   and drain with pneumobilia. Please correlate with the CT of the   abdomen and pelvis. CT abdomen 5/22       Severe distention of the urinary bladder with moderate left   hydronephrosis.       Pneumobilia with bare-metal stent in place and severe dilation of the   pancreatic parenchyma.       Moderate distention of the stomach with debris.       Pigtail catheter present in the right hepatic parenchyma. No evidence   for recurrent fluid collection. CT head 5/22/19       No significant interval change in the appearance of the brain. No   acute process.       Advanced white matter changes and sequela of remote infarcts. CXR 5/22/19       Lines, tubes, and devices:  None.       Lungs and pleura:  No consolidation. No lung mass. No pleural   effusion.        Cardiomediastinal silhouette:  Unchanged cardiomediastinal silhouette.       Other:  Pigtail catheter in the right upper quadrant.      ASSESSMENT  And PLAN:       Neurologic  Acute encephalopathy, improving  Likely 2/2 acute hypoxic respiratory failure from acute massive PE  Monitor mentation    Recent hx of ischemic stroke  No significant residual symptoms   Not contraindicated for anticoagulation     Cardiovascular  Hx of CAD, HFrEF (50%); stable   Pro-BNP 13K, baseline 7K. Troponin trending up 0.04, 0.11    HTN  On lisinopril 10 mg  Monitor BP     Pulmonary  Acute hypoxic respiratory failure   2/2 acute massive PE, likely malignancy related   CTA chest: extensive occlusive thrombus in the right and left main pulmonary arteries extending to upper and lower lobes consistent with bilateral pulmonary embolism with cardiac strain/septal deviation  Obtain ECHO   Monitor BP  On HD heparin gtt, change to SQ lovenox 60mg q12    Gastrointestinal  Pancreatic adenocarcinoma, newly diagnosed, likely metastasize to liver  Biopsy live lesion(5/8/2019) showed: Metastatic poorly differentiated adenocarcinoma  CTA triphasic liver: a new fluid-filled mass within the right lobe of the liver measures 5.5 x 4.9 cm,A rim-enhancing lesion just caudal to the liver lesion previously mentioned measures 2.3 x 2.0 cm. The mass within the pancreatic head now appears necrotic measuring 2.7 x 2.2 cm  Markedly High   Consult to Dr. Desiree Rosado     Nephrology  ? UTI vs pyelonephritis   UA showed evidence of pyuria  Patient denies urinary symptoms   CT abd revealed Severe distention of the urinary bladder with moderate left  Hydronephrosis  On zosyn     ID  Recent hx of obstructive cholangitis and polymicrobial bacteremia, candidemia   PICC line for 4 weeks of zosyn   ID is following     WEAN PER PROTOCOL:  [] No   [] Yes  [x] N/A    ICU PROPHYLAXIS:  Stress ulcer:  [x] PPI Agent  [] X2Uesrs [] Sucralfate  [] Other:  VTE:   [x] Enoxaparin  [] Unfract. Heparin Subcut  [] EPC Cuffs    NUTRITION:  [] NPO [] Tube Feeding (Specify: ) [] TPN  [x] PO (Diet: DIET CARDIAC; Carb Control: 4 carb choices (60 gms)/meal; Low Sodium (2 GM);  Daily Fluid Restriction: 1800 ml)    INSULIN DRIP:   [x] No   [] Yes    CONSULTATION NEEDED:   [] No   [x] Yes    FAMILY UPDATED: [] No   [x] Yes    TRANSFER OUT OF ICU:   [] No   [x] Yes    Jarad Palmer MD PGY-1  Attending Physician: Dr. Carroll Diallo   5/23/2019, 9:11 AM     Attending Physician Attestation: Dr. Benita Olszewski    Thank you very much for allowing me to see this patient in consultation and follow up. I personally saw, examined and provided care for the patient. Radiographs, labs and medication list were reviewed by me independently. I spoke with bedside nursing, respiratory therapists and consultants. Critical care services and times documented are independent of procedures and multidisciplinary rounds with Residents. Additionally comprehensive, multidisciplinary rounds were conducted with the MICU team. The case was discussed in detail and plans for care were established. Review of Residents documentation was conducted and revisions were made as appropriate. I agree with the the above documented information. ASSESSMENT:  1.) B/L Submassive Pulmonary Embolism   2.) Acute Hypoxic Respiratory Failure   3.) Pancreatic Adenocarcinoma     In addition the following applies:    Check: N/A  Medication Alterations: heparin drip to lovenox subcutaneous BID, abx per ID  Procedures: N/A  Imaging: reviewed, check doppler US legs and echocardiogram   New Consultations: ID, hematology/oncology   VENT: N/A    Thank you for allowing me to participate in the care of this patient. Care reviewed with nursing staff, medical and surgical specialty care, primary care and the patient's family as available. Restraints are ordered when the patient can do harm to him/herself by pulling out devices.     Benita Olszewski  5/23/2019  5:19 PM

## 2019-05-23 NOTE — PROGRESS NOTES
P Quality Flow/Interdisciplinary Rounds Progress Note        Quality Flow Rounds held on May 23, 2019    Disciplines Attending:  Bedside Nurse and ICU team     Mouna Mir was admitted on 5/22/2019  7:31 PM    Anticipated Discharge Date:       Disposition:    Dylan Score:  Dylan Scale Score: 16    Readmission Risk              Risk of Unplanned Readmission:        30           Discussed patient goal for the day, patient clinical progression, and barriers to discharge. The following Goal(s) of the Day/Commitment(s) have been identified:  Will need US LE and echo today. Continue current treatment.        Amaury Arana  May 23, 2019

## 2019-05-24 ENCOUNTER — APPOINTMENT (OUTPATIENT)
Dept: ULTRASOUND IMAGING | Age: 69
DRG: 175 | End: 2019-05-24
Payer: MEDICARE

## 2019-05-24 LAB
ALBUMIN SERPL-MCNC: 2.8 G/DL (ref 3.5–5.2)
ALP BLD-CCNC: 88 U/L (ref 40–129)
ALT SERPL-CCNC: 10 U/L (ref 0–40)
ANION GAP SERPL CALCULATED.3IONS-SCNC: 12 MMOL/L (ref 7–16)
AST SERPL-CCNC: 14 U/L (ref 0–39)
BASOPHILS ABSOLUTE: 0.02 E9/L (ref 0–0.2)
BASOPHILS RELATIVE PERCENT: 0.4 % (ref 0–2)
BILIRUB SERPL-MCNC: 0.4 MG/DL (ref 0–1.2)
BILIRUBIN DIRECT: 0.2 MG/DL (ref 0–0.3)
BILIRUBIN, INDIRECT: 0.2 MG/DL (ref 0–1)
BUN BLDV-MCNC: 11 MG/DL (ref 8–23)
CALCIUM SERPL-MCNC: 8.2 MG/DL (ref 8.6–10.2)
CHLORIDE BLD-SCNC: 102 MMOL/L (ref 98–107)
CO2: 23 MMOL/L (ref 22–29)
CREAT SERPL-MCNC: 1.1 MG/DL (ref 0.7–1.2)
EOSINOPHILS ABSOLUTE: 0.28 E9/L (ref 0.05–0.5)
EOSINOPHILS RELATIVE PERCENT: 5.5 % (ref 0–6)
GFR AFRICAN AMERICAN: >60
GFR NON-AFRICAN AMERICAN: >60 ML/MIN/1.73
GLUCOSE BLD-MCNC: 72 MG/DL (ref 74–99)
HCT VFR BLD CALC: 25.7 % (ref 37–54)
HEMOGLOBIN: 7.9 G/DL (ref 12.5–16.5)
IMMATURE GRANULOCYTES #: 0.02 E9/L
IMMATURE GRANULOCYTES %: 0.4 % (ref 0–5)
KEPPRA: 28 UG/ML (ref 12–46)
LV EF: 45 %
LVEF MODALITY: NORMAL
LYMPHOCYTES ABSOLUTE: 1.1 E9/L (ref 1.5–4)
LYMPHOCYTES RELATIVE PERCENT: 21.7 % (ref 20–42)
MAGNESIUM: 1.8 MG/DL (ref 1.6–2.6)
MCH RBC QN AUTO: 27.6 PG (ref 26–35)
MCHC RBC AUTO-ENTMCNC: 30.7 % (ref 32–34.5)
MCV RBC AUTO: 89.9 FL (ref 80–99.9)
METER GLUCOSE: 105 MG/DL (ref 74–99)
METER GLUCOSE: 51 MG/DL (ref 74–99)
METER GLUCOSE: 52 MG/DL (ref 74–99)
METER GLUCOSE: 62 MG/DL (ref 74–99)
METER GLUCOSE: 77 MG/DL (ref 74–99)
METER GLUCOSE: 95 MG/DL (ref 74–99)
MONOCYTES ABSOLUTE: 0.4 E9/L (ref 0.1–0.95)
MONOCYTES RELATIVE PERCENT: 7.9 % (ref 2–12)
NEUTROPHILS ABSOLUTE: 3.25 E9/L (ref 1.8–7.3)
NEUTROPHILS RELATIVE PERCENT: 64.1 % (ref 43–80)
PDW BLD-RTO: 15.8 FL (ref 11.5–15)
PLATELET # BLD: 202 E9/L (ref 130–450)
PMV BLD AUTO: 10.2 FL (ref 7–12)
POTASSIUM REFLEX MAGNESIUM: 3.2 MMOL/L (ref 3.5–5)
RBC # BLD: 2.86 E12/L (ref 3.8–5.8)
SODIUM BLD-SCNC: 137 MMOL/L (ref 132–146)
TOTAL PROTEIN: 6.3 G/DL (ref 6.4–8.3)
WBC # BLD: 5.1 E9/L (ref 4.5–11.5)

## 2019-05-24 PROCEDURE — 6360000002 HC RX W HCPCS: Performed by: INTERNAL MEDICINE

## 2019-05-24 PROCEDURE — 93971 EXTREMITY STUDY: CPT

## 2019-05-24 PROCEDURE — 6370000000 HC RX 637 (ALT 250 FOR IP): Performed by: INTERNAL MEDICINE

## 2019-05-24 PROCEDURE — 82962 GLUCOSE BLOOD TEST: CPT

## 2019-05-24 PROCEDURE — 2580000003 HC RX 258: Performed by: INTERNAL MEDICINE

## 2019-05-24 PROCEDURE — 2140000000 HC CCU INTERMEDIATE R&B

## 2019-05-24 PROCEDURE — 97166 OT EVAL MOD COMPLEX 45 MIN: CPT

## 2019-05-24 PROCEDURE — 94660 CPAP INITIATION&MGMT: CPT

## 2019-05-24 PROCEDURE — 83735 ASSAY OF MAGNESIUM: CPT

## 2019-05-24 PROCEDURE — 80048 BASIC METABOLIC PNL TOTAL CA: CPT

## 2019-05-24 PROCEDURE — 85025 COMPLETE CBC W/AUTO DIFF WBC: CPT

## 2019-05-24 PROCEDURE — 80076 HEPATIC FUNCTION PANEL: CPT

## 2019-05-24 PROCEDURE — 97535 SELF CARE MNGMENT TRAINING: CPT

## 2019-05-24 PROCEDURE — 36415 COLL VENOUS BLD VENIPUNCTURE: CPT

## 2019-05-24 PROCEDURE — 99233 SBSQ HOSP IP/OBS HIGH 50: CPT | Performed by: INTERNAL MEDICINE

## 2019-05-24 PROCEDURE — 93306 TTE W/DOPPLER COMPLETE: CPT

## 2019-05-24 RX ORDER — POTASSIUM CHLORIDE 20 MEQ/1
40 TABLET, EXTENDED RELEASE ORAL ONCE
Status: COMPLETED | OUTPATIENT
Start: 2019-05-24 | End: 2019-05-24

## 2019-05-24 RX ORDER — MAGNESIUM SULFATE 1 G/100ML
1 INJECTION INTRAVENOUS ONCE
Status: COMPLETED | OUTPATIENT
Start: 2019-05-24 | End: 2019-05-24

## 2019-05-24 RX ADMIN — HEPARIN 300 UNITS: 100 SYRINGE at 08:56

## 2019-05-24 RX ADMIN — PANTOPRAZOLE SODIUM 40 MG: 40 TABLET, DELAYED RELEASE ORAL at 05:45

## 2019-05-24 RX ADMIN — POTASSIUM CHLORIDE 40 MEQ: 20 TABLET, EXTENDED RELEASE ORAL at 08:55

## 2019-05-24 RX ADMIN — PIPERACILLIN AND TAZOBACTAM 3.38 G: 3; .375 INJECTION, POWDER, FOR SOLUTION INTRAVENOUS at 17:02

## 2019-05-24 RX ADMIN — ENOXAPARIN SODIUM 60 MG: 60 INJECTION SUBCUTANEOUS at 14:28

## 2019-05-24 RX ADMIN — Medication 10 ML: at 08:54

## 2019-05-24 RX ADMIN — Medication 10 ML: at 17:02

## 2019-05-24 RX ADMIN — PIPERACILLIN AND TAZOBACTAM 3.38 G: 3; .375 INJECTION, POWDER, FOR SOLUTION INTRAVENOUS at 00:21

## 2019-05-24 RX ADMIN — LEVETIRACETAM 750 MG: 250 TABLET, FILM COATED ORAL at 20:33

## 2019-05-24 RX ADMIN — Medication 10 ML: at 00:21

## 2019-05-24 RX ADMIN — INSULIN GLARGINE 5 UNITS: 100 INJECTION, SOLUTION SUBCUTANEOUS at 08:56

## 2019-05-24 RX ADMIN — LEVETIRACETAM 750 MG: 250 TABLET, FILM COATED ORAL at 08:56

## 2019-05-24 RX ADMIN — ENOXAPARIN SODIUM 60 MG: 60 INJECTION SUBCUTANEOUS at 00:21

## 2019-05-24 RX ADMIN — MAGNESIUM SULFATE HEPTAHYDRATE 1 G: 1 INJECTION, SOLUTION INTRAVENOUS at 08:54

## 2019-05-24 RX ADMIN — LISINOPRIL 10 MG: 10 TABLET ORAL at 08:56

## 2019-05-24 RX ADMIN — PIPERACILLIN AND TAZOBACTAM 3.38 G: 3; .375 INJECTION, POWDER, FOR SOLUTION INTRAVENOUS at 08:53

## 2019-05-24 RX ADMIN — TAMSULOSIN HYDROCHLORIDE 0.4 MG: 0.4 CAPSULE ORAL at 08:56

## 2019-05-24 RX ADMIN — PRAVASTATIN SODIUM 80 MG: 20 TABLET ORAL at 20:33

## 2019-05-24 ASSESSMENT — PAIN SCALES - GENERAL
PAINLEVEL_OUTOF10: 0
PAINLEVEL_OUTOF10: 0

## 2019-05-24 NOTE — CARE COORDINATION
SOCIAL WORK/CASEMANAGEMENT TRANSITION OF CARE PLANNING: OT has not been able to assess pt. PT eval is done. Rep, horace, unable to take pt back without precert. Will be here over weekend. Lewis Kaur. 5/24/2019

## 2019-05-24 NOTE — CARE COORDINATION
SOCIAL WORK/CASEMANAGEMENT TRANSITION OF CARE PLANNING: pt came from [facility] snf loc but not bed held and they will take pt back. Waiting for OT tom and I called dept this a.m. To have it done asap so that precert can be started. It is needed for pt to return. Call discharge paperwork in place. Wife was in room and wants pt to return to snf, that pt comes from.   Mary Olson  5/24/2019

## 2019-05-24 NOTE — PROGRESS NOTES
Breathing fine, has not required bipap. On 6 liters with excellent saturations.   Vent settings:Vent Information  FiO2 : 40 %  I Time/ I Time %: 0.8 s  Additional Respiratory  Assessments  Pulse: 95  Resp: 20  SpO2: 97 %  Oral Care: Mouth swabbed      Current Facility-Administered Medications:     sodium chloride flush 0.9 % injection 10 mL, 10 mL, Intravenous, 2 times per day, Caio Sánchez MD, 10 mL at 05/24/19 0854    sodium chloride flush 0.9 % injection 10 mL, 10 mL, Intravenous, PRN, Caio Sánchez MD, 10 mL at 05/24/19 0021    potassium chloride (KLOR-CON M) extended release tablet 40 mEq, 40 mEq, Oral, PRN **OR** potassium bicarb-citric acid (EFFER-K) effervescent tablet 40 mEq, 40 mEq, Oral, PRN **OR** potassium chloride 10 mEq/100 mL IVPB (Peripheral Line), 10 mEq, Intravenous, PRN, Caio Sánchez MD    magnesium sulfate 1 g in dextrose 5% 100 mL IVPB, 1 g, Intravenous, PRN, Caio Sánchez MD    magnesium hydroxide (MILK OF MAGNESIA) 400 MG/5ML suspension 30 mL, 30 mL, Oral, Daily PRN, Caio Sánchez MD    acetaminophen (TYLENOL) tablet 650 mg, 650 mg, Oral, Q4H PRN, Caio Sánchez MD    insulin glargine (LANTUS) injection vial 5 Units, 5 Units, Subcutaneous, Daily, Caio Sánchez MD, 5 Units at 05/24/19 0856    levETIRAcetam (KEPPRA) tablet 750 mg, 750 mg, Oral, BID, Caio Sánchez MD, 750 mg at 05/24/19 0856    lisinopril (PRINIVIL;ZESTRIL) tablet 10 mg, 10 mg, Oral, Daily, Caio Sánchez MD, 10 mg at 05/24/19 0856    pantoprazole (PROTONIX) tablet 40 mg, 40 mg, Oral, QAM AC, Caio Sánchez MD, 40 mg at 05/24/19 0545    pravastatin (PRAVACHOL) tablet 80 mg, 80 mg, Oral, Nightly, Caio Sánchez MD, 80 mg at 05/23/19 2039    tamsulosin (FLOMAX) capsule 0.4 mg, 0.4 mg, Oral, Daily, Caio Sánchez MD, 0.4 mg at 05/24/19 0856    glucose (GLUTOSE) 40 % oral gel 15 g, 15 g, Oral, PRN, Caio Sánchez MD    dextrose 50 % solution 12.5 g, 12.5 g, Intravenous, PRN, Caio Sánchez MD    glucagon (rDNA) injection 1 mg, 1 mg, Intramuscular, PRN, Caio Sánchez, MD    dextrose 5 % solution, 100 mL/hr, Intravenous, PRN, Lisa Franco MD    insulin lispro (HUMALOG) injection vial 0-6 Units, 0-6 Units, Subcutaneous, TID WC, Lisa Franco MD, 1 Units at 05/23/19 1648    insulin lispro (HUMALOG) injection vial 0-3 Units, 0-3 Units, Subcutaneous, Nightly, Lisa Franco MD    piperacillin-tazobactam (ZOSYN) 3.375 g in dextrose 5 % 100 mL IVPB extended infusion (mini-bag), 3.375 g, Intravenous, Q8H, Lisa Franco MD, Last Rate: 25 mL/hr at 05/24/19 0853, 3.375 g at 05/24/19 0853    enoxaparin (LOVENOX) injection 60 mg, 1 mg/kg, Subcutaneous, Q12H, Lisa Franco MD, 60 mg at 05/24/19 0021    heparin flush 100 UNIT/ML injection 100 Units, 100 Units, Intracatheter, BID, [facility], 300 Units at 05/24/19 0856    sodium chloride flush 0.9 % injection 10 mL, 10 mL, Intravenous, Once, Lisa Franco MD  BP (!) 154/88   Pulse 95   Temp 98.7 °F (37.1 °C) (Temporal)   Resp 20   Ht 5' 10\" (1.778 m)   Wt 132 lb 14.4 oz (60.3 kg)   SpO2 97%   BMI 19.07 kg/m²     General: No distress  Chest: Symmetric, no accessory use  Heart: RRR  Lungs: CTA  Extremities: No edema    Intake/Output Summary (Last 24 hours) at 5/24/2019 1152  Last data filed at 5/24/2019 0546  Gross per 24 hour   Intake 420 ml   Output 900 ml   Net -480 ml     CBC with Differential:    Lab Results   Component Value Date    WBC 5.1 05/24/2019    RBC 2.86 05/24/2019    HGB 7.9 05/24/2019    HCT 25.7 05/24/2019     05/24/2019    MCV 89.9 05/24/2019    MCH 27.6 05/24/2019    MCHC 30.7 05/24/2019    RDW 15.8 05/24/2019    NRBC 0.0 03/29/2019    SEGSPCT 68 01/11/2013    LYMPHOPCT 21.7 05/24/2019    MONOPCT 7.9 05/24/2019    MYELOPCT 0.9 01/06/2019    BASOPCT 0.4 05/24/2019    MONOSABS 0.40 05/24/2019    LYMPHSABS 1.10 05/24/2019    EOSABS 0.28 05/24/2019    BASOSABS 0.02 05/24/2019     BMP:    Lab Results   Component Value Date     05/24/2019    K 3.2 05/24/2019     05/24/2019    CO2 23 05/24/2019    BUN 11 05/24/2019    LABALBU 2.8 05/24/2019    CREATININE 1.1 05/24/2019    CALCIUM 8.2 05/24/2019    GFRAA >60 05/24/2019    LABGLOM >60 05/24/2019    GLUCOSE 72 05/24/2019       IMPRESSION:   Acute hypoxic respiratory failure currently on 6 liters but can be weaned down. Bilateral PE needs lifelong enoxaparin injections Await echo and leg us but will not   Metastatic pancreatic cancer per oncology.   Dominique Galvez  5/24/2019  11:52 AM

## 2019-05-24 NOTE — PROGRESS NOTES
Occupational Therapy  OCCUPATIONAL THERAPY INITIAL EVALUATION      Date:2019  Patient Name: Isaiah Gan  MRN: [id number]  : 1950  Room: [address] [place] [address]-A    Evaluating OT:  TERESA See OTR/L  # [id number]      AM-PAC Daily Activity Raw Score:  15/24  Recommended Adaptive Equipment:  TBD     Reason for Admission:  Pt was admitted after being found unresponsive in SNF. CPR Initiated     Diagnosis:  Bilateral Pulmonary Embolism      Procedures this admission:  None     Pertinent Medical History:  Metastatic Pancreatic CA, CVA, HTN, Cystoscopy insertion/removal stent/stone w/ Stent insertion  19, ERCP stent insertion 3-25-19, PICC 3/2019, Whipple Procedure 2019    Precautions:  Falls  Biliary Drainage Catheter Right  Freed Cather  PICC L UE  Tele-sitter   Carb Control, Low Na Diet, Fluid Restrictions    Home Living: Pt was transferred from a SNF. Bathroom setup:  [employer], high toilet, grab bars throughout   Equipment owned:  [employer]    Available Family Assist:   staff assist    Prior Level of Function:  Per pt report, he was IND with ADLs, Transfers and Mobility using FWW for ambulation.  Stated he was Not participating in therapy at the facility  Driving:  No  Occupation:  Retired [employer]     Pain Level:  Denies;  Nsg Notified   Additional Complaints:  Frustrated w/ current hospitalization    Vitals/Lab Values:   O2 sats at rest w/ NC O2 4.5 LPM = 97%     O2 sats seated EOB w/ 4 LPM = 96%     O2 sats decreased to 86% w/ standing ax - required 4 min seated rest break for sats to return to 90%     O2 sats reading 94% in supine w/ NC O2 4 LPM at end of session    Cognition: A & O x 3 - generally oriented - aware of current month, year, place, situation, INDly reported current President   Able to Follow Multi-Step Commands w/ occasional Min VCs   Memory:  good (-)   Sequencing:  good (-)   Problem solving:  good (-)   Judgement/safety:  good (-)  Additional Comments:  Pt was cooperative but verbalized his frustration w/ current hospitalization and stay at SNF. Reported he wanted to return home.        Functional Assessment:   Initial Eval Status  Date: 5-24-19 Treatment Status  Date: Short Term Goals  Treatment frequency: PRN 2-4 x/week   Feeding SUP/Set up    Required Min A to set up tray     Grooming SUP/Set up    Able to complete simple grooming tasks while seated EOB, unable to ambulate to/stand at sink for task d/t limited endurance/hypoxia  SUP  Standing At The Sink   UB Dressing Min A/Set up    Required Min A to don bathrobe while seated EOB, Min VCs to problem solve task  SUP   LB Dressing Min A/Set up    Required SUP for safety to don/doff socks seated EOB bending to floor level for task  Min A for standing balance and Clothing adjustment over hips to don pants(Simulated)  SUP   Bathing NT      SUP   Toileting NT    Freed Catheter  SUP   Bed Mobility  Rolling:  SUP  Repositioning:  SUP/Min VCs   Supine to Sit:  SUP/Min VCs    Sit to Supine:  SUP/Min VCs        IND   Functional Transfers Sit to stand:  Min A  Stand to sit:  Close SUP      Required Min A/Pt ed for safety/hand placement to stand from EOB  SUP   Functional Mobility Min A w/ FWW    ~ 4' Fwd/back + 6' side-stepping along EOB, limited by lines  SUP   Balance Sitting:  Good - SUP     Static:  SUP at EOB    Dynamic:  SUP at EOB w/ LB dressing Task    Standing:  Fair (+)/Good (-)     Static:  CGA/Close SUP w/ FWW    Dynamic:  Min A w/ FWW     Activity Tolerance Tolerated Sitting:  EOB ~ 20 mins w/ ax  Tolerated Standing:  ~ 5 mins - see vitals above     Visual/  Perceptual WFL  Glasses:  Yes - not present      Hearing WFL  Hearing Aids  No       Hand dominance: Right    UE ROM: RUE:  WFL      LUE:  WFL    Strength: RUE: grossly 5-/5     LUE: grossly 5-/5     Strength:  WFL Omar UEs    Fine Motor Coordination:  WFL Omar UEs    Sensation:  Denies numbness or tingling Omar UEs  Tone:  WFL Omar UEs  Edema:  None Noted Treatment:       -- Education:  Provided Pt/Family ed re: Benefits/Purpose of OT services;  OT Plan of Care;  Transfer Safety, Walker safety; Benefits of use of DME/AD/Adaptive equip/techs to increase safety/IND with Functional Ax; Techs to increase Safety/Safety Awareness w/ Functional Ax; [employer] Techs/Pursed-Lip Breathing;  Benefits of Cont'd Participation in OT services at D/C      Pt and/or Family verbalized/demonstrated a good understanding of education provided. Will Review PRN. Provided Skilled SUP/Assist w/ Pt safety, Proper Positioning, ADLs, Transfers and Functional Mobility as noted above, as well as set up and clean up for session. Skilled monitoring of Vitals and pts response to treatment. Consulted RN     [] Malnutrition indicators have been identified and nursing has been notified to ensure a dietitian consult is ordered. Comments/Treatment:  Upon arrival, pt was found semi-supine in bed. He was agreeable to participate in assessment ax. No family members present during assessment. Received permission from RN prior to engaging pt in assessment ax. At the end of the session, patient was properly positioned in semi-supine with call light and phone within reach, all lines and tubes intact. Oriented pt to call bell. Bed Alarm activated. Made all appropriate Environmental Modifications to facilitate pt's level of IND and safety. All needs met.   Tele-Sitter in place       Pt would benefit from continued skilled OT services to increase safety and independence with completion of ADL/IADL tasks for functional independence and quality of life    Eval Complexity: Moderate     Assessment of current deficits   Functional mobility [x]  ADLs [x] Strength []  Cognition [x]  Functional transfers  [x] IADLs [x] Safety Awareness [x]  Endurance [x]  Fine Motor Coordination [] Balance [x] Vision/perception [x] Sensation []   Gross Motor Coordination [] ROM

## 2019-05-24 NOTE — DISCHARGE INSTR - COC
Continuity of Care Form    Patient Name: Rose Ashley   :  1950  MRN:  [id number]    Admit date:  2019  Discharge date:  2019    Code Status Order: Limited   Advance Directives:   [facility] Documentation     Date/Time Healthcare Directive Type of Healthcare Directive Copy in [facility] [address] Agent's Name Healthcare Agent's Phone Number    19 1110  No, patient does not have an advance directive for healthcare treatment -- -- -- -- --          Admitting Physician:  Marc Patel DO  PCP: Josh Vaughan DO    Discharging Nurse: ARIADNA Lara [facility] Unit/Room#: 9865/6945-D  Discharging Unit Phone Number: [phone]    Emergency Contact:   Extended Emergency Contact Information  Primary Emergency Contact: Ezra Rather [address] Phone: [phone]  Mobile Phone: [phone]  Relation: Spouse  Secondary Emergency Contact: Lemond Lacrosse [address] Phone: [phone]  Relation: Child    Past Surgical History:  Past Surgical History:   Procedure Laterality Date    CYSTOSCOPY INSERTION / REMOVAL STENT / STONE N/A 2019    CYSTOSCOPY RETROGRADE PYELOGRAM STENT INSERTION performed by Britney Quiroz MD at [place] [address] ERCP N/A 3/25/2019    ERCP STENT INSERTION performed by Aicha Soler MD at [facility]  3/27/2019         TRANSESOPHAGEAL ECHOCARDIOGRAM  2019    dr Venus Godinez N/A 3/8/2019    REDO ENDOSCOPIC EGD  ULTRASOUND performed by Dee Dee Solorio DO at [address] [place] [address] N/A 3/8/2019    EGD DIAGNOSTIC ONLY performed by Dee Dee Solorio DO at [address] [place] [address] N/A 2019    EXPLORATORY LAPAROTOMY; ABORTED WHIPPLE PROCEDURE; GASTROJEJUNOSTOMY; OPEN CHOLECYSTECTOMY performed by Escobar Palma MD at [address] [place] [address] History: There is no immunization history for the selected administration types on file for this patient. 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    Biliary obstruction K83.1    Thrombocytopathia (HCC) D69.1    Endocarditis, suspected R09.89    Seizure (HCC) R56.9    Seizure-like activity (HCC) R56.9    Severe protein-calorie malnutrition (HCC) E43    Liver mass R16.0    Bilateral pulmonary embolism (HCC) I26.99    Type 2 diabetes mellitus, with long-term current use of insulin (HCC) E11.9, Z79.4       Isolation/Infection:   Isolation          No Isolation            Nurse Assessment:  Last Vital Signs: BP (!) 154/97   Pulse 88   Temp 97.8 °F (36.6 °C) (Temporal)   Resp 18   Ht 5' 10\" (1.778 m)   Wt 132 lb 14.4 oz (60.3 kg)   SpO2 92%   BMI 19.07 kg/m²     Last documented pain score (0-10 scale): Pain Level: 0  Last Weight:   Wt Readings from Last 1 Encounters:   05/23/19 132 lb 14.4 oz (60.3 kg)     Mental Status:  oriented, alert, logical and thought processes intact    IV Access:  - Peripheral IV - site  L Upper Arm, insertion date: Midline     Nursing Mobility/ADLs:  Walking   Assisted  Transfer  Assisted  Bathing  Assisted  Dressing  Assisted  Toileting  Assisted  Feeding  Independent  Med Admin  Assisted  Med Delivery   whole    Wound Care Documentation and Therapy:  Wound 05/03/19 Ankle Left; Outer (Active)   Dressing/Treatment Open to air 5/24/2019 12:00 AM   Wound Assessment Clean;Dry; Intact 5/24/2019 12:00 AM   Drainage Amount None 5/24/2019 12:00 AM   Ruby-wound Assessment Intact 5/24/2019 12:00 AM   Number of days: 20        Elimination:  Continence:   · Bowel: Yes  · Bladder: Yes  Urinary Catheter: Removal Date 5/28/19; Due to void: [id number]   Colostomy/Ileostomy/Ileal Conduit: No       Date of Last BM: 5/28/2019    Intake/Output Summary (Last 24 hours) at 5/24/2019 0731  Last data filed at 5/24/2019 0546  Gross per 24 hour   Intake 520 ml   Output 1050 ml   Net -530 ml     I/O last 3 completed shifts: In: 520 [P.O.:420; IV Piggyback:100]  Out: 1050 [Urine:1050]    Safety Concerns:     History of Falls (last 30 days) and At Risk for Falls    Impairments/Disabilities:      None    Nutrition Therapy:  Current Nutrition Therapy:   - Oral Diet:  Carb Control 4 carbs/meal (1800kcals/day), Cardiac and Low Sodium (2gm)    Routes of Feeding: Oral  Liquids: No Restrictions  Daily Fluid Restriction: yes - amount 1800 mL  Last Modified Barium Swallow with Video (Video Swallowing Test): not done    Treatments at the Time of Hospital Discharge:   Respiratory Treatments: ***  Oxygen Therapy:  is not on home oxygen therapy.   Ventilator:    - No ventilator support    Rehab Therapies: PT and OT to eval and treat   Weight Bearing Status/Restrictions: No weight bearing restirctions  Other Medical Equipment (for information only, NOT a DME order):  walker  Other Treatments: ***    Patient's personal belongings (please select all that are sent with patient):  {[facility] DME Belongings:[id number]}    RN SIGNATURE:  Electronically signed by Lamin Silva RN on 5/28/19 at 2:11 PM    CASE MANAGEMENT/SOCIAL WORK SECTION    Inpatient Status Date: 5/2/2019    Readmission Risk Assessment Score:  Readmission Risk              Risk of Unplanned Readmission:        33           Discharging to Facility/ Agency   · Name: [facility]   · Address:  · Phone:  · Fax:    Dialysis Facility (if applicable)   · Name:  · Address:  · Dialysis Schedule:  · Phone:  · Fax:    / signature:

## 2019-05-24 NOTE — PROGRESS NOTES
WC, Kwame Maradiaga MD, 1 Units at 05/23/19 1648    insulin lispro (HUMALOG) injection vial 0-3 Units, 0-3 Units, Subcutaneous, Nightly, Kwame Maradiaga MD    piperacillin-tazobactam (ZOSYN) 3.375 g in dextrose 5 % 100 mL IVPB extended infusion (mini-bag), 3.375 g, Intravenous, Q8H, Kwame Maradiaga MD, Stopped at 05/24/19 0425    enoxaparin (LOVENOX) injection 60 mg, 1 mg/kg, Subcutaneous, Q12H, Kwame Maradiaga MD, 60 mg at 05/24/19 0021    heparin flush 100 UNIT/ML injection 100 Units, 100 Units, Intracatheter, BID, Carolina Jordan DO, 100 Units at 05/23/19 2039    sodium chloride flush 0.9 % injection 10 mL, 10 mL, Intravenous, Once, Kwame Maradiaga MD    Objective:    BP (!) 154/88   Pulse 95   Temp 98.7 °F (37.1 °C) (Temporal)   Resp 20   Ht 5' 10\" (1.778 m)   Wt 132 lb 14.4 oz (60.3 kg)   SpO2 97%   BMI 19.07 kg/m²     Heart:  Reg  Lungs:  Min rhonchi  Abd: bowel sounds present, nontender, non distended  Extrem:  No clubbing, cyanosis, or edema    CBC with Differential:    Lab Results   Component Value Date    WBC 5.1 05/24/2019    RBC 2.86 05/24/2019    HGB 7.9 05/24/2019    HCT 25.7 05/24/2019     05/24/2019    MCV 89.9 05/24/2019    MCH 27.6 05/24/2019    MCHC 30.7 05/24/2019    RDW 15.8 05/24/2019    NRBC 0.0 03/29/2019    SEGSPCT 68 01/11/2013    LYMPHOPCT 21.7 05/24/2019    MONOPCT 7.9 05/24/2019    MYELOPCT 0.9 01/06/2019    BASOPCT 0.4 05/24/2019    MONOSABS 0.40 05/24/2019    LYMPHSABS 1.10 05/24/2019    EOSABS 0.28 05/24/2019    BASOSABS 0.02 05/24/2019     CMP:    Lab Results   Component Value Date     05/24/2019    K 3.2 05/24/2019     05/24/2019    CO2 23 05/24/2019    BUN 11 05/24/2019    CREATININE 1.1 05/24/2019    GFRAA >60 05/24/2019    LABGLOM >60 05/24/2019    GLUCOSE 72 05/24/2019    PROT 6.3 05/24/2019    LABALBU 2.8 05/24/2019    CALCIUM 8.2 05/24/2019    BILITOT 0.4 05/24/2019    ALKPHOS 88 05/24/2019    AST 14 05/24/2019    ALT 10 05/24/2019     Warfarin PT/INR:    Lab Results Component Value Date    INR 1.2 05/22/2019    INR 1.1 05/06/2019    INR 1.0 05/04/2019    PROTIME 14.2 (H) 05/22/2019    PROTIME 12.3 05/06/2019    PROTIME 11.6 05/04/2019       Assessment:    Principal Problem:    Bilateral pulmonary embolism (HCC)  Active Problems:    HTN (hypertension), benign    Pancreatic adenocarcinoma (HCC)    Biliary obstruction    Type 2 diabetes mellitus, with long-term current use of insulin (HCC)  Resolved Problems:    * No resolved hospital problems.  *      Plan:  Replace uziel hernandez planning        Jazmin Dudley  7:58 AM  5/24/2019

## 2019-05-24 NOTE — PLAN OF CARE
Problem: Falls - Risk of:  Goal: Will remain free from falls  Description  Will remain free from falls  5/24/2019 0004 by Sandip Jay RN  Outcome: Met This Shift  5/23/2019 1118 by Tran Gabriel  Outcome: Met This Shift  Goal: Absence of physical injury  Description  Absence of physical injury  5/23/2019 1118 by Tran Gabriel  Outcome: Met This Shift     Problem: Risk for Impaired Skin Integrity  Goal: Tissue integrity - skin and mucous membranes  Description  Structural intactness and normal physiological function of skin and  mucous membranes.   5/24/2019 0004 by Sandip Jay RN  Outcome: Met This Shift  5/23/2019 1118 by Tran Gabriel  Outcome: Met This Shift     Problem: Gas Exchange - Impaired:  Goal: Levels of oxygenation will improve  Description  Levels of oxygenation will improve  5/24/2019 0004 by Sandip Jay RN  Outcome: Met This Shift  5/23/2019 1810 by Madonna García RN  Outcome: Met This Shift     Problem: Bleeding:  Goal: Will show no signs and symptoms of excessive bleeding  Description  Will show no signs and symptoms of excessive bleeding  5/23/2019 1810 by Madonna García RN  Outcome: Met This Shift

## 2019-05-24 NOTE — CARE COORDINATION
SOCIAL WORK/CASEMANAGEMENT TRANSITION OF CARE PLANNING:  is from [facility]. precert started around 4:78 p.m. And most likely not be back until tues. Completed n17 in epic.  Emiliano Richmond

## 2019-05-24 NOTE — PROGRESS NOTES
Occupational Therapy  Attempted OT eval at b/s, however, pt was off the unit for testing. Will attempt assessment at a later time.   Thank you for this referral.  Best Anna, MOT, OTR/L  # [id number]

## 2019-05-24 NOTE — PROGRESS NOTES
Occupational Therapy  Attempted OT assessment at b/s, pt has returned from testing but is now receiving [facility] Care. [facility] staff requested therapist return at a later time.   Will attempt OT session this PM.  Thank you for this referral.  [employer], MOT, OTR/L  # [id number]

## 2019-05-24 NOTE — PROGRESS NOTES
ID Progress Note                [address] [facility] [address], [place], [address] [facility]            Phone [phone]     Fax [phone]      Chief complaint   Unchanged/SOB    Subjective: The patient is awake and alert. Tolerating medications. Reports no side effects. Afebrile. 10 ROS otherwise negative unless otherwise specified above. Objective:    Vitals:    05/24/19 1348   BP: (!) 173/109   Pulse: 86   Resp: 20   Temp: 97.8 °F (36.6 °C)   SpO2:      GENERAL:  The patient is not alert and awake, minimally responsive. HEENT:  Atraumatic and normocephalic.  PERRLA.  EOMI. RESPIRATORY:  Air entry bilaterally equal.  No wheezes or crackles. CARDIOVASCULAR:  S1 and S2 normal.  No murmur, rubs, or gallop. ABDOMEN:  Soft, nontender, and nondistended.  Bowel sounds present. Rt sided catheter in place minimally draining   EXTREMITIES:  No pedal edema. Left basilic midline    Labs:  Recent Labs     05/22/19  1948 05/22/19  2305 05/24/19  0536   WBC 6.3 5.6 5.1   RBC 3.40* 3.03* 2.86*   HGB 9.4* 8.3* 7.9*   HCT 30.6* 26.7* 25.7*   MCV 90.0 88.1 89.9   MCH 27.6 27.4 27.6   MCHC 30.7* 31.1* 30.7*   RDW 15.8* 15.8* 15.8*    189 202   MPV 10.3 10.1 10.2     CMP:    Lab Results   Component Value Date     05/24/2019    K 3.2 05/24/2019     05/24/2019    CO2 23 05/24/2019    BUN 11 05/24/2019    CREATININE 1.1 05/24/2019    GFRAA >60 05/24/2019    LABGLOM >60 05/24/2019    GLUCOSE 72 05/24/2019    PROT 6.3 05/24/2019    LABALBU 2.8 05/24/2019    CALCIUM 8.2 05/24/2019    BILITOT 0.4 05/24/2019    ALKPHOS 88 05/24/2019    AST 14 05/24/2019    ALT 10 05/24/2019          Microbiology :  Recent Labs     05/22/19  2300   BC 24 Hours- no growth     Recent Labs     05/22/19  2310   BLOODCULT2 24 Hours- no growth     No results for input(s): LABURIN in the last 72 hours. No results for input(s): CULTRESP in the last 72 hours.   No results for input(s): WNDABS in the last 72 hours. Radiology :  CT Head WO Contrast   Final Result      No significant interval change in the appearance of the brain. No   acute process. Advanced white matter changes and sequela of remote infarcts. CT ABDOMEN PELVIS WO CONTRAST Additional Contrast? None   Final Result      Severe distention of the urinary bladder with moderate left   hydronephrosis. Pneumobilia with bare-metal stent in place and severe dilation of the   pancreatic parenchyma. Moderate distention of the stomach with debris. Pigtail catheter present in the right hepatic parenchyma. No evidence   for recurrent fluid collection.   ==========               CTA CHEST W CONTRAST   Final Result   Extensive occlusive thrombus in the right and left main pulmonary   arteries extending to upper and lower lobes consistent with bilateral   pulmonary embolism with cardiac strain/septal deviation. Partial visualization of the pancreatic head mass with a biliary stent   and drain with pneumobilia. Please correlate with the CT of the   abdomen and pelvis. The findings were discussed with the Dr. Da Cam at 10:10 PM on   5/22/2019. ALERT:  CRITICAL RESULT            XR CHEST PORTABLE   Final Result      Lines, tubes, and devices:  None. Lungs and pleura:  No consolidation. No lung mass. No pleural   effusion. Cardiomediastinal silhouette:  Unchanged cardiomediastinal silhouette. Other:  Pigtail catheter in the right upper quadrant. US DUP LOWER EXTREMITY LEFT DWAYNE    (Results Pending)         URINARY CATHETER OUTPUT (Freed):  Urethral Catheter Non-latex 16 fr-Output (mL): 300 mL    Assessment and Plan:         1.  Candida parapsilosis fungemia- treated completed antifungals on 5/16/2019  2.  History of autoimmune pancreatitis with biliary obstruction.   3.  History of recently treated Enterococcus avium, complicated  bacteremia with six weeks of ampicillin plus ceftriaxone ending on  04/30/2019, also had polymicrobial bacteremia including Klebsiella  pneumoniae, E. coli as well as Clostridium perfringens.   4 pancreatic adenocarcinoma  5 pulmonary embolism     PLAN:       continue Zosyn till  5/30/2019              Electronically signed by Laurel Aguilar MD on 5/24/2019 at 2:17 PM

## 2019-05-24 NOTE — PROGRESS NOTES
Hematology/Oncology Inpatient F/up:    Subjective:  Patient seen and examined, has no complaints. Physical Exam:  BP (!) 148/88   Pulse 79   Temp 97.8 °F (36.6 °C) (Temporal)   Resp 20   Ht 5' 10\" (1.778 m)   Wt 132 lb 14.4 oz (60.3 kg)   SpO2 97%   BMI 19.07 kg/m²   GENERAL:alert, oriented x3, not in acute process. HEENT: PERRLA; EOMI. Oropharynx dry  NECK: Supple. Without lymphadenopathy. LUNGS: Good air entry bilaterally. CARDIOVASCULAR: Regular rate. No murmurs, rubs or gallops. ABDOMEN: Soft. Non-tender, non-distended. Positive bowel sounds. EXTREMITIES: Without clubbing, cyanosis, or edema. NEUROLOGIC: No focal deficits. ECOG PS 1-2     Impression/Plan:  77 y/o male with pancreatic lesion and Biliary obstruction-IGG elevated; was on prednisone for ? autoimmune pancreatitis per [facility] GI/hepatology recommendations. Non-compliant with  appointments. 3 prior attempts (2 EUS and aborted whipple; no path showing invasive adenoca). Recent candidemia w/ biliary stent; on Eraxis, also on Vanc/Zosyn, ID following. ZOË negative for vegetations. New masses in liver on triphasic; CEA 2.4, CA 87-9 4080; CT guided liver abscess drainage on 5/6, no organism seen on GS. CT guided liver mass biopsy proved metastatic adenocarcinoma likely of pancreatic origin. Multiple attempts to have him see us in clinic. Patient non-compliant. He was found unresponsive at his nursing home. Per EMS nursing staff saw him sitting up and not responding in the nursing home and began doing CPR for 2 minutes and the patient did become responsive. CTA chest showed extensive occlusive thrombus in the right and left main pulmonary arteries extending to upper and lower lobes consistent with bilateral pulmonary embolism with cardiac strain/septal deviation. CT abdomen/pelvis Pneumobilia with bare-metal stent in place and severe dilation of the pancreatic parenchyma.  CT head No significant interval change in the appearance of the brain. No acute process. Continue Lovenox therapeutic for pilar PE. Systemic palliative chemo (Gemcitabine/Abraxane) for metastatic adenocarcinoma of pancreatic origin is recommended if his ECOG performance status allows. F/up with Dr. Umu Bauer after discharge from the hospital.     Thank you for allowing us to participate in the care of Mr. Michael Thompson.     Helena Baldwin MD   HEMATOLOGY/MEDICAL ONCOLOGY  [address] [place] [address] MED ONCOLOGY  [address] [place] [id number]  Dept: [phone]

## 2019-05-25 LAB
ALBUMIN SERPL-MCNC: 2.7 G/DL (ref 3.5–5.2)
ALP BLD-CCNC: 86 U/L (ref 40–129)
ALT SERPL-CCNC: 10 U/L (ref 0–40)
ANION GAP SERPL CALCULATED.3IONS-SCNC: 12 MMOL/L (ref 7–16)
AST SERPL-CCNC: 11 U/L (ref 0–39)
BASOPHILS ABSOLUTE: 0.03 E9/L (ref 0–0.2)
BASOPHILS RELATIVE PERCENT: 0.6 % (ref 0–2)
BILIRUB SERPL-MCNC: 0.4 MG/DL (ref 0–1.2)
BILIRUBIN DIRECT: <0.2 MG/DL (ref 0–0.3)
BILIRUBIN, INDIRECT: ABNORMAL MG/DL (ref 0–1)
BUN BLDV-MCNC: 11 MG/DL (ref 8–23)
CALCIUM SERPL-MCNC: 8.3 MG/DL (ref 8.6–10.2)
CHLORIDE BLD-SCNC: 103 MMOL/L (ref 98–107)
CO2: 22 MMOL/L (ref 22–29)
CREAT SERPL-MCNC: 1 MG/DL (ref 0.7–1.2)
EOSINOPHILS ABSOLUTE: 0.28 E9/L (ref 0.05–0.5)
EOSINOPHILS RELATIVE PERCENT: 5.3 % (ref 0–6)
GFR AFRICAN AMERICAN: >60
GFR NON-AFRICAN AMERICAN: >60 ML/MIN/1.73
GLUCOSE BLD-MCNC: 44 MG/DL (ref 74–99)
HCT VFR BLD CALC: 26.5 % (ref 37–54)
HEMOGLOBIN: 7.9 G/DL (ref 12.5–16.5)
IMMATURE GRANULOCYTES #: 0.02 E9/L
IMMATURE GRANULOCYTES %: 0.4 % (ref 0–5)
LYMPHOCYTES ABSOLUTE: 1.05 E9/L (ref 1.5–4)
LYMPHOCYTES RELATIVE PERCENT: 19.8 % (ref 20–42)
MAGNESIUM: 1.9 MG/DL (ref 1.6–2.6)
MCH RBC QN AUTO: 27 PG (ref 26–35)
MCHC RBC AUTO-ENTMCNC: 29.8 % (ref 32–34.5)
MCV RBC AUTO: 90.4 FL (ref 80–99.9)
METER GLUCOSE: 100 MG/DL (ref 74–99)
METER GLUCOSE: 122 MG/DL (ref 74–99)
METER GLUCOSE: 49 MG/DL (ref 74–99)
METER GLUCOSE: 51 MG/DL (ref 74–99)
METER GLUCOSE: 70 MG/DL (ref 74–99)
METER GLUCOSE: 80 MG/DL (ref 74–99)
MONOCYTES ABSOLUTE: 0.43 E9/L (ref 0.1–0.95)
MONOCYTES RELATIVE PERCENT: 8.1 % (ref 2–12)
NEUTROPHILS ABSOLUTE: 3.48 E9/L (ref 1.8–7.3)
NEUTROPHILS RELATIVE PERCENT: 65.8 % (ref 43–80)
PDW BLD-RTO: 15.7 FL (ref 11.5–15)
PLATELET # BLD: 243 E9/L (ref 130–450)
PMV BLD AUTO: 10.8 FL (ref 7–12)
POTASSIUM REFLEX MAGNESIUM: 3.8 MMOL/L (ref 3.5–5)
RBC # BLD: 2.93 E12/L (ref 3.8–5.8)
SODIUM BLD-SCNC: 137 MMOL/L (ref 132–146)
TOTAL PROTEIN: 6.3 G/DL (ref 6.4–8.3)
WBC # BLD: 5.3 E9/L (ref 4.5–11.5)

## 2019-05-25 PROCEDURE — 6370000000 HC RX 637 (ALT 250 FOR IP): Performed by: INTERNAL MEDICINE

## 2019-05-25 PROCEDURE — 94660 CPAP INITIATION&MGMT: CPT

## 2019-05-25 PROCEDURE — 36415 COLL VENOUS BLD VENIPUNCTURE: CPT

## 2019-05-25 PROCEDURE — 2140000000 HC CCU INTERMEDIATE R&B

## 2019-05-25 PROCEDURE — 2700000000 HC OXYGEN THERAPY PER DAY

## 2019-05-25 PROCEDURE — 80076 HEPATIC FUNCTION PANEL: CPT

## 2019-05-25 PROCEDURE — 6360000002 HC RX W HCPCS: Performed by: INTERNAL MEDICINE

## 2019-05-25 PROCEDURE — 82962 GLUCOSE BLOOD TEST: CPT

## 2019-05-25 PROCEDURE — 80048 BASIC METABOLIC PNL TOTAL CA: CPT

## 2019-05-25 PROCEDURE — 83735 ASSAY OF MAGNESIUM: CPT

## 2019-05-25 PROCEDURE — 85025 COMPLETE CBC W/AUTO DIFF WBC: CPT

## 2019-05-25 PROCEDURE — 2580000003 HC RX 258: Performed by: INTERNAL MEDICINE

## 2019-05-25 PROCEDURE — 99233 SBSQ HOSP IP/OBS HIGH 50: CPT | Performed by: INTERNAL MEDICINE

## 2019-05-25 RX ORDER — AMLODIPINE BESYLATE 5 MG/1
5 TABLET ORAL DAILY
Status: DISCONTINUED | OUTPATIENT
Start: 2019-05-25 | End: 2019-05-28 | Stop reason: HOSPADM

## 2019-05-25 RX ADMIN — DEXTROSE 15 G: 15 GEL ORAL at 06:40

## 2019-05-25 RX ADMIN — ENOXAPARIN SODIUM 60 MG: 60 INJECTION SUBCUTANEOUS at 01:24

## 2019-05-25 RX ADMIN — AMLODIPINE BESYLATE 5 MG: 5 TABLET ORAL at 16:52

## 2019-05-25 RX ADMIN — LEVETIRACETAM 750 MG: 250 TABLET, FILM COATED ORAL at 09:05

## 2019-05-25 RX ADMIN — PIPERACILLIN AND TAZOBACTAM 3.38 G: 3; .375 INJECTION, POWDER, FOR SOLUTION INTRAVENOUS at 16:49

## 2019-05-25 RX ADMIN — HEPARIN 100 UNITS: 100 SYRINGE at 09:05

## 2019-05-25 RX ADMIN — PANTOPRAZOLE SODIUM 40 MG: 40 TABLET, DELAYED RELEASE ORAL at 06:43

## 2019-05-25 RX ADMIN — PIPERACILLIN AND TAZOBACTAM 3.38 G: 3; .375 INJECTION, POWDER, FOR SOLUTION INTRAVENOUS at 09:05

## 2019-05-25 RX ADMIN — Medication 10 ML: at 16:49

## 2019-05-25 RX ADMIN — HEPARIN 100 UNITS: 100 SYRINGE at 20:12

## 2019-05-25 RX ADMIN — PIPERACILLIN AND TAZOBACTAM 3.38 G: 3; .375 INJECTION, POWDER, FOR SOLUTION INTRAVENOUS at 01:23

## 2019-05-25 RX ADMIN — LISINOPRIL 10 MG: 10 TABLET ORAL at 09:05

## 2019-05-25 RX ADMIN — ENOXAPARIN SODIUM 60 MG: 60 INJECTION SUBCUTANEOUS at 12:30

## 2019-05-25 RX ADMIN — DEXTROSE 15 G: 15 GEL ORAL at 06:25

## 2019-05-25 RX ADMIN — LEVETIRACETAM 750 MG: 250 TABLET, FILM COATED ORAL at 20:11

## 2019-05-25 RX ADMIN — TAMSULOSIN HYDROCHLORIDE 0.4 MG: 0.4 CAPSULE ORAL at 09:05

## 2019-05-25 RX ADMIN — PRAVASTATIN SODIUM 80 MG: 20 TABLET ORAL at 20:12

## 2019-05-25 RX ADMIN — Medication 10 ML: at 01:23

## 2019-05-25 RX ADMIN — Medication 10 ML: at 20:12

## 2019-05-25 RX ADMIN — Medication 10 ML: at 06:28

## 2019-05-25 RX ADMIN — Medication 10 ML: at 09:06

## 2019-05-25 ASSESSMENT — PAIN SCALES - GENERAL
PAINLEVEL_OUTOF10: 0

## 2019-05-25 NOTE — PROGRESS NOTES
Message sent to Dr. Sanford Sánchez regarding patient's blood pressure of 178/92 taken manually. Will continue to monitor at this time.        Carleen Lux RN

## 2019-05-25 NOTE — PROGRESS NOTES
WNDABS in the last 72 hours. Radiology :  US DUP LOWER EXTREMITY LEFT DWAYNE   Final Result      Findings suggest positive study for nonocclusive deep vein thrombosis   involving proximal, mid, and distal segment of left superficial   femoral vein as well as left popliteal vein and tibioperoneal trunk. CT Head WO Contrast   Final Result      No significant interval change in the appearance of the brain. No   acute process. Advanced white matter changes and sequela of remote infarcts. CT ABDOMEN PELVIS WO CONTRAST Additional Contrast? None   Final Result      Severe distention of the urinary bladder with moderate left   hydronephrosis. Pneumobilia with bare-metal stent in place and severe dilation of the   pancreatic parenchyma. Moderate distention of the stomach with debris. Pigtail catheter present in the right hepatic parenchyma. No evidence   for recurrent fluid collection.   ==========               CTA CHEST W CONTRAST   Final Result   Extensive occlusive thrombus in the right and left main pulmonary   arteries extending to upper and lower lobes consistent with bilateral   pulmonary embolism with cardiac strain/septal deviation. Partial visualization of the pancreatic head mass with a biliary stent   and drain with pneumobilia. Please correlate with the CT of the   abdomen and pelvis. The findings were discussed with the Dr. Elvis Flores at 10:10 PM on   5/22/2019. ALERT:  CRITICAL RESULT            XR CHEST PORTABLE   Final Result      Lines, tubes, and devices:  None. Lungs and pleura:  No consolidation. No lung mass. No pleural   effusion. Cardiomediastinal silhouette:  Unchanged cardiomediastinal silhouette. Other:  Pigtail catheter in the right upper quadrant.                      URINARY CATHETER OUTPUT (Freed):  Urethral Catheter Non-latex 16 fr-Output (mL): 375 mL    Assessment and Plan:         1.  Candida parapsilosis fungemia-

## 2019-05-25 NOTE — PLAN OF CARE
Problem: Falls - Risk of:  Goal: Will remain free from falls  Description  Will remain free from falls  Outcome: Met This Shift     Problem: Risk for Impaired Skin Integrity  Goal: Tissue integrity - skin and mucous membranes  Description  Structural intactness and normal physiological function of skin and  mucous membranes.   Outcome: Ongoing     Problem: Gas Exchange - Impaired:  Goal: Levels of oxygenation will improve  Description  Levels of oxygenation will improve  Outcome: Ongoing

## 2019-05-25 NOTE — PROGRESS NOTES
[facility] Medicine    Subjective:  Pt alert responsive in no distress eating breakfast      Current Facility-Administered Medications:     sodium chloride flush 0.9 % injection 10 mL, 10 mL, Intravenous, 2 times per day, Faustino Chance MD, 10 mL at 05/25/19 0906    sodium chloride flush 0.9 % injection 10 mL, 10 mL, Intravenous, PRN, Faustino Chance MD, 10 mL at 05/25/19 5429    potassium chloride (KLOR-CON M) extended release tablet 40 mEq, 40 mEq, Oral, PRN **OR** potassium bicarb-citric acid (EFFER-K) effervescent tablet 40 mEq, 40 mEq, Oral, PRN **OR** potassium chloride 10 mEq/100 mL IVPB (Peripheral Line), 10 mEq, Intravenous, PRN, Faustino Chance MD    magnesium sulfate 1 g in dextrose 5% 100 mL IVPB, 1 g, Intravenous, PRN, Faustino Chance MD    magnesium hydroxide (MILK OF MAGNESIA) 400 MG/5ML suspension 30 mL, 30 mL, Oral, Daily PRN, Faustino Chance MD    acetaminophen (TYLENOL) tablet 650 mg, 650 mg, Oral, Q4H PRN, Faustino Chance MD    levETIRAcetam (KEPPRA) tablet 750 mg, 750 mg, Oral, BID, Faustino Chance MD, 750 mg at 05/25/19 0905    lisinopril (PRINIVIL;ZESTRIL) tablet 10 mg, 10 mg, Oral, Daily, Faustino Chance MD, 10 mg at 05/25/19 0905    pantoprazole (PROTONIX) tablet 40 mg, 40 mg, Oral, QAM AC, Faustino Chance MD, 40 mg at 05/25/19 1603    pravastatin (PRAVACHOL) tablet 80 mg, 80 mg, Oral, Nightly, Faustino Chance MD, 80 mg at 05/24/19 2033    tamsulosin (FLOMAX) capsule 0.4 mg, 0.4 mg, Oral, Daily, Faustino Chance MD, 0.4 mg at 05/25/19 0905    glucose (GLUTOSE) 40 % oral gel 15 g, 15 g, Oral, PRN, Faustino Chance MD, 15 g at 05/25/19 0640    dextrose 50 % solution 12.5 g, 12.5 g, Intravenous, PRN, Faustino Chance MD    glucagon (rDNA) injection 1 mg, 1 mg, Intramuscular, PRN, Faustino Chance MD    dextrose 5 % solution, 100 mL/hr, Intravenous, PRN, Faustino Chance MD    insulin lispro (HUMALOG) injection vial 0-6 Units, 0-6 Units, Subcutaneous, TID WC, Faustino Chance MD, 1 Units at 05/23/19 1648    insulin lispro (HUMALOG) injection vial 0-3 Units, 0-3 Units, Subcutaneous, Nightly, Susan Garza MD    piperacillin-tazobactam (ZOSYN) 3.375 g in dextrose 5 % 100 mL IVPB extended infusion (mini-bag), 3.375 g, Intravenous, Q8H, Susan Garza MD, Last Rate: 25 mL/hr at 05/25/19 0905, 3.375 g at 05/25/19 0905    enoxaparin (LOVENOX) injection 60 mg, 1 mg/kg, Subcutaneous, Q12H, Susan Garza MD, 60 mg at 05/25/19 0124    heparin flush 100 UNIT/ML injection 100 Units, 100 Units, Intracatheter, BID, Sissy Jordan DO, 100 Units at 05/25/19 0905    sodium chloride flush 0.9 % injection 10 mL, 10 mL, Intravenous, Once, Susan Garza MD    Objective:    BP (!) 173/96   Pulse 80   Temp 97.5 °F (36.4 °C) (Temporal)   Resp 16   Ht 5' 10\" (1.778 m)   Wt 132 lb 14.4 oz (60.3 kg)   SpO2 95%   BMI 19.07 kg/m²     Heart:  Reg  Lungs:  Min rhonchi  Abd: bowel sounds present, nontender, non distended  Extrem:  No clubbing, cyanosis, or edema    CBC with Differential:    Lab Results   Component Value Date    WBC 5.3 05/25/2019    RBC 2.93 05/25/2019    HGB 7.9 05/25/2019    HCT 26.5 05/25/2019     05/25/2019    MCV 90.4 05/25/2019    MCH 27.0 05/25/2019    MCHC 29.8 05/25/2019    RDW 15.7 05/25/2019    NRBC 0.0 03/29/2019    SEGSPCT 68 01/11/2013    LYMPHOPCT 19.8 05/25/2019    MONOPCT 8.1 05/25/2019    MYELOPCT 0.9 01/06/2019    BASOPCT 0.6 05/25/2019    MONOSABS 0.43 05/25/2019    LYMPHSABS 1.05 05/25/2019    EOSABS 0.28 05/25/2019    BASOSABS 0.03 05/25/2019     CMP:    Lab Results   Component Value Date     05/25/2019    K 3.8 05/25/2019     05/25/2019    CO2 22 05/25/2019    BUN 11 05/25/2019    CREATININE 1.0 05/25/2019    GFRAA >60 05/25/2019    LABGLOM >60 05/25/2019    GLUCOSE 44 05/25/2019    PROT 6.3 05/25/2019    LABALBU 2.7 05/25/2019    CALCIUM 8.3 05/25/2019    BILITOT 0.4 05/25/2019    ALKPHOS 86 05/25/2019    AST 11 05/25/2019    ALT 10 05/25/2019     Warfarin PT/INR:    Lab Results   Component Value Date    INR 1.2 05/22/2019    INR 1.1 05/06/2019 INR 1.0 05/04/2019    PROTIME 14.2 (H) 05/22/2019    PROTIME 12.3 05/06/2019    PROTIME 11.6 05/04/2019       Assessment:    Principal Problem:    Bilateral pulmonary embolism (HCC)  Active Problems:    HTN (hypertension), benign    Pancreatic adenocarcinoma (HCC)    Biliary obstruction    Type 2 diabetes mellitus, with long-term current use of insulin (HCC)  Resolved Problems:    * No resolved hospital problems. *      Plan:  Reviewed all consultant notes and plans  Stop insulin now hypoglycemia  ONCOLOGY:  Continue Lovenox therapeutic for pilar PE. Systemic palliative chemo (Gemcitabine/Abraxane) for metastatic adenocarcinoma of pancreatic origin is recommended if his ECOG performance status allows.    F/up with Dr. Rose Gleason after discharge from the hospital.          Rachel Byers  10:15 AM  5/25/2019

## 2019-05-25 NOTE — PROGRESS NOTES
Breathing fine. On 2-3 liters with excellent saturations.   Vent settings:Vent Information  FiO2 : 40 %  I Time/ I Time %: 0.8 s  Additional Respiratory  Assessments  Pulse: 86  Resp: 16  SpO2: 90 %  Oral Care: Mouth swabbed      Current Facility-Administered Medications:     amLODIPine (NORVASC) tablet 5 mg, 5 mg, Oral, Daily, Kassie Limon MD, 5 mg at 05/25/19 1652    sodium chloride flush 0.9 % injection 10 mL, 10 mL, Intravenous, 2 times per day, Lisa Franco MD, 10 mL at 05/25/19 0906    sodium chloride flush 0.9 % injection 10 mL, 10 mL, Intravenous, PRN, Lisa Franco MD, 10 mL at 05/25/19 1649    potassium chloride (KLOR-CON M) extended release tablet 40 mEq, 40 mEq, Oral, PRN **OR** potassium bicarb-citric acid (EFFER-K) effervescent tablet 40 mEq, 40 mEq, Oral, PRN **OR** potassium chloride 10 mEq/100 mL IVPB (Peripheral Line), 10 mEq, Intravenous, PRN, Lisa Franco MD    magnesium sulfate 1 g in dextrose 5% 100 mL IVPB, 1 g, Intravenous, PRN, Lisa Franco MD    magnesium hydroxide (MILK OF MAGNESIA) 400 MG/5ML suspension 30 mL, 30 mL, Oral, Daily PRN, Lisa Franco MD    acetaminophen (TYLENOL) tablet 650 mg, 650 mg, Oral, Q4H PRN, Lisa Franco MD    levETIRAcetam (KEPPRA) tablet 750 mg, 750 mg, Oral, BID, Lisa Franco MD, 750 mg at 05/25/19 0905    lisinopril (PRINIVIL;ZESTRIL) tablet 10 mg, 10 mg, Oral, Daily, Lisa Franco MD, 10 mg at 05/25/19 0905    pantoprazole (PROTONIX) tablet 40 mg, 40 mg, Oral, QAM AC, Lisa Franco MD, 40 mg at 05/25/19 3942    pravastatin (PRAVACHOL) tablet 80 mg, 80 mg, Oral, Nightly, Lisa Franco MD, 80 mg at 05/24/19 2033    tamsulosin (FLOMAX) capsule 0.4 mg, 0.4 mg, Oral, Daily, Lisa Franco MD, 0.4 mg at 05/25/19 0905    glucose (GLUTOSE) 40 % oral gel 15 g, 15 g, Oral, PRN, Lisa Franco MD, 15 g at 05/25/19 0640    dextrose 50 % solution 12.5 g, 12.5 g, Intravenous, PRN, Lisa Franco MD    glucagon (rDNA) injection 1 mg, 1 mg, Intramuscular, PRN, Lisa Franco MD    dextrose 5 % solution, 100 mL/hr, Intravenous, PRN, Caio Sánchez MD    piperacillin-tazobactam (ZOSYN) 3.375 g in dextrose 5 % 100 mL IVPB extended infusion (mini-bag), 3.375 g, Intravenous, Q8H, Caio Sánchez MD, Last Rate: 25 mL/hr at 05/25/19 1649, 3.375 g at 05/25/19 1649    enoxaparin (LOVENOX) injection 60 mg, 1 mg/kg, Subcutaneous, Q12H, Caio Sánchez MD, 60 mg at 05/25/19 1230    heparin flush 100 UNIT/ML injection 100 Units, 100 Units, Intracatheter, BID, Alicia Jordan DO, 100 Units at 05/25/19 0905    sodium chloride flush 0.9 % injection 10 mL, 10 mL, Intravenous, Once, Caio Sánchez MD  BP (!) 178/92   Pulse 86   Temp 97.9 °F (36.6 °C) (Temporal)   Resp 16   Ht 5' 10\" (1.778 m)   Wt 132 lb 14.4 oz (60.3 kg)   SpO2 90%   BMI 19.07 kg/m²     General: No distress  Chest: Symmetric, no accessory use  Heart: RRR  Lungs: CTA  Extremities: No edema    Intake/Output Summary (Last 24 hours) at 5/25/2019 1739  Last data filed at 5/25/2019 1357  Gross per 24 hour   Intake 740 ml   Output 1475 ml   Net -735 ml     CBC with Differential:    Lab Results   Component Value Date    WBC 5.3 05/25/2019    RBC 2.93 05/25/2019    HGB 7.9 05/25/2019    HCT 26.5 05/25/2019     05/25/2019    MCV 90.4 05/25/2019    MCH 27.0 05/25/2019    MCHC 29.8 05/25/2019    RDW 15.7 05/25/2019    NRBC 0.0 03/29/2019    SEGSPCT 68 01/11/2013    LYMPHOPCT 19.8 05/25/2019    MONOPCT 8.1 05/25/2019    MYELOPCT 0.9 01/06/2019    BASOPCT 0.6 05/25/2019    MONOSABS 0.43 05/25/2019    LYMPHSABS 1.05 05/25/2019    EOSABS 0.28 05/25/2019    BASOSABS 0.03 05/25/2019     BMP:    Lab Results   Component Value Date     05/25/2019    K 3.8 05/25/2019     05/25/2019    CO2 22 05/25/2019    BUN 11 05/25/2019    LABALBU 2.7 05/25/2019    CREATININE 1.0 05/25/2019    CALCIUM 8.3 05/25/2019    GFRAA >60 05/25/2019    LABGLOM >60 05/25/2019    GLUCOSE 44 05/25/2019       IMPRESSION:   S/p Acute hypoxic respiratory failure currently on 2-3 liters but can be weaned down. Bilateral PE needs lifelong enoxaparin injections. echo: mild LVSD, EF 45 % mildly elevated RVSP 40. Bidirectional PFO    leg us : L DVT +  Metastatic pancreatic cancer per oncology.   Pete Dave  5/25/2019  5:39 PM

## 2019-05-25 NOTE — PROGRESS NOTES
Hematology/Oncology Inpatient F/up:    Subjective:  Patient seen and examined, has no complaints, no shortness of breath. Physical Exam:  BP (!) 173/96   Pulse 80   Temp 97.5 °F (36.4 °C) (Temporal)   Resp 16   Ht 5' 10\" (1.778 m)   Wt 132 lb 14.4 oz (60.3 kg)   SpO2 95%   BMI 19.07 kg/m²   GENERAL:alert, oriented x3, not in acute process. HEENT: PERRLA; EOMI. Oropharynx dry  NECK: Supple. Without lymphadenopathy. LUNGS: Good air entry bilaterally. CARDIOVASCULAR: RRR. ABDOMEN: Soft. Non-tender, non-distended. Positive bowel sounds. EXTREMITIES: Without clubbing, cyanosis, or edema. NEUROLOGIC: No focal deficits. ECOG PS 1-2     Impression/Plan:  77 y/o male with pancreatic lesion and Biliary obstruction-IGG elevated; was on prednisone for ? autoimmune pancreatitis per [facility] GI/hepatology recommendations. Non-compliant with  appointments. 3 prior attempts (2 EUS and aborted whipple; no path showing invasive adenoca). Recent candidemia w/ biliary stent; on Eraxis, also on Vanc/Zosyn, ID following. ZOË negative for vegetations. New masses in liver on triphasic; CEA 2.4, CA 27-0 2824; CT guided liver abscess drainage on 5/6, no organism seen on GS. CT guided liver mass biopsy proved metastatic adenocarcinoma likely of pancreatic origin. Multiple attempts to have him see us in clinic. Patient non-compliant. He was found unresponsive at his nursing home. Per EMS nursing staff saw him sitting up and not responding in the nursing home and began doing CPR for 2 minutes and the patient did become responsive. CTA chest showed extensive occlusive thrombus in the right and left main pulmonary arteries extending to upper and lower lobes consistent with bilateral pulmonary embolism with cardiac strain/septal deviation. CT abdomen/pelvis Pneumobilia with bare-metal stent in place and severe dilation of the pancreatic parenchyma. CT head No significant interval change in the appearance of the brain.  No acute process. Continue Lovenox therapeutic for pilar PE. Continue ATB per ID team, on Zosyn until 5/30/2019. Systemic palliative chemo (Gemcitabine/Abraxane) for metastatic adenocarcinoma of pancreatic origin is recommended if his ECOG performance status allows. F/up with Dr. Ranjit Laboy after discharge from the hospital.  Will continue to follow.     Thank you for allowing us to participate in the care of Mr. Kelley Barr.     Leanna Damian MD   HEMATOLOGY/MEDICAL ONCOLOGY  [address] [place] [address] MED ONCOLOGY  [address] [place] [id number]  Dept: [phone]

## 2019-05-26 LAB
ALBUMIN SERPL-MCNC: 2.8 G/DL (ref 3.5–5.2)
ALP BLD-CCNC: 87 U/L (ref 40–129)
ALT SERPL-CCNC: 8 U/L (ref 0–40)
ANION GAP SERPL CALCULATED.3IONS-SCNC: 10 MMOL/L (ref 7–16)
AST SERPL-CCNC: 10 U/L (ref 0–39)
BASOPHILS ABSOLUTE: 0.03 E9/L (ref 0–0.2)
BASOPHILS RELATIVE PERCENT: 0.5 % (ref 0–2)
BILIRUB SERPL-MCNC: 0.4 MG/DL (ref 0–1.2)
BILIRUBIN DIRECT: <0.2 MG/DL (ref 0–0.3)
BILIRUBIN, INDIRECT: ABNORMAL MG/DL (ref 0–1)
BUN BLDV-MCNC: 10 MG/DL (ref 8–23)
CALCIUM SERPL-MCNC: 8.3 MG/DL (ref 8.6–10.2)
CHLORIDE BLD-SCNC: 103 MMOL/L (ref 98–107)
CO2: 25 MMOL/L (ref 22–29)
CREAT SERPL-MCNC: 1.1 MG/DL (ref 0.7–1.2)
EOSINOPHILS ABSOLUTE: 0.31 E9/L (ref 0.05–0.5)
EOSINOPHILS RELATIVE PERCENT: 5.3 % (ref 0–6)
GFR AFRICAN AMERICAN: >60
GFR NON-AFRICAN AMERICAN: >60 ML/MIN/1.73
GLUCOSE BLD-MCNC: 121 MG/DL (ref 74–99)
HCT VFR BLD CALC: 28.6 % (ref 37–54)
HEMOGLOBIN: 8.6 G/DL (ref 12.5–16.5)
IMMATURE GRANULOCYTES #: 0.02 E9/L
IMMATURE GRANULOCYTES %: 0.3 % (ref 0–5)
LYMPHOCYTES ABSOLUTE: 1.14 E9/L (ref 1.5–4)
LYMPHOCYTES RELATIVE PERCENT: 19.6 % (ref 20–42)
MAGNESIUM: 1.8 MG/DL (ref 1.6–2.6)
MCH RBC QN AUTO: 27.1 PG (ref 26–35)
MCHC RBC AUTO-ENTMCNC: 30.1 % (ref 32–34.5)
MCV RBC AUTO: 90.2 FL (ref 80–99.9)
METER GLUCOSE: 123 MG/DL (ref 74–99)
METER GLUCOSE: 176 MG/DL (ref 74–99)
METER GLUCOSE: 183 MG/DL (ref 74–99)
MONOCYTES ABSOLUTE: 0.4 E9/L (ref 0.1–0.95)
MONOCYTES RELATIVE PERCENT: 6.9 % (ref 2–12)
NEUTROPHILS ABSOLUTE: 3.91 E9/L (ref 1.8–7.3)
NEUTROPHILS RELATIVE PERCENT: 67.4 % (ref 43–80)
PDW BLD-RTO: 15.5 FL (ref 11.5–15)
PLATELET # BLD: 291 E9/L (ref 130–450)
PMV BLD AUTO: 10.4 FL (ref 7–12)
POTASSIUM REFLEX MAGNESIUM: 4 MMOL/L (ref 3.5–5)
RBC # BLD: 3.17 E12/L (ref 3.8–5.8)
SODIUM BLD-SCNC: 138 MMOL/L (ref 132–146)
TOTAL PROTEIN: 6.7 G/DL (ref 6.4–8.3)
WBC # BLD: 5.8 E9/L (ref 4.5–11.5)

## 2019-05-26 PROCEDURE — 36415 COLL VENOUS BLD VENIPUNCTURE: CPT

## 2019-05-26 PROCEDURE — 2140000000 HC CCU INTERMEDIATE R&B

## 2019-05-26 PROCEDURE — 6360000002 HC RX W HCPCS: Performed by: INTERNAL MEDICINE

## 2019-05-26 PROCEDURE — 2580000003 HC RX 258: Performed by: INTERNAL MEDICINE

## 2019-05-26 PROCEDURE — 6370000000 HC RX 637 (ALT 250 FOR IP): Performed by: INTERNAL MEDICINE

## 2019-05-26 PROCEDURE — 82962 GLUCOSE BLOOD TEST: CPT

## 2019-05-26 PROCEDURE — 2700000000 HC OXYGEN THERAPY PER DAY

## 2019-05-26 PROCEDURE — 85025 COMPLETE CBC W/AUTO DIFF WBC: CPT

## 2019-05-26 PROCEDURE — 80048 BASIC METABOLIC PNL TOTAL CA: CPT

## 2019-05-26 PROCEDURE — 83735 ASSAY OF MAGNESIUM: CPT

## 2019-05-26 PROCEDURE — 80076 HEPATIC FUNCTION PANEL: CPT

## 2019-05-26 PROCEDURE — 94660 CPAP INITIATION&MGMT: CPT

## 2019-05-26 RX ADMIN — HEPARIN 100 UNITS: 100 SYRINGE at 08:51

## 2019-05-26 RX ADMIN — PIPERACILLIN AND TAZOBACTAM 3.38 G: 3; .375 INJECTION, POWDER, FOR SOLUTION INTRAVENOUS at 00:10

## 2019-05-26 RX ADMIN — AMLODIPINE BESYLATE 5 MG: 5 TABLET ORAL at 08:51

## 2019-05-26 RX ADMIN — PRAVASTATIN SODIUM 80 MG: 20 TABLET ORAL at 21:40

## 2019-05-26 RX ADMIN — TAMSULOSIN HYDROCHLORIDE 0.4 MG: 0.4 CAPSULE ORAL at 08:51

## 2019-05-26 RX ADMIN — LEVETIRACETAM 750 MG: 250 TABLET, FILM COATED ORAL at 21:40

## 2019-05-26 RX ADMIN — Medication 10 ML: at 21:41

## 2019-05-26 RX ADMIN — PIPERACILLIN AND TAZOBACTAM 3.38 G: 3; .375 INJECTION, POWDER, FOR SOLUTION INTRAVENOUS at 17:07

## 2019-05-26 RX ADMIN — LISINOPRIL 10 MG: 10 TABLET ORAL at 08:51

## 2019-05-26 RX ADMIN — PANTOPRAZOLE SODIUM 40 MG: 40 TABLET, DELAYED RELEASE ORAL at 06:59

## 2019-05-26 RX ADMIN — ENOXAPARIN SODIUM 60 MG: 60 INJECTION SUBCUTANEOUS at 12:39

## 2019-05-26 RX ADMIN — HEPARIN 100 UNITS: 100 SYRINGE at 21:40

## 2019-05-26 RX ADMIN — LEVETIRACETAM 750 MG: 250 TABLET, FILM COATED ORAL at 08:51

## 2019-05-26 RX ADMIN — PIPERACILLIN AND TAZOBACTAM 3.38 G: 3; .375 INJECTION, POWDER, FOR SOLUTION INTRAVENOUS at 08:51

## 2019-05-26 RX ADMIN — Medication 10 ML: at 08:51

## 2019-05-26 RX ADMIN — ENOXAPARIN SODIUM 60 MG: 60 INJECTION SUBCUTANEOUS at 00:10

## 2019-05-26 ASSESSMENT — PAIN SCALES - GENERAL
PAINLEVEL_OUTOF10: 0

## 2019-05-26 NOTE — PLAN OF CARE
Problem: Risk for Impaired Skin Integrity  Goal: Tissue integrity - skin and mucous membranes  Description  Structural intactness and normal physiological function of skin and  mucous membranes.   5/26/2019 0923 by Nic Nieves RN  Outcome: Met This Shift     Problem: Gas Exchange - Impaired:  Goal: Levels of oxygenation will improve  Description  Levels of oxygenation will improve  5/26/2019 0923 by Nic Nieves RN  Outcome: Met This Shift     Problem: Bleeding:  Goal: Will show no signs and symptoms of excessive bleeding  Description  Will show no signs and symptoms of excessive bleeding  5/26/2019 0923 by Nic Nieves RN  Outcome: Met This Shift

## 2019-05-26 NOTE — PLAN OF CARE
Problem: Falls - Risk of:  Goal: Will remain free from falls  Description  Will remain free from falls  5/25/2019 2205 by Juana Sarah RN  Outcome: Met This Shift  5/25/2019 1634 by Stephanie Cohen RN  Outcome: Met This Shift  5/25/2019 1038 by Tom Layton RN  Outcome: Met This Shift     Problem: Risk for Impaired Skin Integrity  Goal: Tissue integrity - skin and mucous membranes  Description  Structural intactness and normal physiological function of skin and  mucous membranes.   5/25/2019 2205 by Juana Sarah RN  Outcome: Met This Shift  5/25/2019 1634 by Stephanie Cohen RN  Outcome: Ongoing  5/25/2019 1038 by Tom Layton RN  Outcome: Ongoing     Problem: Gas Exchange - Impaired:  Goal: Levels of oxygenation will improve  Description  Levels of oxygenation will improve  5/25/2019 2205 by Juana Sarah RN  Outcome: Met This Shift  5/25/2019 1634 by Stephanie Cohen RN  Outcome: Ongoing  5/25/2019 1038 by Tom Layton RN  Outcome: Ongoing

## 2019-05-26 NOTE — PROGRESS NOTES
ID Progress Note                [address] [facility], [address] [place] [address]            Phone [phone]     Fax [phone]      Chief complaint   Unchanged     Subjective: The patient is awake and alert. Remains confused at times. Tolerating medications. Reports no side effects. Afebrile. No leukocytosis. Denies SOB, chest pain, cough, fever, chills. Weaning from nasal cannula per pulmonary. 10 ROS otherwise negative unless otherwise specified above. Objective:    Vitals:    05/26/19 1120   BP: (!) 154/82   Pulse: 94   Resp: 18   Temp: 97.7 °F (36.5 °C)   SpO2: 98%     GENERAL:  The patient is alert and awake, confused  HEENT:  Atraumatic and normocephalic.  PERRLA.  EOMI. RESPIRATORY:  Air entry bilaterally equal.  No wheezes or crackles. 3 L via NC - weaning  CARDIOVASCULAR:  S1 and S2 normal.  No murmur, rubs, or gallop. ABDOMEN:  Soft, nontender, and nondistended.  Bowel sounds present. EXTREMITIES:  No pedal edema. Left basilic midline    Labs:  Recent Labs     05/24/19  0536 05/25/19  0451 05/26/19  0554   WBC 5.1 5.3 5.8   RBC 2.86* 2.93* 3.17*   HGB 7.9* 7.9* 8.6*   HCT 25.7* 26.5* 28.6*   MCV 89.9 90.4 90.2   MCH 27.6 27.0 27.1   MCHC 30.7* 29.8* 30.1*   RDW 15.8* 15.7* 15.5*    243 291   MPV 10.2 10.8 10.4     CMP:    Lab Results   Component Value Date     05/26/2019    K 4.0 05/26/2019     05/26/2019    CO2 25 05/26/2019    BUN 10 05/26/2019    CREATININE 1.1 05/26/2019    GFRAA >60 05/26/2019    LABGLOM >60 05/26/2019    GLUCOSE 121 05/26/2019    PROT 6.7 05/26/2019    LABALBU 2.8 05/26/2019    CALCIUM 8.3 05/26/2019    BILITOT 0.4 05/26/2019    ALKPHOS 87 05/26/2019    AST 10 05/26/2019    ALT 8 05/26/2019          Microbiology :  No results for input(s): BC in the last 72 hours. No results for input(s): Piero Jeremiah in the last 72 hours. No results for input(s): LABURIN in the last 72 hours. No results for input(s): CULTRESP in the last 72 hours.   No results for input(s): WNDABS in the last 72 hours. Radiology :  US DUP LOWER EXTREMITY LEFT DWAYNE   Final Result      Findings suggest positive study for nonocclusive deep vein thrombosis   involving proximal, mid, and distal segment of left superficial   femoral vein as well as left popliteal vein and tibioperoneal trunk. CT Head WO Contrast   Final Result      No significant interval change in the appearance of the brain. No   acute process. Advanced white matter changes and sequela of remote infarcts. CT ABDOMEN PELVIS WO CONTRAST Additional Contrast? None   Final Result      Severe distention of the urinary bladder with moderate left   hydronephrosis. Pneumobilia with bare-metal stent in place and severe dilation of the   pancreatic parenchyma. Moderate distention of the stomach with debris. Pigtail catheter present in the right hepatic parenchyma. No evidence   for recurrent fluid collection.   ==========               CTA CHEST W CONTRAST   Final Result   Extensive occlusive thrombus in the right and left main pulmonary   arteries extending to upper and lower lobes consistent with bilateral   pulmonary embolism with cardiac strain/septal deviation. Partial visualization of the pancreatic head mass with a biliary stent   and drain with pneumobilia. Please correlate with the CT of the   abdomen and pelvis. The findings were discussed with the Dr. Kali Lala at 10:10 PM on   5/22/2019. ALERT:  CRITICAL RESULT            XR CHEST PORTABLE   Final Result      Lines, tubes, and devices:  None. Lungs and pleura:  No consolidation. No lung mass. No pleural   effusion. Cardiomediastinal silhouette:  Unchanged cardiomediastinal silhouette. Other:  Pigtail catheter in the right upper quadrant.                      URINARY CATHETER OUTPUT (Freed):  Urethral Catheter Non-latex 16 fr-Output (mL): 1150 mL    Assessment and Plan:         1.  Candida parapsilosis fungemia- treated completed antifungals on 5/16/2019  2.  History of autoimmune pancreatitis with biliary obstruction. 3.  History of recently treated Enterococcus avium, complicated  bacteremia with six weeks of ampicillin plus ceftriaxone ending on  04/30/2019, also had polymicrobial bacteremia including Klebsiella  pneumoniae, E. coli as well as Clostridium perfringens. 4 pancreatic adenocarcinoma  5 pulmonary embolism     PLAN:   monitor labs    continue Zosyn till  5/30/2019 via midline       Electronically signed by NAHUM Tucker NP on 5/26/2019 at 12:59 PM    I had a face-to-face encounter with the patient at the bedside. I agree with the nurse practitioner's note and assessment and plan as detailed above. Necessary editing and changes made to the note by myself.     Maris INFANTE

## 2019-05-26 NOTE — PROGRESS NOTES
[facility] Medicine    Subjective:    Pt alert responsive in no distress   He has no concerns at this time       Current Facility-Administered Medications:     amLODIPine (NORVASC) tablet 5 mg, 5 mg, Oral, Daily, Kitty Chaves MD, 5 mg at 05/26/19 0851    sodium chloride flush 0.9 % injection 10 mL, 10 mL, Intravenous, 2 times per day, Jarad Palmer MD, 10 mL at 05/26/19 0851    sodium chloride flush 0.9 % injection 10 mL, 10 mL, Intravenous, PRN, Jarad Palmer MD, 10 mL at 05/25/19 1649    potassium chloride (KLOR-CON M) extended release tablet 40 mEq, 40 mEq, Oral, PRN **OR** potassium bicarb-citric acid (EFFER-K) effervescent tablet 40 mEq, 40 mEq, Oral, PRN **OR** potassium chloride 10 mEq/100 mL IVPB (Peripheral Line), 10 mEq, Intravenous, PRN, Jarad Palmer MD    magnesium sulfate 1 g in dextrose 5% 100 mL IVPB, 1 g, Intravenous, PRN, Jarad Palmer MD    magnesium hydroxide (MILK OF MAGNESIA) 400 MG/5ML suspension 30 mL, 30 mL, Oral, Daily PRN, Jarad Palmer MD    acetaminophen (TYLENOL) tablet 650 mg, 650 mg, Oral, Q4H PRN, Jarad Palmer MD    levETIRAcetam (KEPPRA) tablet 750 mg, 750 mg, Oral, BID, Jarad Palmer MD, 750 mg at 05/26/19 0851    lisinopril (PRINIVIL;ZESTRIL) tablet 10 mg, 10 mg, Oral, Daily, Jarad Palmer MD, 10 mg at 05/26/19 0851    pantoprazole (PROTONIX) tablet 40 mg, 40 mg, Oral, QAM AC, Jarad Palmer MD, 40 mg at 05/26/19 6093    pravastatin (PRAVACHOL) tablet 80 mg, 80 mg, Oral, Nightly, Jarad Palmer MD, 80 mg at 05/25/19 2012    tamsulosin (FLOMAX) capsule 0.4 mg, 0.4 mg, Oral, Daily, Jarad Palmer MD, 0.4 mg at 05/26/19 0851    glucose (GLUTOSE) 40 % oral gel 15 g, 15 g, Oral, PRN, Jarad Palmer MD, 15 g at 05/25/19 0640    dextrose 50 % solution 12.5 g, 12.5 g, Intravenous, PRN, Jarad Palmer MD    glucagon (rDNA) injection 1 mg, 1 mg, Intramuscular, PRN, Jarad Palmer MD    dextrose 5 % solution, 100 mL/hr, Intravenous, PRN, Jarad Palmer MD    piperacillin-tazobactam (ZOSYN) 3.375 g in dextrose 5 % 100 mL IVPB extended infusion (mini-bag), 3.375 g, Intravenous, Q8H, Kashmir Sanchez MD, Last Rate: 25 mL/hr at 05/26/19 0851, 3.375 g at 05/26/19 0851    enoxaparin (LOVENOX) injection 60 mg, 1 mg/kg, Subcutaneous, Q12H, Kashmir Sanchez MD, 60 mg at 05/26/19 0010    heparin flush 100 UNIT/ML injection 100 Units, 100 Units, Intracatheter, BID, Jennifer Shell Malmer, DO, 100 Units at 05/26/19 0851    sodium chloride flush 0.9 % injection 10 mL, 10 mL, Intravenous, Once, Kashmir Sanchez MD    Objective:    BP (!) 173/89   Pulse 86   Temp 97.4 °F (36.3 °C) (Temporal)   Resp 16   Ht 5' 10\" (1.778 m)   Wt 132 lb 14.4 oz (60.3 kg)   SpO2 94%   BMI 19.07 kg/m²     Heart:  Reg  Lungs:  clear with reduced ventilation   Abd: bowel sounds present, nontender, non distended  Extrem:  No clubbing, cyanosis, but there is edema    CBC with Differential:    Lab Results   Component Value Date    WBC 5.8 05/26/2019    RBC 3.17 05/26/2019    HGB 8.6 05/26/2019    HCT 28.6 05/26/2019     05/26/2019    MCV 90.2 05/26/2019    MCH 27.1 05/26/2019    MCHC 30.1 05/26/2019    RDW 15.5 05/26/2019    NRBC 0.0 03/29/2019    SEGSPCT 68 01/11/2013    LYMPHOPCT 19.6 05/26/2019    MONOPCT 6.9 05/26/2019    MYELOPCT 0.9 01/06/2019    BASOPCT 0.5 05/26/2019    MONOSABS 0.40 05/26/2019    LYMPHSABS 1.14 05/26/2019    EOSABS 0.31 05/26/2019    BASOSABS 0.03 05/26/2019     CMP:    Lab Results   Component Value Date     05/26/2019    K 4.0 05/26/2019     05/26/2019    CO2 25 05/26/2019    BUN 10 05/26/2019    CREATININE 1.1 05/26/2019    GFRAA >60 05/26/2019    LABGLOM >60 05/26/2019    GLUCOSE 121 05/26/2019    PROT 6.7 05/26/2019    LABALBU 2.8 05/26/2019    CALCIUM 8.3 05/26/2019    BILITOT 0.4 05/26/2019    ALKPHOS 87 05/26/2019    AST 10 05/26/2019    ALT 8 05/26/2019     Warfarin PT/INR:    Lab Results   Component Value Date    INR 1.2 05/22/2019    INR 1.1 05/06/2019    INR 1.0 05/04/2019    PROTIME 14.2 (H) 05/22/2019    PROTIME 12.3 05/06/2019 PROTIME 11.6 05/04/2019       Assessment:    Principal Problem:    Bilateral pulmonary embolism (HCC)  Active Problems:    HTN (hypertension), benign    Pancreatic adenocarcinoma (HCC)    Biliary obstruction    Type 2 diabetes mellitus, with long-term current use of insulin (HCC)  Resolved Problems:    * No resolved hospital problems. *      Plan:  Reviewed all consultant notes and plans  Stop insulin now hypoglycemia    ONCOLOGY:  Continue Lovenox therapeutic for pilar PE. Systemic palliative chemo (Gemcitabine/Abraxane) for metastatic adenocarcinoma of pancreatic origin is recommended if his ECOG performance status allows. F/up with Dr. Karmen Pena after discharge from the hospital.    PULMONARY IMPRESSION:   S/p Acute hypoxic respiratory failure currently on 2-3 liters but can be weaned down. Bilateral PE needs lifelong enoxaparin injections. echo: mild LVSD, EF 45 % mildly elevated RVSP 40. Bidirectional PFO    leg us : L DVT +  Metastatic pancreatic cancer per oncology. ID Assessment and Plan:   1.  Candida parapsilosis fungemia- treated completed antifungals on 5/16/2019  2.  History of autoimmune pancreatitis with biliary obstruction. 3.  History of recently treated Enterococcus avium, complicated  bacteremia with six weeks of ampicillin plus ceftriaxone ending on  04/30/2019, also had polymicrobial bacteremia including Klebsiella  pneumoniae, E. coli as well as Clostridium perfringens.   4 pancreatic adenocarcinoma  5 pulmonary embolism                      Marya Wong  10:13 AM  5/26/2019

## 2019-05-27 LAB
METER GLUCOSE: 114 MG/DL (ref 74–99)
METER GLUCOSE: 117 MG/DL (ref 74–99)
METER GLUCOSE: 239 MG/DL (ref 74–99)

## 2019-05-27 PROCEDURE — 6360000002 HC RX W HCPCS: Performed by: INTERNAL MEDICINE

## 2019-05-27 PROCEDURE — 94660 CPAP INITIATION&MGMT: CPT

## 2019-05-27 PROCEDURE — 6370000000 HC RX 637 (ALT 250 FOR IP): Performed by: INTERNAL MEDICINE

## 2019-05-27 PROCEDURE — 2580000003 HC RX 258: Performed by: INTERNAL MEDICINE

## 2019-05-27 PROCEDURE — 82962 GLUCOSE BLOOD TEST: CPT

## 2019-05-27 PROCEDURE — 99233 SBSQ HOSP IP/OBS HIGH 50: CPT | Performed by: INTERNAL MEDICINE

## 2019-05-27 PROCEDURE — 2140000000 HC CCU INTERMEDIATE R&B

## 2019-05-27 RX ADMIN — TAMSULOSIN HYDROCHLORIDE 0.4 MG: 0.4 CAPSULE ORAL at 08:10

## 2019-05-27 RX ADMIN — HEPARIN 100 UNITS: 100 SYRINGE at 21:18

## 2019-05-27 RX ADMIN — HEPARIN 100 UNITS: 100 SYRINGE at 08:10

## 2019-05-27 RX ADMIN — LEVETIRACETAM 750 MG: 250 TABLET, FILM COATED ORAL at 21:18

## 2019-05-27 RX ADMIN — ENOXAPARIN SODIUM 60 MG: 60 INJECTION SUBCUTANEOUS at 13:21

## 2019-05-27 RX ADMIN — ENOXAPARIN SODIUM 60 MG: 60 INJECTION SUBCUTANEOUS at 01:50

## 2019-05-27 RX ADMIN — PIPERACILLIN AND TAZOBACTAM 3.38 G: 3; .375 INJECTION, POWDER, FOR SOLUTION INTRAVENOUS at 17:07

## 2019-05-27 RX ADMIN — PIPERACILLIN AND TAZOBACTAM 3.38 G: 3; .375 INJECTION, POWDER, FOR SOLUTION INTRAVENOUS at 01:49

## 2019-05-27 RX ADMIN — Medication 10 ML: at 21:17

## 2019-05-27 RX ADMIN — PANTOPRAZOLE SODIUM 40 MG: 40 TABLET, DELAYED RELEASE ORAL at 07:02

## 2019-05-27 RX ADMIN — PIPERACILLIN AND TAZOBACTAM 3.38 G: 3; .375 INJECTION, POWDER, FOR SOLUTION INTRAVENOUS at 08:11

## 2019-05-27 RX ADMIN — LEVETIRACETAM 750 MG: 250 TABLET, FILM COATED ORAL at 08:10

## 2019-05-27 RX ADMIN — AMLODIPINE BESYLATE 5 MG: 5 TABLET ORAL at 08:10

## 2019-05-27 RX ADMIN — PRAVASTATIN SODIUM 80 MG: 20 TABLET ORAL at 21:18

## 2019-05-27 RX ADMIN — Medication 10 ML: at 08:10

## 2019-05-27 RX ADMIN — LISINOPRIL 10 MG: 10 TABLET ORAL at 08:10

## 2019-05-27 ASSESSMENT — PAIN SCALES - GENERAL
PAINLEVEL_OUTOF10: 0

## 2019-05-27 NOTE — PLAN OF CARE
Problem: Falls - Risk of:  Goal: Will remain free from falls  Description  Will remain free from falls  Outcome: Met This Shift     Problem: Risk for Impaired Skin Integrity  Goal: Tissue integrity - skin and mucous membranes  Description  Structural intactness and normal physiological function of skin and  mucous membranes.   Outcome: Met This Shift     Problem: Gas Exchange - Impaired:  Goal: Levels of oxygenation will improve  Description  Levels of oxygenation will improve  Outcome: Met This Shift

## 2019-05-27 NOTE — PROGRESS NOTES
Hematology/Oncology Inpatient F/up:    Subjective:  Patient seen and examined, he is feeling better, no shortness of breath., afebrile overnight. Physical Exam:  BP (!) 152/83   Pulse 84   Temp 97.4 °F (36.3 °C) (Temporal)   Resp 16   Ht 5' 10\" (1.778 m)   Wt 132 lb 14.4 oz (60.3 kg)   SpO2 95%   BMI 19.07 kg/m²   GENERAL:alert, oriented x3, not in acute process. HEENT: PERRLA; EOMI. Oropharynx dry  NECK: Supple. Without lymphadenopathy. LUNGS: Good air entry bilaterally. CARDIOVASCULAR: RRR. ABDOMEN: Soft. Non-tender, non-distended. Positive bowel sounds. EXTREMITIES: Without clubbing, cyanosis, or edema. NEUROLOGIC: No focal deficits. ECOG PS 1-2     Impression/Plan:  75 y/o male with pancreatic lesion and Biliary obstruction-IGG elevated; was on prednisone for ? autoimmune pancreatitis per [facility] GI/hepatology recommendations. Non-compliant with  appointments. 3 prior attempts (2 EUS and aborted whipple; no path showing invasive adenoca). Recent candidemia w/ biliary stent; on Eraxis, also on Vanc/Zosyn, ID following. ZOË negative for vegetations. New masses in liver on triphasic; CEA 2.4, CA 15-4 5786; CT guided liver abscess drainage on 5/6, no organism seen on GS. CT guided liver mass biopsy proved metastatic adenocarcinoma likely of pancreatic origin. Multiple attempts to have him see us in clinic. Patient non-compliant. He was found unresponsive at his nursing home. Per EMS nursing staff saw him sitting up and not responding in the nursing home and began doing CPR for 2 minutes and the patient did become responsive. CTA chest showed extensive occlusive thrombus in the right and left main pulmonary arteries extending to upper and lower lobes consistent with bilateral pulmonary embolism with cardiac strain/septal deviation. CT abdomen/pelvis Pneumobilia with bare-metal stent in place and severe dilation of the pancreatic parenchyma.  CT head No significant interval change in the appearance of the brain. No acute process. Continue Lovenox therapeutic for pilar PE. Continue ATB per ID team, on Zosyn until 5/30/2019. Systemic palliative chemo (Gemcitabine/Abraxane) for metastatic adenocarcinoma of pancreatic origin is recommended if his ECOG performance status allows. F/up with Dr. Desiree Rosado after discharge from the hospital.  Will continue to follow.     Thank you for allowing us to participate in the care of Mr. Damaris Lugo.     Bony Jones MD   HEMATOLOGY/MEDICAL ONCOLOGY  [address] [place] [address] Rd MED ONCOLOGY  [address] [place] [id number]  Dept: [phone]

## 2019-05-27 NOTE — PROGRESS NOTES
% solution 12.5 g  12.5 g Intravenous PRN Susan Garza MD        glucagon (rDNA) injection 1 mg  1 mg Intramuscular PRN Susan Garza MD        dextrose 5 % solution  100 mL/hr Intravenous PRN Susan Garza MD        piperacillin-tazobactam (ZOSYN) 3.375 g in dextrose 5 % 100 mL IVPB extended infusion (mini-bag)  3.375 g Intravenous Q8H Paige Donohue, APRN - NP   Stopped at 05/27/19 1322    enoxaparin (LOVENOX) injection 60 mg  1 mg/kg Subcutaneous Q12H Susan Garza MD   60 mg at 05/27/19 1321    heparin flush 100 UNIT/ML injection 100 Units  100 Units Intracatheter BID Gleda Ronnie Jordan DO   100 Units at 05/27/19 9860    sodium chloride flush 0.9 % injection 10 mL  10 mL Intravenous Once Susan Garza MD             REVIEW OF SYSTEMS:        CONSTITUTIONAL:Denies fever and chills   HEENT: denies blurring of vision or double vision, denies hearing problem  RESPIRATORY: Denies SOB    CARDIOVASCULAR:  Denies palpitation  GASTROINTESTINAL:  Denies abdomen pain, drain tube in place   GENITOURINARY:  Denies dysuria   INTEGUMENT: denies wound , rash  HEMATOLOGIC/LYMPHATIC:  Denies lymph node swelling, gum bleeding or easy bruising. MUSCULOSKELETAL:  Denies leg pain , joint pain , joint swelling  NEUROLOGICAL: awake and alert           Objective:    Vitals:    05/27/19 1115   BP: (!) 152/83   Pulse: 84   Resp: 16   Temp: 97.4 °F (36.3 °C)   SpO2: 95%     GENERAL:  Awake and alert  HEENT:  Atraumatic and normocephalic.  PERRLA.  EOMI. RESPIRATORY:  Clear bilaterally   CARDIOVASCULAR:  Regular, no murmur . ABDOMEN:  Soft, nontender, and nondistended.  Bowel sounds present. Drain tube in place   EXTREMITIES:  No pedal edema.     Left basilic midline    Labs:  Recent Labs     05/25/19  0451 05/26/19  0554   WBC 5.3 5.8   RBC 2.93* 3.17*   HGB 7.9* 8.6*   HCT 26.5* 28.6*   MCV 90.4 90.2   MCH 27.0 27.1   MCHC 29.8* 30.1*   RDW 15.7* 15.5*    291   MPV 10.8 10.4     CMP:    Lab Results   Component Value Date     05/26/2019    K 4.0 05/26/2019     05/26/2019    CO2 25 05/26/2019    BUN 10 05/26/2019    CREATININE 1.1 05/26/2019    GFRAA >60 05/26/2019    LABGLOM >60 05/26/2019    GLUCOSE 121 05/26/2019    PROT 6.7 05/26/2019    LABALBU 2.8 05/26/2019    CALCIUM 8.3 05/26/2019    BILITOT 0.4 05/26/2019    ALKPHOS 87 05/26/2019    AST 10 05/26/2019    ALT 8 05/26/2019          Microbiology :  No results for input(s): BC in the last 72 hours. No results for input(s): Emmit Canelo in the last 72 hours. No results for input(s): LABURIN in the last 72 hours. No results for input(s): CULTRESP in the last 72 hours. No results for input(s): WNDABS in the last 72 hours. Radiology :  US DUP LOWER EXTREMITY LEFT DWAYNE   Final Result      Findings suggest positive study for nonocclusive deep vein thrombosis   involving proximal, mid, and distal segment of left superficial   femoral vein as well as left popliteal vein and tibioperoneal trunk. CT Head WO Contrast   Final Result      No significant interval change in the appearance of the brain. No   acute process. Advanced white matter changes and sequela of remote infarcts. CT ABDOMEN PELVIS WO CONTRAST Additional Contrast? None   Final Result      Severe distention of the urinary bladder with moderate left   hydronephrosis. Pneumobilia with bare-metal stent in place and severe dilation of the   pancreatic parenchyma. Moderate distention of the stomach with debris. Pigtail catheter present in the right hepatic parenchyma. No evidence   for recurrent fluid collection.   ==========               CTA CHEST W CONTRAST   Final Result   Extensive occlusive thrombus in the right and left main pulmonary   arteries extending to upper and lower lobes consistent with bilateral   pulmonary embolism with cardiac strain/septal deviation. Partial visualization of the pancreatic head mass with a biliary stent   and drain with pneumobilia.  Please correlate with the CT of the   abdomen and pelvis. The findings were discussed with the Dr. Carlos Zavaleta at 10:10 PM on   5/22/2019. ALERT:  CRITICAL RESULT            XR CHEST PORTABLE   Final Result      Lines, tubes, and devices:  None. Lungs and pleura:  No consolidation. No lung mass. No pleural   effusion. Cardiomediastinal silhouette:  Unchanged cardiomediastinal silhouette. Other:  Pigtail catheter in the right upper quadrant. URINARY CATHETER OUTPUT (Freed):  Urethral Catheter Non-latex 16 fr-Output (mL): 400 mL    Assessment and Plan:         1. Liver abscess s/p percutaneous drainage   2.  Hx of  Candida parapsilosis fungemia  (t completed antifungals on 5/16/2019) and E avium bacteremia , Klebsiella  pneumoniae, E. coli as well as Clostridium perfringens bacteremia   3.  History of autoimmune pancreatitis with biliary obstruction.     PLAN:       continue Zosyn 3.375 grams IV q 8 hrs  till  5/30/2019 via midline   Monitor labs       Electronically signed by Nayely Benjamin MD on 5/27/2019 at 2:57 PM

## 2019-05-27 NOTE — PROGRESS NOTES
05/04/2019       Assessment:    Principal Problem:    Bilateral pulmonary embolism (HCC)  Active Problems:    HTN (hypertension), benign    Pancreatic adenocarcinoma (HCC)    Biliary obstruction    Type 2 diabetes mellitus, with long-term current use of insulin (HCC)  Resolved Problems:    * No resolved hospital problems.  *      Plan:  Cont atb per id david Molina  9:32 AM  5/27/2019

## 2019-05-28 VITALS
BODY MASS INDEX: 19.03 KG/M2 | RESPIRATION RATE: 16 BRPM | HEIGHT: 70 IN | SYSTOLIC BLOOD PRESSURE: 133 MMHG | TEMPERATURE: 98.2 F | WEIGHT: 132.9 LBS | DIASTOLIC BLOOD PRESSURE: 71 MMHG | HEART RATE: 73 BPM | OXYGEN SATURATION: 98 %

## 2019-05-28 LAB
BLOOD CULTURE, ROUTINE: NORMAL
CULTURE, BLOOD 2: NORMAL
METER GLUCOSE: 121 MG/DL (ref 74–99)
METER GLUCOSE: 207 MG/DL (ref 74–99)
PLATELET # BLD: 307 E9/L (ref 130–450)

## 2019-05-28 PROCEDURE — 97110 THERAPEUTIC EXERCISES: CPT

## 2019-05-28 PROCEDURE — 2580000003 HC RX 258: Performed by: INTERNAL MEDICINE

## 2019-05-28 PROCEDURE — 6370000000 HC RX 637 (ALT 250 FOR IP): Performed by: INTERNAL MEDICINE

## 2019-05-28 PROCEDURE — 97530 THERAPEUTIC ACTIVITIES: CPT

## 2019-05-28 PROCEDURE — 82962 GLUCOSE BLOOD TEST: CPT

## 2019-05-28 PROCEDURE — 6360000002 HC RX W HCPCS: Performed by: INTERNAL MEDICINE

## 2019-05-28 PROCEDURE — 94660 CPAP INITIATION&MGMT: CPT

## 2019-05-28 PROCEDURE — 99233 SBSQ HOSP IP/OBS HIGH 50: CPT | Performed by: INTERNAL MEDICINE

## 2019-05-28 PROCEDURE — 97535 SELF CARE MNGMENT TRAINING: CPT

## 2019-05-28 PROCEDURE — 36415 COLL VENOUS BLD VENIPUNCTURE: CPT

## 2019-05-28 PROCEDURE — 85049 AUTOMATED PLATELET COUNT: CPT

## 2019-05-28 RX ORDER — LISINOPRIL 20 MG/1
20 TABLET ORAL DAILY
Qty: 30 TABLET | Refills: 3
Start: 2019-05-29

## 2019-05-28 RX ORDER — LISINOPRIL 20 MG/1
20 TABLET ORAL DAILY
Status: DISCONTINUED | OUTPATIENT
Start: 2019-05-28 | End: 2019-05-28 | Stop reason: HOSPADM

## 2019-05-28 RX ORDER — PIPERACILLIN SODIUM, TAZOBACTAM SODIUM 3; .375 G/15ML; G/15ML
3.38 INJECTION, POWDER, LYOPHILIZED, FOR SOLUTION INTRAVENOUS EVERY 8 HOURS
Qty: 20.25 G | Refills: 0 | DISCHARGE
Start: 2019-05-28 | End: 2019-05-30

## 2019-05-28 RX ORDER — ACETAMINOPHEN 325 MG/1
650 TABLET ORAL EVERY 4 HOURS PRN
Qty: 120 TABLET | Refills: 3
Start: 2019-05-28

## 2019-05-28 RX ADMIN — ENOXAPARIN SODIUM 60 MG: 60 INJECTION SUBCUTANEOUS at 01:01

## 2019-05-28 RX ADMIN — PIPERACILLIN AND TAZOBACTAM 3.38 G: 3; .375 INJECTION, POWDER, FOR SOLUTION INTRAVENOUS at 01:00

## 2019-05-28 RX ADMIN — PIPERACILLIN AND TAZOBACTAM 3.38 G: 3; .375 INJECTION, POWDER, FOR SOLUTION INTRAVENOUS at 08:36

## 2019-05-28 RX ADMIN — AMLODIPINE BESYLATE 5 MG: 5 TABLET ORAL at 08:36

## 2019-05-28 RX ADMIN — Medication 10 ML: at 08:36

## 2019-05-28 RX ADMIN — TAMSULOSIN HYDROCHLORIDE 0.4 MG: 0.4 CAPSULE ORAL at 12:26

## 2019-05-28 RX ADMIN — LEVETIRACETAM 750 MG: 250 TABLET, FILM COATED ORAL at 08:36

## 2019-05-28 RX ADMIN — LISINOPRIL 20 MG: 10 TABLET ORAL at 08:36

## 2019-05-28 RX ADMIN — PANTOPRAZOLE SODIUM 40 MG: 40 TABLET, DELAYED RELEASE ORAL at 08:36

## 2019-05-28 ASSESSMENT — PAIN SCALES - GENERAL
PAINLEVEL_OUTOF10: 0

## 2019-05-28 NOTE — PROGRESS NOTES
[facility]  Physical Therapy  Rx. Note     Name: Cipriano Lozada  : 1950  MRN: [id number]    Date of Service: 2019    Evaluating Therapist: Eileen Wilson PT, DPT  [id number]    Room #: 1428/1201-I  DIAGNOSIS: Bilateral PE  PRECAUTIONS: Falls, O2, R abdominal drain    PMH: pancreatic Ca, R parietal CVA, HTN, Syncope, Cystoscopy with stent insertion, ERCP stent insertion, EGD, Ex Lap open cholecystectiomy      Social:  Pt was admitted from SNF. Pt reports that he was not active with therapy services. Pt reports using a WC for mobility. Initial Evaluation  Date:  Treatment  Short Term/ Long Term   Goals   Was pt agreeable to Eval/treatment? Yes  yes    Does pt have pain? no no    Bed Mobility  Rolling: SBA  Supine to sit: Min A  Sit to supine: Min A  Scooting: Min A     Rolling sba  Supine to sit min  Sit to supine nt  Scooting min SBA   Transfers Sit to stand: Min A  Stand to sit: Mod A  Stand pivot: Mod A using [employer] Sit to stand min  Stand to sit min  Stand pivot ww min SBA   Ambulation   3 feet using WW with Min A 75 and 25 feet ww min assist   >50 feet using WW with SBA   Stair negotiation:  NT NT    ROM RLE: WFL  LLE: WFL     Strength RLE: 4-/5  LLE: 4-/5     Balance   Sitting: min A EOB   Standing: Min A using [employer] Sitting eob sba  Standing ww min    AM-PAC Basic Mobility Inpatient Short Form Raw Score:        Pt is alert and oriented x 3  BLE Strength is grossly weakness L LE  Balance: min with ww standing dynamic    Pt performed therapeutic exercise of the following: laq, ankle pumps, small hip flexion        Patient education  Pt was educated on transfers    Patient response to education:   Pt verbalized understanding Pt demonstrated skill Pt requires further education in this area   x x x     Additional Comments: pt getting bed bath upon arrival.   Pt has L LE weakness , nbos and left occasional drag with gait as well as unsteady .    Pt requires cues for hand placement and cues

## 2019-05-28 NOTE — PROGRESS NOTES
ID Progress Note                [address] [facility] [address], [place] [address] [facility]            Phone [phone]     Fax [phone]      Chief complaint : polymicrobial bacteremia ,liver abscess     Pt is awake and alert   Denies fever and chills  Denies abdomen pain      Afebrile         Current Facility-Administered Medications   Medication Dose Route Frequency Provider Last Rate Last Dose    lisinopril (PRINIVIL;ZESTRIL) tablet 20 mg  20 mg Oral Daily Hammond eJd Malmer, DO   20 mg at 05/28/19 0836    amLODIPine (NORVASC) tablet 5 mg  5 mg Oral Daily Sandy White MD   5 mg at 05/28/19 0836    sodium chloride flush 0.9 % injection 10 mL  10 mL Intravenous 2 times per day Kwame Maradiaga MD   10 mL at 05/28/19 0836    sodium chloride flush 0.9 % injection 10 mL  10 mL Intravenous PRN Kwame Maradiaga MD   10 mL at 05/25/19 1644    potassium chloride (KLOR-CON M) extended release tablet 40 mEq  40 mEq Oral PRN Kwame Maradiaga MD        Or    potassium bicarb-citric acid (EFFER-K) effervescent tablet 40 mEq  40 mEq Oral PRN Kwame Maradiaga MD        Or    potassium chloride 10 mEq/100 mL IVPB (Peripheral Line)  10 mEq Intravenous PRN Kwame Maradiaga MD        magnesium sulfate 1 g in dextrose 5% 100 mL IVPB  1 g Intravenous PRN Kwame Maradiaga MD        magnesium hydroxide (MILK OF MAGNESIA) 400 MG/5ML suspension 30 mL  30 mL Oral Daily PRN Kwame Maradiaga MD        acetaminophen (TYLENOL) tablet 650 mg  650 mg Oral Q4H PRN Kwame Maradiaga MD        levETIRAcetam (KEPPRA) tablet 750 mg  750 mg Oral BID Kwame Maradiaga MD   750 mg at 05/28/19 0836    pantoprazole (PROTONIX) tablet 40 mg  40 mg Oral QAM AC Kwame Maradiaga MD   40 mg at 05/28/19 0836    pravastatin (PRAVACHOL) tablet 80 mg  80 mg Oral Nightly Kwame Maradiaga MD   80 mg at 05/27/19 2118    tamsulosin (FLOMAX) capsule 0.4 mg  0.4 mg Oral Daily Kwame Maradiaga MD   0.4 mg at 05/27/19 0810    glucose (GLUTOSE) 40 % oral gel 15 g  15 g Oral PRN Kwame Maradiaga MD   15 g at 05/25/19 9519    dextrose 50 % solution 12.5 g  12.5 g Intravenous PRN Vianca Ledesma MD        glucagon (rDNA) injection 1 mg  1 mg Intramuscular PRN Vianca Ledesma MD        dextrose 5 % solution  100 mL/hr Intravenous PRN Vianca Ledesma MD        piperacillin-tazobactam (ZOSYN) 3.375 g in dextrose 5 % 100 mL IVPB extended infusion (mini-bag)  3.375 g Intravenous Q8H Christy Pall, APRN - NP 25 mL/hr at 05/28/19 0836 3.375 g at 05/28/19 0836    enoxaparin (LOVENOX) injection 60 mg  1 mg/kg Subcutaneous Q12H Vianca Ledesma MD   60 mg at 05/28/19 0101    heparin flush 100 UNIT/ML injection 100 Units  100 Units Intracatheter BID Salo Jordan DO   100 Units at 05/27/19 2118    sodium chloride flush 0.9 % injection 10 mL  10 mL Intravenous Once Vianca Ledesma MD             REVIEW OF SYSTEMS:        CONSTITUTIONAL:Denies fever and chills   HEENT: denies blurring of vision or double vision, denies hearing problem  RESPIRATORY: Denies SOB    CARDIOVASCULAR:  Denies palpitation  GASTROINTESTINAL:  Denies abdomen pain, drain tube in place   GENITOURINARY:  Denies dysuria   INTEGUMENT: denies wound , rash  HEMATOLOGIC/LYMPHATIC:  Denies lymph node swelling, gum bleeding or easy bruising. MUSCULOSKELETAL:  Denies leg pain , joint pain , joint swelling  NEUROLOGICAL: awake and alert           Objective:    Vitals:    05/28/19 0730   BP: 133/71   Pulse: 73   Resp: 16   Temp: 98.2 °F (36.8 °C)   SpO2: 98%     GENERAL:  Awake and alert  HEENT:  Atraumatic and normocephalic.  PERRLA.  EOMI. RESPIRATORY:  Clear bilaterally   CARDIOVASCULAR:  Regular, no murmur . ABDOMEN:  Soft, nontender, and nondistended.  Bowel sounds present. Drain tube in place   EXTREMITIES:  No pedal edema.     Left basilic midline    Labs:  Recent Labs     05/26/19  0554 05/28/19  0526   WBC 5.8  --    RBC 3.17*  --    HGB 8.6*  --    HCT 28.6*  --    MCV 90.2  --    MCH 27.1  --    MCHC 30.1*  --    RDW 15.5*  --     307   MPV 10.4  --      CMP:    Lab Results Component Value Date     05/26/2019    K 4.0 05/26/2019     05/26/2019    CO2 25 05/26/2019    BUN 10 05/26/2019    CREATININE 1.1 05/26/2019    GFRAA >60 05/26/2019    LABGLOM >60 05/26/2019    GLUCOSE 121 05/26/2019    PROT 6.7 05/26/2019    LABALBU 2.8 05/26/2019    CALCIUM 8.3 05/26/2019    BILITOT 0.4 05/26/2019    ALKPHOS 87 05/26/2019    AST 10 05/26/2019    ALT 8 05/26/2019          Microbiology :  No results for input(s): BC in the last 72 hours. No results for input(s): Ronold Peat in the last 72 hours. No results for input(s): LABURIN in the last 72 hours. No results for input(s): CULTRESP in the last 72 hours. No results for input(s): WNDABS in the last 72 hours. Radiology :  US DUP LOWER EXTREMITY LEFT DWAYNE   Final Result      Findings suggest positive study for nonocclusive deep vein thrombosis   involving proximal, mid, and distal segment of left superficial   femoral vein as well as left popliteal vein and tibioperoneal trunk. CT Head WO Contrast   Final Result      No significant interval change in the appearance of the brain. No   acute process. Advanced white matter changes and sequela of remote infarcts. CT ABDOMEN PELVIS WO CONTRAST Additional Contrast? None   Final Result      Severe distention of the urinary bladder with moderate left   hydronephrosis. Pneumobilia with bare-metal stent in place and severe dilation of the   pancreatic parenchyma. Moderate distention of the stomach with debris. Pigtail catheter present in the right hepatic parenchyma. No evidence   for recurrent fluid collection.   ==========               CTA CHEST W CONTRAST   Final Result   Extensive occlusive thrombus in the right and left main pulmonary   arteries extending to upper and lower lobes consistent with bilateral   pulmonary embolism with cardiac strain/septal deviation.       Partial visualization of the pancreatic head mass with a biliary stent and drain with pneumobilia. Please correlate with the CT of the   abdomen and pelvis. The findings were discussed with the Dr. Lj Klein at 10:10 PM on   5/22/2019. ALERT:  CRITICAL RESULT            XR CHEST PORTABLE   Final Result      Lines, tubes, and devices:  None. Lungs and pleura:  No consolidation. No lung mass. No pleural   effusion. Cardiomediastinal silhouette:  Unchanged cardiomediastinal silhouette. Other:  Pigtail catheter in the right upper quadrant. URINARY CATHETER OUTPUT (Freed):  [REMOVED] Urethral Catheter Non-latex 16 fr-Output (mL): 275 mL    Assessment and Plan:         1. Liver abscess s/p percutaneous drainage   2.  Hx of  Candida parapsilosis fungemia  (t completed antifungals on 5/16/2019) and E avium bacteremia , Klebsiella  pneumoniae, E. coli as well as Clostridium perfringens bacteremia   3.  History of autoimmune pancreatitis with biliary obstruction.     PLAN:       continue Zosyn 3.375 grams IV q 8 hrs  till  5/30/2019 via midline   Monitor labs   Med rec done  D/C midline when IV abx are completed      Electronically signed by Bernadine Agosto MD on 5/28/2019 at 10:02 AM

## 2019-05-28 NOTE — CARE COORDINATION
SOCIAL WORK/CASEMANAGEMENT TRANSITION OF CARE PLANNING: met with pt this a.m. The plan is back to [facility]. precert pending since Friday to return. Called for updated PT and OT notes. Per ID, continue Zosyn 3.375 grams IV q 8 hrs  till  5/30/2019 via midline which is in.  Wojciech Rodriguez  5/28/2019

## 2019-05-28 NOTE — PROGRESS NOTES
Nurse to nurse called to Jet Lala at [facility] ([phone]). Discharge paperwork along with med record included in discharge folder.        Janis Caraballo RN  5/28/2019  4:00 PM

## 2019-05-28 NOTE — PROGRESS NOTES
[facility] Medicine    Subjective:  Pt alert conversive in no distress      Current Facility-Administered Medications:     lisinopril (PRINIVIL;ZESTRIL) tablet 20 mg, 20 mg, Oral, Daily, Jasmine Jordan DO    amLODIPine (NORVASC) tablet 5 mg, 5 mg, Oral, Daily, Santo Kitchen MD, 5 mg at 05/27/19 0810    sodium chloride flush 0.9 % injection 10 mL, 10 mL, Intravenous, 2 times per day, Susi Moody MD, 10 mL at 05/27/19 2117    sodium chloride flush 0.9 % injection 10 mL, 10 mL, Intravenous, PRN, Susi Moody MD, 10 mL at 05/25/19 1649    potassium chloride (KLOR-CON M) extended release tablet 40 mEq, 40 mEq, Oral, PRN **OR** potassium bicarb-citric acid (EFFER-K) effervescent tablet 40 mEq, 40 mEq, Oral, PRN **OR** potassium chloride 10 mEq/100 mL IVPB (Peripheral Line), 10 mEq, Intravenous, PRN, Susi Moody MD    magnesium sulfate 1 g in dextrose 5% 100 mL IVPB, 1 g, Intravenous, PRN, Susi Moody MD    magnesium hydroxide (MILK OF MAGNESIA) 400 MG/5ML suspension 30 mL, 30 mL, Oral, Daily PRN, Susi Moody MD    acetaminophen (TYLENOL) tablet 650 mg, 650 mg, Oral, Q4H PRN, Susi Moody MD    levETIRAcetam (KEPPRA) tablet 750 mg, 750 mg, Oral, BID, Susi Moody MD, 750 mg at 05/27/19 2118    pantoprazole (PROTONIX) tablet 40 mg, 40 mg, Oral, QAM AC, Susi Moody MD, 40 mg at 05/27/19 0702    pravastatin (PRAVACHOL) tablet 80 mg, 80 mg, Oral, Nightly, Susi Moody MD, 80 mg at 05/27/19 2118    tamsulosin (FLOMAX) capsule 0.4 mg, 0.4 mg, Oral, Daily, Susi Moody MD, 0.4 mg at 05/27/19 0810    glucose (GLUTOSE) 40 % oral gel 15 g, 15 g, Oral, PRN, Susi Moody MD, 15 g at 05/25/19 0640    dextrose 50 % solution 12.5 g, 12.5 g, Intravenous, PRNSusi MD    glucagon (rDNA) injection 1 mg, 1 mg, Intramuscular, PRNSusi MD    dextrose 5 % solution, 100 mL/hr, Intravenous, PRNSusi MD    piperacillin-tazobactam (ZOSYN) 3.375 g in dextrose 5 % 100 mL IVPB extended infusion (mini-bag), 3.375 g, Intravenous, Q8H, Xochilt Chavez APRN - NP, Stopped at 05/28/19 0548    enoxaparin (LOVENOX) injection 60 mg, 1 mg/kg, Subcutaneous, Q12H, Jarad Palmer MD, 60 mg at 05/28/19 0101    heparin flush 100 UNIT/ML injection 100 Units, 100 Units, Intracatheter, BID, Nicole Jordan DO, 100 Units at 05/27/19 2118    sodium chloride flush 0.9 % injection 10 mL, 10 mL, Intravenous, Once, Jarad Palmer MD    Objective:    /71   Pulse 73   Temp 98.2 °F (36.8 °C) (Temporal)   Resp 16   Ht 5' 10\" (1.778 m)   Wt 132 lb 14.4 oz (60.3 kg)   SpO2 98%   BMI 19.07 kg/m²     Heart:  Reg  Lungs:  CTA bilaterally, no wheeze, rales or rhonchi  Abd: bowel sounds present, nontender, nondistended, no masses  Extrem:  No clubbing, cyanosis, or edema    CBC with Differential:    Lab Results   Component Value Date    WBC 5.8 05/26/2019    RBC 3.17 05/26/2019    HGB 8.6 05/26/2019    HCT 28.6 05/26/2019     05/28/2019    MCV 90.2 05/26/2019    MCH 27.1 05/26/2019    MCHC 30.1 05/26/2019    RDW 15.5 05/26/2019    NRBC 0.0 03/29/2019    SEGSPCT 68 01/11/2013    LYMPHOPCT 19.6 05/26/2019    MONOPCT 6.9 05/26/2019    MYELOPCT 0.9 01/06/2019    BASOPCT 0.5 05/26/2019    MONOSABS 0.40 05/26/2019    LYMPHSABS 1.14 05/26/2019    EOSABS 0.31 05/26/2019    BASOSABS 0.03 05/26/2019     CMP:    Lab Results   Component Value Date     05/26/2019    K 4.0 05/26/2019     05/26/2019    CO2 25 05/26/2019    BUN 10 05/26/2019    CREATININE 1.1 05/26/2019    GFRAA >60 05/26/2019    LABGLOM >60 05/26/2019    GLUCOSE 121 05/26/2019    PROT 6.7 05/26/2019    LABALBU 2.8 05/26/2019    CALCIUM 8.3 05/26/2019    BILITOT 0.4 05/26/2019    ALKPHOS 87 05/26/2019    AST 10 05/26/2019    ALT 8 05/26/2019     Warfarin PT/INR:    Lab Results   Component Value Date    INR 1.2 05/22/2019    INR 1.1 05/06/2019    INR 1.0 05/04/2019    PROTIME 14.2 (H) 05/22/2019    PROTIME 12.3 05/06/2019    PROTIME 11.6 05/04/2019       Assessment:    Principal

## 2019-05-28 NOTE — CARE COORDINATION
SOCIAL WORK/CASEMANAGEMENT TRANSITION OF CARE PLANNING: met with pt this a.m. The plan is back to [facility]. precert pending since Friday to return. Called for updated PT and OT notes. Per ID, continue Zosyn 3.375 grams IV q 8 hrs  till  5/30/2019 via midline which is in. Negro Ryder  4/35/6675    precert obtained for pt to return to [facility]. Facility has [facility] ambulette to  pt around 4 p.m. Snf;loc. Pt is in agreement.  Negro Ryder  5/28/2019

## 2019-05-28 NOTE — PLAN OF CARE
Problem: Falls - Risk of:  Goal: Will remain free from falls  Description  Will remain free from falls  Outcome: Met This Shift     Problem: Gas Exchange - Impaired:  Goal: Levels of oxygenation will improve  Description  Levels of oxygenation will improve  Outcome: Met This Shift     Problem: Risk for Impaired Skin Integrity  Goal: Tissue integrity - skin and mucous membranes  Description  Structural intactness and normal physiological function of skin and  mucous membranes.   Outcome: Met This Shift

## 2019-05-28 NOTE — PROGRESS NOTES
OT BEDSIDE TREATMENT NOTE      Date:2019  Patient Name: Denise Sánchez  MRN: [id number]  : 1950  Room: [address] [place] [address]     Per OT Eval:    Evaluating OT:  TERESA Downing, OTR/L  # [id number]     AM-PAC Daily Activity Raw Score:  15/24  Recommended Adaptive Equipment:  TBD      Reason for Admission:  Pt was admitted after being found unresponsive in SNF. CPR Initiated      Diagnosis:  Bilateral Pulmonary Embolism      Pertinent Medical History:  Metastatic Pancreatic CA, CVA, HTN, Cystoscopy insertion/removal stent/stone w/ Stent insertion  19, ERCP stent insertion 3-25-19, PICC 3/2019, Whipple Procedure 2019     Precautions:  Falls, Biliary Drainage Catheter, PICC L UE, Tele-sitter, Fluid Restrictions     Home Living: Pt was transferred from a SNF. Bathroom setup:  [employer], high toilet, grab bars throughout   Equipment owned:  [facility]     Prior Level of Function:  Per pt report, he was IND with ADLs, Transfers and Mobility using FWW for ambulation.  Stated he was Not participating in therapy at the facility  Driving:  No  Occupation:  Retired [employer]      Pain Level:  Denied any pain     Cognition: A & O x 3, generally oriented x 3, able to recall year, place & city, reporting April re-oriented pt to current Month & date, pleasant & cooperative               Memory: Fair              Sequencing: Fair              Problem solving: Fair              Judgement/safety:  Fair                Functional Assessment:    Initial Eval Status  Date: 19 Treatment Status  Date:  19 Short Term Goals  Treatment frequency: PRN 2-4 x/week   Feeding SUP/Set up     Required Min A to set up tray  Mod Indep  Seated in chair     Grooming SUP/Set up     Able to complete simple grooming tasks while seated EOB, unable to ambulate to/stand at sink for task d/t limited endurance/hypoxia  Supervision  Seated in chair, per pt request (fatiuged from mobility)  Brushing his teeth SUP  Standing At The Sink UB Dressing Min A/Set up     Required Min A to don bathrobe while seated EOB, Min VCs to problem solve task  Min A  Doff & martha gown, assist needed around IV line & around back  SUP   LB Dressing Min A/Set up     Required SUP for safety to don/doff socks seated EOB bending to floor level for task  Min A for standing balance and Clothing adjustment over hips to don pants(Simulated) Mod A  Rowe & donning pants assist needed with china SBA-Min A  Doff & martha socks, instructed pt to use cross over technique to limit bending   SUP   Bathing NT       UB:  Min A  Washing his back   LB: Min A  Posterior hygiene   Sponge bathing tasks  SUP   Toileting NT     Freed Catheter  Min A  Commode transfer   Mild incontinence noted of BM assist with thorough hygiene  SUP   Bed Mobility  Rolling:  SUP  Repositioning:  SUP/Min VCs   Supine to Sit:  SUP/Min VCs    Sit to Supine:  SUP/Min VCs         Min A  Supine to sit  IND   Functional Transfers Sit to stand:  Min A  Stand to sit:  Close SUP       Required Min A/Pt ed for safety/hand placement to stand from EOB Min A  Cues for hand placement, safety & technique with Fair- recall    SUP   Functional Mobility Min A w/ FWW     ~ 4' Fwd/back + 6' side-stepping along EOB, limited by lines  Min A  Using walker, short housdhold distance, slight L lean noted due to weakness in L LE & occasional L foot drag noted, narrow base of support  SUP   Balance Sitting:  Good - SUP     Static:  SUP at EOB    Dynamic:  SUP at EOB w/ LB dressing Task     Standing:  Fair (+)/Good (-)     Static:  CGA/Close SUP w/ FWW    Dynamic:  Min A w/ FWW Sitting: Static: Good, Supervision    Dynamic: CGA with LB dressing  Standing: Min A  Using walker, unsteady L LE weakness     Activity Tolerance Tolerated Sitting:  EOB ~ 20 mins w/ ax  Tolerated Standing:  ~ 5 mins - see vitals above Fair+  With moderate tasks, mild fatigue after mobility       Visual/  Perceptual WFL  Glasses:  Yes - not present      Hearing WFL  Hearing Aids  No        Education:  Pt was educated through out treatment regarding proper technique & safety with bed mobility, functional transfers & mobility, ADL compensatory strategies due to weakness in L LE to ease tasks to improve safety & prevent falls and allow pt to return home safely. Educated pt on B UE therapeutic exercises while seated in chair able to tolerate 5 reps in 4 planes to increase strength & ease ADL tasks & transfers. Comments: Upon arrival pt was in bed, beginning with sponge bathing tasks & agreeable for therapy. At end of session pt was seated in bedside chair, TSM present, all lines and tubes intact, call light within reach. Nsg aware pt is up in chair & level of assist. PTA informed nsg to order walker for pt to use while at hospital.     · Pt has made Good progress towards set goals. · Continue with current plan of care    Time in: 10:15am  Time out: 10:55am  Total Tx Time: 40 minutes    Catalino Marin.  [facility], [address] [place] [address]

## 2019-05-29 ENCOUNTER — CARE COORDINATION (OUTPATIENT)
Dept: CARE COORDINATION | Age: 69
End: 2019-05-29

## 2019-05-29 NOTE — CARE COORDINATION
[facility] Update Call    2019    Patient: Shabana Veronica Patient : 1950   MRN: [id number]  Reason for Admission: [facility] Problem:  Bilateral Pulmonary Embolism  Discharge Date: 19 RARS: Readmission Risk Score: 34    Spoke with Phillipl states patient is at the facility. Left voice message for Efrain Mehta, Director of Social Service requested she please return RNTCC's call this week. Provided contact number. Would like progress update on the following:   Nursing reports  Therapy progress   -ADL's   -Bed Mobility   -Transfers   -Ambulation, if so how many feet, if using an assistive device  Discharge plans or needs  Barriers to discharge    RN TCC's next tentative progress update call 19. Efrain Mehta, Director of Social Service returned the call, left a voice message with the following update:    \"Patient continues to be skilled. Has a follow up appointment with [facility] Oncology Department tomorrow, to know what is going on. Family not sure of actual diagnosis or what the referral for Palliative Chemotherapy is about\". No anticipated discharge date at this time.      Care Transitions Post Acute Facility Update    Care Transitions Interventions  Post Acute Facility:  [facility]  Post Acute Facility Update

## 2019-06-06 ENCOUNTER — OFFICE VISIT (OUTPATIENT)
Dept: ONCOLOGY | Age: 69
End: 2019-06-06
Payer: MEDICARE

## 2019-06-06 ENCOUNTER — HOSPITAL ENCOUNTER (OUTPATIENT)
Dept: INFUSION THERAPY | Age: 69
Discharge: HOME | End: 2019-06-06
Payer: MEDICARE

## 2019-06-06 VITALS
WEIGHT: 118.1 LBS | DIASTOLIC BLOOD PRESSURE: 84 MMHG | HEART RATE: 95 BPM | SYSTOLIC BLOOD PRESSURE: 145 MMHG | HEIGHT: 70 IN | TEMPERATURE: 97.6 F | BODY MASS INDEX: 16.91 KG/M2

## 2019-06-06 VITALS
SYSTOLIC BLOOD PRESSURE: 159 MMHG | HEART RATE: 79 BPM | RESPIRATION RATE: 18 BRPM | DIASTOLIC BLOOD PRESSURE: 83 MMHG | TEMPERATURE: 98.3 F

## 2019-06-06 DIAGNOSIS — T45.1X5A CHEMOTHERAPY INDUCED NAUSEA AND VOMITING: ICD-10-CM

## 2019-06-06 DIAGNOSIS — K86.89 PANCREATIC MASS: ICD-10-CM

## 2019-06-06 DIAGNOSIS — C24.1 AMPULLARY CARCINOMA (HCC): ICD-10-CM

## 2019-06-06 DIAGNOSIS — R97.8 OTHER ABNORMAL TUMOR MARKERS: Primary | ICD-10-CM

## 2019-06-06 DIAGNOSIS — K86.89 PANCREATIC MASS: Primary | ICD-10-CM

## 2019-06-06 DIAGNOSIS — R11.2 CHEMOTHERAPY INDUCED NAUSEA AND VOMITING: ICD-10-CM

## 2019-06-06 DIAGNOSIS — C25.9 PANCREATIC ADENOCARCINOMA (HCC): Primary | Chronic | ICD-10-CM

## 2019-06-06 DIAGNOSIS — R97.8 OTHER ABNORMAL TUMOR MARKERS: ICD-10-CM

## 2019-06-06 DIAGNOSIS — C25.9 PANCREATIC ADENOCARCINOMA (HCC): ICD-10-CM

## 2019-06-06 LAB
ALBUMIN SERPL-MCNC: 3.7 G/DL (ref 3.5–5.2)
ALP BLD-CCNC: 97 U/L (ref 40–129)
ALT SERPL-CCNC: 8 U/L (ref 0–40)
ANION GAP SERPL CALCULATED.3IONS-SCNC: 9 MMOL/L (ref 7–16)
AST SERPL-CCNC: 10 U/L (ref 0–39)
BASOPHILS ABSOLUTE: 0.05 E9/L (ref 0–0.2)
BASOPHILS RELATIVE PERCENT: 0.5 % (ref 0–2)
BILIRUB SERPL-MCNC: 0.5 MG/DL (ref 0–1.2)
BUN BLDV-MCNC: 14 MG/DL (ref 8–23)
CALCIUM SERPL-MCNC: 9.2 MG/DL (ref 8.6–10.2)
CHLORIDE BLD-SCNC: 101 MMOL/L (ref 98–107)
CO2: 26 MMOL/L (ref 22–29)
CREAT SERPL-MCNC: 1.2 MG/DL (ref 0.7–1.2)
EOSINOPHILS ABSOLUTE: 0.42 E9/L (ref 0.05–0.5)
EOSINOPHILS RELATIVE PERCENT: 3.8 % (ref 0–6)
GFR AFRICAN AMERICAN: >60
GFR NON-AFRICAN AMERICAN: >60 ML/MIN/1.73
GLUCOSE BLD-MCNC: 145 MG/DL (ref 74–99)
HCT VFR BLD CALC: 32.8 % (ref 37–54)
HEMOGLOBIN: 9.9 G/DL (ref 12.5–16.5)
IMMATURE GRANULOCYTES #: 0.07 E9/L
IMMATURE GRANULOCYTES %: 0.6 % (ref 0–5)
LYMPHOCYTES ABSOLUTE: 1.26 E9/L (ref 1.5–4)
LYMPHOCYTES RELATIVE PERCENT: 11.5 % (ref 20–42)
MCH RBC QN AUTO: 28 PG (ref 26–35)
MCHC RBC AUTO-ENTMCNC: 30.2 % (ref 32–34.5)
MCV RBC AUTO: 92.7 FL (ref 80–99.9)
MONOCYTES ABSOLUTE: 0.48 E9/L (ref 0.1–0.95)
MONOCYTES RELATIVE PERCENT: 4.4 % (ref 2–12)
NEUTROPHILS ABSOLUTE: 8.69 E9/L (ref 1.8–7.3)
NEUTROPHILS RELATIVE PERCENT: 79.2 % (ref 43–80)
PDW BLD-RTO: 15.6 FL (ref 11.5–15)
PLATELET # BLD: 271 E9/L (ref 130–450)
PMV BLD AUTO: 10.1 FL (ref 7–12)
POTASSIUM SERPL-SCNC: 4.1 MMOL/L (ref 3.5–5)
RBC # BLD: 3.54 E12/L (ref 3.8–5.8)
SODIUM BLD-SCNC: 136 MMOL/L (ref 132–146)
TOTAL PROTEIN: 8 G/DL (ref 6.4–8.3)
WBC # BLD: 11 E9/L (ref 4.5–11.5)

## 2019-06-06 PROCEDURE — 2580000003 HC RX 258: Performed by: INTERNAL MEDICINE

## 2019-06-06 PROCEDURE — 96417 CHEMO IV INFUS EACH ADDL SEQ: CPT

## 2019-06-06 PROCEDURE — 96375 TX/PRO/DX INJ NEW DRUG ADDON: CPT

## 2019-06-06 PROCEDURE — 96366 THER/PROPH/DIAG IV INF ADDON: CPT

## 2019-06-06 PROCEDURE — 6360000002 HC RX W HCPCS: Performed by: INTERNAL MEDICINE

## 2019-06-06 PROCEDURE — 86301 IMMUNOASSAY TUMOR CA 19-9: CPT

## 2019-06-06 PROCEDURE — 99215 OFFICE O/P EST HI 40 MIN: CPT | Performed by: INTERNAL MEDICINE

## 2019-06-06 PROCEDURE — 85025 COMPLETE CBC W/AUTO DIFF WBC: CPT

## 2019-06-06 PROCEDURE — 80053 COMPREHEN METABOLIC PANEL: CPT

## 2019-06-06 PROCEDURE — 96413 CHEMO IV INFUSION 1 HR: CPT

## 2019-06-06 RX ORDER — PACLITAXEL 100 MG/20ML
200 INJECTION, POWDER, LYOPHILIZED, FOR SUSPENSION INTRAVENOUS ONCE
Status: COMPLETED | OUTPATIENT
Start: 2019-06-06 | End: 2019-06-06

## 2019-06-06 RX ORDER — DEXAMETHASONE SODIUM PHOSPHATE 10 MG/ML
10 INJECTION INTRAMUSCULAR; INTRAVENOUS ONCE
Status: COMPLETED | OUTPATIENT
Start: 2019-06-06 | End: 2019-06-06

## 2019-06-06 RX ORDER — SODIUM CHLORIDE 0.9 % (FLUSH) 0.9 %
5 SYRINGE (ML) INJECTION PRN
Status: CANCELLED | OUTPATIENT
Start: 2019-06-06

## 2019-06-06 RX ORDER — SODIUM CHLORIDE 9 MG/ML
INJECTION, SOLUTION INTRAVENOUS CONTINUOUS
Status: CANCELLED | OUTPATIENT
Start: 2019-06-06

## 2019-06-06 RX ORDER — SODIUM CHLORIDE 0.9 % (FLUSH) 0.9 %
5 SYRINGE (ML) INJECTION PRN
Status: CANCELLED | OUTPATIENT
Start: 2019-06-20

## 2019-06-06 RX ORDER — SODIUM CHLORIDE 0.9 % (FLUSH) 0.9 %
10 SYRINGE (ML) INJECTION PRN
Status: CANCELLED | OUTPATIENT
Start: 2019-06-26

## 2019-06-06 RX ORDER — SODIUM CHLORIDE 0.9 % (FLUSH) 0.9 %
10 SYRINGE (ML) INJECTION PRN
Status: CANCELLED | OUTPATIENT
Start: 2019-06-06

## 2019-06-06 RX ORDER — SODIUM CHLORIDE 9 MG/ML
20 INJECTION, SOLUTION INTRAVENOUS ONCE
Status: CANCELLED | OUTPATIENT
Start: 2019-06-26

## 2019-06-06 RX ORDER — DEXAMETHASONE SODIUM PHOSPHATE 10 MG/ML
10 INJECTION, SOLUTION INTRAMUSCULAR; INTRAVENOUS ONCE
Status: CANCELLED | OUTPATIENT
Start: 2019-06-06

## 2019-06-06 RX ORDER — PACLITAXEL 100 MG/20ML
200 INJECTION, POWDER, LYOPHILIZED, FOR SUSPENSION INTRAVENOUS ONCE
Status: CANCELLED | OUTPATIENT
Start: 2019-06-06

## 2019-06-06 RX ORDER — PROCHLORPERAZINE MALEATE 10 MG
10 TABLET ORAL EVERY 6 HOURS PRN
Qty: 120 TABLET | Refills: 1 | OUTPATIENT
Start: 2019-06-06 | End: 2019-06-17

## 2019-06-06 RX ORDER — MEPERIDINE HYDROCHLORIDE 25 MG/ML
12.5 INJECTION INTRAMUSCULAR; INTRAVENOUS; SUBCUTANEOUS ONCE
Status: CANCELLED | OUTPATIENT
Start: 2019-06-06

## 2019-06-06 RX ORDER — MEPERIDINE HYDROCHLORIDE 25 MG/ML
12.5 INJECTION INTRAMUSCULAR; INTRAVENOUS; SUBCUTANEOUS ONCE
Status: CANCELLED | OUTPATIENT
Start: 2019-06-20

## 2019-06-06 RX ORDER — SODIUM CHLORIDE 9 MG/ML
INJECTION, SOLUTION INTRAVENOUS CONTINUOUS
Status: CANCELLED | OUTPATIENT
Start: 2019-06-20

## 2019-06-06 RX ORDER — 0.9 % SODIUM CHLORIDE 0.9 %
10 VIAL (ML) INJECTION ONCE
Status: CANCELLED | OUTPATIENT
Start: 2019-06-20

## 2019-06-06 RX ORDER — DEXAMETHASONE SODIUM PHOSPHATE 10 MG/ML
10 INJECTION, SOLUTION INTRAMUSCULAR; INTRAVENOUS ONCE
Status: CANCELLED | OUTPATIENT
Start: 2019-06-13

## 2019-06-06 RX ORDER — PACLITAXEL 100 MG/20ML
200 INJECTION, POWDER, LYOPHILIZED, FOR SUSPENSION INTRAVENOUS ONCE
Status: CANCELLED | OUTPATIENT
Start: 2019-06-13

## 2019-06-06 RX ORDER — PALONOSETRON 0.05 MG/ML
0.25 INJECTION, SOLUTION INTRAVENOUS ONCE
Status: CANCELLED | OUTPATIENT
Start: 2019-06-26

## 2019-06-06 RX ORDER — MEPERIDINE HYDROCHLORIDE 25 MG/ML
12.5 INJECTION INTRAMUSCULAR; INTRAVENOUS; SUBCUTANEOUS ONCE
Status: CANCELLED | OUTPATIENT
Start: 2019-06-26

## 2019-06-06 RX ORDER — METHYLPREDNISOLONE SODIUM SUCCINATE 125 MG/2ML
125 INJECTION, POWDER, LYOPHILIZED, FOR SOLUTION INTRAMUSCULAR; INTRAVENOUS ONCE
Status: CANCELLED | OUTPATIENT
Start: 2019-06-20

## 2019-06-06 RX ORDER — SODIUM CHLORIDE 0.9 % (FLUSH) 0.9 %
5 SYRINGE (ML) INJECTION PRN
Status: CANCELLED | OUTPATIENT
Start: 2019-06-26

## 2019-06-06 RX ORDER — HEPARIN SODIUM (PORCINE) LOCK FLUSH IV SOLN 100 UNIT/ML 100 UNIT/ML
500 SOLUTION INTRAVENOUS PRN
Status: CANCELLED | OUTPATIENT
Start: 2019-06-26

## 2019-06-06 RX ORDER — SODIUM CHLORIDE 0.9 % (FLUSH) 0.9 %
10 SYRINGE (ML) INJECTION PRN
Status: DISCONTINUED | OUTPATIENT
Start: 2019-06-06 | End: 2019-06-07 | Stop reason: HOSPADM

## 2019-06-06 RX ORDER — METHYLPREDNISOLONE SODIUM SUCCINATE 125 MG/2ML
125 INJECTION, POWDER, LYOPHILIZED, FOR SOLUTION INTRAMUSCULAR; INTRAVENOUS ONCE
Status: CANCELLED | OUTPATIENT
Start: 2019-06-06

## 2019-06-06 RX ORDER — PACLITAXEL 100 MG/20ML
200 INJECTION, POWDER, LYOPHILIZED, FOR SUSPENSION INTRAVENOUS ONCE
Status: CANCELLED | OUTPATIENT
Start: 2019-06-26

## 2019-06-06 RX ORDER — METHYLPREDNISOLONE SODIUM SUCCINATE 125 MG/2ML
125 INJECTION, POWDER, LYOPHILIZED, FOR SOLUTION INTRAMUSCULAR; INTRAVENOUS ONCE
Status: CANCELLED | OUTPATIENT
Start: 2019-06-26

## 2019-06-06 RX ORDER — 0.9 % SODIUM CHLORIDE 0.9 %
10 VIAL (ML) INJECTION ONCE
Status: CANCELLED | OUTPATIENT
Start: 2019-06-26

## 2019-06-06 RX ORDER — DEXAMETHASONE SODIUM PHOSPHATE 10 MG/ML
10 INJECTION, SOLUTION INTRAMUSCULAR; INTRAVENOUS ONCE
Status: CANCELLED | OUTPATIENT
Start: 2019-06-26

## 2019-06-06 RX ORDER — DIPHENHYDRAMINE HYDROCHLORIDE 50 MG/ML
50 INJECTION INTRAMUSCULAR; INTRAVENOUS ONCE
Status: CANCELLED | OUTPATIENT
Start: 2019-06-26

## 2019-06-06 RX ORDER — HEPARIN SODIUM (PORCINE) LOCK FLUSH IV SOLN 100 UNIT/ML 100 UNIT/ML
500 SOLUTION INTRAVENOUS PRN
Status: CANCELLED | OUTPATIENT
Start: 2019-06-06

## 2019-06-06 RX ORDER — PALONOSETRON 0.05 MG/ML
0.25 INJECTION, SOLUTION INTRAVENOUS ONCE
Status: CANCELLED | OUTPATIENT
Start: 2019-06-06

## 2019-06-06 RX ORDER — EPINEPHRINE 1 MG/ML
0.3 INJECTION, SOLUTION, CONCENTRATE INTRAVENOUS PRN
Status: CANCELLED | OUTPATIENT
Start: 2019-06-26

## 2019-06-06 RX ORDER — PALONOSETRON 0.05 MG/ML
0.25 INJECTION, SOLUTION INTRAVENOUS ONCE
Status: CANCELLED | OUTPATIENT
Start: 2019-06-13

## 2019-06-06 RX ORDER — SODIUM CHLORIDE 9 MG/ML
INJECTION, SOLUTION INTRAVENOUS CONTINUOUS
Status: CANCELLED | OUTPATIENT
Start: 2019-06-26

## 2019-06-06 RX ORDER — 0.9 % SODIUM CHLORIDE 0.9 %
10 VIAL (ML) INJECTION ONCE
Status: CANCELLED | OUTPATIENT
Start: 2019-06-06

## 2019-06-06 RX ORDER — ONDANSETRON 4 MG/1
4 TABLET, ORALLY DISINTEGRATING ORAL EVERY 8 HOURS PRN
Qty: 60 TABLET | Refills: 1 | OUTPATIENT
Start: 2019-06-06 | End: 2019-06-17

## 2019-06-06 RX ORDER — SODIUM CHLORIDE 0.9 % (FLUSH) 0.9 %
10 SYRINGE (ML) INJECTION PRN
Status: CANCELLED | OUTPATIENT
Start: 2019-06-13

## 2019-06-06 RX ORDER — HEPARIN SODIUM (PORCINE) LOCK FLUSH IV SOLN 100 UNIT/ML 100 UNIT/ML
500 SOLUTION INTRAVENOUS PRN
Status: CANCELLED | OUTPATIENT
Start: 2019-06-20

## 2019-06-06 RX ORDER — EPINEPHRINE 1 MG/ML
0.3 INJECTION, SOLUTION, CONCENTRATE INTRAVENOUS PRN
Status: CANCELLED | OUTPATIENT
Start: 2019-06-06

## 2019-06-06 RX ORDER — EPINEPHRINE 1 MG/ML
0.3 INJECTION, SOLUTION, CONCENTRATE INTRAVENOUS PRN
Status: CANCELLED | OUTPATIENT
Start: 2019-06-20

## 2019-06-06 RX ORDER — DIPHENHYDRAMINE HYDROCHLORIDE 50 MG/ML
50 INJECTION INTRAMUSCULAR; INTRAVENOUS ONCE
Status: CANCELLED | OUTPATIENT
Start: 2019-06-20

## 2019-06-06 RX ORDER — SODIUM CHLORIDE 9 MG/ML
250 INJECTION, SOLUTION INTRAVENOUS ONCE
Status: DISCONTINUED | OUTPATIENT
Start: 2019-06-06 | End: 2019-06-07 | Stop reason: HOSPADM

## 2019-06-06 RX ORDER — DIPHENHYDRAMINE HYDROCHLORIDE 50 MG/ML
50 INJECTION INTRAMUSCULAR; INTRAVENOUS ONCE
Status: CANCELLED | OUTPATIENT
Start: 2019-06-06

## 2019-06-06 RX ORDER — SODIUM CHLORIDE 9 MG/ML
20 INJECTION, SOLUTION INTRAVENOUS ONCE
Status: CANCELLED | OUTPATIENT
Start: 2019-06-06

## 2019-06-06 RX ORDER — PALONOSETRON HYDROCHLORIDE 0.05 MG/ML
0.25 INJECTION, SOLUTION INTRAVENOUS ONCE
Status: COMPLETED | OUTPATIENT
Start: 2019-06-06 | End: 2019-06-06

## 2019-06-06 RX ORDER — SODIUM CHLORIDE 9 MG/ML
20 INJECTION, SOLUTION INTRAVENOUS ONCE
Status: CANCELLED | OUTPATIENT
Start: 2019-06-13

## 2019-06-06 RX ADMIN — PACLITAXEL 200 MG: 100 INJECTION, POWDER, LYOPHILIZED, FOR SUSPENSION INTRAVENOUS at 13:38

## 2019-06-06 RX ADMIN — SODIUM CHLORIDE 150 MG: 9 INJECTION, SOLUTION INTRAVENOUS at 12:58

## 2019-06-06 RX ADMIN — SODIUM CHLORIDE 250 ML: 9 INJECTION, SOLUTION INTRAVENOUS at 12:50

## 2019-06-06 RX ADMIN — PALONOSETRON 0.25 MG: 0.25 INJECTION, SOLUTION INTRAVENOUS at 12:51

## 2019-06-06 RX ADMIN — DEXAMETHASONE SODIUM PHOSPHATE 10 MG: 10 INJECTION INTRAMUSCULAR; INTRAVENOUS at 12:52

## 2019-06-06 RX ADMIN — GEMCITABINE 1634 MG: 38 INJECTION, SOLUTION INTRAVENOUS at 14:37

## 2019-06-06 NOTE — PROGRESS NOTES
Patient discharged in stable condition with family. Discharge instructions/handouts given. Patient verbalizes understanding. Extra discharge instruction handout given for the staff at the [facility] where patient is currently staying.

## 2019-06-06 NOTE — PROGRESS NOTES
Ok to proceed with chemotherapy today with labs from 5/26 per [facility] Jaime RN, with instruction from Dr. Eleanora Denver.

## 2019-06-06 NOTE — PROGRESS NOTES
Department of [facility] Oncology  Attending Clinic Note    Reason for Visit: Follow-up on a patient with Pancreatic and liver lesions    PCP:  Emi Garay DO    History of Present Illness:  77 yo male with pancreatic lesion and Biliary obstruction-IGG elevated; was on prednisone for ? autoimmune pancreatitis per [facility] GI/hepatology recommendations. Non-compliant with  appointments. 3 prior attempts (2 EUS and aborted whipple; no path showing invasive adenoca). Recent candidemia w/ biliary stent; on Eraxis, also on Vanc/Zosyn, ID following. ZOË negative for vegetations. New masses in liver on triphasic; CEA 2.4, CA 33-5 7499; CT guided liver abscess drainage on 5/6, no organism seen on GS. CT guided liver mass biopsy proved metastatic adenocarcinoma likely of pancreatic origin. Multiple attempts to have him see us in clinic. Patient was non-compliant. He was found unresponsive at his nursing home. Per EMS nursing staff saw him sitting up and not responding in the nursing home and began doing CPR for 2 minutes and the patient did become responsive. CTA chest showed extensive occlusive thrombus in the right and left main pulmonary arteries extending to upper and lower lobes consistent with bilateral pulmonary embolism with cardiac strain/septal deviation. CT abdomen/pelvis Pneumobilia with bare-metal stent in place and severe dilation of the pancreatic parenchyma. CT head No significant interval change in the appearance of the brain. No acute process. Lovenox therapeutic for pilar PE. Review of Systems;  CONSTITUTIONAL: No fever, chills. Fair appetite. Fatigue  ENMT: Eyes: No diplopia; Nose: No epistaxis. Mouth: No sore throat. RESPIRATORY: No hemoptysis, shortness of breath, cough. CARDIOVASCULAR: No chest pain, palpitations. GASTROINTESTINAL: No nausea/vomiting. Abdominal pain controlled. GENITOURINARY: No dysuria, urinary frequency, hematuria.   NEURO: No syncope, presyncope, headache. Remainder:  ROS NEGATIVE    Past Medical History:      Diagnosis Date    Cancer [facility])     pancreatic    Cerebral artery occlusion with cerebral infarction ([facility] [address]     Hypertension     Syncope      Medications:  Reviewed and reconciled. Allergies:  No Known Allergies    Physical Exam:  BP (!) 145/84 (Site: Right Upper Arm, Position: Sitting, Cuff Size: Medium Adult)   Pulse 95   Temp 97.6 °F (36.4 °C) (Temporal)   Ht 5' 10\" (1.778 m)   Wt 118 lb 1.6 oz (53.6 kg)   BMI 16.95 kg/m²   GENERAL: Alert, oriented x 3, tired. HEENT: PERRLA; EOMI. Oropharynx clear. NECK: Supple. Without lymphadenopathy. LUNGS: Good air entry bilaterally. No wheezing, crackles or ronchi. CARDIOVASCULAR: Regular rate. No murmurs, rubs or gallops. ABDOMEN: Soft. Non-tender, non-distended. Positive bowel sounds. EXTREMITIES: Without clubbing, cyanosis, or edema. NEUROLOGIC: No focal deficits. ECOG PS 1-2    Impression/Plan:  75 yo male with pancreatic lesion and Biliary obstruction-IGG elevated; was on prednisone for ? autoimmune pancreatitis per [facility] GI/hepatology recommendations. Non-compliant with  appointments. 3 prior attempts (2 EUS and aborted whipple; no path showing invasive adenoca). Recent candidemia w/ biliary stent; on Eraxis, also on Vanc/Zosyn, ID following. OZË negative for vegetations. New masses in liver on triphasic; CEA 2.4, CA 94-1 1441;   CT guided liver abscess drainage on 5/6, no organism on GS. CT guided liver mass biopsy proved metastatic adenocarcinoma likely of pancreatic origin. Multiple attempts to have him see us in clinic. Patient was non-compliant. He was found unresponsive at his nursing home. Per EMS nursing staff saw him sitting up and not responding in the nursing home and began doing CPR for 2 minutes and the patient did become responsive.   CTA chest showed extensive occlusive thrombus in the right and left main pulmonary arteries extending to upper and lower lobes

## 2019-06-08 LAB — CA 19-9: 1299 U/ML (ref 0–37)

## 2019-06-11 ENCOUNTER — TELEPHONE (OUTPATIENT)
Dept: ADMINISTRATIVE | Age: 69
End: 2019-06-11

## 2019-06-11 NOTE — TELEPHONE ENCOUNTER
Mis Hancock from [facility] called to cancel pt's appt for today, pt is a resident at the nursing home and is being followed by in house

## 2019-06-13 ENCOUNTER — CARE COORDINATION (OUTPATIENT)
Dept: CARE COORDINATION | Age: 69
End: 2019-06-13

## 2019-06-13 ENCOUNTER — TELEPHONE (OUTPATIENT)
Dept: INFUSION THERAPY | Age: 69
End: 2019-06-13

## 2019-06-13 ENCOUNTER — OFFICE VISIT (OUTPATIENT)
Dept: ONCOLOGY | Age: 69
End: 2019-06-13
Payer: MEDICARE

## 2019-06-13 ENCOUNTER — TELEPHONE (OUTPATIENT)
Dept: ONCOLOGY | Age: 69
End: 2019-06-13

## 2019-06-13 ENCOUNTER — HOSPITAL ENCOUNTER (OUTPATIENT)
Dept: INFUSION THERAPY | Age: 69
Discharge: HOME | End: 2019-06-13
Payer: MEDICARE

## 2019-06-13 VITALS
HEART RATE: 91 BPM | SYSTOLIC BLOOD PRESSURE: 118 MMHG | WEIGHT: 118 LBS | DIASTOLIC BLOOD PRESSURE: 64 MMHG | BODY MASS INDEX: 16.89 KG/M2 | HEIGHT: 70 IN | TEMPERATURE: 98.3 F

## 2019-06-13 DIAGNOSIS — Z09 FOLLOW UP: Primary | ICD-10-CM

## 2019-06-13 DIAGNOSIS — C25.9 PANCREATIC ADENOCARCINOMA (HCC): ICD-10-CM

## 2019-06-13 DIAGNOSIS — K86.89 PANCREATIC MASS: Primary | ICD-10-CM

## 2019-06-13 DIAGNOSIS — K86.89 PANCREATIC MASS: ICD-10-CM

## 2019-06-13 DIAGNOSIS — R97.8 OTHER ABNORMAL TUMOR MARKERS: Primary | ICD-10-CM

## 2019-06-13 LAB
ALBUMIN SERPL-MCNC: 3.2 G/DL (ref 3.5–5.2)
ALP BLD-CCNC: 99 U/L (ref 40–129)
ALT SERPL-CCNC: 15 U/L (ref 0–40)
ANION GAP SERPL CALCULATED.3IONS-SCNC: 9 MMOL/L (ref 7–16)
AST SERPL-CCNC: 23 U/L (ref 0–39)
BASOPHILS ABSOLUTE: 0 E9/L (ref 0–0.2)
BASOPHILS RELATIVE PERCENT: 0.2 % (ref 0–2)
BILIRUB SERPL-MCNC: 0.3 MG/DL (ref 0–1.2)
BUN BLDV-MCNC: 7 MG/DL (ref 8–23)
BURR CELLS: ABNORMAL
CALCIUM SERPL-MCNC: 8.9 MG/DL (ref 8.6–10.2)
CHLORIDE BLD-SCNC: 101 MMOL/L (ref 98–107)
CO2: 23 MMOL/L (ref 22–29)
CREAT SERPL-MCNC: 0.9 MG/DL (ref 0.7–1.2)
EOSINOPHILS ABSOLUTE: 0.38 E9/L (ref 0.05–0.5)
EOSINOPHILS RELATIVE PERCENT: 4.3 % (ref 0–6)
GFR AFRICAN AMERICAN: >60
GFR NON-AFRICAN AMERICAN: >60 ML/MIN/1.73
GLUCOSE BLD-MCNC: 311 MG/DL (ref 74–99)
HCT VFR BLD CALC: 31.8 % (ref 37–54)
HEMOGLOBIN: 9.7 G/DL (ref 12.5–16.5)
HYPOCHROMIA: ABNORMAL
LYMPHOCYTES ABSOLUTE: 0.97 E9/L (ref 1.5–4)
LYMPHOCYTES RELATIVE PERCENT: 11.3 % (ref 20–42)
MCH RBC QN AUTO: 28.2 PG (ref 26–35)
MCHC RBC AUTO-ENTMCNC: 30.5 % (ref 32–34.5)
MCV RBC AUTO: 92.4 FL (ref 80–99.9)
MONOCYTES ABSOLUTE: 0 E9/L (ref 0.1–0.95)
MONOCYTES RELATIVE PERCENT: 3.3 % (ref 2–12)
NEUTROPHILS ABSOLUTE: 7.39 E9/L (ref 1.8–7.3)
NEUTROPHILS RELATIVE PERCENT: 84.3 % (ref 43–80)
OVALOCYTES: ABNORMAL
PDW BLD-RTO: 14.9 FL (ref 11.5–15)
PLATELET # BLD: 112 E9/L (ref 130–450)
PMV BLD AUTO: 11.6 FL (ref 7–12)
POIKILOCYTES: ABNORMAL
POLYCHROMASIA: ABNORMAL
POTASSIUM SERPL-SCNC: 4.9 MMOL/L (ref 3.5–5)
RBC # BLD: 3.44 E12/L (ref 3.8–5.8)
SCHISTOCYTES: ABNORMAL
SODIUM BLD-SCNC: 133 MMOL/L (ref 132–146)
TOTAL PROTEIN: 7.5 G/DL (ref 6.4–8.3)
WBC # BLD: 8.8 E9/L (ref 4.5–11.5)

## 2019-06-13 PROCEDURE — 36415 COLL VENOUS BLD VENIPUNCTURE: CPT

## 2019-06-13 PROCEDURE — 6360000002 HC RX W HCPCS: Performed by: INTERNAL MEDICINE

## 2019-06-13 PROCEDURE — 2580000003 HC RX 258

## 2019-06-13 PROCEDURE — 80053 COMPREHEN METABOLIC PANEL: CPT

## 2019-06-13 PROCEDURE — 85025 COMPLETE CBC W/AUTO DIFF WBC: CPT

## 2019-06-13 PROCEDURE — 96365 THER/PROPH/DIAG IV INF INIT: CPT

## 2019-06-13 PROCEDURE — 96366 THER/PROPH/DIAG IV INF ADDON: CPT

## 2019-06-13 PROCEDURE — 96375 TX/PRO/DX INJ NEW DRUG ADDON: CPT

## 2019-06-13 PROCEDURE — 99214 OFFICE O/P EST MOD 30 MIN: CPT | Performed by: INTERNAL MEDICINE

## 2019-06-13 PROCEDURE — 2580000003 HC RX 258: Performed by: INTERNAL MEDICINE

## 2019-06-13 PROCEDURE — 86301 IMMUNOASSAY TUMOR CA 19-9: CPT

## 2019-06-13 RX ORDER — SODIUM CHLORIDE 0.9 % (FLUSH) 0.9 %
10 SYRINGE (ML) INJECTION PRN
Status: CANCELLED | OUTPATIENT
Start: 2019-06-20

## 2019-06-13 RX ORDER — SODIUM CHLORIDE 9 MG/ML
250 INJECTION, SOLUTION INTRAVENOUS ONCE
Status: CANCELLED | OUTPATIENT
Start: 2019-06-20

## 2019-06-13 RX ORDER — HEPARIN SODIUM (PORCINE) LOCK FLUSH IV SOLN 100 UNIT/ML 100 UNIT/ML
500 SOLUTION INTRAVENOUS PRN
Status: CANCELLED | OUTPATIENT
Start: 2019-06-13

## 2019-06-13 RX ORDER — PALONOSETRON HYDROCHLORIDE 0.05 MG/ML
0.25 INJECTION, SOLUTION INTRAVENOUS ONCE
Status: CANCELLED | OUTPATIENT
Start: 2019-06-20

## 2019-06-13 RX ORDER — SODIUM CHLORIDE 0.9 % (FLUSH) 0.9 %
10 SYRINGE (ML) INJECTION PRN
Status: CANCELLED | OUTPATIENT
Start: 2019-06-13

## 2019-06-13 RX ORDER — SODIUM CHLORIDE 0.9 % (FLUSH) 0.9 %
10 SYRINGE (ML) INJECTION PRN
Status: DISCONTINUED | OUTPATIENT
Start: 2019-06-13 | End: 2019-06-13

## 2019-06-13 RX ORDER — SODIUM CHLORIDE 9 MG/ML
INJECTION, SOLUTION INTRAVENOUS
Status: DISCONTINUED
Start: 2019-06-13 | End: 2019-06-14 | Stop reason: HOSPADM

## 2019-06-13 RX ORDER — PACLITAXEL 100 MG/20ML
200 INJECTION, POWDER, LYOPHILIZED, FOR SUSPENSION INTRAVENOUS ONCE
Status: DISCONTINUED | OUTPATIENT
Start: 2019-06-13 | End: 2019-06-13

## 2019-06-13 RX ORDER — SODIUM CHLORIDE 0.9 % (FLUSH) 0.9 %
20 SYRINGE (ML) INJECTION PRN
Status: CANCELLED | OUTPATIENT
Start: 2019-06-13

## 2019-06-13 RX ORDER — DEXAMETHASONE SODIUM PHOSPHATE 10 MG/ML
10 INJECTION INTRAMUSCULAR; INTRAVENOUS ONCE
Status: CANCELLED | OUTPATIENT
Start: 2019-06-20

## 2019-06-13 RX ORDER — PALONOSETRON HYDROCHLORIDE 0.05 MG/ML
0.25 INJECTION, SOLUTION INTRAVENOUS ONCE
Status: COMPLETED | OUTPATIENT
Start: 2019-06-13 | End: 2019-06-13

## 2019-06-13 RX ORDER — SODIUM CHLORIDE 9 MG/ML
250 INJECTION, SOLUTION INTRAVENOUS ONCE
Status: DISCONTINUED | OUTPATIENT
Start: 2019-06-13 | End: 2019-06-13

## 2019-06-13 RX ORDER — DEXAMETHASONE SODIUM PHOSPHATE 10 MG/ML
10 INJECTION INTRAMUSCULAR; INTRAVENOUS ONCE
Status: COMPLETED | OUTPATIENT
Start: 2019-06-13 | End: 2019-06-13

## 2019-06-13 RX ORDER — PACLITAXEL 100 MG/20ML
200 INJECTION, POWDER, LYOPHILIZED, FOR SUSPENSION INTRAVENOUS ONCE
Status: CANCELLED | OUTPATIENT
Start: 2019-06-20

## 2019-06-13 RX ADMIN — DEXAMETHASONE SODIUM PHOSPHATE 10 MG: 10 INJECTION INTRAMUSCULAR; INTRAVENOUS at 12:18

## 2019-06-13 RX ADMIN — SODIUM CHLORIDE 150 MG: 9 INJECTION, SOLUTION INTRAVENOUS at 12:25

## 2019-06-13 RX ADMIN — ALTEPLASE 2 MG: 2.2 INJECTION, POWDER, LYOPHILIZED, FOR SOLUTION INTRAVENOUS at 13:50

## 2019-06-13 RX ADMIN — SODIUM CHLORIDE 250 ML: 9 INJECTION, SOLUTION INTRAVENOUS at 12:14

## 2019-06-13 RX ADMIN — Medication 10 ML: at 12:18

## 2019-06-13 RX ADMIN — Medication 10 ML: at 10:53

## 2019-06-13 RX ADMIN — PALONOSETRON 0.25 MG: 0.25 INJECTION, SOLUTION INTRAVENOUS at 12:15

## 2019-06-13 NOTE — PROGRESS NOTES
Department of [facility]  Attending Clinic Note    Reason for Visit: Follow-up on a patient with Pancreatic Cancer     PCP:  Christiano Perkins DO    History of Present Illness:  77 yo male with pancreatic lesion and Biliary obstruction-IGG elevated; was on prednisone for ? autoimmune pancreatitis per [facility] GI/hepatology recommendations. Non-compliant with  appointments. 3 prior attempts (2 EUS and aborted whipple; no path showing invasive adenoca). Recent candidemia w/ biliary stent; on Eraxis, also on Vanc/Zosyn, ID following. ZOË negative for vegetations. New masses in liver on triphasic; CEA 2.4, CA 19-4 0003; CT guided liver abscess drainage on 5/6, no organism seen on GS. CT guided liver mass biopsy proved metastatic adenocarcinoma likely of pancreatic origin. Multiple attempts to have him see us in clinic. Patient was non-compliant. He was found unresponsive at his nursing home. Per EMS nursing staff saw him sitting up and not responding in the nursing home and began doing CPR for 2 minutes and the patient did become responsive. CTA chest showed extensive occlusive thrombus in the right and left main pulmonary arteries extending to upper and lower lobes consistent with bilateral pulmonary embolism with cardiac strain/septal deviation. CT abdomen/pelvis Pneumobilia with bare-metal stent in place and severe dilation of the pancreatic parenchyma. CT head No significant interval change in the appearance of the brain. No acute process. Lovenox therapeutic for pilar PE.   ECOG 1-2  Systemic palliative chemo (Gemcitabine/Abraxane) for metastatic adenocarcinoma of pancreatic origin was recommended. Side effects of Gemcitabine/Abraxane reviewed with patient. He agreed to proceed. Cycle # 1 Gemcitabine + Abraxane was on 06/06/2019. CA 19-9 1299 on 06/06/2019. Review of Systems;  CONSTITUTIONAL: No fever, chills. Fair appetite. Fatigue  ENMT: Eyes: No diplopia; Nose: No epistaxis.  Mouth: No sore throat. RESPIRATORY: No hemoptysis, shortness of breath, cough. CARDIOVASCULAR: No chest pain, palpitations. GASTROINTESTINAL: No nausea/vomiting. Abdominal pain controlled. GENITOURINARY: No dysuria, urinary frequency, hematuria. NEURO: No syncope, presyncope, headache. Remainder:  ROS NEGATIVE    Past Medical History:      Diagnosis Date    Cancer [facility])     pancreatic    Cerebral artery occlusion with cerebral infarction ([facility] [address]     Hypertension     Syncope      Medications:  Reviewed and reconciled. Allergies:  No Known Allergies    Physical Exam:  /64 (Site: Right Upper Arm, Position: Sitting, Cuff Size: Medium Adult)   Pulse 91   Temp 98.3 °F (36.8 °C) (Temporal)   Ht 5' 10\" (1.778 m)   Wt 118 lb (53.5 kg)   BMI 16.93 kg/m²   GENERAL: Alert, oriented x 3, tired. HEENT: PERRLA; EOMI. Oropharynx clear. NECK: Supple. Without lymphadenopathy. LUNGS: Good air entry bilaterally. No wheezing, crackles or ronchi. CARDIOVASCULAR: Regular rate. No murmurs, rubs or gallops. ABDOMEN: Soft. Non-tender, non-distended. Positive bowel sounds. EXTREMITIES: Without clubbing, cyanosis, or edema. NEUROLOGIC: No focal deficits. ECOG PS 1-2    Impression/Plan:  77 yo male with pancreatic lesion and Biliary obstruction-IGG elevated; was on prednisone for ? autoimmune pancreatitis per [facility] GI/hepatology recommendations. Non-compliant with  appointments. 3 prior attempts (2 EUS and aborted whipple; no path showing invasive adenoca). Recent candidemia w/ biliary stent; on Eraxis, also on Vanc/Zosyn, ID following. ZOË negative for vegetations. New masses in liver on triphasic; CEA 2.4, CA 74-7 1977;   CT guided liver abscess drainage on 5/6, no organism on GS. CT guided liver mass biopsy proved metastatic adenocarcinoma likely of pancreatic origin. Multiple attempts to have him see us in clinic. Patient was non-compliant. He was found unresponsive at his nursing home.  Per EMS nursing staff saw him sitting up and not responding in the nursing home and began doing CPR for 2 minutes and the patient did become responsive. CTA chest showed extensive occlusive thrombus in the right and left main pulmonary arteries extending to upper and lower lobes consistent with bilateral pulmonary embolism with cardiac strain/septal deviation. CT abdomen/pelvis Pneumobilia with bare-metal stent in place and severe dilation of the pancreatic parenchyma. CT head No significant interval change in the appearance of the brain. No acute process. Lovenox therapeutic for pilar PE.   ECOG 1-2  Systemic palliative chemo (Gemcitabine/Abraxane) for metastatic adenocarcinoma of pancreatic origin was recommended. Side effects of Gemcitabine/Abraxane reviewed with patient. He agreed to proceed. Cycle # 1 Gemcitabine + Abraxane was on 06/06/2019. CA 19-9 1299 on 06/06/2019. C1D8 Gemcitabine + Abraxane today 06/13/2019. RTC next week for C1D15 Gemcitabine + Abraxane.     Debbi Wiley MD   [id number]/[id number]  Board Certified Medical Oncologist

## 2019-06-13 NOTE — PROGRESS NOTES
Unable to obtain blood return from midline left arm. Attempted twice to start peripheral IV for treatment without success. Maggie Pollock RN notified.

## 2019-06-13 NOTE — PROGRESS NOTES
(1430) No blood return from midline after activase instilled per dr's orders. (0) Still no blood return noted. New transparent dressing applied to midline site due to previous dressing appeared dirty and it was already partially coming off. Per Giesll Rouse, patient will be having PICC Line inserted 6/13, and will be rescheduled for chemo. [phone]) Patient awaiting transportation back to [facility] facility. Patient denies any pain right forearm where IV previously infiltrated. No redness noted at site, minimal swelling noted. Right arm has been kept elevated with ice pack applied.

## 2019-06-14 ENCOUNTER — OFFICE VISIT (OUTPATIENT)
Dept: HEMATOLOGY | Age: 69
End: 2019-06-14
Payer: MEDICARE

## 2019-06-14 ENCOUNTER — HOSPITAL ENCOUNTER (OUTPATIENT)
Dept: INFUSION THERAPY | Age: 69
End: 2019-06-14
Payer: MEDICARE

## 2019-06-14 ENCOUNTER — TELEPHONE (OUTPATIENT)
Dept: HEMATOLOGY | Age: 69
End: 2019-06-14

## 2019-06-14 ENCOUNTER — TELEPHONE (OUTPATIENT)
Dept: ONCOLOGY | Age: 69
End: 2019-06-14

## 2019-06-14 VITALS
HEIGHT: 70 IN | OXYGEN SATURATION: 97 % | WEIGHT: 122.7 LBS | HEART RATE: 78 BPM | BODY MASS INDEX: 17.57 KG/M2 | DIASTOLIC BLOOD PRESSURE: 80 MMHG | RESPIRATION RATE: 14 BRPM | TEMPERATURE: 97.8 F | SYSTOLIC BLOOD PRESSURE: 170 MMHG

## 2019-06-14 DIAGNOSIS — K86.89 PANCREATIC MASS: Primary | ICD-10-CM

## 2019-06-14 DIAGNOSIS — C78.7 METASTATIC CANCER TO LIVER (HCC): Primary | ICD-10-CM

## 2019-06-14 DIAGNOSIS — R16.0 LIVER MASS: ICD-10-CM

## 2019-06-14 PROCEDURE — 99212 OFFICE O/P EST SF 10 MIN: CPT | Performed by: TRANSPLANT SURGERY

## 2019-06-14 RX ORDER — HEPARIN SODIUM (PORCINE) LOCK FLUSH IV SOLN 100 UNIT/ML 100 UNIT/ML
500 SOLUTION INTRAVENOUS PRN
Status: CANCELLED | OUTPATIENT
Start: 2019-06-14

## 2019-06-14 RX ORDER — SODIUM CHLORIDE 0.9 % (FLUSH) 0.9 %
10 SYRINGE (ML) INJECTION PRN
Status: CANCELLED | OUTPATIENT
Start: 2019-06-14

## 2019-06-14 NOTE — TELEPHONE ENCOUNTER
I called the patient's insurance [employer] and I spoke with Tomasz authorization rep., she stated no authorization is required for CPT code 23454. I called surgery scheduling and I spoke with Luna Billingsley, she scheduled the patient at [facility] on 6/18/19 at 1:00 pm.   I called the [facility] and I spoke with Yeison patients nurse. I shared with her that the patient is scheduled at [facility] on 6/18/19 at 1:00 pm.  I also shared with her that PAT will call with further instructions. Ivett confirmed. Then I called PAT and I spoke with Barbara Kilgore, I shared with her that she needs to call [phone] and ask for Yeison the patients nurse and inform her of the instructions. Cecilia confirmed. The patient and his wife were notified while in office and they both confirmed.        Electronically signed by Shashi Haley on 6/14/19 at 2:17 PM

## 2019-06-14 NOTE — PROGRESS NOTES
Hepatobiliary and Pancreatic Surgery Progress Note    CC: metastatic pancreatic adenocarcinoma    Subjective: Patient doing well, states that he is feeling better    OBJECTIVE      Physical    VITALS:  BP (!) 170/80 (Site: Right Upper Arm, Position: Sitting, Cuff Size: Medium Adult)   Pulse 78   Temp 97.8 °F (36.6 °C) (Temporal)   Resp 14   Ht 5' 10\" (1.778 m)   Wt 122 lb 11.2 oz (55.7 kg)   SpO2 97%   BMI 17.61 kg/m²     General appearance: appears in no acute distress  Lungs:CTABL  Heart: RRR  Abdomen: soft, nondistended, nontympanic, no guarding, no peritoneal signs, normoactive bowel sounds  Extremities:no peripheral edema    ASSESSMENT:  s/p aborted whipple secondary to desmoplastic reaction - path at [facility] with high grade dysplasia and focal areas concerning for adenocarcinoma, gastrojejunostomy.   Without treatment for adenocarcinoma despite biopsy and my operative findings, now with metastatic pancreatic adenocarcinoma to the liver - biopsy proven without concerns for adenocarcinoma    PLAN:    - mediport insertion  - patient was made aware of the risks, benefits, alternative, complications of a port insertion and wishes to proceed with surgery    Electronically signed by Sari Catalan MD on 6/14/2019 at 1:55 PM

## 2019-06-14 NOTE — TELEPHONE ENCOUNTER
Spoke to the nurse who stated patient has no IV access, and unable to insert a PICC or Midline. States the facility called in a special team and were stated that patient's veins are done and unable to have venous access. I explained that the pt needs access for chemotherapy, therefore he will need a mediport.

## 2019-06-15 LAB — CA 19-9: 1131 U/ML (ref 0–37)

## 2019-06-18 ENCOUNTER — PREP FOR PROCEDURE (OUTPATIENT)
Dept: SURGERY | Age: 69
End: 2019-06-18

## 2019-06-18 ENCOUNTER — TELEPHONE (OUTPATIENT)
Dept: SURGERY | Age: 69
End: 2019-06-18

## 2019-06-18 RX ORDER — SODIUM CHLORIDE 0.9 % (FLUSH) 0.9 %
10 SYRINGE (ML) INJECTION EVERY 12 HOURS SCHEDULED
Status: CANCELLED | OUTPATIENT
Start: 2019-06-18

## 2019-06-18 RX ORDER — SODIUM CHLORIDE 0.9 % (FLUSH) 0.9 %
10 SYRINGE (ML) INJECTION PRN
Status: CANCELLED | OUTPATIENT
Start: 2019-06-18

## 2019-06-18 RX ORDER — DEXTROSE, SODIUM CHLORIDE, SODIUM LACTATE, POTASSIUM CHLORIDE, AND CALCIUM CHLORIDE 5; .6; .31; .03; .02 G/100ML; G/100ML; G/100ML; G/100ML; G/100ML
INJECTION, SOLUTION INTRAVENOUS CONTINUOUS
Status: CANCELLED | OUTPATIENT
Start: 2019-06-18

## 2019-06-18 NOTE — TELEPHONE ENCOUNTER
Spoke with Flora Campbell LPN. Notified her that the patient is scheduled for mediport insertion on 6/19/19 at 2:00pm, arrival time is at 12:00pm in [facility]. NPO after the midnight the night before the procedure. The patient is to hold Lovonox this evening and tomorrow morning. Okayed by Dr. Sabra Kim. All questions were answered. Lamar Pondole verbalized understanding.    Electronically signed by Edwin Anguiano on 6/18/19 at 4:19 PM

## 2019-06-18 NOTE — PROGRESS NOTES
[facility] PRE-ADMISSION TESTING INSTRUCTIONS    The Preadmission Testing patient is instructed accordingly using the following criteria (check applicable):    ARRIVAL INSTRUCTIONS:  [x] Parking the day of Surgery is located in the Main Entrance lot. Upon entering the door, make an immediate right to the surgery reception desk    [] [id number] is available Monday through Friday 6 am to 6 pm    [x] Bring photo ID and insurance card    [] Bring in a copy of Living will or Durable Power of  papers. [x] Please be sure to arrange for responsible adult to provide transportation to and from the hospital    [x] Please arrange for responsible adult to be with you for the 24 hour period post procedure due to having anesthesia      GENERAL INSTRUCTIONS:    [x] Nothing by mouth after midnight, including gum, candy, mints or water    [x] You may brush your teeth, but do not swallow any water    [x] Take medications as instructed with 1-2 oz of water    [] Stop herbal supplements and vitamins 5 days prior to procedure    [x] Follow preop dosing of blood thinners per physician instructions    [] Take 1/2 dose of evening insulin, but no insulin after midnight    [] No oral diabetic medications after midnight    [] If diabetic and have low blood sugar or feel symptomatic, take 1-2oz apple juice only    [] Bring inhalers day of surgery    [] Bring C-PAP/ Bi-Pap day of surgery    [] Bring urine specimen day of surgery    [x] Shower or bath with soap, lather and rinse well, AM of Surgery, no lotion, powders or creams to surgical site    [] Follow bowel prep as instructed per surgeon    [x] No tobacco products within 24 hours of surgery     [x] No alcohol or illegal drug use within 24 hours of surgery.     [x] Jewelry, body piercing's, eyeglasses, contact lenses and dentures are not permitted into surgery (bring cases)      [] Please do not wear any nail polish, make up or hair products on the day of surgery    [x] If not already done, you can expect a call from registration    [x] You can expect a call the business day prior to procedure to notify you if your arrival time changes    [x] If you receive a survey after surgery we would greatly appreciate your comments    [] Parent/guardian of a minor must accompany their child and remain on the premises  the entire time they are under our care     [] Pediatric patients may bring favorite toy, blanket or comfort item with them    [x] A caregiver or family member must remain with the patient during their stay if they are mentally handicapped, have dementia, disoriented or unable to use a call light or would be a safety concern if left unattended    [x] Please notify surgeon if you develop any illness between now and time of surgery (cold, cough, sore throat, fever, nausea, vomiting) or any signs of infections  including skin, wounds, and dental.    [x]  The Outpatient Pharmacy is available to fill your prescription here on your day of surgery, ask your preop nurse for details    [] Other instructions  EDUCATIONAL MATERIALS PROVIDED:    [] PAT Preoperative Education Packet/Booklet     [] Medication List    [] Fluoroscopy Information Pamphlet    [] Transfusion bracelet applied with instructions    [] Joint replacement video reviewed    [] Shower with soap, lather and rinse well, and use CHG wipes provided the evening before surgery as instructed

## 2019-06-19 ENCOUNTER — HOSPITAL ENCOUNTER (OUTPATIENT)
Age: 69
Setting detail: OUTPATIENT SURGERY
Discharge: HOME | End: 2019-06-19
Attending: SURGERY | Admitting: SURGERY
Payer: MEDICARE

## 2019-06-19 ENCOUNTER — HOSPITAL ENCOUNTER (OUTPATIENT)
Dept: GENERAL RADIOLOGY | Age: 69
Discharge: HOME | End: 2019-06-21
Attending: SURGERY
Payer: MEDICARE

## 2019-06-19 ENCOUNTER — ANESTHESIA (OUTPATIENT)
Dept: OPERATING ROOM | Age: 69
End: 2019-06-19
Payer: MEDICARE

## 2019-06-19 ENCOUNTER — APPOINTMENT (OUTPATIENT)
Dept: GENERAL RADIOLOGY | Age: 69
End: 2019-06-19
Attending: SURGERY
Payer: MEDICARE

## 2019-06-19 ENCOUNTER — ANESTHESIA EVENT (OUTPATIENT)
Dept: OPERATING ROOM | Age: 69
End: 2019-06-19
Payer: MEDICARE

## 2019-06-19 VITALS — SYSTOLIC BLOOD PRESSURE: 143 MMHG | OXYGEN SATURATION: 100 % | DIASTOLIC BLOOD PRESSURE: 72 MMHG

## 2019-06-19 VITALS
BODY MASS INDEX: 17.04 KG/M2 | DIASTOLIC BLOOD PRESSURE: 78 MMHG | SYSTOLIC BLOOD PRESSURE: 169 MMHG | HEIGHT: 70 IN | WEIGHT: 119 LBS | OXYGEN SATURATION: 100 % | TEMPERATURE: 97.4 F | RESPIRATION RATE: 16 BRPM | HEART RATE: 74 BPM

## 2019-06-19 DIAGNOSIS — R52 PAIN: ICD-10-CM

## 2019-06-19 LAB — METER GLUCOSE: 112 MG/DL (ref 74–99)

## 2019-06-19 PROCEDURE — 2500000003 HC RX 250 WO HCPCS: Performed by: SURGERY

## 2019-06-19 PROCEDURE — 82962 GLUCOSE BLOOD TEST: CPT

## 2019-06-19 PROCEDURE — 7100000011 HC PHASE II RECOVERY - ADDTL 15 MIN: Performed by: SURGERY

## 2019-06-19 PROCEDURE — 6360000002 HC RX W HCPCS: Performed by: SURGERY

## 2019-06-19 PROCEDURE — 6360000002 HC RX W HCPCS

## 2019-06-19 PROCEDURE — 3700000000 HC ANESTHESIA ATTENDED CARE: Performed by: SURGERY

## 2019-06-19 PROCEDURE — 71045 X-RAY EXAM CHEST 1 VIEW: CPT

## 2019-06-19 PROCEDURE — 3600000012 HC SURGERY LEVEL 2 ADDTL 15MIN: Performed by: SURGERY

## 2019-06-19 PROCEDURE — 3700000001 HC ADD 15 MINUTES (ANESTHESIA): Performed by: SURGERY

## 2019-06-19 PROCEDURE — 3600000002 HC SURGERY LEVEL 2 BASE: Performed by: SURGERY

## 2019-06-19 PROCEDURE — 3209999900 FLUORO FOR SURGICAL PROCEDURES

## 2019-06-19 PROCEDURE — 2709999900 HC NON-CHARGEABLE SUPPLY: Performed by: SURGERY

## 2019-06-19 PROCEDURE — C1788 PORT, INDWELLING, IMP: HCPCS | Performed by: SURGERY

## 2019-06-19 PROCEDURE — 7100000010 HC PHASE II RECOVERY - FIRST 15 MIN: Performed by: SURGERY

## 2019-06-19 PROCEDURE — 36561 INSERT TUNNELED CV CATH: CPT | Performed by: SURGERY

## 2019-06-19 PROCEDURE — 2580000003 HC RX 258: Performed by: SURGERY

## 2019-06-19 PROCEDURE — 2580000003 HC RX 258

## 2019-06-19 DEVICE — PORT SMARTPORT 8FR CT TI W/VLV SHTH: Type: IMPLANTABLE DEVICE | Site: SUBCLAVIAN | Status: FUNCTIONAL

## 2019-06-19 RX ORDER — HEPARIN SODIUM (PORCINE) LOCK FLUSH IV SOLN 100 UNIT/ML 100 UNIT/ML
SOLUTION INTRAVENOUS PRN
Status: DISCONTINUED | OUTPATIENT
Start: 2019-06-19 | End: 2019-06-19 | Stop reason: ALTCHOICE

## 2019-06-19 RX ORDER — HYDROCODONE BITARTRATE AND ACETAMINOPHEN 5; 325 MG/1; MG/1
1 TABLET ORAL
Status: DISCONTINUED | OUTPATIENT
Start: 2019-06-19 | End: 2019-06-19 | Stop reason: HOSPADM

## 2019-06-19 RX ORDER — DIPHENHYDRAMINE HYDROCHLORIDE 50 MG/ML
12.5 INJECTION INTRAMUSCULAR; INTRAVENOUS
Status: DISCONTINUED | OUTPATIENT
Start: 2019-06-19 | End: 2019-06-19 | Stop reason: HOSPADM

## 2019-06-19 RX ORDER — SODIUM CHLORIDE 9 MG/ML
INJECTION, SOLUTION INTRAVENOUS CONTINUOUS PRN
Status: DISCONTINUED | OUTPATIENT
Start: 2019-06-19 | End: 2019-06-19 | Stop reason: SDUPTHER

## 2019-06-19 RX ORDER — MIDAZOLAM HYDROCHLORIDE 1 MG/ML
INJECTION INTRAMUSCULAR; INTRAVENOUS PRN
Status: DISCONTINUED | OUTPATIENT
Start: 2019-06-19 | End: 2019-06-19 | Stop reason: SDUPTHER

## 2019-06-19 RX ORDER — PROMETHAZINE HYDROCHLORIDE 25 MG/ML
6.25 INJECTION, SOLUTION INTRAMUSCULAR; INTRAVENOUS
Status: DISCONTINUED | OUTPATIENT
Start: 2019-06-19 | End: 2019-06-19 | Stop reason: HOSPADM

## 2019-06-19 RX ORDER — SODIUM CHLORIDE 0.9 % (FLUSH) 0.9 %
10 SYRINGE (ML) INJECTION PRN
Status: DISCONTINUED | OUTPATIENT
Start: 2019-06-19 | End: 2019-06-19 | Stop reason: HOSPADM

## 2019-06-19 RX ORDER — LIDOCAINE HYDROCHLORIDE 10 MG/ML
INJECTION, SOLUTION INFILTRATION; PERINEURAL PRN
Status: DISCONTINUED | OUTPATIENT
Start: 2019-06-19 | End: 2019-06-19 | Stop reason: ALTCHOICE

## 2019-06-19 RX ORDER — FENTANYL CITRATE 50 UG/ML
50 INJECTION, SOLUTION INTRAMUSCULAR; INTRAVENOUS EVERY 5 MIN PRN
Status: DISCONTINUED | OUTPATIENT
Start: 2019-06-19 | End: 2019-06-19 | Stop reason: HOSPADM

## 2019-06-19 RX ORDER — SODIUM CHLORIDE 0.9 % (FLUSH) 0.9 %
10 SYRINGE (ML) INJECTION EVERY 12 HOURS SCHEDULED
Status: DISCONTINUED | OUTPATIENT
Start: 2019-06-19 | End: 2019-06-19 | Stop reason: HOSPADM

## 2019-06-19 RX ORDER — MEPERIDINE HYDROCHLORIDE 25 MG/ML
12.5 INJECTION INTRAMUSCULAR; INTRAVENOUS; SUBCUTANEOUS EVERY 10 MIN PRN
Status: DISCONTINUED | OUTPATIENT
Start: 2019-06-19 | End: 2019-06-19 | Stop reason: HOSPADM

## 2019-06-19 RX ORDER — DEXTROSE, SODIUM CHLORIDE, SODIUM LACTATE, POTASSIUM CHLORIDE, AND CALCIUM CHLORIDE 5; .6; .31; .03; .02 G/100ML; G/100ML; G/100ML; G/100ML; G/100ML
INJECTION, SOLUTION INTRAVENOUS CONTINUOUS
Status: DISCONTINUED | OUTPATIENT
Start: 2019-06-19 | End: 2019-06-19 | Stop reason: HOSPADM

## 2019-06-19 RX ORDER — PROPOFOL 10 MG/ML
INJECTION, EMULSION INTRAVENOUS CONTINUOUS PRN
Status: DISCONTINUED | OUTPATIENT
Start: 2019-06-19 | End: 2019-06-19 | Stop reason: SDUPTHER

## 2019-06-19 RX ORDER — FENTANYL CITRATE 50 UG/ML
INJECTION, SOLUTION INTRAMUSCULAR; INTRAVENOUS PRN
Status: DISCONTINUED | OUTPATIENT
Start: 2019-06-19 | End: 2019-06-19 | Stop reason: SDUPTHER

## 2019-06-19 RX ADMIN — Medication 10 ML: at 13:54

## 2019-06-19 RX ADMIN — FENTANYL CITRATE 25 MCG: 50 INJECTION, SOLUTION INTRAMUSCULAR; INTRAVENOUS at 14:20

## 2019-06-19 RX ADMIN — PROPOFOL 50 MCG/KG/MIN: 10 INJECTION, EMULSION INTRAVENOUS at 14:13

## 2019-06-19 RX ADMIN — MIDAZOLAM HYDROCHLORIDE 2 MG: 1 INJECTION, SOLUTION INTRAMUSCULAR; INTRAVENOUS at 13:56

## 2019-06-19 RX ADMIN — FENTANYL CITRATE 25 MCG: 50 INJECTION, SOLUTION INTRAMUSCULAR; INTRAVENOUS at 14:13

## 2019-06-19 RX ADMIN — Medication 2 G: at 14:10

## 2019-06-19 RX ADMIN — SODIUM CHLORIDE: 9 INJECTION, SOLUTION INTRAVENOUS at 14:13

## 2019-06-19 ASSESSMENT — PAIN SCALES - GENERAL
PAINLEVEL_OUTOF10: 0

## 2019-06-19 ASSESSMENT — PULMONARY FUNCTION TESTS
PIF_VALUE: 0
PIF_VALUE: 1
PIF_VALUE: 0

## 2019-06-19 ASSESSMENT — PAIN - FUNCTIONAL ASSESSMENT: PAIN_FUNCTIONAL_ASSESSMENT: 0-10

## 2019-06-19 NOTE — H&P
Hepatobiliary and Pancreatic Surgery Progress Note     CC: metastatic pancreatic adenocarcinoma     Subjective: Patient doing well, states that he is feeling better     OBJECTIVE       Physical    VITALS:  BP (!) 170/80 (Site: Right Upper Arm, Position: Sitting, Cuff Size: Medium Adult)   Pulse 78   Temp 97.8 °F (36.6 °C) (Temporal)   Resp 14   Ht 5' 10\" (1.778 m)   Wt 122 lb 11.2 oz (55.7 kg)   SpO2 97%   BMI 17.61 kg/m²      General appearance: appears in no acute distress  Lungs:CTABL  Heart: RRR  Abdomen: soft, nondistended, nontympanic, no guarding, no peritoneal signs, normoactive bowel sounds  Extremities:no peripheral edema     ASSESSMENT:  s/p aborted whipple secondary to desmoplastic reaction - path at [facility] with high grade dysplasia and focal areas concerning for adenocarcinoma, gastrojejunostomy.  Without treatment for adenocarcinoma despite biopsy and my operative findings, now with metastatic pancreatic adenocarcinoma to the liver - biopsy proven without concerns for adenocarcinoma     PLAN:    - mediport insertion  - patient was made aware of the risks, benefits, alternative, complications of a port insertion and wishes to proceed with surgery    Electronically signed by Haja Tee DO on 6/19/2019 at 1:39 PM

## 2019-06-19 NOTE — OP NOTE
DATE OF PROCEDURE: 6/19/2019     SURGEON: Dr. Daniel Baldwin MD    ASSISTANT: Fransisco Castellano PGY-4 Ajay Phillips PGY-2    PREOPERATIVE DIAGNOSIS: metastatic pancreatic adenocarcinoma     POSTOPERATIVE DIAGNOSIS: same    OPERATION: Placement of right subclavian PowerPort. ANESTHESIA: Local 1% plain lidocaine with monitored anesthesia care. ESTIMATED BLOOD LOSS: minimal    COMPLICATIONS: None. SPECIMEN: None. BRIEF HISTORY: Shimon Keating Sr. is a 76 y.o.  male  who was recently diagnosed with metastatic pancreatic adenocarcinoma. He  will require a MediPort for long-term IV access. I discussed \"placement of MediPort\" with him, including the procedure, benefits, risks, and alternatives, and he agreed. PROCEDURE IN DETAIL: The patient was taken to the operative suite and was placed on the table in a supine position with a vertical roll placed between the shoulders to splay them laterally, the arms pulled downward, and the head turned to the left. The neck, chest, and shoulders were prepped with Betadine and draped in a sterile fashion. The patient  was placed into Trendelenburg position and was left in Trendelenburg position during the entire procedure. The skin, subcutaneous tissue, and periosteum of the right clavicle were anesthetized with 1% plain lidocaine. After this, a right subclavian venipuncture was performed and a guidewire was passed through the needle and into the superior vena cava. Its position was confirmed using the fluoroscopy unit. The guidewire was then clamped to the drapes using a rubber-shod clamp to prevent inward and outward migration. Attention was turned to creation of the port pocket. The port pocket was marked in the upper medial right chest wall, and then the skin and subcutaneous tissue were anesthetized with 1% plain lidocaine. A transverse linear incision was made and was carried down through the subcutaneous tissue using the electrocautery.  A combination of sharp dissection with the electrocautery and blunt finger dissection was used to create a pocket for the port, just large enough to accommodate a single-lumen PowerPort in the upper medial right chest wall. Once complete hemostasis was seen. A rubber-shod clamp was then placed on the tubing at the level of the port site incision to maintain the measurement. The dilator and introducer assembly were passed over the guidewire and into the right subclavian vein, and then the dilator and guidewire were removed. The tubing was passed through the introducer and into the superior vena cava, and it was positioned just above the level of the heart using the fluoroscopy unit. The introducer was then split and removed. There was good blood return from the tubing and the tubing flushed easily with heparinized saline. The excess tubing was then cut at the port incision and then the supplied connector was used to connect the tubing to the port itself. Once again, there was good blood return from the port and the port flushed easily with heparinized saline. After the sponge was removed from the port pocket and complete hemostasis was seen, the port was then placed into the port pocket and was sutured to Marya fascia using a single 3-0 Prolene stitch to prevent rolling and flipping of the port. The patient was then placed flat. Both wounds were copiously irrigated with antibiotic solution and dried. Any bleeding points were cauterized. The deep dermal layer of both incisions was closed with 3-0 Vicryl sutures and the skin was closed with 4-0 Monocryl subcuticular sutures, and sterile dressings were applied. The patient tolerated the procedure well. Sponge, needle, and instrument counts were correct. The patient had a stat portable upright chest x-ray done as soon as she was transferred to the cart for transport to the [place] [id number].     Dr. Chaudhary Memory was present for entire procedure    Electronically signed by Richie Apgar, DO on 2019 at 2:52 PM

## 2019-06-19 NOTE — H&P
H&P Update    Patient's History and Physical from June 17, 2019 was reviewed. Patient examined. Lungs: no increased work of breathing  Cardiac: RR    There has been no change    Plan for port insertion today.     Awilda Jones

## 2019-06-19 NOTE — ANESTHESIA PRE PROCEDURE
Department of Anesthesiology  Preprocedure Note       Name:  Armando Hunter   Age:  76 y.o.  :  1950                                          MRN:  [id number]         Date:  2019      Surgeon: Van Wen):  Dereje Meraz MD    Procedure: MEDI PORT INSERTION (N/A )    Medications prior to admission:   Prior to Admission medications    Medication Sig Start Date End Date Taking?  Authorizing Provider   atorvastatin (LIPITOR) 20 MG tablet Take 20 mg by mouth daily    Historical Provider, MD   acetaminophen (TYLENOL) 325 MG tablet Take 2 tablets by mouth every 4 hours as needed for Fever (For temp greater than 100.5 F (38 C))  Patient taking differently: Take 650 mg by mouth every 4 hours as needed for Pain or Fever (For temp greater than 100.5 F (38 C))  19   Irasema Jodran DO   enoxaparin (LOVENOX) 60 MG/0.6ML injection Inject 0.6 mLs into the skin every 12 hours 19   Donovan Cote DO   lisinopril (PRINIVIL;ZESTRIL) 20 MG tablet Take 1 tablet by mouth daily 19   Donovan Cote DO   levETIRAcetam (KEPPRA) 750 MG tablet Take 1 tablet by mouth 2 times daily 5/10/19   Irasema Jordan DO   pantoprazole (PROTONIX) 40 MG tablet Take 1 tablet by mouth every morning (before breakfast) 3/30/19   Lyle Dennis MD   tamsulosin (FLOMAX) 0.4 MG capsule Take 1 capsule by mouth daily  Patient taking differently: Take 0.4 mg by mouth nightly  19   Ruddy Mckeon MD       Current medications:    Current Facility-Administered Medications   Medication Dose Route Frequency Provider Last Rate Last Dose    ceFAZolin (ANCEF) 2 g in dextrose 5 % 100 mL IVPB  2 g Intravenous On Call to [address], MD        dextrose 5 % in lactated ringers infusion   Intravenous Continuous Dereje Meraz MD        sodium chloride flush 0.9 % injection 10 mL  10 mL Intravenous 2 times per day Dereje Meraz MD        sodium chloride flush 0.9 % injection 10 mL  10 mL Intravenous PRN Dereje Meraz MD Allergies:  No Known Allergies    Problem List:    Patient Active Problem List   Diagnosis Code    HTN (hypertension), benign I10    Other specified anemias D64.89    Obstructive jaundice K83.8    Hyperbilirubinemia E80.6    Palliative care encounter Z51.5    Obstructive jaundice due to malignant neoplasm ([address] K83.1, C80.1    Moderate protein-calorie malnutrition (HCC) E44.0    Pancreatic mass K86.9    Pancreatic adenocarcinoma (HCC) C25.9    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    Biliary obstruction K83.1    Thrombocytopathia (HCC) D69.1    Endocarditis, suspected R09.89    Seizure ([address] R56.9    Seizure-like activity ([address] R56.9    Severe protein-calorie malnutrition (HCC) E43    Liver mass R16.0    Bilateral pulmonary embolism (HCC) I26.99    Type 2 diabetes mellitus, with long-term current use of insulin (HCC) E11.9, Z79.4       Past Medical History:        Diagnosis Date    Acute respiratory failure ([address] 05/2019    with hypoxia    Bile duct obstruction     Cancer (HCC)     pancreatic    Cerebral artery occlusion with cerebral infarction (HCC)     Cognitive communication deficit     Encephalopathy     Hypertension     Liver abscess     Mycosis     Pulmonary emboli ([address] 05/2019    Sequelae of protein-calorie malnutrition (HCC)     Syncope     Unspecified convulsions (HCC)        Past Surgical History:        Procedure Laterality Date    CYSTOSCOPY INSERTION / REMOVAL STENT / STONE N/A 5/5/2019    CYSTOSCOPY RETROGRADE PYELOGRAM STENT INSERTION performed by Alex Santana MD at [place] [address] ERCP N/A 3/25/2019    ERCP STENT INSERTION performed by Jenifer Wang MD at [facility]  03/27/2019    REMOVED NOT WORKING    TRANSESOPHAGEAL ECHOCARDIOGRAM  05/06/2019    dr Ravinder Orta 3/8/2019    REDO ENDOSCOPIC EGD  ULTRASOUND performed by Ramon Lan DO at [address] GASTROINTESTINAL ENDOSCOPY N/A 3/8/2019    EGD DIAGNOSTIC ONLY performed by Lc Lang DO at [address] [facility] N/A 1/16/2019    EXPLORATORY LAPAROTOMY; ABORTED WHIPPLE PROCEDURE; GASTROJEJUNOSTOMY; OPEN CHOLECYSTECTOMY performed by Aleyda García MD at [facility] History:    Social History     Tobacco Use    Smoking status: Never Smoker    Smokeless tobacco: Never Used   Substance Use Topics    Alcohol use: Yes     Alcohol/week: 4.8 oz     Types: 8 Cans of beer per week     Comment: states drinks one 24 ounce beer daily                                Counseling given: Not Answered      Vital Signs (Current):   Vitals:    06/18/19 1627   Weight: 119 lb (54 kg)   Height: 5' 10\" (1.778 m)                                              BP Readings from Last 3 Encounters:   06/17/19 118/62   06/14/19 (!) 170/80   06/13/19 118/64       NPO Status:                                                                                 BMI:   Wt Readings from Last 3 Encounters:   06/18/19 119 lb (54 kg)   06/14/19 122 lb 11.2 oz (55.7 kg)   06/13/19 118 lb (53.5 kg)     Body mass index is 17.07 kg/m². CBC:   Lab Results   Component Value Date    WBC 8.8 06/13/2019    RBC 3.44 06/13/2019    HGB 9.7 06/13/2019    HCT 31.8 06/13/2019    MCV 92.4 06/13/2019    RDW 14.9 06/13/2019     06/13/2019       CMP:   Lab Results   Component Value Date     06/13/2019    K 4.9 06/13/2019    K 4.0 05/26/2019     06/13/2019    CO2 23 06/13/2019    BUN 7 06/13/2019    CREATININE 0.9 06/13/2019    GFRAA >60 06/13/2019    LABGLOM >60 06/13/2019    GLUCOSE 311 06/13/2019    PROT 7.5 06/13/2019    CALCIUM 8.9 06/13/2019    BILITOT 0.3 06/13/2019    ALKPHOS 99 06/13/2019    AST 23 06/13/2019    ALT 15 06/13/2019       POC Tests: No results for input(s): POCGLU, POCNA, POCK, POCCL, POCBUN, POCHEMO, POCHCT in the last 72 hours.     Coags:   Lab Results   Component Value Date    PROTIME 14.2 05/22/2019    INR 1.2 05/22/2019    APTT 84.9 05/23/2019       HCG (If Applicable): No results found for: PREGTESTUR, PREGSERUM, HCG, HCGQUANT     ABGs:   Lab Results   Component Value Date    PO2ART 132.1 01/16/2019    HOP3AOH 33.0 01/16/2019    BZY2VEW 19.7 01/16/2019        Type & Screen (If Applicable):  No results found for: LABABO, [address]    Anesthesia Evaluation  Patient summary reviewed and Nursing notes reviewed no history of anesthetic complications:   Airway: Mallampati: II  TM distance: >3 FB   Neck ROM: full  Mouth opening: > = 3 FB Dental: normal exam         Pulmonary:Negative Pulmonary ROS   (+) decreased breath sounds,                             Cardiovascular:  Exercise tolerance: poor (<4 METS),   (+) hypertension:, hyperlipidemia        Rhythm: regular  Rate: normal           Beta Blocker:  Not on Beta Blocker         Neuro/Psych:   (+) seizures:, CVA: residual symptoms,             GI/Hepatic/Renal:   (+) GERD:, liver disease:,          ROS comment: BPH. Endo/Other:    (+) DiabetesType II DM, , blood dyscrasia: anticoagulation therapy:., malignancy/cancer. Abdominal:           Vascular: negative vascular ROS. Anesthesia Plan      MAC     ASA 3       Induction: intravenous. Anesthetic plan and risks discussed with patient and spouse.                       Armen Meza MD   6/19/2019

## 2019-06-20 ENCOUNTER — HOSPITAL ENCOUNTER (OUTPATIENT)
Dept: INFUSION THERAPY | Age: 69
Discharge: HOME | End: 2019-06-20
Payer: MEDICARE

## 2019-06-20 ENCOUNTER — OFFICE VISIT (OUTPATIENT)
Dept: ONCOLOGY | Age: 69
End: 2019-06-20
Payer: MEDICARE

## 2019-06-20 ENCOUNTER — TELEPHONE (OUTPATIENT)
Dept: CASE MANAGEMENT | Age: 69
End: 2019-06-20

## 2019-06-20 VITALS
HEART RATE: 118 BPM | WEIGHT: 124.1 LBS | DIASTOLIC BLOOD PRESSURE: 85 MMHG | SYSTOLIC BLOOD PRESSURE: 150 MMHG | BODY MASS INDEX: 17.77 KG/M2 | TEMPERATURE: 98.5 F | HEIGHT: 70 IN

## 2019-06-20 VITALS
SYSTOLIC BLOOD PRESSURE: 168 MMHG | TEMPERATURE: 98 F | RESPIRATION RATE: 20 BRPM | DIASTOLIC BLOOD PRESSURE: 90 MMHG | HEART RATE: 94 BPM

## 2019-06-20 DIAGNOSIS — C24.1 AMPULLARY CARCINOMA (HCC): Primary | ICD-10-CM

## 2019-06-20 DIAGNOSIS — K86.89 PANCREATIC MASS: ICD-10-CM

## 2019-06-20 DIAGNOSIS — R97.8 OTHER ABNORMAL TUMOR MARKERS: ICD-10-CM

## 2019-06-20 DIAGNOSIS — Z09 FOLLOW UP: Primary | ICD-10-CM

## 2019-06-20 DIAGNOSIS — C25.9 PANCREATIC ADENOCARCINOMA (HCC): ICD-10-CM

## 2019-06-20 LAB
ALBUMIN SERPL-MCNC: 3.4 G/DL (ref 3.5–5.2)
ALP BLD-CCNC: 116 U/L (ref 40–129)
ALT SERPL-CCNC: 11 U/L (ref 0–40)
ANION GAP SERPL CALCULATED.3IONS-SCNC: 10 MMOL/L (ref 7–16)
ANISOCYTOSIS: ABNORMAL
AST SERPL-CCNC: 11 U/L (ref 0–39)
BASOPHILS ABSOLUTE: 0.03 E9/L (ref 0–0.2)
BASOPHILS RELATIVE PERCENT: 0.2 % (ref 0–2)
BILIRUB SERPL-MCNC: 0.3 MG/DL (ref 0–1.2)
BUN BLDV-MCNC: 11 MG/DL (ref 8–23)
CALCIUM SERPL-MCNC: 8.3 MG/DL (ref 8.6–10.2)
CHLORIDE BLD-SCNC: 101 MMOL/L (ref 98–107)
CO2: 24 MMOL/L (ref 22–29)
CREAT SERPL-MCNC: 0.9 MG/DL (ref 0.7–1.2)
EOSINOPHILS ABSOLUTE: 0.65 E9/L (ref 0.05–0.5)
EOSINOPHILS RELATIVE PERCENT: 5 % (ref 0–6)
GFR AFRICAN AMERICAN: >60
GFR NON-AFRICAN AMERICAN: >60 ML/MIN/1.73
GLUCOSE BLD-MCNC: 332 MG/DL (ref 74–99)
HCT VFR BLD CALC: 28.6 % (ref 37–54)
HEMOGLOBIN: 8.8 G/DL (ref 12.5–16.5)
IMMATURE GRANULOCYTES #: 0.07 E9/L
IMMATURE GRANULOCYTES %: 0.5 % (ref 0–5)
LYMPHOCYTES ABSOLUTE: 0.74 E9/L (ref 1.5–4)
LYMPHOCYTES RELATIVE PERCENT: 5.7 % (ref 20–42)
MCH RBC QN AUTO: 27.6 PG (ref 26–35)
MCHC RBC AUTO-ENTMCNC: 30.8 % (ref 32–34.5)
MCV RBC AUTO: 89.7 FL (ref 80–99.9)
MONOCYTES ABSOLUTE: 0.65 E9/L (ref 0.1–0.95)
MONOCYTES RELATIVE PERCENT: 5 % (ref 2–12)
NEUTROPHILS ABSOLUTE: 10.91 E9/L (ref 1.8–7.3)
NEUTROPHILS RELATIVE PERCENT: 83.6 % (ref 43–80)
OVALOCYTES: ABNORMAL
PDW BLD-RTO: 14.6 FL (ref 11.5–15)
PLATELET # BLD: 155 E9/L (ref 130–450)
PMV BLD AUTO: 10.9 FL (ref 7–12)
POIKILOCYTES: ABNORMAL
POLYCHROMASIA: ABNORMAL
POTASSIUM SERPL-SCNC: 3.9 MMOL/L (ref 3.5–5)
RBC # BLD: 3.19 E12/L (ref 3.8–5.8)
SODIUM BLD-SCNC: 135 MMOL/L (ref 132–146)
TOTAL PROTEIN: 7.3 G/DL (ref 6.4–8.3)
WBC # BLD: 13.1 E9/L (ref 4.5–11.5)

## 2019-06-20 PROCEDURE — 86301 IMMUNOASSAY TUMOR CA 19-9: CPT

## 2019-06-20 PROCEDURE — 80053 COMPREHEN METABOLIC PANEL: CPT

## 2019-06-20 PROCEDURE — 99214 OFFICE O/P EST MOD 30 MIN: CPT | Performed by: INTERNAL MEDICINE

## 2019-06-20 PROCEDURE — 2580000003 HC RX 258: Performed by: INTERNAL MEDICINE

## 2019-06-20 PROCEDURE — 85025 COMPLETE CBC W/AUTO DIFF WBC: CPT

## 2019-06-20 PROCEDURE — 96375 TX/PRO/DX INJ NEW DRUG ADDON: CPT

## 2019-06-20 PROCEDURE — 96417 CHEMO IV INFUS EACH ADDL SEQ: CPT

## 2019-06-20 PROCEDURE — 96366 THER/PROPH/DIAG IV INF ADDON: CPT

## 2019-06-20 PROCEDURE — 6360000002 HC RX W HCPCS: Performed by: INTERNAL MEDICINE

## 2019-06-20 PROCEDURE — 96413 CHEMO IV INFUSION 1 HR: CPT

## 2019-06-20 RX ORDER — HEPARIN SODIUM (PORCINE) LOCK FLUSH IV SOLN 100 UNIT/ML 100 UNIT/ML
500 SOLUTION INTRAVENOUS PRN
Status: CANCELLED | OUTPATIENT
Start: 2019-06-20

## 2019-06-20 RX ORDER — HEPARIN SODIUM (PORCINE) LOCK FLUSH IV SOLN 100 UNIT/ML 100 UNIT/ML
500 SOLUTION INTRAVENOUS PRN
Status: DISCONTINUED | OUTPATIENT
Start: 2019-06-20 | End: 2019-06-21 | Stop reason: HOSPADM

## 2019-06-20 RX ORDER — SODIUM CHLORIDE 9 MG/ML
250 INJECTION, SOLUTION INTRAVENOUS ONCE
Status: DISCONTINUED | OUTPATIENT
Start: 2019-06-20 | End: 2019-06-21 | Stop reason: HOSPADM

## 2019-06-20 RX ORDER — DEXAMETHASONE SODIUM PHOSPHATE 10 MG/ML
10 INJECTION INTRAMUSCULAR; INTRAVENOUS ONCE
Status: COMPLETED | OUTPATIENT
Start: 2019-06-20 | End: 2019-06-20

## 2019-06-20 RX ORDER — PALONOSETRON HYDROCHLORIDE 0.05 MG/ML
0.25 INJECTION, SOLUTION INTRAVENOUS ONCE
Status: COMPLETED | OUTPATIENT
Start: 2019-06-20 | End: 2019-06-20

## 2019-06-20 RX ORDER — PACLITAXEL 100 MG/20ML
200 INJECTION, POWDER, LYOPHILIZED, FOR SUSPENSION INTRAVENOUS ONCE
Status: COMPLETED | OUTPATIENT
Start: 2019-06-20 | End: 2019-06-20

## 2019-06-20 RX ORDER — SODIUM CHLORIDE 0.9 % (FLUSH) 0.9 %
10 SYRINGE (ML) INJECTION PRN
Status: CANCELLED | OUTPATIENT
Start: 2019-06-20

## 2019-06-20 RX ORDER — SODIUM CHLORIDE 0.9 % (FLUSH) 0.9 %
10 SYRINGE (ML) INJECTION PRN
Status: DISCONTINUED | OUTPATIENT
Start: 2019-06-20 | End: 2019-06-21 | Stop reason: HOSPADM

## 2019-06-20 RX ADMIN — Medication 10 ML: at 11:09

## 2019-06-20 RX ADMIN — GEMCITABINE 1634 MG: 38 INJECTION, SOLUTION INTRAVENOUS at 13:14

## 2019-06-20 RX ADMIN — SODIUM CHLORIDE, PRESERVATIVE FREE 500 UNITS: 5 INJECTION INTRAVENOUS at 13:53

## 2019-06-20 RX ADMIN — SODIUM CHLORIDE 250 ML: 9 INJECTION, SOLUTION INTRAVENOUS at 11:09

## 2019-06-20 RX ADMIN — Medication 10 ML: at 10:22

## 2019-06-20 RX ADMIN — Medication 10 ML: at 13:53

## 2019-06-20 RX ADMIN — PACLITAXEL 200 MG: 100 INJECTION, POWDER, LYOPHILIZED, FOR SUSPENSION INTRAVENOUS at 12:05

## 2019-06-20 RX ADMIN — Medication 10 ML: at 11:19

## 2019-06-20 RX ADMIN — PALONOSETRON 0.25 MG: 0.25 INJECTION, SOLUTION INTRAVENOUS at 11:15

## 2019-06-20 RX ADMIN — SODIUM CHLORIDE 150 MG: 9 INJECTION, SOLUTION INTRAVENOUS at 11:24

## 2019-06-20 RX ADMIN — Medication 10 ML: at 10:20

## 2019-06-20 RX ADMIN — DEXAMETHASONE SODIUM PHOSPHATE 10 MG: 10 INJECTION INTRAMUSCULAR; INTRAVENOUS at 11:19

## 2019-06-20 NOTE — PROGRESS NOTES
Department of [facility] Oncology  Attending Clinic Note    Reason for Visit: Follow-up on a patient with Pancreatic Cancer     PCP:  Dianelys Alex DO    History of Present Illness:  75 yo male with pancreatic lesion and Biliary obstruction-IGG elevated; was on prednisone for ? autoimmune pancreatitis per [facility] GI/hepatology recommendations. Non-compliant with  appointments. 3 prior attempts (2 EUS and aborted whipple; no path showing invasive adenoca). Recent candidemia w/ biliary stent; on Eraxis, also on Vanc/Zosyn, ID following. ZOË negative for vegetations. New masses in liver on triphasic; CEA 2.4, CA 53-8 1265; CT guided liver abscess drainage on 5/6, no organism seen on GS. CT guided liver mass biopsy proved metastatic adenocarcinoma likely of pancreatic origin. Multiple attempts to have him see us in clinic. Patient was non-compliant. He was found unresponsive at his nursing home. Per EMS nursing staff saw him sitting up and not responding in the nursing home and began doing CPR for 2 minutes and the patient did become responsive. CTA chest showed extensive occlusive thrombus in the right and left main pulmonary arteries extending to upper and lower lobes consistent with bilateral pulmonary embolism with cardiac strain/septal deviation. CT abdomen/pelvis Pneumobilia with bare-metal stent in place and severe dilation of the pancreatic parenchyma. CT head No significant interval change in the appearance of the brain. No acute process. Lovenox therapeutic for pilar PE.   ECOG 1-2  Systemic palliative chemo (Gemcitabine/Abraxane) for metastatic adenocarcinoma of pancreatic origin was recommended. Side effects of Gemcitabine/Abraxane reviewed with patient. He agreed to proceed. Cycle # 1 Gemcitabine + Abraxane was on 06/06/2019. CA 19-9 1299 on 06/06/2019. CA 19-9 1131 on 06/13/2019. Review of Systems;  CONSTITUTIONAL: No fever, chills. Fair appetite.  Fatigue  ENMT: Eyes: No diplopia; Nose: No epistaxis. Mouth: No sore throat. RESPIRATORY: No hemoptysis, shortness of breath, cough. CARDIOVASCULAR: No chest pain, palpitations. GASTROINTESTINAL: No nausea/vomiting. Abdominal pain controlled. GENITOURINARY: No dysuria, urinary frequency, hematuria. NEURO: No syncope, presyncope, headache. Remainder:  ROS NEGATIVE    Past Medical History:      Diagnosis Date    Acute respiratory failure ([facility] 05/2019    with hypoxia    Bile duct obstruction     Cancer (HCC)     pancreatic    Cerebral artery occlusion with cerebral infarction (HCC)     Cognitive communication deficit     Encephalopathy     Hypertension     Liver abscess     Mycosis     Pulmonary emboli ([facility] 05/2019    Sequelae of protein-calorie malnutrition (HCC)     Syncope     Unspecified convulsions ([facility]      Medications:  Reviewed and reconciled. Allergies:  No Known Allergies    Physical Exam:  BP (!) 150/85 (Site: Left Upper Arm, Position: Sitting, Cuff Size: Medium Adult)   Pulse 118   Temp 98.5 °F (36.9 °C) (Temporal)   Ht 5' 10\" (1.778 m)   Wt 124 lb 1.6 oz (56.3 kg)   BMI 17.81 kg/m²   GENERAL: Alert, oriented x 3, tired. HEENT: PERRLA; EOMI. Oropharynx clear. NECK: Supple. Without lymphadenopathy. LUNGS: Good air entry bilaterally. No wheezing, crackles or ronchi. CARDIOVASCULAR: Regular rate. No murmurs, rubs or gallops. ABDOMEN: Soft. Non-tender, non-distended. Positive bowel sounds. EXTREMITIES: Without clubbing, cyanosis, or edema. NEUROLOGIC: No focal deficits. ECOG PS 1-2    Impression/Plan:  75 yo male with pancreatic lesion and Biliary obstruction-IGG elevated; was on prednisone for ? autoimmune pancreatitis per [facility] GI/hepatology recommendations. Non-compliant with  appointments. 3 prior attempts (2 EUS and aborted whipple; no path showing invasive adenoca). Recent candidemia w/ biliary stent; on Eraxis, also on Vanc/Zosyn, ID following. ZOË negative for vegetations.   New masses in

## 2019-06-20 NOTE — TELEPHONE ENCOUNTER
Met with patient in the treatment room during his  chemotherapy treatment for follow up. Patient states that he is doing very well. Reports no questions or concerns at this time. Reports eating and sleeping well. Provided support and encouragement. Denies any current needs for assistance and encouraged to call with any questions or concerns. Patient verbalized understanding and appreciative of visit. Will continue to follow.

## 2019-06-20 NOTE — PROGRESS NOTES
Patient tolerated infusion well without complaint. Port flushed per protocol and de-accessed. Patient discharged back to facility.

## 2019-06-23 LAB — CA 19-9: 1712 U/ML (ref 0–37)

## 2019-06-27 ENCOUNTER — HOSPITAL ENCOUNTER (OUTPATIENT)
Dept: INFUSION THERAPY | Age: 69
End: 2019-06-27
Payer: MEDICARE

## 2019-07-03 ENCOUNTER — TELEPHONE (OUTPATIENT)
Dept: ONCOLOGY | Age: 69
End: 2019-07-03

## 2019-07-03 ENCOUNTER — TELEPHONE (OUTPATIENT)
Dept: FAMILY MEDICINE CLINIC | Age: 69
End: 2019-07-03

## 2019-07-11 ENCOUNTER — HOSPITAL ENCOUNTER (OUTPATIENT)
Dept: INFUSION THERAPY | Age: 69
Discharge: HOME | End: 2019-07-11
Payer: MEDICARE

## 2019-07-11 DIAGNOSIS — C24.1 AMPULLARY CARCINOMA (HCC): Primary | ICD-10-CM

## 2019-07-11 RX ORDER — HEPARIN SODIUM (PORCINE) LOCK FLUSH IV SOLN 100 UNIT/ML 100 UNIT/ML
500 SOLUTION INTRAVENOUS PRN
Status: DISCONTINUED | OUTPATIENT
Start: 2019-07-11 | End: 2019-07-12 | Stop reason: HOSPADM

## 2019-07-11 RX ORDER — SODIUM CHLORIDE 0.9 % (FLUSH) 0.9 %
10 SYRINGE (ML) INJECTION PRN
Status: CANCELLED | OUTPATIENT
Start: 2019-07-11

## 2019-07-11 RX ORDER — SODIUM CHLORIDE 0.9 % (FLUSH) 0.9 %
10 SYRINGE (ML) INJECTION PRN
Status: DISCONTINUED | OUTPATIENT
Start: 2019-07-11 | End: 2019-07-12 | Stop reason: HOSPADM

## 2019-07-11 RX ORDER — HEPARIN SODIUM (PORCINE) LOCK FLUSH IV SOLN 100 UNIT/ML 100 UNIT/ML
500 SOLUTION INTRAVENOUS PRN
Status: CANCELLED | OUTPATIENT
Start: 2019-07-11

## 2019-07-15 ENCOUNTER — HOSPITAL ENCOUNTER (EMERGENCY)
Age: 69
Discharge: HOME | End: 2019-07-15
Attending: EMERGENCY MEDICINE
Payer: COMMERCIAL

## 2019-07-15 VITALS
RESPIRATION RATE: 18 BRPM | OXYGEN SATURATION: 94 % | WEIGHT: 109 LBS | DIASTOLIC BLOOD PRESSURE: 78 MMHG | HEART RATE: 78 BPM | HEIGHT: 70 IN | BODY MASS INDEX: 15.6 KG/M2 | SYSTOLIC BLOOD PRESSURE: 108 MMHG | TEMPERATURE: 97.5 F

## 2019-07-15 DIAGNOSIS — C25.9 MALIGNANT NEOPLASM OF PANCREAS, UNSPECIFIED LOCATION OF MALIGNANCY (HCC): Primary | ICD-10-CM

## 2019-07-15 PROCEDURE — 6360000002 HC RX W HCPCS: Performed by: EMERGENCY MEDICINE

## 2019-07-15 PROCEDURE — 99285 EMERGENCY DEPT VISIT HI MDM: CPT

## 2019-07-15 RX ORDER — MORPHINE SULFATE 2 MG/ML
2 INJECTION, SOLUTION INTRAMUSCULAR; INTRAVENOUS EVERY 4 HOURS PRN
Status: DISCONTINUED | OUTPATIENT
Start: 2019-07-15 | End: 2019-07-15 | Stop reason: HOSPADM

## 2019-07-15 RX ADMIN — MORPHINE SULFATE 2 MG: 2 INJECTION, SOLUTION INTRAMUSCULAR; INTRAVENOUS at 18:53

## 2019-07-15 ASSESSMENT — PAIN SCALES - GENERAL: PAINLEVEL_OUTOF10: 7

## 2019-07-15 ASSESSMENT — PAIN DESCRIPTION - LOCATION: LOCATION: ABDOMEN

## 2019-07-15 ASSESSMENT — PAIN DESCRIPTION - PAIN TYPE: TYPE: ACUTE PAIN

## 2019-07-15 NOTE — ED PROVIDER NOTES
because she believes that's what he would want and his cancer is worsening. He currently cannot respond for himself. She is not sure if she understands what is going on at this point. 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 --------------------------------------------- PAST HISTORY ---------------------------------------------  Past Medical History:  has a past medical history of Acute respiratory failure ([facility] Bile duct obstruction, Cancer ([facility] Cerebral artery occlusion with cerebral infarction [facility]), Cognitive communication deficit, Encephalopathy, Hypertension, Liver abscess, Mycosis, Pulmonary emboli ([facility] Sequelae of protein-calorie malnutrition ([facility] Syncope, and Unspecified convulsions ([facility] Past Surgical History:  has a past surgical history that includes whipple procedure (N/A, 1/16/2019); eye surgery; Upper gastrointestinal endoscopy (N/A, 3/8/2019); Upper gastrointestinal endoscopy (N/A, 3/8/2019); ERCP (N/A, 3/25/2019); picc line insertion nurse (03/27/2019); CYSTOSCOPY INSERTION / REMOVAL STENT / STONE (N/A, 5/5/2019); transesophageal echocardiogram (05/06/2019); TUNNELED CENTRAL VENOUS CATHETER W/ SUBCUTANEOUS PORT (Right, 06/19/2019); and INSERTION / REMOVAL / REPLACEMENT VENOUS ACCESS CATHETER (N/A, 6/19/2019). Social History:  reports that he has never smoked. He has never used smokeless tobacco. He reports that he drinks about 8.0 standard drinks of alcohol per week. He reports that he does not use drugs. Family History: family history includes Cancer (age of onset: 50) in his mother. The patients home medications have been reviewed. Allergies: Patient has no known allergies.     -------------------------------------------------- RESULTS -------------------------------------------------  Labs:  No results found for this visit

## 2019-07-15 NOTE — CARE COORDINATION
Social Work/ Discharge Planning:    Pt presents to the ED secondary to altered mental status. Pt is from home and active with [facility]. Per ED physician, discussion had with pt's wife, and pt is now [facility]. SW met with pt's wife and [facility] hosp[facility] social worker Mitzi Needle [phone]). Per Melecio Benson, pt has been active with their hospice care since 6/28. Plan will be for pt to return home with his wife at [address] [place]. Melecio Benson will order DME needed for home and arranged transport home. Sav Pressley RN, aware.

## 2019-07-17 ENCOUNTER — PREP FOR PROCEDURE (OUTPATIENT)
Dept: GASTROENTEROLOGY | Age: 69
End: 2019-07-17

## 2019-07-17 DIAGNOSIS — R10.84 GENERALIZED ABDOMINAL PAIN: Primary | ICD-10-CM

## 2019-07-17 RX ORDER — SODIUM CHLORIDE 9 MG/ML
INJECTION, SOLUTION INTRAVENOUS CONTINUOUS
Status: CANCELLED | OUTPATIENT
Start: 2019-07-17

## 2019-07-17 RX ORDER — SODIUM CHLORIDE 0.9 % (FLUSH) 0.9 %
10 SYRINGE (ML) INJECTION EVERY 12 HOURS SCHEDULED
Status: CANCELLED | OUTPATIENT
Start: 2019-07-17

## 2019-08-07 ENCOUNTER — TELEPHONE (OUTPATIENT)
Dept: ONCOLOGY | Age: 69
End: 2019-08-07

## 2019-08-07 NOTE — TELEPHONE ENCOUNTER
Rebeca Rivas called requesting pt diagnosis. States she is filling out forms and needed correct diagnosis.

## 2021-06-05 NOTE — PRE SEDATION
Federica Thompson II, MD  5/6/2019  1:40 PM        PRE-SEDATION PHYSICIAN ASSESSMENT:      1. HISTORY & PHYSICAL EXAMINATION:  Comments: none    Vitals:    05/06/19 1335   BP: 133/71   Pulse: 88   Resp:    Temp:    SpO2: 100%       Allergies: Patient has no known allergies. 2. Heart and Lungs immediately prior to procedure demonstrate no contraindications to proceed      Chief Complaint: Hyperosmolar hyperglycemic coma due to diabetes mellitus without ketoacidosis ([facility]    Drug: unknown  Tobacco: unknown    3. PAST ANESTHESIA EXPERIENCE:  unknown. 4. AIRWAY/TEETH/HEAD & NECK(Mallampati Classification):  II (soft palate, uvula, fauces visible)    5: NORMAL RANGE OF MOTION OF NECK: No    6. PATIENT WILL LIKELY TOLERATE PLAN OF MODERATE SEDATION    7. ASA 2.     Heber Tomas MD normal...

## 2021-07-30 NOTE — PROGRESS NOTES
Sitting EOB:  Ind  Dynamic Standing: Mod Ind. Pt is A & O x place and person. Cues for time. Sensation:  Pt denies numbness and tingling to extremities  Edema:  BLE. Patient education  Pt educated on  call light for safety and need for mobility. Patient response to education:   Pt verbalized understanding Pt demonstrated skill Pt requires further education in this area   yes yes none     Comments:  Patient was seen this date for PT evaluation. Results of the functional assessment are noted above. Upon entering the room patient was found in supine position in bed. Required encouragement to complete evaluation. Cues required for all functional mobility as well as increased time. Patient due to BLE weakness had difficulty completing gait. Following assessment patient up in bedside chair. Call light placed within reach and all lines were intact. This patient can benefit from the continuation of skilled PT in possible rehab setting. to maximize functional level and return to PLOF. Pts/ family goals   1. Did not state. Patient and or family understand(s) diagnosis, prognosis, and plan of care. yes    PLAN  PT care will be provided in accordance with the objectives noted above. Whenever appropriate, clear delegation orders will be provided for nursing staff. Exercises and functional mobility practice will be used as well as appropriate assistive devices or modalities to obtain goals. Patient and family education will also be administered as needed. Frequency of treatments: 2-5x/week x 7-10 days.       Time in  0740  Time out  [address], [address] [place] [facility] Negative

## (undated) DEVICE — MAX-CORE® DISPOSABLE CORE BIOPSY INSTRUMENT, 18G X 20CM: Brand: MAX-CORE

## (undated) DEVICE — CHLORAPREP 26ML ORANGE

## (undated) DEVICE — GOWN,SIRUS,FABRNF,XL,20/CS: Brand: MEDLINE

## (undated) DEVICE — BAG DRNGE COMB PK

## (undated) DEVICE — CLAMP TONSIL

## (undated) DEVICE — SKIN AFFIX SURG ADHESIVE 72/CS 0.55ML: Brand: MEDLINE

## (undated) DEVICE — GOWN,SIRUS,FABRNF,L,20/CS: Brand: MEDLINE

## (undated) DEVICE — DOUBLE BASIN SET: Brand: MEDLINE INDUSTRIES, INC.

## (undated) DEVICE — SOLUTION IV 100ML 0.9% SOD CHL PLAS CONT USP VIAFLX 1 PER

## (undated) DEVICE — Z INACTIVE USE 2641837 CLIP LIG M BLU TI HRT SHP WIRE HORZ 600 PER BX

## (undated) DEVICE — 4-PORT MANIFOLD: Brand: NEPTUNE 2

## (undated) DEVICE — Device

## (undated) DEVICE — SPONGE GZ W4XL4IN RAYON POLY FILL CVR W/ NONWOVEN FAB

## (undated) DEVICE — BLOCK BITE 60FR RUBBER ADLT DENTAL

## (undated) DEVICE — SUTURE BOOTIES, YELLOW, STERILE, 5 PAIR/PAD; 5 PADS/BOX: Brand: KEY SURGICAL SUTURE BOOTIES

## (undated) DEVICE — SYSTEM BX CAP BILI RAP EXCHG CAP LOK DEV COMPATIBLE W/ OLY

## (undated) DEVICE — PATIENT RETURN ELECTRODE, SINGLE-USE, CONTACT QUALITY MONITORING, ADULT, WITH 9FT CORD, FOR PATIENTS WEIGING OVER 33LBS. (15KG): Brand: MEGADYNE

## (undated) DEVICE — CATHETER,URETHRAL,REDRUBBER,STERILE,8FR: Brand: MEDLINE

## (undated) DEVICE — DECANTER: Brand: UNBRANDED

## (undated) DEVICE — SINGLE USE ASPIRATION NEEDLE: Brand: SINGLE USE ASPIRATION NEEDLE NA-U200H

## (undated) DEVICE — SPHINCTEROTOME: Brand: DREAMTOME™ RX 44

## (undated) DEVICE — SEAL ENDOSCP INSTR 7FR BX SELF SEAL

## (undated) DEVICE — COVER,TABLE,60X90,STERILE: Brand: MEDLINE

## (undated) DEVICE — SET INSTR ART 1

## (undated) DEVICE — Z INACTIVE USE 2635503 SOLUTION IRRIG 3000ML ST H2O USP UROMATIC PLAS CONT

## (undated) DEVICE — SPONGE,PEANUT,XRAY,ST,SM,3/8",5/CARD: Brand: MEDLINE INDUSTRIES, INC.

## (undated) DEVICE — SPONGE,LAP,4"X18",XR,ST,5/PK,40PK/CS: Brand: MEDLINE INDUSTRIES, INC.

## (undated) DEVICE — CLIP INT M L GRN TI TRNSVRS GRV CHEVRON SHP W/ PRECIS TIP

## (undated) DEVICE — SET CLAMP NEONATAL VASCUALR

## (undated) DEVICE — BAG DRAINAGE CONTAINER 15 LT FLUID COLLCTN

## (undated) DEVICE — DRAPE C ARM W41XL74IN UNIV MOB W RUBBERBAND CLP

## (undated) DEVICE — SPECIMEN CUP W/LID: Brand: DEROYAL

## (undated) DEVICE — PACK PROCEDURE SURG GEN CUST

## (undated) DEVICE — SPONGE LAP W18XL18IN WHT COT 4 PLY FLD STRUNG RADPQ DISP ST

## (undated) DEVICE — AGENT HEMSTAT W4XL8IN OXIDIZED REGENERATED CELOS ABSRB

## (undated) DEVICE — DRAPE,CHEST,FENES,15X10,STERIL: Brand: MEDLINE

## (undated) DEVICE — RETRACTOR THOMPSON 1

## (undated) DEVICE — CYSTO PACK: Brand: MEDLINE INDUSTRIES, INC.

## (undated) DEVICE — CANNULA NSL ORAL AD FOR CAPNOFLEX CO2 O2 AIRLFE

## (undated) DEVICE — STANDARD HYPODERMIC NEEDLE,POLYPROPYLENE HUB: Brand: MONOJECT

## (undated) DEVICE — Z DISCONTINUED BY MEDLINE USE 2661081 SHEARS SEAL L20CM DIA5MM ULTRASONIC CRV TIP HARM HD 1000I

## (undated) DEVICE — SOLUTION IV IRRIG WATER 1000ML POUR BRL 2F7114

## (undated) DEVICE — SYSTEM BX 25GA FN NDL SIX DST CUT EDG SHARKCORE

## (undated) DEVICE — SOLUTION IV IRRIG POUR BRL 0.9% SODIUM CHL 2F7124

## (undated) DEVICE — SYRINGE MED 10ML POLYPR LUERSLIP TIP FLAT TOP W/O SFTY DISP

## (undated) DEVICE — TOTAL TRAY, 16FR 10ML SIL FOLEY, URN: Brand: MEDLINE

## (undated) DEVICE — CATHETER URETH 22FR BLLN 5CC STD LTX 2 W TWO OPP DRNGE EYE

## (undated) DEVICE — GLOVE ORANGE PI 7 1/2   MSG9075

## (undated) DEVICE — COUNTER NDL 30 COUNT DBL MAG

## (undated) DEVICE — 3M™ IOBAN™ 2 ANTIMICROBIAL INCISE DRAPE 6650EZ: Brand: IOBAN™ 2

## (undated) DEVICE — GARMENT,MEDLINE,DVT,INT,CALF,MED, GEN2: Brand: MEDLINE

## (undated) DEVICE — GAUZE,SPONGE,4"X4",16PLY,XRAY,STRL,LF: Brand: MEDLINE

## (undated) DEVICE — SET HORIZON HEMOCLIP APPLIER

## (undated) DEVICE — CONTROL SYRINGE LUER-LOCK TIP: Brand: MONOJECT

## (undated) DEVICE — DRIP REDUCTION MANIFOLD

## (undated) DEVICE — DRAPE,REIN 53X77,STERILE: Brand: MEDLINE

## (undated) DEVICE — READY WET SKIN SCRUB TRAY-LF: Brand: MEDLINE INDUSTRIES, INC.

## (undated) DEVICE — CLEANER,CAUTERY TIP,2X2",STERILE: Brand: MEDLINE

## (undated) DEVICE — PAD,NON-ADHERENT,3X8,STERILE,LF,1/PK: Brand: MEDLINE

## (undated) DEVICE — 3M™ BAIR HUGGER® UNDERBODY BLANKET, FULL ACCESS, 10 PER CASE 63500: Brand: BAIR HUGGER™

## (undated) DEVICE — CAMERA STRYKER 1488 HD GEN

## (undated) DEVICE — TOWEL,OR,DSP,ST,BLUE,STD,6/PK,12PK/CS: Brand: MEDLINE

## (undated) DEVICE — DILATOR/SHEATH SET: Brand: 8/10 DILATOR/SHEATH SET

## (undated) DEVICE — SET SURG INSTR ART III

## (undated) DEVICE — DRAINBAG,ANTI-REFLUX TOWER,L/F,2000ML,LL: Brand: MEDLINE

## (undated) DEVICE — SET INSTRUMENT LAP I

## (undated) DEVICE — RETRIEVAL BALLOON CATHETER: Brand: EXTRACTOR™ PRO RX

## (undated) DEVICE — DRAPE THER FLUID WARMING 66X44 IN FLAT SLUSH DBL DISC ORS

## (undated) DEVICE — SURGICAL PROCEDURE PACK TRAUM

## (undated) DEVICE — INTENDED FOR TISSUE SEPARATION, AND OTHER PROCEDURES THAT REQUIRE A SHARP SURGICAL BLADE TO PUNCTURE OR CUT.: Brand: BARD-PARKER ® STAINLESS STEEL BLADES

## (undated) DEVICE — MAGNETIC INSTR DRAPE 20X16: Brand: MEDLINE INDUSTRIES, INC.

## (undated) DEVICE — GRADUATE TRIANG MEASURE 1000ML BLK PRNT

## (undated) DEVICE — AMPLATZ TYPE RENAL DILATOR/SHEATH SET

## (undated) DEVICE — RELOAD STPL L75MM OPN H3.8MM CLS 1.5MM WIRE DIA0.2MM REG

## (undated) DEVICE — GLOVE SURG SZ 75 L12IN FNGR THK94MIL TRNSLUC YEL LTX